# Patient Record
Sex: MALE | Race: WHITE | Employment: PART TIME | URBAN - METROPOLITAN AREA
[De-identification: names, ages, dates, MRNs, and addresses within clinical notes are randomized per-mention and may not be internally consistent; named-entity substitution may affect disease eponyms.]

---

## 2017-10-17 ENCOUNTER — HOSPITAL ENCOUNTER (INPATIENT)
Facility: CLINIC | Age: 57
LOS: 2 days | Discharge: ANOTHER HOSPITAL | End: 2017-10-19
Attending: INTERNAL MEDICINE | Admitting: INTERNAL MEDICINE
Payer: MEDICAID

## 2017-10-17 ENCOUNTER — APPOINTMENT (OUTPATIENT)
Dept: CT IMAGING | Facility: CLINIC | Age: 57
End: 2017-10-17
Attending: INTERNAL MEDICINE
Payer: MEDICAID

## 2017-10-17 ENCOUNTER — APPOINTMENT (OUTPATIENT)
Dept: GENERAL RADIOLOGY | Facility: CLINIC | Age: 57
End: 2017-10-17
Attending: INTERNAL MEDICINE
Payer: MEDICAID

## 2017-10-17 DIAGNOSIS — K92.2 GI BLEED: ICD-10-CM

## 2017-10-17 LAB
ALBUMIN SERPL-MCNC: 2.9 G/DL (ref 3.4–5)
ALP SERPL-CCNC: 66 U/L (ref 40–150)
ALT SERPL W P-5'-P-CCNC: 10 U/L (ref 0–70)
ANION GAP SERPL CALCULATED.3IONS-SCNC: 6 MMOL/L (ref 3–14)
ANISOCYTOSIS BLD QL SMEAR: ABNORMAL
APTT PPP: 27 SEC (ref 22–37)
AST SERPL W P-5'-P-CCNC: 10 U/L (ref 0–45)
BASOPHILS # BLD AUTO: 0 10E9/L (ref 0–0.2)
BASOPHILS NFR BLD AUTO: 0.1 %
BILIRUB SERPL-MCNC: 0.3 MG/DL (ref 0.2–1.3)
BLD PROD TYP BPU: NORMAL
BLD UNIT ID BPU: 0
BLOOD PRODUCT CODE: NORMAL
BPU ID: NORMAL
BUN SERPL-MCNC: 22 MG/DL (ref 7–30)
CALCIUM SERPL-MCNC: 7.8 MG/DL (ref 8.5–10.1)
CHLORIDE SERPL-SCNC: 109 MMOL/L (ref 94–109)
CO2 BLDCOV-SCNC: 20 MMOL/L (ref 21–28)
CO2 SERPL-SCNC: 24 MMOL/L (ref 20–32)
CREAT SERPL-MCNC: 0.91 MG/DL (ref 0.66–1.25)
DACRYOCYTES BLD QL SMEAR: SLIGHT
DIFFERENTIAL METHOD BLD: ABNORMAL
EOSINOPHIL # BLD AUTO: 0 10E9/L (ref 0–0.7)
EOSINOPHIL NFR BLD AUTO: 0.1 %
ERYTHROCYTE [DISTWIDTH] IN BLOOD BY AUTOMATED COUNT: 20.9 % (ref 10–15)
ETHANOL SERPL-MCNC: <0.01 G/DL
GFR SERPL CREATININE-BSD FRML MDRD: 87 ML/MIN/1.7M2
GLUCOSE SERPL-MCNC: 159 MG/DL (ref 70–99)
HCT VFR BLD AUTO: 13.3 % (ref 40–53)
HEMOCCULT STL QL: POSITIVE
HGB BLD-MCNC: 3.5 G/DL (ref 13.3–17.7)
HYPOCHROMIA BLD QL: PRESENT
IMM GRANULOCYTES # BLD: 0.1 10E9/L (ref 0–0.4)
IMM GRANULOCYTES NFR BLD: 0.8 %
INR PPP: 1.26 (ref 0.86–1.14)
LACTATE BLD-SCNC: 3 MMOL/L (ref 0.7–2.1)
LYMPHOCYTES # BLD AUTO: 2.1 10E9/L (ref 0.8–5.3)
LYMPHOCYTES NFR BLD AUTO: 13.6 %
MCH RBC QN AUTO: 17.5 PG (ref 26.5–33)
MCHC RBC AUTO-ENTMCNC: 26.3 G/DL (ref 31.5–36.5)
MCV RBC AUTO: 67 FL (ref 78–100)
MICROCYTES BLD QL SMEAR: PRESENT
MONOCYTES # BLD AUTO: 1.3 10E9/L (ref 0–1.3)
MONOCYTES NFR BLD AUTO: 8.2 %
NEUTROPHILS # BLD AUTO: 12.1 10E9/L (ref 1.6–8.3)
NEUTROPHILS NFR BLD AUTO: 77.2 %
NRBC # BLD AUTO: 0 10*3/UL
NRBC BLD AUTO-RTO: 0 /100
OVALOCYTES BLD QL SMEAR: SLIGHT
PCO2 BLDV: 25 MM HG (ref 40–50)
PH BLDV: 7.52 PH (ref 7.32–7.43)
PLATELET # BLD AUTO: 379 10E9/L (ref 150–450)
PLATELET # BLD EST: NORMAL 10*3/UL
PO2 BLDV: 19 MM HG (ref 25–47)
POTASSIUM SERPL-SCNC: 3.7 MMOL/L (ref 3.4–5.3)
PROT SERPL-MCNC: 5.9 G/DL (ref 6.8–8.8)
RBC # BLD AUTO: 2 10E12/L (ref 4.4–5.9)
SAO2 % BLDV FROM PO2: 39 %
SODIUM SERPL-SCNC: 139 MMOL/L (ref 133–144)
TRANSFUSION STATUS PATIENT QL: NORMAL
WBC # BLD AUTO: 15.7 10E9/L (ref 4–11)

## 2017-10-17 PROCEDURE — 25000125 ZZHC RX 250: Performed by: INTERNAL MEDICINE

## 2017-10-17 PROCEDURE — 87640 STAPH A DNA AMP PROBE: CPT | Performed by: INTERNAL MEDICINE

## 2017-10-17 PROCEDURE — 71010 XR CHEST PORT 1 VW: CPT

## 2017-10-17 PROCEDURE — 85730 THROMBOPLASTIN TIME PARTIAL: CPT | Performed by: INTERNAL MEDICINE

## 2017-10-17 PROCEDURE — 40000986 XR CHEST PORT 1 VW

## 2017-10-17 PROCEDURE — 25000128 H RX IP 250 OP 636: Performed by: INTERNAL MEDICINE

## 2017-10-17 PROCEDURE — 82803 BLOOD GASES ANY COMBINATION: CPT

## 2017-10-17 PROCEDURE — 85610 PROTHROMBIN TIME: CPT | Performed by: INTERNAL MEDICINE

## 2017-10-17 PROCEDURE — 86900 BLOOD TYPING SEROLOGIC ABO: CPT | Performed by: INTERNAL MEDICINE

## 2017-10-17 PROCEDURE — 20000003 ZZH R&B ICU

## 2017-10-17 PROCEDURE — 86901 BLOOD TYPING SEROLOGIC RH(D): CPT | Performed by: INTERNAL MEDICINE

## 2017-10-17 PROCEDURE — P9059 PLASMA, FRZ BETWEEN 8-24HOUR: HCPCS | Performed by: INTERNAL MEDICINE

## 2017-10-17 PROCEDURE — 83605 ASSAY OF LACTIC ACID: CPT

## 2017-10-17 PROCEDURE — 40000344 ZZHCL STATISTIC THAWING COMPONENT: Performed by: INTERNAL MEDICINE

## 2017-10-17 PROCEDURE — 85025 COMPLETE CBC W/AUTO DIFF WBC: CPT | Performed by: INTERNAL MEDICINE

## 2017-10-17 PROCEDURE — 87641 MR-STAPH DNA AMP PROBE: CPT | Performed by: INTERNAL MEDICINE

## 2017-10-17 PROCEDURE — 80053 COMPREHEN METABOLIC PANEL: CPT | Performed by: INTERNAL MEDICINE

## 2017-10-17 PROCEDURE — 36569 INSJ PICC 5 YR+ W/O IMAGING: CPT

## 2017-10-17 PROCEDURE — 82272 OCCULT BLD FECES 1-3 TESTS: CPT | Performed by: INTERNAL MEDICINE

## 2017-10-17 PROCEDURE — 86850 RBC ANTIBODY SCREEN: CPT | Performed by: INTERNAL MEDICINE

## 2017-10-17 PROCEDURE — 25000131 ZZH RX MED GY IP 250 OP 636 PS 637: Performed by: INTERNAL MEDICINE

## 2017-10-17 PROCEDURE — S0164 INJECTION PANTROPRAZOLE: HCPCS | Performed by: INTERNAL MEDICINE

## 2017-10-17 PROCEDURE — 80320 DRUG SCREEN QUANTALCOHOLS: CPT | Performed by: INTERNAL MEDICINE

## 2017-10-17 PROCEDURE — P9016 RBC LEUKOCYTES REDUCED: HCPCS | Performed by: INTERNAL MEDICINE

## 2017-10-17 PROCEDURE — 00000146 ZZHCL STATISTIC GLUCOSE BY METER IP

## 2017-10-17 PROCEDURE — 70450 CT HEAD/BRAIN W/O DYE: CPT

## 2017-10-17 PROCEDURE — 86923 COMPATIBILITY TEST ELECTRIC: CPT | Performed by: INTERNAL MEDICINE

## 2017-10-17 PROCEDURE — 99223 1ST HOSP IP/OBS HIGH 75: CPT | Mod: AI | Performed by: INTERNAL MEDICINE

## 2017-10-17 RX ORDER — LORAZEPAM 2 MG/ML
1-2 INJECTION INTRAMUSCULAR EVERY 30 MIN PRN
Status: DISCONTINUED | OUTPATIENT
Start: 2017-10-17 | End: 2017-10-19 | Stop reason: HOSPADM

## 2017-10-17 RX ORDER — ONDANSETRON 4 MG/1
4 TABLET, ORALLY DISINTEGRATING ORAL EVERY 6 HOURS PRN
Status: DISCONTINUED | OUTPATIENT
Start: 2017-10-17 | End: 2017-10-19 | Stop reason: HOSPADM

## 2017-10-17 RX ORDER — ONDANSETRON 2 MG/ML
4 INJECTION INTRAMUSCULAR; INTRAVENOUS ONCE
Status: DISCONTINUED | OUTPATIENT
Start: 2017-10-17 | End: 2017-10-17

## 2017-10-17 RX ORDER — NALOXONE HYDROCHLORIDE 0.4 MG/ML
.1-.4 INJECTION, SOLUTION INTRAMUSCULAR; INTRAVENOUS; SUBCUTANEOUS
Status: DISCONTINUED | OUTPATIENT
Start: 2017-10-17 | End: 2017-10-19

## 2017-10-17 RX ORDER — ONDANSETRON 2 MG/ML
4 INJECTION INTRAMUSCULAR; INTRAVENOUS EVERY 6 HOURS PRN
Status: DISCONTINUED | OUTPATIENT
Start: 2017-10-17 | End: 2017-10-19 | Stop reason: HOSPADM

## 2017-10-17 RX ORDER — ACETAMINOPHEN 325 MG/1
650 TABLET ORAL EVERY 4 HOURS PRN
Status: DISCONTINUED | OUTPATIENT
Start: 2017-10-17 | End: 2017-10-19 | Stop reason: HOSPADM

## 2017-10-17 RX ORDER — OCTREOTIDE ACETATE 50 UG/ML
50 INJECTION, SOLUTION INTRAVENOUS; SUBCUTANEOUS ONCE
Status: COMPLETED | OUTPATIENT
Start: 2017-10-17 | End: 2017-10-17

## 2017-10-17 RX ORDER — ONDANSETRON 2 MG/ML
4 INJECTION INTRAMUSCULAR; INTRAVENOUS ONCE
Status: COMPLETED | OUTPATIENT
Start: 2017-10-17 | End: 2017-10-17

## 2017-10-17 RX ORDER — LORAZEPAM 1 MG/1
1-2 TABLET ORAL EVERY 30 MIN PRN
Status: DISCONTINUED | OUTPATIENT
Start: 2017-10-17 | End: 2017-10-19 | Stop reason: HOSPADM

## 2017-10-17 RX ORDER — SODIUM CHLORIDE 9 MG/ML
INJECTION, SOLUTION INTRAVENOUS CONTINUOUS
Status: DISCONTINUED | OUTPATIENT
Start: 2017-10-17 | End: 2017-10-19 | Stop reason: HOSPADM

## 2017-10-17 RX ORDER — HYDROMORPHONE HYDROCHLORIDE 1 MG/ML
.3-.5 INJECTION, SOLUTION INTRAMUSCULAR; INTRAVENOUS; SUBCUTANEOUS
Status: DISCONTINUED | OUTPATIENT
Start: 2017-10-17 | End: 2017-10-19 | Stop reason: HOSPADM

## 2017-10-17 RX ORDER — SODIUM CHLORIDE 9 MG/ML
1000 INJECTION, SOLUTION INTRAVENOUS CONTINUOUS
Status: DISCONTINUED | OUTPATIENT
Start: 2017-10-17 | End: 2017-10-17

## 2017-10-17 RX ADMIN — ONDANSETRON 4 MG: 2 INJECTION INTRAMUSCULAR; INTRAVENOUS at 18:01

## 2017-10-17 RX ADMIN — SODIUM CHLORIDE 1000 ML: 9 INJECTION, SOLUTION INTRAVENOUS at 17:00

## 2017-10-17 RX ADMIN — SODIUM CHLORIDE 8 MG/HR: 9 INJECTION, SOLUTION INTRAVENOUS at 20:37

## 2017-10-17 RX ADMIN — SODIUM CHLORIDE 80 MG: 9 INJECTION, SOLUTION INTRAVENOUS at 20:20

## 2017-10-17 RX ADMIN — OCTREOTIDE ACETATE 50 MCG/HR: 200 INJECTION, SOLUTION INTRAVENOUS; SUBCUTANEOUS at 17:51

## 2017-10-17 RX ADMIN — OCTREOTIDE ACETATE 50 MCG: 50 INJECTION, SOLUTION INTRAVENOUS; SUBCUTANEOUS at 17:50

## 2017-10-17 RX ADMIN — FOLIC ACID: 5 INJECTION, SOLUTION INTRAMUSCULAR; INTRAVENOUS; SUBCUTANEOUS at 22:01

## 2017-10-17 ASSESSMENT — ENCOUNTER SYMPTOMS
NAUSEA: 1
DIARRHEA: 1
BLOOD IN STOOL: 1
ABDOMINAL PAIN: 1

## 2017-10-17 ASSESSMENT — ACTIVITIES OF DAILY LIVING (ADL): WHICH_OF_THE_ABOVE_FUNCTIONAL_RISKS_HAD_A_RECENT_ONSET_OR_CHANGE?: FALL HISTORY

## 2017-10-17 NOTE — ED NOTES
Lightheaded and dizzy for the past couple days.  Noticed black tarry stools and feeling short of breath.  States drinking a lot lately.  Fell at home on Sunday night after drinking heavily. Very pale.  Patient alert and oriented x3.  Airway and breathing  intact.

## 2017-10-17 NOTE — ED PROVIDER NOTES
"  History     Chief Complaint:  Dizziness    HPI   Dashawn Ordoñez is a 57 year old male with a history of alcohol abuse who presents with dizziness. The patient reports that two days ago he was drinking some milk when he lost consciousness and fell backwards, hitting his head on the floor. He has noted having some abdominal pain and diarrhea recently with some black tarry bowel movements.The patient notes that he no longer drinks every day and that Sunday before the fall he had not had any alcohol since 1500.    Allergies:  No Known Drug Allergies      Medications:    The patient is not currently taking any prescribed medications.     Past Medical History:    Depressive disorder  Diabetes  Hypertension  Alcohol abuse    Past Surgical History:    Appendectomy  ENT surgery    Family History:    The patient denies any relevant family medical history.     Social History:  The patient was accompanied to the ED by his wife.  Smoking Status: yes  Smokeless Tobacco: No  Alcohol Use: Yes   Marital Status:   [2]    Review of Systems   Gastrointestinal: Positive for abdominal pain, blood in stool, diarrhea and nausea.   Neurological: Positive for syncope.   All other systems reviewed and are negative.    Physical Exam   Vitals;  Patient Vitals for the past 24 hrs:   BP Temp Temp src Pulse Heart Rate Resp SpO2 Height Weight   10/17/17 1604 106/62 98.5  F (36.9  C) Oral 109 109 24 100 % 1.778 m (5' 10\") 74.8 kg (165 lb)      Physical Exam   Constitutional: He is cooperative.   HENT:   Right Ear: Tympanic membrane normal.   Left Ear: Tympanic membrane normal.   Mouth/Throat: Oropharynx is clear and moist and mucous membranes are normal.   Eyes: Conjunctivae are normal.   Neck: Normal range of motion.   Cardiovascular: Regular rhythm and normal heart sounds.    Pulmonary/Chest: Effort normal and breath sounds normal.   Abdominal: Soft. Normal appearance and bowel sounds are normal. There is tenderness in the epigastric area. " There is no rebound and no guarding.   Genitourinary: Rectal exam shows guaiac positive stool.   Genitourinary Comments: Melanotic stool   Musculoskeletal: Normal range of motion.   Lymphadenopathy:     He has no cervical adenopathy.   Neurological: He is alert.   Skin: Skin is warm and dry. There is pallor.   Psychiatric: He has a normal mood and affect.       Emergency Department Course   ECG:  ECG taken at 1723, ECG read at 1747  Normal sinus rhythm. Right bundle branch block that is new. Abnormal ECG  Rate 100 bpm. [place] interval 140. QRS duration 130. QT/QTc 416/536. P-R-T axes 0, 5, 23.     Imaging:  Radiology findings were communicated with the patient who voiced understanding of the findings.  Chest XR, 1 view portable:  IMPRESSION: The lungs appear clear. No pleural effusion.  Reading per radiology.     Laboratory:  Laboratory findings were communicated with the patient who voiced understanding of the findings.  ISTAT gases lactate venous: pH: 7.52, PCO2: 25(L), PO2: 19(L), Bicarbonate: 20(L), O2 sat: 39, Lactic acid: 3.0(H)  CBC: WBC: 15.7(H), RBC: 2.00(L), HGB: 3.5(LL), HCT: 13.3(L), MCV: 67(L), MCH: 17.5(L), MCHC: 26.3(L), RDW: 20.9(H), o/w WNL ()  CMP: Glucose: 159(H), Calcium: 7.8(L), Albumin: 2.9(L), Protein total: 5.9(L), o/w WNL (Creatinine 0.91)   ABO/Rh type and screen: O negative, antibody screen: negative, 4 units ordered  Alcohol level: <0.01  INR: 1.26(H)  PTT: 27  Stool occult blood: Positive    Interventions:  1750 Sandostatin 50 mcg IV  1751 Sandostatin 50 mcg/hr IV  1801 Zofran 4 mg IV   1804 Normal Saline 1000 mL IV   4 units of blood administered    Emergency Department Course:  Nursing notes and vitals reviewed.  I performed an exam of the patient as documented above.   IV was inserted and blood was drawn for laboratory testing, results above.     1726 I spoke with Dr Paulino martha [facility] regarding the patient    I discussed the treatment plan with the patient. They expressed  understanding of this plan and consented to admission. I discussed the patient with Dr Eckert, who will admit the patient to a monitored bed for further evaluation and treatment.     I personally reviewed the laboratory results with the Patient and answered all related questions prior to discharge.    Impression & Plan      Medical Decision Making:  Dashawn Ordoñez is a 57 year old male with a long history of alcohol abuse who presents to the emergency department today complaining of weakness and dark stool. He does have GI bleed with melanotic stool as well as remarkable anemia. He has a low MCV suggesting this is at least chronic to some extent. He has not actually vomited any blood. He does not have known cirrhosis or esophageal varices but given his risk factors, I have started octreotide. Recurrent epigastric distress which could indicate ulcer. He is suprisingly hemodynamically stable given his anemia. Blood has been ordered and is being transfused. We are having difficulty with peripheral access and PICC nurse is coming to establish a triple lumen line. As noted, I discussed the case with gastroenterology who did not feel the patient required immediate endoscopy unless he should have higher volume blood loss or vomiting blood. Given his recent fall and mild coagulopathy head CT is ordered. I discussed the case with Dr. Eckert of the hospitalist service. Patient will be admitted to the ICU for further evaluation and treatment.     Total critical care time was 50 minutes not including procedures    Diagnosis:    ICD-10-CM    1. GI bleed K92.2         Disposition:   Admitted    CMS Diagnoses: None     Scribe Disclosure:  Lucio ARZOLA, am serving as a scribe at 4:50 PM on 10/17/2017 to document services personally performed by Jaylyn Bradford MD, based on my observations and the provider's statements to me.   [facility] EMERGENCY DEPARTMENT       Jaylyn Bradford MD  10/17/17  1909

## 2017-10-18 LAB
ALBUMIN SERPL-MCNC: 2.7 G/DL (ref 3.4–5)
ALP SERPL-CCNC: 58 U/L (ref 40–150)
ALT SERPL W P-5'-P-CCNC: 12 U/L (ref 0–70)
ANION GAP SERPL CALCULATED.3IONS-SCNC: 5 MMOL/L (ref 3–14)
AST SERPL W P-5'-P-CCNC: 8 U/L (ref 0–45)
BILIRUB SERPL-MCNC: 0.5 MG/DL (ref 0.2–1.3)
BLD PROD TYP BPU: NORMAL
BLD UNIT ID BPU: 0
BLOOD PRODUCT CODE: NORMAL
BPU ID: NORMAL
BUN SERPL-MCNC: 14 MG/DL (ref 7–30)
CALCIUM SERPL-MCNC: 7.1 MG/DL (ref 8.5–10.1)
CHLORIDE SERPL-SCNC: 113 MMOL/L (ref 94–109)
CO2 SERPL-SCNC: 24 MMOL/L (ref 20–32)
CREAT SERPL-MCNC: 0.88 MG/DL (ref 0.66–1.25)
ERYTHROCYTE [DISTWIDTH] IN BLOOD BY AUTOMATED COUNT: 21.2 % (ref 10–15)
GFR SERPL CREATININE-BSD FRML MDRD: >90 ML/MIN/1.7M2
GLUCOSE BLDC GLUCOMTR-MCNC: 122 MG/DL (ref 70–99)
GLUCOSE BLDC GLUCOMTR-MCNC: 135 MG/DL (ref 70–99)
GLUCOSE BLDC GLUCOMTR-MCNC: 135 MG/DL (ref 70–99)
GLUCOSE BLDC GLUCOMTR-MCNC: 137 MG/DL (ref 70–99)
GLUCOSE BLDC GLUCOMTR-MCNC: 138 MG/DL (ref 70–99)
GLUCOSE BLDC GLUCOMTR-MCNC: 158 MG/DL (ref 70–99)
GLUCOSE SERPL-MCNC: 137 MG/DL (ref 70–99)
HBA1C MFR BLD: 5.6 % (ref 4.3–6)
HCT VFR BLD AUTO: 19.6 % (ref 40–53)
HGB BLD-MCNC: 5.9 G/DL (ref 13.3–17.7)
HGB BLD-MCNC: 7.4 G/DL (ref 13.3–17.7)
HGB BLD-MCNC: 7.8 G/DL (ref 13.3–17.7)
INTERPRETATION ECG - MUSE: NORMAL
INTERPRETATION ECG - MUSE: NORMAL
MCH RBC QN AUTO: 22.5 PG (ref 26.5–33)
MCHC RBC AUTO-ENTMCNC: 30.1 G/DL (ref 31.5–36.5)
MCV RBC AUTO: 75 FL (ref 78–100)
MRSA DNA SPEC QL NAA+PROBE: NEGATIVE
PLATELET # BLD AUTO: 246 10E9/L (ref 150–450)
POTASSIUM SERPL-SCNC: 4.1 MMOL/L (ref 3.4–5.3)
PROT SERPL-MCNC: 5.4 G/DL (ref 6.8–8.8)
RBC # BLD AUTO: 2.62 10E12/L (ref 4.4–5.9)
SODIUM SERPL-SCNC: 142 MMOL/L (ref 133–144)
SPECIMEN SOURCE: NORMAL
TRANSFUSION STATUS PATIENT QL: NORMAL
WBC # BLD AUTO: 10.3 10E9/L (ref 4–11)

## 2017-10-18 PROCEDURE — 25000132 ZZH RX MED GY IP 250 OP 250 PS 637: Performed by: INTERNAL MEDICINE

## 2017-10-18 PROCEDURE — S0164 INJECTION PANTROPRAZOLE: HCPCS | Performed by: INTERNAL MEDICINE

## 2017-10-18 PROCEDURE — 88305 TISSUE EXAM BY PATHOLOGIST: CPT | Performed by: INTERNAL MEDICINE

## 2017-10-18 PROCEDURE — P9016 RBC LEUKOCYTES REDUCED: HCPCS | Performed by: INTERNAL MEDICINE

## 2017-10-18 PROCEDURE — 0DB68ZX EXCISION OF STOMACH, VIA NATURAL OR ARTIFICIAL OPENING ENDOSCOPIC, DIAGNOSTIC: ICD-10-PCS | Performed by: INTERNAL MEDICINE

## 2017-10-18 PROCEDURE — G0500 MOD SEDAT ENDO SERVICE >5YRS: HCPCS | Performed by: INTERNAL MEDICINE

## 2017-10-18 PROCEDURE — 99233 SBSQ HOSP IP/OBS HIGH 50: CPT | Performed by: INTERNAL MEDICINE

## 2017-10-18 PROCEDURE — 80053 COMPREHEN METABOLIC PANEL: CPT | Performed by: INTERNAL MEDICINE

## 2017-10-18 PROCEDURE — 00000158 ZZHCL STATISTIC H-FISH PROCESS B/S: Performed by: INTERNAL MEDICINE

## 2017-10-18 PROCEDURE — 25000128 H RX IP 250 OP 636: Performed by: INTERNAL MEDICINE

## 2017-10-18 PROCEDURE — 43239 EGD BIOPSY SINGLE/MULTIPLE: CPT | Performed by: INTERNAL MEDICINE

## 2017-10-18 PROCEDURE — 85027 COMPLETE CBC AUTOMATED: CPT | Performed by: INTERNAL MEDICINE

## 2017-10-18 PROCEDURE — 88305 TISSUE EXAM BY PATHOLOGIST: CPT | Mod: 26 | Performed by: INTERNAL MEDICINE

## 2017-10-18 PROCEDURE — 00000146 ZZHCL STATISTIC GLUCOSE BY METER IP

## 2017-10-18 PROCEDURE — 25000125 ZZHC RX 250: Performed by: INTERNAL MEDICINE

## 2017-10-18 PROCEDURE — 20000003 ZZH R&B ICU

## 2017-10-18 PROCEDURE — 85018 HEMOGLOBIN: CPT | Performed by: INTERNAL MEDICINE

## 2017-10-18 PROCEDURE — 88377 M/PHMTRC ALYS ISHQUANT/SEMIQ: CPT | Performed by: PATHOLOGY

## 2017-10-18 PROCEDURE — 00000159 ZZHCL STATISTIC H-SEND OUTS PREP: Performed by: INTERNAL MEDICINE

## 2017-10-18 PROCEDURE — 83036 HEMOGLOBIN GLYCOSYLATED A1C: CPT | Performed by: INTERNAL MEDICINE

## 2017-10-18 RX ORDER — FENTANYL CITRATE 50 UG/ML
50 INJECTION, SOLUTION INTRAMUSCULAR; INTRAVENOUS
Status: COMPLETED | OUTPATIENT
Start: 2017-10-18 | End: 2017-10-18

## 2017-10-18 RX ORDER — NALOXONE HYDROCHLORIDE 0.4 MG/ML
.1-.4 INJECTION, SOLUTION INTRAMUSCULAR; INTRAVENOUS; SUBCUTANEOUS
Status: DISCONTINUED | OUTPATIENT
Start: 2017-10-18 | End: 2017-10-19

## 2017-10-18 RX ORDER — FLUMAZENIL 0.1 MG/ML
0.2 INJECTION, SOLUTION INTRAVENOUS
Status: DISCONTINUED | OUTPATIENT
Start: 2017-10-18 | End: 2017-10-19

## 2017-10-18 RX ORDER — FENTANYL CITRATE 50 UG/ML
25-50 INJECTION, SOLUTION INTRAMUSCULAR; INTRAVENOUS EVERY 5 MIN PRN
Status: DISPENSED | OUTPATIENT
Start: 2017-10-18 | End: 2017-10-19

## 2017-10-18 RX ORDER — LIDOCAINE 40 MG/G
CREAM TOPICAL
Status: DISCONTINUED | OUTPATIENT
Start: 2017-10-18 | End: 2017-10-18 | Stop reason: HOSPADM

## 2017-10-18 RX ADMIN — LIDOCAINE HYDROCHLORIDE 30 ML: 20 SOLUTION ORAL; TOPICAL at 03:09

## 2017-10-18 RX ADMIN — SODIUM CHLORIDE 8 MG/HR: 9 INJECTION, SOLUTION INTRAVENOUS at 14:24

## 2017-10-18 RX ADMIN — FENTANYL CITRATE 100 MCG: 50 INJECTION INTRAMUSCULAR; INTRAVENOUS at 14:16

## 2017-10-18 RX ADMIN — LORAZEPAM 1 MG: 1 TABLET ORAL at 21:34

## 2017-10-18 RX ADMIN — SODIUM CHLORIDE: 9 INJECTION, SOLUTION INTRAVENOUS at 08:27

## 2017-10-18 RX ADMIN — ACETAMINOPHEN 650 MG: 325 TABLET, FILM COATED ORAL at 19:46

## 2017-10-18 RX ADMIN — FOLIC ACID: 5 INJECTION, SOLUTION INTRAMUSCULAR; INTRAVENOUS; SUBCUTANEOUS at 21:46

## 2017-10-18 RX ADMIN — HYDROMORPHONE HYDROCHLORIDE 0.5 MG: 1 INJECTION, SOLUTION INTRAMUSCULAR; INTRAVENOUS; SUBCUTANEOUS at 08:20

## 2017-10-18 RX ADMIN — MIDAZOLAM 2 MG: 1 INJECTION INTRAMUSCULAR; INTRAVENOUS at 14:11

## 2017-10-18 RX ADMIN — MIDAZOLAM 1 MG: 1 INJECTION INTRAMUSCULAR; INTRAVENOUS at 14:18

## 2017-10-18 RX ADMIN — SODIUM CHLORIDE 8 MG/HR: 9 INJECTION, SOLUTION INTRAVENOUS at 03:44

## 2017-10-18 RX ADMIN — SODIUM CHLORIDE: 9 INJECTION, SOLUTION INTRAVENOUS at 19:37

## 2017-10-18 ASSESSMENT — PAIN DESCRIPTION - DESCRIPTORS: DESCRIPTORS: HEADACHE

## 2017-10-18 NOTE — CONSULTS
GASTROENTEROLOGY CONSULTATION      Dashawn Ordoñez  [address] [facility] [address]  57 year old male     Admission Date/Time: 10/17/2017  Primary Care Provider: Mann Grigsby  Referring / Attending Physician:  Dr. Ekcert     We were asked to see the patient in consultation by Dr. Eckert for evaluation of abdominal pain and melena.        HPI:  Dashawn Ordoñez is a 57 year old male with medical history of diabetes mellitus, alcohol dependency, and cigarette smoking who presents to the hospital with generalized weakness and fatigue.    The patient reports he is been feeling ill for the past 2-3 weeks.  He has very little energy.  He has little appetite.  He reports chronic pain in his arms for which he often drinks alcohol in order to fall asleep.  Over the past few days he's noticed some diffuse abdominal pain and indigestion.  This typically occurs at work.  He's been using [place] times per day for relief.  He does also take a sleep aid at night which might contain ibuprofen.  Approximately two days ago he did notice black stool.  This was sticky and difficult to wipe.  He has never experienced this before.  He denies any nausea or vomiting.  He does continue to drink alcohol heavily at least three times per week.  Typically he'll consume a quart of hard alcohol at one time.  To his knowledge she does not have any liver disease.  He does not follow with any outpatient doctors.     In the emergency room he was found to be mildly hypotensive and tachycardic.  His hemoglobin was found to be 3.5.  He has received four of packed red cells since admission.  He was started on empiric octreotide and a Protonix drip.  He is currently being managed in the ICU. Patient did undergo a colonoscopy in 2011.  He cannot recall the results at this time.       PAST MEDICAL HISTORY:  Patient Active Problem List    Diagnosis Date Noted     Acute upper gastrointestinal bleeding 10/17/2017     Priority: Medium      "Alcohol abuse with alcohol-induced anxiety disorder (H) 06/27/2014     Priority: Medium          ROS: A comprehensive ten point review of systems was negative aside from those in mentioned in the HPI.       MEDICATIONS:   Prior to Admission medications    Medication Sig Start Date End Date Taking? Authorizing Provider   sodium-potassium bicarbonate (RADHA-SELTZER GOLD) TBEF solu-tab Take 1 tablet by mouth once   Yes Reported, Patient   IBUPROFEN PO Take 600 mg by mouth every 6 hours as needed for moderate pain   Yes Unknown, Entered By History   UNKNOWN TO PATIENT Take 1 tablet by mouth At Bedtime Sleep aid OTC   Yes Unknown, Entered By History        ALLERGIES: No Known Allergies     SOCIAL HISTORY:  Social History   Substance Use Topics     Smoking status: Current Every Day Smoker     Packs/day: 2.00     Smokeless tobacco: Never Used     Alcohol use Yes      Comment: daily / hard liq;        FAMILY HISTORY: No history of liver disease       PHYSICAL EXAM:     /80  Pulse 109  Temp 98  F (36.7  C) (Oral)  Resp 12  Ht 1.778 m (5' 10\")  Wt 78 kg (171 lb 15.3 oz)  SpO2 100%  BMI 24.67 kg/m2     PHYSICAL EXAM:  GENERAL: No acute distress  SKIN: no suspicious lesions, rashes, jaundice  HEAD: Normocephalic. Atraumatic.  NECK: Neck supple. No adenopathy.   EYES: No scleral icterus  RESPIRATORY: Good transmission. CTA bilaterally.   CARDIOVASCULAR: RRR, normal S1, S2,  No murmur appreciated  GASTROINTESTINAL: +BS, soft, diffusely tender throughout, non distended, no guarding/rebound  JOINT/EXTREMITIES:  no gross deformities noted, normal muscle tone  NEURO: CN 2-12 grossly intact, no focal deficits  PSYCH: Normal affect        ADDITIONAL COMMENTS:   I reviewed the patient's new clinical lab test results.   Recent Labs   Lab Test  10/18/17   0559  10/17/17   1630  05/04/15   2002   WBC  10.3  15.7*  11.5*   HGB  5.9*  3.5*  17.0   MCV  75*  67*  89   PLT  246  379  190   INR   --   1.26*   --      Recent Labs "   Lab Test  10/18/17   0559  10/17/17   1630  05/04/15   2002   POTASSIUM  4.1  3.7  4.0   CHLORIDE  113*  109  106   CO2  24  24  24   BUN  14  22  16   ANIONGAP  5  6  7     Recent Labs   Lab Test  10/18/17   0559  10/17/17   1630  05/04/15   2044  05/04/15   2002   06/21/12   1245   ALBUMIN  2.7*  2.9*   --   4.3   < >   --    BILITOTAL  0.5  0.3   --   0.4   < >   --    ALT  12  10   --   43   < >   --    AST  8  10   --   24   < >   --    PROTEIN   --    --   30*   --    --   10*   LIPASE   --    --    --   193   --    --     < > = values in this interval not displayed.           CONSULTATION ASSESSMENT AND PLAN:    Dashawn Ordoñez is a 57 year old male with medical history of diabetes mellitus, alcohol dependency, and cigarette smoking who presents to the hospital with generalized weakness and fatigue and found to have melena and a hemoglobin of 3.5.    1.  Melena/upper GI bleeding: Melena has been present for the past few days.  There appears to be a chronic component to his blood loss given how low his hemoglobin dropped.  Hemoglobin is 5.9 this morning.  He is at high risk for peptic ulcer disease given smoking and alcohol use in addition to intermittent NSAIDs.  Concern for gastric or duodenal ulcer, alcoholic gastritis or esophagitis, esophageal varices are a consideration.  CT of the abdomen and pelvis from 2015 reveals fatty infiltration of the liver with multiple cysts in the left lobe of the liver. No evidence of portal hypertension.     -- Plan for upper endoscopy this afternoon with Dr. Mclean.  -- Continue PPI infusion   -- Continue octreotide until EGD  -- He has received four units of packed red cells.  Monitor serial hemoglobin, transfusions as needed.    2.  Alcohol dependency: Ongoing heavy alcohol use.  Continue to monitor for withdrawal.  LFTs are normal.  Platelets are stable at 246,000.  INR is slightly elevated at 1.26.  Agree with abdominal ultrasound prior to discharge.  CT from 2015 with  fatty liver.  No evidence of encephalopathy.      I discussed the patient plan with Dr. Mclean. Thank you for asking us to participate in the care of this patient.    Mere Emery PA-C  [facility]

## 2017-10-18 NOTE — PROGRESS NOTES
Pt tolerated routine PICC placement to Right Brachial medial vein.  Good blood flow and flushes without difficulty.  OK to use.    XR CHEST PORT 1 VW  10/17/2017 8:01 PM      HISTORY:  PICC     COMPARISON: None.     FINDINGS:  Right PICC line has been inserted. The tip is at the  junction of the superior vena cava and right atrium. Lungs remain  clear.         IMPRESSION: PICC line in good position.

## 2017-10-18 NOTE — PROGRESS NOTES
[facility]  Hospitalist Progress Note    Name: Dashawn Ordoñez    MRN: [id number]  Provider:  Jono Pruett MD, [facility]    Date of Service: 10/18/2017     Reason for Stay (Diagnosis): GI bleeding         Summary of hospital stay & Assessment/Plan:   Summary of Stay: Dashawn Ordoñez is a 57 year old male who was admitted on 10/17/2017  57 year old male with PMH including DM, alcohol and tobacco dependence who presents with weakness and light headedness.  For the past 2-3 weeks he has felt overall fatigued.  He has had progressive limitations in exertional capacity due to sob and weakness.  Has had some chest and upper abdominal pain that he felt was indigestion.  The pain was not exertional.  It was moderate in intensity and he has been taking a lot of isacc seltzer, which he notes has an nsaid or aspirin in it. The stools are black and tarry.  He continues to drink heavily 2-3 times per week, up to a quart of bacardi at a time.  Has had withdrawal in the past.  Last etoh was 2 days pta.      Problem List:   Acute blood loss anemia likely secondary to upper GI bleeding, hemoglobin on presentation 3.5  EtOH dependency, heavy use especially over the weekend  History of diabetes mellitus, used to be previously on metformin, seems to be hyperglycemic now A1c 5.6    Transfuse to a hemoglobin above 7  Give 2 units of FFP in addition to red blood cells  Continue intravenous Protonix for now keep on octreotide until EGD is done  Upper GI endoscopy this morning, patient currently in the endoscopy suite  Watch blood sugar  Watch for withdrawal      DVT Prophylaxis: Pneumatic Compression Devices  Code Status:  Full Code    Disposition Plan   Expected discharge in 2 days to prior living arrangement once hemoglobin above 7, no evidence of ongoing bleeding and cleared by gastroenterology.     Entered: Jono Pruett 10/18/2017, 9:52 AM                 Interval History:       Feeling better this morning, no dizziness  no chest pain or shortness of breath    Case discussed with patient nurse and ICU MD             Physical Exam:   Physical Exam   Temp: 98.3  F (36.8  C) Temp src: Oral BP: 126/84 Pulse: 109 Heart Rate: 78 Resp: 20 SpO2: 99 % O2 Device: None (Room air) Oxygen Delivery: 3 LPM  Vitals:    10/17/17 1604 10/17/17 2135   Weight: 74.8 kg (165 lb) 78 kg (171 lb 15.3 oz)     I/O last 3 completed shifts:  In: 1134.5 [I.V.:215.33]  Out: 1000 [Urine:1000]        GENERAL:  Comfortable.   PSYCH: pleasant, oriented, No acute distress.  EYES: PERRLA, Normal conjunctiva.  HEART:  Normal S1, S2 with no edema.  LUNGS:  Clear to auscultation, normal Respiratory effort.  ABDOMEN:  Soft, no hepatosplenomegaly, normal bowel sounds.  SKIN:  Dry to touch, No rash.  Neuro: Non focal with normal motor power, sensation, CN's and Reflexes.    Medications     pantoprazole (PROTONIX) infusion ADULT/PEDS GREATER than or EQUAL to 45 kg 8 mg/hr (10/18/17 0801)     octreotide (sandoSTATIN) infusion ADULT 50 mcg/hr (10/18/17 0800)     NaCl 100 mL/hr at 10/18/17 0827       IV Fluid with vitamins   Intravenous Q24H     Data     -Data reviewed today:  I personally reviewed  all new labs and imaging results over the last 24 hours.      Recent Labs  Lab 10/18/17  0559 10/17/17  1630   WBC 10.3 15.7*   HGB 5.9* 3.5*   HCT 19.6* 13.3*   MCV 75* 67*    379       Recent Labs  Lab 10/18/17  0559 10/17/17  1630    139   POTASSIUM 4.1 3.7   CHLORIDE 113* 109   CO2 24 24   ANIONGAP 5 6   * 159*   BUN 14 22   CR 0.88 0.91   GFRESTIMATED >90 87   GFRESTBLACK >90 >90   JULIO CESAR 7.1* 7.8*       Recent Results (from the past 24 hour(s))   Chest  XR, 1 view PORTABLE    Narrative    XR CHEST PORT 1 VW 10/17/2017 5:53 PM     HISTORY: GI bleed      Impression    IMPRESSION: The lungs appear clear. No pleural effusion.    NAHOMY MG MD   Head CT w/o contrast    Narrative    CT HEAD W/O CONTRAST   10/17/2017 7:09 PM     HISTORY: fall, head  injury    TECHNIQUE:  Axial images of the head without IV contrast material.  Radiation dose for this scan was reduced using automated exposure  control, adjustment of the mA and/or kV according to patient size, or  iterative reconstruction technique.    COMPARISON: None.    FINDINGS: The ventricles are normal in size, shape and configuration.  The brain parenchyma and subarachnoid spaces are normal. There is no  evidence of intracranial hemorrhage, mass, acute infarct or anomaly.  The visualized portions of the sinuses and mastoids appear normal. No  intracranial hemorrhage or skull fractures are seen..      Impression    IMPRESSION:  No bleed or fracture is identified.    DENNIS ISLAS MD   Chest  XR, 1 view PORTABLE    Narrative    XR CHEST PORT 1 VW  10/17/2017 8:01 PM     HISTORY:  PICC    COMPARISON: None.    FINDINGS:  Right PICC line has been inserted. The tip is at the  junction of the superior vena cava and right atrium. Lungs remain  clear.      Impression    IMPRESSION: PICC line in good position.    DENNIS ISLAS MD       This document was produced using voice recognition software

## 2017-10-18 NOTE — H&P
[facility]  Hospitalist Admission Note  Name: Dashawn Ordoñez    MRN: [id number]  YOB: 1960    Age: 57 year old  Date of admission: 10/17/2017  Primary care provider: Mann Grigsby    Chief Complaint:  Weakness, possible syncope    Assessment and Plan:   1.   Acute upper GI bleed: presenting symptoms of weakness, light headedness, and sob.  Due to his anemia.  Symptoms have been going on for 2 weeks or so and he has also been taking isacc seltzer multiple times every day for some abdominal and chest discomfort that he felt was indigestion.  He does report there is an nsaid in the formulary of isacc seltzer he is using.  Highly suspicious for PUD.  Alternatively he does drink liquor heavily 3x per week, up to a quart per time.  No hx of cirrhosis or varices.  [facility] notified in ED.  Given likely some chronicity to this and he is doing better after 2 units rbcs he will have EGD tomorrow.  -NPO  -protonix and octreotide gtt  -[facility] consulted, EGD tomorrow  -no nsaids  -zofran prn    2.   Acute blood loss anemia: Hg 3.5.  No recent Hg on file, but previously wnl.  Obvious GI losses, most likely upper with melena and positive occult blood testing.  Mildly hypotensive and tachycardic on arrival, but certainly symptomatic given his progressive weakness, light headedness, and sob.  Suspect there must be some chronic component to this for him to be able to walk in at 3.5.  Has MCV in the 60s so likely significantly iron deficient which some will be replaced with blood transfusions.   -4 units RBCs ordered in ED  -will order unit of FFP per intensivist  -repeat hemoglobin 1-2 hours after received units  -no longer hypotensive or tachycardic after 2 units RBCs and feeling better  -has PICC    3.   Elevated lactate: lactate 3.0.  Secondary to hypoperfusion from severe anemia.  No sign of infection or sepsis.  Give fluids and RBCs.    4.   Alcohol dependence: hx of heavy etoh use.  More recently  drinks 2-3 times per week, up to 1 quart of bacardi at a time.  Hx of withdrawal per spouse, patient does not like to admit.  No seizure hx.  Last etoh 2 days pta.  No sign of withdrawal now.    -ciwa with ativan prn  -chem dep when recovered from acute bleed  -banana bag  -would obtain abdominal US to evaluate for cirrhosis at some point    5.   Hx DM2: previously on metformin then A1c better so his pcp told him to stop per patient. BG in 100s here.  -a1c in am    DVT Prophylaxis: Pneumatic Compression Devices  Code Status: Full Code  FEN: npo, 100ml/hr NS  Discharge Dispo: home  Estimated Disch Date / # of Days until Disch: admit to inpatient/ICU for acute GI bleed.  Anticipate EGD and minimal 2 night stay      History of Present Illness:  Dashawn Ordoñez is a 57 year old male with PMH including DM, alcohol and tobacco dependence who presents with weakness and light headedness.  For the past 2-3 weeks he has felt overall fatigued.  He has had progressive limitations in exertional capacity due to sob and weakness.  Has had some chest and upper abdominal pain that he felt was indigestion.  The pain was not exertional.  It was moderate in intensity and he has been taking a lot of isacc seltzer, which he notes has an nsaid or aspirin in it.  He has been feeling more light headed when up and in fact fell on Sunday from this and hit his head.  He does not think he had LOC.  He noticed some abdominal pain and diarrhea the past day or two.  The stools are black and tarry.  He continues to drink heavily 2-3 times per week, up to a quart of bacardi at a time.  Has had withdrawal in the past.  Last etoh was 2 days pta.      History obtained from patient, medical record, and from Dr. Bradford in the emergency department.  Patient was mildly hypotensive and tachycardic in the ED.  HG 3.5.  4 units RBCs ordered and given fluids.  PICC placed along with PIV.  Vitals improved with 2 units blood so far and he feels better.  [facility]  "consulted.  Placed on octreotide and protonix gtt.  Admit to ICU.  EGD in morning.  Follow Hg.     Past Medical History reviewed:  Past Medical History:   Diagnosis Date     Depressive disorder      Diabetes (H)      Hypertension      Substance abuse      Past Surgical History reviewed:  Past Surgical History:   Procedure Laterality Date     APPENDECTOMY       ENT SURGERY       Social History reviewed:  Social History   Substance Use Topics     Smoking status: Current Every Day Smoker     Packs/day: 2.00     Smokeless tobacco: Never Used     Alcohol use Yes      Comment: daily / hard liq;     Social History     Social History Narrative     Family History reviewed:  No hx of liver or pancreas disease in family    Allergies:  No Known Allergies  Medications:  Prescriptions Prior to Admission   Medication Sig Dispense Refill Last Dose     sodium-potassium bicarbonate (RADHA-SELTZER GOLD) TBEF solu-tab Take 1 tablet by mouth once   new rx     IBUPROFEN PO Take 600 mg by mouth every 6 hours as needed for moderate pain   new rx     UNKNOWN TO PATIENT Take 1 tablet by mouth At Bedtime Sleep aid OTC        Review of Systems:  A Comprehensive greater than 10 system review of systems was carried out.  Pertinent positives and negatives are noted above.  Otherwise negative.     Physical Exam:  Blood pressure 147/74, pulse 109, temperature 97.6  F (36.4  C), resp. rate 28, height 1.778 m (5' 10\"), weight 74.8 kg (165 lb), SpO2 96 %.  Wt Readings from Last 1 Encounters:   10/17/17 74.8 kg (165 lb)     Exam:  Constitutional: Awake, NAD  Eyes: sclera white   HEENT: atraumatic, MMM  Respiratory:   lungs cta bilaterally, no crackles or wheeze  Cardiovascular: RRR.  No murmur   GI: mild lower abdominal tenderness, not distended, bowel sounds present  Skin: pale skin, no rash.  PICC in R arm  Musculoskeletal/extremities: atraumatic, no major deformities. No edema  Neurologic: A&O, speech clear, strength and light touch sensation grossly " normal, no tremor  Psychiatric: calm, cooperative, normal affect    Lab and imaging data personally reviewed:  Labs:    Recent Labs  Lab 10/17/17  1636   PHV 7.52*   PO2V 19*   PCO2V 25*   HCO3V 20*       Recent Labs  Lab 10/17/17  1630   WBC 15.7*   HGB 3.5*   HCT 13.3*   MCV 67*          Recent Labs  Lab 10/17/17  1630      POTASSIUM 3.7   CHLORIDE 109   CO2 24   ANIONGAP 6   *   BUN 22   CR 0.91   GFRESTIMATED 87   GFRESTBLACK >90   JULIO CESAR 7.8*   PROTTOTAL 5.9*   ALBUMIN 2.9*   BILITOTAL 0.3   ALKPHOS 66   AST 10   ALT 10       Recent Labs  Lab 10/17/17  1630   INR 1.26*       Recent Labs  Lab 10/17/17  1636   LACT 3.0*     Occult blood positive    EKG:  Sinus tachycardia, RBBB, TWI V4-V5    Imaging:  Recent Results (from the past 24 hour(s))   Chest  XR, 1 view PORTABLE    Narrative    XR CHEST PORT 1 VW 10/17/2017 5:53 PM     HISTORY: GI bleed      Impression    IMPRESSION: The lungs appear clear. No pleural effusion.    NAHOMY MG MD   Head CT w/o contrast    Narrative    CT HEAD W/O CONTRAST   10/17/2017 7:09 PM     HISTORY: fall, head injury    TECHNIQUE:  Axial images of the head without IV contrast material.  Radiation dose for this scan was reduced using automated exposure  control, adjustment of the mA and/or kV according to patient size, or  iterative reconstruction technique.    COMPARISON: None.    FINDINGS: The ventricles are normal in size, shape and configuration.  The brain parenchyma and subarachnoid spaces are normal. There is no  evidence of intracranial hemorrhage, mass, acute infarct or anomaly.  The visualized portions of the sinuses and mastoids appear normal. No  intracranial hemorrhage or skull fractures are seen..      Impression    IMPRESSION:  No bleed or fracture is identified.    DENNIS ISLAS MD   Chest  XR, 1 view PORTABLE    Narrative    XR CHEST PORT 1 VW  10/17/2017 8:01 PM     HISTORY:  PICC    COMPARISON: None.    FINDINGS:  Right PICC line has been inserted.  The tip is at the  junction of the superior vena cava and right atrium. Lungs remain  clear.      Impression    IMPRESSION: PICC line in good position.    MD Waldemar MANSFIELD MD  Hospitalist  [facility]

## 2017-10-18 NOTE — PROGRESS NOTES
[facility]   Procedure Note           Peripherally Inserted Central Line Catheter (PICC):       Dashawn Ordoñez  MRN# [id number]   October 17, 2017, 7:51 PM Indication: Medication administration           Pause for the cause: Consent for catheter placement procedure signed  Time out completed  Patient ID's verified using two distinct indicators  All necessary equipment is present  Site marked if extremity to be used has been predetermined   Type of line to be used: PICC   Full barrier precautions used: Yes   Skin preparation: Chloraprep   Date of insertion: October 17, 2017, 7:52 PM   Device type: Triple lumen, valved, 5.0   Catheter brand: [employer]   Lot number: [id number]   Insertion location: Right brachial vein (medial)   Method of placement: Venipuncture  MST  Ultrasound   Number of attempts: With ultrasound: 1   Without ultrasound: 0   Difficulty threading: No   Midline IV device: Dressing dry and intact  Transparent semmipermeable dressing applied  Chlorhexidine patch  Catheter securement device   Arm circumference: Adults 15 cm   Midline extremity circumference: 33 cm   Internal length: 40 cm   Midline visible catheter length: 3 cm   Total catheter length: 43 cm   Tip termination: SVC   Method of verification: Chest x-ray   Midline patency post placement: Positive blood return  Flushes without difficulty   Line flush: Line flush documented on the eMAR   Placement verified by: Physician   Catheter placed by: Beny Keane   Discontinuation form initiated: Yes   Patient tolerance: Tolerated well      Summary:  This procedure was performed without difficulty and he tolerated the procedure well with no immediate complications.       Recorded by Beny Keane    Attestation:

## 2017-10-18 NOTE — PLAN OF CARE
Problem: Patient Care Overview  Goal: Plan of Care/Patient Progress Review  ICU End of Shift Summary.  For vital signs and complete assessments, please see documentation flowsheets.      Pertinent assessments: A&Ox4, CIWA 3 due to mild HA and anxiety, VSS, LS dim, up with SBA, tolerating full liquid diet  Major Shift Events: eating full liquids, up to chair for dinner  Plan (Upcoming Events): monitor Hgb and await biopsy results  Discharge/Transfer Needs: plan to d/c home     Bedside Shift Report Completed : y  Bedside Safety Check Completed: y

## 2017-10-18 NOTE — PROGRESS NOTES
EGD result discussed with Dr. Mclean, very concerning finding in the antrum of the stomach just below the GE junction, large area of nonbleeding ulceration with pigmentation and possibly underlying tumor that was biopsied  Continue same treatment  Follow-up hemoglobin  Follow-up pathology result with gastroenterology  Results discussed with the patient and his wife    He reports losing almost 60 pounds of weight in the last year  Also wife reports colonoscopy almost 10 years ago with multiple polyps at that time to large, but benign, he was advised to do a follow-up in short term however he never went back

## 2017-10-18 NOTE — PROCEDURES
Pre-Endoscopy History and Physical     Dashawn Ordoñez MRN# [id number]   YOB: 1960 Age: 57 year old     Date of Procedure: 10/17/2017  Primary care provider: Mann Grigsby  Type of Endoscopy: esophagogastroduodenoscopy (upper GI endoscopy)  Reason for Procedure: melena, anemia  Type of Anesthesia Anticipated: Moderate (conscious) sedation    HPI:    Dashawn is a 57 year old male who will be undergoing the above procedure.      A history and physical has been performed. The patient's medications and allergies have been reviewed. The risks and benefits of the procedure and the sedation options and risks were discussed with the patient.  All questions were answered and informed consent was obtained.      No Known Allergies     Current Facility-Administered Medications   Medication     lidocaine 1 % 1 mL     lidocaine (LMX4) kit     sodium chloride (PF) 0.9% PF flush 3 mL     sodium chloride (PF) 0.9% PF flush 3 mL     May continue current IV fluids if patient has IV fluids infusing.     May take regular AM medications except those listed below     naloxone (NARCAN) injection 0.1-0.4 mg     flumazenil (ROMAZICON) injection 0.2 mg     midazolam (VERSED) injection 1-2 mg    Followed by     midazolam (VERSED) injection 1 mg     fentaNYL (PF) (SUBLIMAZE) injection 50 mcg    Followed by     fentaNYL (PF) (SUBLIMAZE) injection 25-50 mcg     sodium chloride (PF) 0.9% PF flush 3 mL     pantoprazole (PROTONIX) 80 mg in NaCl 0.9 % 100 mL infusion     octreotide (sandoSTATIN) 1,250 mcg in NaCl 0.9 % 250 mL     naloxone (NARCAN) injection 0.1-0.4 mg     0.9% sodium chloride infusion     acetaminophen (TYLENOL) tablet 650 mg    Or     acetaminophen (TYLENOL) solution 650 mg     ondansetron (ZOFRAN-ODT) ODT tab 4 mg    Or     ondansetron (ZOFRAN) injection 4 mg     melatonin tablet 1 mg     HYDROmorphone (PF) (DILAUDID) injection 0.3-0.5 mg     NaCl 0.9 % 1,000 mL with INFUVITE 10 mL, thiamine 100 mg, folic acid 1  "mg infusion     LORazepam (ATIVAN) tablet 1-2 mg    Or     LORazepam (ATIVAN) injection 1-2 mg       Patient Active Problem List   Diagnosis     Alcohol abuse with alcohol-induced anxiety disorder (H)     Acute upper gastrointestinal bleeding        Past Medical History:   Diagnosis Date     Depressive disorder      Diabetes (H)      Hypertension      Substance abuse         Past Surgical History:   Procedure Laterality Date     APPENDECTOMY       ENT SURGERY         Social History   Substance Use Topics     Smoking status: Current Every Day Smoker     Packs/day: 2.00     Smokeless tobacco: Never Used     Alcohol use Yes      Comment: daily / hard liq;       No family history on file.         Medications:     Prescriptions Prior to Admission   Medication Sig Dispense Refill Last Dose     sodium-potassium bicarbonate (RADHA-SELTZER GOLD) TBEF solu-tab Take 1 tablet by mouth once   new rx     IBUPROFEN PO Take 600 mg by mouth every 6 hours as needed for moderate pain   new rx     UNKNOWN TO PATIENT Take 1 tablet by mouth At Bedtime Sleep aid OTC          Scheduled Medications:    sodium chloride (PF)  3 mL Intravenous Q8H     midazolam  1-2 mg Intravenous Once within 24 hrs     fentaNYL  50 mcg Intravenous Once within 24 hrs     IV Fluid with vitamins   Intravenous Q24H       PRN:  lidocaine (buffered or not buffered), lidocaine 4%, sodium chloride (PF), - MEDICATION INSTRUCTIONS -, - MEDICATION INSTRUCTIONS -, naloxone, flumazenil, midazolam **FOLLOWED BY** midazolam, fentaNYL **FOLLOWED BY** fentaNYL, sodium chloride (PF), naloxone, acetaminophen **OR** acetaminophen, ondansetron **OR** ondansetron, melatonin, HYDROmorphone, LORazepam **OR** LORazepam    PHYSICAL EXAM:   /79  Pulse 67  Temp 97.9  F (36.6  C) (Oral)  Resp 16  Ht 1.778 m (5' 10\")  Wt 78 kg (171 lb 15.3 oz)  SpO2 100%  BMI 24.67 kg/m2 Estimated body mass index is 24.67 kg/(m^2) as calculated from the following:    Height as of this " "encounter: 1.778 m (5' 10\").    Weight as of this encounter: 78 kg (171 lb 15.3 oz).   RESP: lungs clear to auscultation - no rales, rhonchi or wheezes  CV: regular rates and rhythm    IMPRESSION   ASA Class 2 - Mild systemic disease      Signed Electronically by: Filippo Mclean MD  October 18, 2017    .            "

## 2017-10-18 NOTE — PLAN OF CARE
Problem: Patient Care Overview  Goal: Plan of Care/Patient Progress Review  Outcome: No Change  .ICU End of Shift Summary.  For vital signs and complete assessments, please see documentation flowsheets.      Pertinent assessments: Patient alert/oriented.  Up with SBA to use the urinal.  VSS on 3 L of O2.  Complained of some heartburn; GI cocktail given x 1 with some improvement.  Patient reports that shortness of breath seems to be improving.  IV Protonix gtt and Octreotide gtt infusing.  IV Infuvite infusing; will need to start NS when that bag is complete.  CIWA 2, 2-no Ativan this shift.    Major Shift Events: Hgb 3.5 on admission; received 2 units PRBC in ED and 2 units PRBC while in ICU.  Patient also received 1 unit of Plasma.  CBC sent this morning; awaiting results of Hgb.    Plan (Upcoming Events): Patient is NPO; GI to see today.  Continue to monitor labs.    Discharge/Transfer Needs: TBD     Bedside Shift Report Completed :   Bedside Safety Check Completed:

## 2017-10-18 NOTE — PLAN OF CARE
Problem: Gastrointestinal Bleeding (Adult)  Goal: Signs and Symptoms of Listed Potential Problems Will be Absent, Minimized or Managed (Gastrointestinal Bleeding)  Signs and symptoms of listed potential problems will be absent, minimized or managed by discharge/transition of care (reference Gastrointestinal Bleeding (Adult) CPG).   Outcome: No Change  ICU End of Shift Summary.  For vital signs and complete assessments, please see documentation flowsheets.      Pertinent assessments: hgb 5.9, 2 units prbcs transfused without problems. Pt c/o headache and abd pain. GI consult and EGD performed. Spec sent to lab. vss protonix and octreitide gtt. picc line  Major Shift Events: blood transfusion and EGD  Plan (Upcoming Events):  Spec in labs pending. MD wants asap results if possiblt  Discharge/Transfer Needs: CC Rn aware of insurance issues.     Bedside Shift Report Completed :   Bedside Safety Check Completed:

## 2017-10-18 NOTE — PHARMACY-ADMISSION MEDICATION HISTORY
Admission medication history interview status for this patient is complete. See [facility] admission navigator for allergy information, prior to admission medications and immunization status.     Medication history interview source(s):Patient  Medication history resources (including written lists, pill bottles, clinic record):None    Changes made to PTA medication list:  Added: unknown sleep aid OTC  Deleted: none  Changed: none    Actions taken by pharmacist (provider contacted, etc):None     Additional medication history information:None    Medication reconciliation/reorder completed by provider prior to medication history? No    For patients on insulin therapy: no (Yes/No)   Lantus/levemir/NPH/Mix 70/30 dose: ___ in AM/PM or twice daily   Sliding scale Novolog Y/N   If Yes, do you have a baseline novolog pre-meal dose: ______units with meals   Patients eat three meals a day: Y/N ---  How many episodes of hypoglycemia (low blood glucose) do you have weekly: ---   How many missed doses do you have a week: ---  How many times do you check your blood glucose per day: ---  Any Barriers to therapy: cost of medications/comfortable with giving injections (if applicable)/ comfortable and confident with current diabetes regimen ---      Prior to Admission medications    Medication Sig Last Dose Taking? Auth Provider   sodium-potassium bicarbonate (RADHA-SELTZER GOLD) TBEF solu-tab Take 1 tablet by mouth once new rx Yes Reported, Patient   IBUPROFEN PO Take 600 mg by mouth every 6 hours as needed for moderate pain new rx Yes Unknown, Entered By History   UNKNOWN TO PATIENT Take 1 tablet by mouth At Bedtime Sleep aid OTC  Yes Unknown, Entered By History

## 2017-10-19 ENCOUNTER — HOSPITAL ENCOUNTER (INPATIENT)
Facility: CLINIC | Age: 57
LOS: 3 days | Discharge: HOME | End: 2017-10-22
Attending: SURGERY | Admitting: SURGERY
Payer: MEDICAID

## 2017-10-19 ENCOUNTER — APPOINTMENT (OUTPATIENT)
Dept: INTERVENTIONAL RADIOLOGY/VASCULAR | Facility: CLINIC | Age: 57
End: 2017-10-19
Attending: PHYSICIAN ASSISTANT
Payer: MEDICAID

## 2017-10-19 VITALS
RESPIRATION RATE: 16 BRPM | OXYGEN SATURATION: 100 % | BODY MASS INDEX: 25.25 KG/M2 | HEART RATE: 85 BPM | TEMPERATURE: 97.9 F | DIASTOLIC BLOOD PRESSURE: 83 MMHG | HEIGHT: 70 IN | WEIGHT: 176.37 LBS | SYSTOLIC BLOOD PRESSURE: 120 MMHG

## 2017-10-19 DIAGNOSIS — K25.4 GASTROINTESTINAL HEMORRHAGE ASSOCIATED WITH GASTRIC ULCER: Primary | ICD-10-CM

## 2017-10-19 LAB
ABO + RH BLD: NORMAL
ABO + RH BLD: NORMAL
ANION GAP SERPL CALCULATED.3IONS-SCNC: 3 MMOL/L (ref 3–14)
ANION GAP SERPL CALCULATED.3IONS-SCNC: 5 MMOL/L (ref 3–14)
BLD GP AB SCN SERPL QL: NORMAL
BLD PROD TYP BPU: NORMAL
BLD UNIT ID BPU: 0
BLOOD BANK CMNT PATIENT-IMP: NORMAL
BLOOD PRODUCT CODE: NORMAL
BPU ID: NORMAL
BUN SERPL-MCNC: 11 MG/DL (ref 7–30)
BUN SERPL-MCNC: 15 MG/DL (ref 7–30)
CALCIUM SERPL-MCNC: 6.5 MG/DL (ref 8.5–10.1)
CALCIUM SERPL-MCNC: 6.6 MG/DL (ref 8.5–10.1)
CHLORIDE SERPL-SCNC: 111 MMOL/L (ref 94–109)
CHLORIDE SERPL-SCNC: 111 MMOL/L (ref 94–109)
CO2 SERPL-SCNC: 23 MMOL/L (ref 20–32)
CO2 SERPL-SCNC: 24 MMOL/L (ref 20–32)
CREAT SERPL-MCNC: 0.73 MG/DL (ref 0.66–1.25)
CREAT SERPL-MCNC: 0.75 MG/DL (ref 0.66–1.25)
ERYTHROCYTE [DISTWIDTH] IN BLOOD BY AUTOMATED COUNT: 21.6 % (ref 10–15)
FIBRINOGEN PPP-MCNC: 251 MG/DL (ref 200–420)
GFR SERPL CREATININE-BSD FRML MDRD: >90 ML/MIN/1.7M2
GFR SERPL CREATININE-BSD FRML MDRD: >90 ML/MIN/1.7M2
GLUCOSE BLDC GLUCOMTR-MCNC: 113 MG/DL (ref 70–99)
GLUCOSE BLDC GLUCOMTR-MCNC: 118 MG/DL (ref 70–99)
GLUCOSE BLDC GLUCOMTR-MCNC: 131 MG/DL (ref 70–99)
GLUCOSE BLDC GLUCOMTR-MCNC: 138 MG/DL (ref 70–99)
GLUCOSE BLDC GLUCOMTR-MCNC: 160 MG/DL (ref 70–99)
GLUCOSE SERPL-MCNC: 129 MG/DL (ref 70–99)
GLUCOSE SERPL-MCNC: 148 MG/DL (ref 70–99)
HCT VFR BLD AUTO: 16.7 % (ref 40–53)
HGB BLD-MCNC: 5.3 G/DL (ref 13.3–17.7)
HGB BLD-MCNC: 7.4 G/DL (ref 13.3–17.7)
HGB BLD-MCNC: 7.4 G/DL (ref 13.3–17.7)
INR PPP: 1.44 (ref 0.86–1.14)
LACTATE BLD-SCNC: 0.3 MMOL/L (ref 0.7–2)
MCH RBC QN AUTO: 24.7 PG (ref 26.5–33)
MCHC RBC AUTO-ENTMCNC: 31.7 G/DL (ref 31.5–36.5)
MCV RBC AUTO: 78 FL (ref 78–100)
NUM BPU REQUESTED: 12
PLATELET # BLD AUTO: 223 10E9/L (ref 150–450)
POTASSIUM SERPL-SCNC: 4 MMOL/L (ref 3.4–5.3)
POTASSIUM SERPL-SCNC: 4 MMOL/L (ref 3.4–5.3)
RBC # BLD AUTO: 2.15 10E12/L (ref 4.4–5.9)
SODIUM SERPL-SCNC: 138 MMOL/L (ref 133–144)
SODIUM SERPL-SCNC: 139 MMOL/L (ref 133–144)
SPECIMEN EXP DATE BLD: NORMAL
TRANSFUSION STATUS PATIENT QL: NORMAL
UPPER GI ENDOSCOPY: NORMAL
UPPER GI ENDOSCOPY: NORMAL
WBC # BLD AUTO: 12.2 10E9/L (ref 4–11)

## 2017-10-19 PROCEDURE — 99152 MOD SED SAME PHYS/QHP 5/>YRS: CPT

## 2017-10-19 PROCEDURE — 75726 ARTERY X-RAYS ABDOMEN: CPT

## 2017-10-19 PROCEDURE — 25000125 ZZHC RX 250: Performed by: INTERNAL MEDICINE

## 2017-10-19 PROCEDURE — 40000344 ZZHCL STATISTIC THAWING COMPONENT: Performed by: INTERNAL MEDICINE

## 2017-10-19 PROCEDURE — 36248 INS CATH ABD/L-EXT ART ADDL: CPT

## 2017-10-19 PROCEDURE — 36247 INS CATH ABD/L-EXT ART 3RD: CPT

## 2017-10-19 PROCEDURE — 3E0G8GC INTRODUCTION OF OTHER THERAPEUTIC SUBSTANCE INTO UPPER GI, VIA NATURAL OR ARTIFICIAL OPENING ENDOSCOPIC: ICD-10-PCS | Performed by: INTERNAL MEDICINE

## 2017-10-19 PROCEDURE — 25000132 ZZH RX MED GY IP 250 OP 250 PS 637: Performed by: INTERNAL MEDICINE

## 2017-10-19 PROCEDURE — 20000002 ZZH R&B BMT INTERMEDIATE

## 2017-10-19 PROCEDURE — 43255 EGD CONTROL BLEEDING ANY: CPT | Performed by: INTERNAL MEDICINE

## 2017-10-19 PROCEDURE — 83605 ASSAY OF LACTIC ACID: CPT | Performed by: INTERNAL MEDICINE

## 2017-10-19 PROCEDURE — 85018 HEMOGLOBIN: CPT | Performed by: INTERNAL MEDICINE

## 2017-10-19 PROCEDURE — 99153 MOD SED SAME PHYS/QHP EA: CPT

## 2017-10-19 PROCEDURE — B41B1ZZ FLUOROSCOPY OF OTHER INTRA-ABDOMINAL ARTERIES USING LOW OSMOLAR CONTRAST: ICD-10-PCS | Performed by: RADIOLOGY

## 2017-10-19 PROCEDURE — 85610 PROTHROMBIN TIME: CPT | Performed by: INTERNAL MEDICINE

## 2017-10-19 PROCEDURE — 75774 ARTERY X-RAY EACH VESSEL: CPT

## 2017-10-19 PROCEDURE — 80048 BASIC METABOLIC PNL TOTAL CA: CPT | Performed by: INTERNAL MEDICINE

## 2017-10-19 PROCEDURE — P9059 PLASMA, FRZ BETWEEN 8-24HOUR: HCPCS | Performed by: INTERNAL MEDICINE

## 2017-10-19 PROCEDURE — C1769 GUIDE WIRE: HCPCS

## 2017-10-19 PROCEDURE — 00000146 ZZHCL STATISTIC GLUCOSE BY METER IP

## 2017-10-19 PROCEDURE — S0164 INJECTION PANTROPRAZOLE: HCPCS | Performed by: INTERNAL MEDICINE

## 2017-10-19 PROCEDURE — B4121ZZ FLUOROSCOPY OF HEPATIC ARTERY USING LOW OSMOLAR CONTRAST: ICD-10-PCS | Performed by: RADIOLOGY

## 2017-10-19 PROCEDURE — 99233 SBSQ HOSP IP/OBS HIGH 50: CPT | Performed by: INTERNAL MEDICINE

## 2017-10-19 PROCEDURE — 27210906 ZZH KIT CR8

## 2017-10-19 PROCEDURE — 27210908 ZZH NEEDLE CR4

## 2017-10-19 PROCEDURE — 27210743 ZZH CATH CR11

## 2017-10-19 PROCEDURE — P9016 RBC LEUKOCYTES REDUCED: HCPCS | Performed by: INTERNAL MEDICINE

## 2017-10-19 PROCEDURE — 85384 FIBRINOGEN ACTIVITY: CPT | Performed by: INTERNAL MEDICINE

## 2017-10-19 PROCEDURE — C1760 CLOSURE DEV, VASC: HCPCS

## 2017-10-19 PROCEDURE — G0500 MOD SEDAT ENDO SERVICE >5YRS: HCPCS | Performed by: INTERNAL MEDICINE

## 2017-10-19 PROCEDURE — 25000128 H RX IP 250 OP 636: Performed by: INTERNAL MEDICINE

## 2017-10-19 PROCEDURE — 36245 INS CATH ABD/L-EXT ART 1ST: CPT

## 2017-10-19 PROCEDURE — 85027 COMPLETE CBC AUTOMATED: CPT | Performed by: INTERNAL MEDICINE

## 2017-10-19 PROCEDURE — B4141ZZ FLUOROSCOPY OF SUPERIOR MESENTERIC ARTERY USING LOW OSMOLAR CONTRAST: ICD-10-PCS | Performed by: RADIOLOGY

## 2017-10-19 PROCEDURE — 3E0G8GC INTRODUCTION OF OTHER THERAPEUTIC SUBSTANCE INTO UPPER GI, VIA NATURAL OR ARTIFICIAL OPENING ENDOSCOPIC: ICD-10-PCS | Performed by: RADIOLOGY

## 2017-10-19 PROCEDURE — 27210742 ZZH CATH CR1

## 2017-10-19 PROCEDURE — B4151ZZ FLUOROSCOPY OF INFERIOR MESENTERIC ARTERY USING LOW OSMOLAR CONTRAST: ICD-10-PCS | Performed by: RADIOLOGY

## 2017-10-19 RX ORDER — FLUMAZENIL 0.1 MG/ML
0.2 INJECTION, SOLUTION INTRAVENOUS
Status: DISCONTINUED | OUTPATIENT
Start: 2017-10-19 | End: 2017-10-19 | Stop reason: HOSPADM

## 2017-10-19 RX ORDER — NICOTINE 21 MG/24HR
1 PATCH, TRANSDERMAL 24 HOURS TRANSDERMAL DAILY
Status: DISCONTINUED | OUTPATIENT
Start: 2017-10-19 | End: 2017-10-19 | Stop reason: HOSPADM

## 2017-10-19 RX ORDER — PROCHLORPERAZINE MALEATE 5 MG
5-10 TABLET ORAL EVERY 6 HOURS PRN
Status: DISCONTINUED | OUTPATIENT
Start: 2017-10-19 | End: 2017-10-20

## 2017-10-19 RX ORDER — LANOLIN ALCOHOL/MO/W.PET/CERES
100 CREAM (GRAM) TOPICAL DAILY
Status: COMPLETED | OUTPATIENT
Start: 2017-10-20 | End: 2017-10-22

## 2017-10-19 RX ORDER — LORAZEPAM 1 MG/1
1-4 TABLET ORAL EVERY 30 MIN PRN
Status: DISCONTINUED | OUTPATIENT
Start: 2017-10-19 | End: 2017-10-22

## 2017-10-19 RX ORDER — LIDOCAINE 40 MG/G
CREAM TOPICAL
Status: DISCONTINUED | OUTPATIENT
Start: 2017-10-19 | End: 2017-10-19 | Stop reason: HOSPADM

## 2017-10-19 RX ORDER — PROCHLORPERAZINE 25 MG
25 SUPPOSITORY, RECTAL RECTAL EVERY 12 HOURS PRN
Status: DISCONTINUED | OUTPATIENT
Start: 2017-10-19 | End: 2017-10-20

## 2017-10-19 RX ORDER — FOLIC ACID 1 MG/1
1 TABLET ORAL DAILY
Status: DISCONTINUED | OUTPATIENT
Start: 2017-10-20 | End: 2017-10-22

## 2017-10-19 RX ORDER — FENTANYL CITRATE 50 UG/ML
50 INJECTION, SOLUTION INTRAMUSCULAR; INTRAVENOUS
Status: DISCONTINUED | OUTPATIENT
Start: 2017-10-19 | End: 2017-10-19 | Stop reason: HOSPADM

## 2017-10-19 RX ORDER — FENTANYL CITRATE 50 UG/ML
25-50 INJECTION, SOLUTION INTRAMUSCULAR; INTRAVENOUS EVERY 5 MIN PRN
Status: DISCONTINUED | OUTPATIENT
Start: 2017-10-19 | End: 2017-10-19 | Stop reason: HOSPADM

## 2017-10-19 RX ORDER — SODIUM CHLORIDE 9 MG/ML
INJECTION, SOLUTION INTRAVENOUS CONTINUOUS
Status: DISCONTINUED | OUTPATIENT
Start: 2017-10-19 | End: 2017-10-21

## 2017-10-19 RX ORDER — NALOXONE HYDROCHLORIDE 0.4 MG/ML
.1-.4 INJECTION, SOLUTION INTRAMUSCULAR; INTRAVENOUS; SUBCUTANEOUS
Status: DISCONTINUED | OUTPATIENT
Start: 2017-10-19 | End: 2017-10-19 | Stop reason: HOSPADM

## 2017-10-19 RX ORDER — ACETAMINOPHEN 325 MG/1
650 TABLET ORAL EVERY 4 HOURS PRN
Status: DISCONTINUED | OUTPATIENT
Start: 2017-10-19 | End: 2017-10-22 | Stop reason: HOSPADM

## 2017-10-19 RX ORDER — FENTANYL CITRATE 50 UG/ML
50-100 INJECTION, SOLUTION INTRAMUSCULAR; INTRAVENOUS ONCE
Status: COMPLETED | OUTPATIENT
Start: 2017-10-19 | End: 2017-10-19

## 2017-10-19 RX ORDER — NALOXONE HYDROCHLORIDE 0.4 MG/ML
.1-.4 INJECTION, SOLUTION INTRAMUSCULAR; INTRAVENOUS; SUBCUTANEOUS
Status: DISCONTINUED | OUTPATIENT
Start: 2017-10-19 | End: 2017-10-22 | Stop reason: HOSPADM

## 2017-10-19 RX ORDER — DEXTROSE MONOHYDRATE 25 G/50ML
25-50 INJECTION, SOLUTION INTRAVENOUS
Status: DISCONTINUED | OUTPATIENT
Start: 2017-10-19 | End: 2017-10-22 | Stop reason: HOSPADM

## 2017-10-19 RX ORDER — NICOTINE POLACRILEX 4 MG
15-30 LOZENGE BUCCAL
Status: DISCONTINUED | OUTPATIENT
Start: 2017-10-19 | End: 2017-10-22 | Stop reason: HOSPADM

## 2017-10-19 RX ORDER — ONDANSETRON 4 MG/1
4 TABLET, ORALLY DISINTEGRATING ORAL EVERY 6 HOURS PRN
Status: DISCONTINUED | OUTPATIENT
Start: 2017-10-19 | End: 2017-10-20

## 2017-10-19 RX ORDER — ONDANSETRON 2 MG/ML
4 INJECTION INTRAMUSCULAR; INTRAVENOUS EVERY 6 HOURS PRN
Status: DISCONTINUED | OUTPATIENT
Start: 2017-10-19 | End: 2017-10-20

## 2017-10-19 RX ADMIN — PROCHLORPERAZINE EDISYLATE 10 MG: 5 INJECTION INTRAMUSCULAR; INTRAVENOUS at 05:38

## 2017-10-19 RX ADMIN — MIDAZOLAM 0.5 MG: 1 INJECTION INTRAMUSCULAR; INTRAVENOUS at 12:42

## 2017-10-19 RX ADMIN — FENTANYL CITRATE 100 MCG: 50 INJECTION INTRAMUSCULAR; INTRAVENOUS at 17:05

## 2017-10-19 RX ADMIN — FENTANYL CITRATE 25 MCG: 50 INJECTION INTRAMUSCULAR; INTRAVENOUS at 12:41

## 2017-10-19 RX ADMIN — ONDANSETRON 4 MG: 2 INJECTION INTRAMUSCULAR; INTRAVENOUS at 04:23

## 2017-10-19 RX ADMIN — FENTANYL CITRATE 25 MCG: 50 INJECTION INTRAMUSCULAR; INTRAVENOUS at 12:36

## 2017-10-19 RX ADMIN — MIDAZOLAM 1 MG: 1 INJECTION INTRAMUSCULAR; INTRAVENOUS at 12:11

## 2017-10-19 RX ADMIN — MIDAZOLAM 1 MG: 1 INJECTION INTRAMUSCULAR; INTRAVENOUS at 17:06

## 2017-10-19 RX ADMIN — SODIUM CHLORIDE 500 ML: 9 INJECTION, SOLUTION INTRAVENOUS at 04:06

## 2017-10-19 RX ADMIN — LORAZEPAM 2 MG: 2 INJECTION INTRAMUSCULAR; INTRAVENOUS at 16:11

## 2017-10-19 RX ADMIN — NICOTINE 1 PATCH: 21 PATCH, EXTENDED RELEASE TRANSDERMAL at 16:34

## 2017-10-19 RX ADMIN — SODIUM CHLORIDE 8 MG/HR: 9 INJECTION, SOLUTION INTRAVENOUS at 00:09

## 2017-10-19 RX ADMIN — SODIUM CHLORIDE 8 MG/HR: 9 INJECTION, SOLUTION INTRAVENOUS at 11:12

## 2017-10-19 NOTE — PROGRESS NOTES
TELE:  Notified of recurrent GI bleeding with hemodynamic instability.  In-house hospitalist evaluating.  Gave NS bolus while awaiting repeat cbc.  hgb now 5.3.  Getting 2 stat prbc.  GI reconsulted by hospitalist.  On ppi.  Pltlts ok.  Last INR ok.  Pt has 18g x2 for access as well as picc -- recommend using large bore peripheral iv's over picc for rapid resuscitation.

## 2017-10-19 NOTE — PLAN OF CARE
Problem: Gastrointestinal Bleeding (Adult)  Goal: Signs and Symptoms of Listed Potential Problems Will be Absent, Minimized or Managed (Gastrointestinal Bleeding)  Signs and symptoms of listed potential problems will be absent, minimized or managed by discharge/transition of care (reference Gastrointestinal Bleeding (Adult) CPG).   Outcome: No Change  ICU End of Shift Summary.  For vital signs and complete assessments, please see documentation flowsheets.      Pertinent assessments: Pt alert and oriented but very anxious and requesting to leave and have cigarette this afternoon.  Given Ativan and Nicotine patch.  VSS.  Pos. Murmur.  Lungs clear. Sats WDL.  Abd soft with active bowel sounds. Incontinent of  dark red bloody stools. Voiding in adequate amnts. HGB STABLE @ 7.6.     Major Shift Events: Pt with large nghia bloody stools x 3 this shift. Has received and additional 2 u prbcs this shift and is next due for FFP.  Went to IR today for vascular study of stomach ulcer. No treatment given.  Access site to R groin asymptomatic. Biopsy has come back positive for CA and mds have discussed with pt and family.  Repeat EGD this afternoon after large bloody stool. Site injected with Epi.  VSS @ this time.  Oncology spoke with wife.  Dr Graves is working on [facility].  Pt remains drowsy post procedure.    Plan (Upcoming Events): Transfer to another facility for surgery.    Discharge/Transfer Needs: TBD      Bedside Shift Report Completed :   Bedside Safety Check Completed:

## 2017-10-19 NOTE — PROGRESS NOTES
"[facility]  Hospitalist Progress Note  Edna Graves MD 10/19/2017    Reason for Stay (Diagnosis): Recurrent upper GI hemorhage         Assessment and Plan:      Summary of Stay: Dashawn Ordoñez is a 57 year old male admitted on 10/17/2017     Problem List:   1. Severe anemia on admission and tarry stools  Walked in with hemoglobin 3.5  Given 4 units and 2 FFP  10/18 EGD showed large antral ulcer without active bleeding  bx done as cancer was suspected    2. Recurrent upper GI hemorhage  4 am with drop in hemoglobin to 5.6 and mild hypotension  And again large maroon stool at 8 am  Given 4 units blood  Also maroon stool at 12 noon  IR could not find the bleeding vessel so no coiling was done    3. Repeat endoscopy at  5 pm for large maroon stool  Large clot was presented over the ulcerated area not there yesterday  Epinephrine injection done  Given an additional 2 units blood and 2 units FFP  Suspect will continue to have recurrent bleeding and will need a gastrectomy  To stop the bleeding  [facility]    4. 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 Long standing alcohol use and abuse  Binge drinking with 3 bottles of rum a week  Has had alcohol withdrawal before            Interval History (Subjective):      \" I have cancer so what is the point\"                  Physical Exam:      Last Vital Signs:  /84  Pulse 82  Temp 97.5  F (36.4  C) (Axillary)  Resp 10  Ht 1.778 m (5' 10\")  Wt 80 kg (176 lb 5.9 oz)  SpO2 100%  BMI 25.31 kg/m2    Constitutional: Awake, alert, cooperative, feels overwhelmed understands the dx and plan   Respiratory: Clear to auscultation bilaterally, no rales or wheezing  Wants a cigarette   Cardiovascular: Regular rate and rhythm, normal S1 and S2, and no murmur noted   Abdomen: Normal bowel " sounds, soft, non-distended, non-tender   Skin: No rashes, no cyanosis, dry to touch   Neuro: Alert and oriented x3, no focal weakness, numbness, memory loss   Extremities: No edema, normal range of motion   Other(s): Recurrent maroon stools large volume at 4 am 8 am 12 noon and 4 pm    No pain   All other systems: Negative          Medications:      All current medications were reviewed with changes reflected in problem list.         Data:      All new lab and imaging data was reviewed.   Labs: Na 138  K 4.0  Bicarb 24  Creat 0.75  Wbc  12.2  Hemoglobin at 4 am 5.3  And now 7.4 ( after 6 units today)  Imaging: EGD  Today showed large clot over the large ulcer seen yesterday  Could not identify a bleeding vessel  S/p epinephrine injections

## 2017-10-19 NOTE — PLAN OF CARE
Problem: Patient Care Overview  Goal: Plan of Care/Patient Progress Review  Outcome: No Change  Patient to IR @ 1110 am.  Dr Graves updated patient's spouse. Hgb up to 7.4 after 2 uprbcs early AM. Started 3rd AM unit prbc just prior to transfer to cath lab. Protonix gtt conts @ 8 mg/ hr.   Patient fatigued, scoring 0 on CIWA scale.  Denies pain but feels cramping like bowels might let loose again. No stooling since Attends change @ 0830 @ which time he had a large maroon stool.  Good uop. VSS presently.

## 2017-10-19 NOTE — PROGRESS NOTES
Pathology reveals a signet ring adenocarcinoma.  IR was unable to find an active bleed.    Will schedule a CT of the chest, abdomen and pelvis for tomorrow.   Oncology consult.  If bleeding returns, difficult to know best way to manage it.  I doubt there is a second site of bleeding.  The ulcerated mass is so big that endoscopic control is unlikely.  Need to consider repeat IR treatment or surgery as well.  Keep on clears in case of rebleeding.

## 2017-10-19 NOTE — PROGRESS NOTES
Xcover:  Notified about bloody stools  Stable hemodynamics  No hematemesis  Stat Hgb.  IVF support.  On PPI drip. Seen with non bleeding gasrtic ulcer by EGD  prbc transfusion if with decreasing trend, unstable hemodynamics.

## 2017-10-19 NOTE — PROGRESS NOTES
Notified about Hgb at 5.3 and hypotension with BP of 80/50  No tachycardia  Awake and alert, no ongoing vomiting or abdominal pain  Seen with bloody stools.  Stat 2 units transfusion  IV bolus  Requested for urgent GI evaluation given worsening anemia, unstable BP levels  Remained on PPI drip.    Ciro      Addendum 4:45 AM    BP improving slightly, not tachycardic, no hypoxia,  No prior hx of CHF, normal creatinine  Continue with PRBC transfusion.   Prepare another 2 units of PRBC and proceed with transfusion if with hypotension.  GI aware about the clinical deterioration.    Addendum 6:30am  Re-visited the patient and BP levels improving but still with bloody stools.  Getting 3rd unit of  Of PRBC

## 2017-10-19 NOTE — IP AVS SNAPSHOT
[address] [address]    Phone:  [phone]                                       After Visit Summary   10/19/2017    Dashawn Ordoñez    MRN: [id number]           After Visit Summary Signature Page     I have received my discharge instructions, and my questions have been answered. I have discussed any challenges I see with this plan with the nurse or doctor.    ..........................................................................................................................................  Patient/Patient Representative Signature      ..........................................................................................................................................  Patient Representative Print Name and Relationship to Patient    ..................................................               ................................................  Date                                            Time    ..........................................................................................................................................  Reviewed by Signature/Title    ...................................................              ..............................................  Date                                                            Time

## 2017-10-19 NOTE — PLAN OF CARE
Problem: Patient Care Overview  Goal: Plan of Care/Patient Progress Review  Outcome: No Change  .ICU End of Shift Summary.  For vital signs and complete assessments, please see documentation flowsheets.      Pertinent assessments: A&O.  Was up with SBA at start of the shift.  1 mg Ativan given x 1 per CIWA.  Slept off and on for most of the night until about 0350.    Major Shift Events: Patient asked to use the urinal; had a hard time standing up.  Sat patient back down in bed; and patient started to have bloody stools.  Hgb checked during this time.  BP dropped to SBP of 80s.  1000 of NS bolus given.  Hgb back at 5.3; 2 units of blood given and currently infusing the 3rd unit.  Patient complained of some shortness of breath along with dry cough and nausea.  Zofran and Compazine given.  Patient had large amounts of bloody stools.  Plan (Upcoming Events): continue to monitor for bleeding, labs, and BP.  GI to see again; Hospitalist spoke with them overnight.    Discharge/Transfer Needs: TBD     Bedside Shift Report Completed :   Bedside Safety Check Completed:

## 2017-10-19 NOTE — OR NURSING
Procedure performed by Dr. Mclean without complications. Sedation and hemodynamic status performed by Carmen Chávez, ICU RN, as well as recovery time.

## 2017-10-19 NOTE — PROGRESS NOTES
"GASTROENTEROLOGY PROGRESS NOTE        SUBJECTIVE:  Fatigued. Pale. No abdominal pain. When to urinate this morning but could not stand up. Ongoing bloody stool since 4 am.      OBJECTIVE:    /72 (BP Location: Left arm)  Pulse 82  Temp 98  F (36.7  C) (Oral)  Resp 14  Ht 1.778 m (5' 10\")  Wt 80 kg (176 lb 5.9 oz)  SpO2 98%  BMI 25.31 kg/m2  Temp (24hrs), Av.9  F (36.6  C), Min:97.1  F (36.2  C), Max:98.5  F (36.9  C)    Patient Vitals for the past 72 hrs:   Weight   10/19/17 0100 80 kg (176 lb 5.9 oz)   10/17/17 2135 78 kg (171 lb 15.3 oz)   10/17/17 1604 74.8 kg (165 lb)       Intake/Output Summary (Last 24 hours) at 10/19/17 0900  Last data filed at 10/19/17 0830   Gross per 24 hour   Intake          2776.83 ml   Output             1270 ml   Net          1506.83 ml        PHYSICAL EXAM     Constitutional: Pale, fatigued  Cardiovascular: RRR, normal S1, S2, no murmur appreciated   Respiratory: fair transmission, CTAB  Abdomen: soft, NTTP     Additional Comments:  ROS, FH, SH: See initial GI consult for details.     I have reviewed the patient's new clinical lab results:     Recent Labs   Lab Test  10/19/17   0840  10/19/17   0352  10/18/17   1800   10/18/17   0559  10/17/17   1630   WBC   --   12.2*   --    --   10.3  15.7*   HGB  7.4*  5.3*  7.8*   < >  5.9*  3.5*   MCV   --   78   --    --   75*  67*   PLT   --   223   --    --   246  379   INR   --    --    --    --    --   1.26*    < > = values in this interval not displayed.     Recent Labs   Lab Test  10/19/17   0352  10/18/17   0559  10/17/17   1630   POTASSIUM  4.0  4.1  3.7   CHLORIDE  111*  113*  109   CO2  24  24  24   BUN  11  14  22   ANIONGAP  3  5  6     Recent Labs   Lab Test  10/18/17   0559  10/17/17   1630  05/04/15   2044  05/04/15   2002   06/21/12   1245   ALBUMIN  2.7*  2.9*   --   4.3   < >   --    BILITOTAL  0.5  0.3   --   0.4   < >   --    ALT  12  10   --   43   < >   --    AST  8  10   --   24   < >   --    PROTEIN   --  "   --   30*   --    --   10*   LIPASE   --    --    --   193   --    --     < > = values in this interval not displayed.     ENDOSCOPY    10/18/2017 EGD  Findings:        A diffuse area of extrinsic compression was found at the        gastroesophageal junction. Z line 42 cm.        One non-bleeding cratered gastric ulcer with pigmented material was        found in the cardia. The lesion was giant, and greater [id number] mm in        largest dimension. Biopsies were taken with a cold forceps for histology.        The examined duodenum was normal.                                                                                     Impression:         - Extrinsic compression at the gastroesophageal junction.                             - Non-bleeding gastric ulcer with pigmented material. Biopsied.                             - Normal examined duodenum.                ASSESSMENT/ PLAN    Dashawn Ordoñez is a 57 year old male with medical history of diabetes mellitus, alcohol dependency, and cigarette smoking who presents to the hospital with generalized weakness and fatigue and found to have melena and a hemoglobin of 3.5. EGD 10/18 revealing a large cratered gastric ulcer greater than 100 mm.      1. Large gastric ulcer / Upper GI bleeding: Melena has been present for the past few days. Ongoing bloody stool this morning since 4 am. His HgB was 5.3 and he was hypotensive. He was stabilized with fluids and 3 more units of PRBCs ( total 7 units since admit).  -- Given large size of the ulcer, will need IR evaluation for possible embolization. Discussed with Dr. Mclean. Dr. James is on call for IR this morning.   -- There is concern this is more than a peptic ulcer given it's size, proximal location, and heaped margins.     -- Continue PPI infusion   -- Monitor serial hemoglobin, transfusions as needed per medicine     2.  Alcohol dependency: Ongoing heavy alcohol use.  Continue to monitor for withdrawal.  LFTs are normal.   Platelets are stable at 246,000.  INR is slightly elevated at 1.26.  Agree with abdominal ultrasound prior to discharge.  CT from 2015 with fatty liver.  No evidence of encephalopathy. CT of the abdomen and pelvis from 2015 reveals fatty infiltration of the liver with multiple cysts in the left lobe of the liver. No evidence of portal hypertension.     Discussed with Dr. Caridad Emery, PA-C  [facility]

## 2017-10-19 NOTE — OR NURSING
Patient's history, allergies and lab results reviewed. Waiting for Dr. Mclean to proceed with Upper Endoscopy for control bleed. Patient is sleepy, but oriented to self and place.

## 2017-10-19 NOTE — PROCEDURES
Pre-Endoscopy History and Physical     Dashawn Ordoñez MRN# [id number]   YOB: 1960 Age: 57 year old     Date of Procedure: 10/17/2017  Primary care provider: Mann Grigsby  Type of Endoscopy: esophagogastroduodenoscopy (upper GI endoscopy)  Reason for Procedure: UGI bleeding  Type of Anesthesia Anticipated: Moderate (conscious) sedation    HPI:    Dashawn is a 57 year old male who will be undergoing the above procedure.      A history and physical has been performed. The patient's medications and allergies have been reviewed. The risks and benefits of the procedure and the sedation options and risks were discussed with the patient.  All questions were answered and informed consent was obtained.      No Known Allergies     Current Facility-Administered Medications   Medication     0.9% sodium chloride BOLUS     nicotine Patch in Place     [START ON 10/20/2017] nicotine patch REMOVAL     nicotine (NICODERM CQ) 21 MG/24HR 24 hr patch 1 patch     nicotine (NICOTROL) Inhaler 1 Cartridge     naloxone (NARCAN) injection 0.1-0.4 mg     flumazenil (ROMAZICON) injection 0.2 mg     fentaNYL (PF) (SUBLIMAZE) injection 50 mcg    Followed by     fentaNYL (PF) (SUBLIMAZE) injection 25-50 mcg     midazolam (VERSED) injection 1-2 mg    Followed by     midazolam (VERSED) injection 1 mg     flumazenil (ROMAZICON) injection 0.2 mg     sodium chloride (PF) 0.9% PF flush 3 mL     pantoprazole (PROTONIX) 80 mg in NaCl 0.9 % 100 mL infusion     0.9% sodium chloride infusion     acetaminophen (TYLENOL) tablet 650 mg    Or     acetaminophen (TYLENOL) solution 650 mg     ondansetron (ZOFRAN-ODT) ODT tab 4 mg    Or     ondansetron (ZOFRAN) injection 4 mg     melatonin tablet 1 mg     HYDROmorphone (PF) (DILAUDID) injection 0.3-0.5 mg     NaCl 0.9 % 1,000 mL with INFUVITE 10 mL, thiamine 100 mg, folic acid 1 mg infusion     LORazepam (ATIVAN) tablet 1-2 mg    Or     LORazepam (ATIVAN) injection 1-2 mg       Patient Active  "Problem List   Diagnosis     Alcohol abuse with alcohol-induced anxiety disorder (H)     Acute upper gastrointestinal bleeding        Past Medical History:   Diagnosis Date     Depressive disorder      Diabetes (H)      Hypertension      Substance abuse         Past Surgical History:   Procedure Laterality Date     APPENDECTOMY       ENT SURGERY         Social History   Substance Use Topics     Smoking status: Current Every Day Smoker     Packs/day: 2.00     Smokeless tobacco: Never Used     Alcohol use Yes      Comment: daily / hard liq;       No family history on file.         Medications:     Prescriptions Prior to Admission   Medication Sig Dispense Refill Last Dose     sodium-potassium bicarbonate (RADHA-SELTZER GOLD) TBEF solu-tab Take 1 tablet by mouth once   new rx     IBUPROFEN PO Take 600 mg by mouth every 6 hours as needed for moderate pain   new rx     UNKNOWN TO PATIENT Take 1 tablet by mouth At Bedtime Sleep aid OTC          Scheduled Medications:    sodium chloride 0.9%  1,000 mL Intravenous Once     nicotine   Transdermal Q8H     [START ON 10/20/2017] nicotine   Transdermal Daily     nicotine  1 patch Transdermal Daily     fentaNYL  50 mcg Intravenous Once within 24 hrs     midazolam  1-2 mg Intravenous Once within 24 hrs     IV Fluid with vitamins   Intravenous Q24H       PRN:  nicotine, naloxone, flumazenil, fentaNYL **FOLLOWED BY** fentaNYL, midazolam **FOLLOWED BY** midazolam, flumazenil, sodium chloride (PF), acetaminophen **OR** acetaminophen, ondansetron **OR** ondansetron, melatonin, HYDROmorphone, LORazepam **OR** LORazepam    PHYSICAL EXAM:   /71  Pulse 82  Temp 97.8  F (36.6  C) (Oral)  Resp 20  Ht 1.778 m (5' 10\")  Wt 80 kg (176 lb 5.9 oz)  SpO2 100%  BMI 25.31 kg/m2 Estimated body mass index is 25.31 kg/(m^2) as calculated from the following:    Height as of this encounter: 1.778 m (5' 10\").    Weight as of this encounter: 80 kg (176 lb 5.9 oz).   RESP: lungs clear to " auscultation - no rales, rhonchi or wheezes  CV: regular rates and rhythm    IMPRESSION   ASA Class 3 - Severe systemic disease, but not incapacitating      Signed Electronically by: Filippo Mclean MD  October 19, 2017    .

## 2017-10-19 NOTE — CONSULTS
Hematology / Oncology Consultation      Dashawn Ordoñez MRN# [id number]   YOB: 1960 Age: 57 year old   Date of Admission: 10/17/2017     Reason for consult: I was asked by Dr. Mclean  to evaluate this patient for newly diagnosed gastric cancer           Assessment and Plan:   #1 Newly diagnosed poor differentiated gastric cancer, staging pending  #2 Significant upper GI bleeding from ulcerated gastric ulcer, secondary to #1    The patient was still sedated after the EGD when I visited.  His family was not available at the time, but I was able to talk to the patient's wife Isa over the phone on two separate occasions.  At this time, the priority is to stop the bleeding.  The patient just underwent an EGD this afternoon during which some bleeding spots were coagulated.  However, given the size of the ulcerated mass in the stomach and the degree of bleeding prior to the EGD, I remain very concerned that he may have continued bleeding throughout the night.  [facility] IR [facility], and I do not believe we have a gastric surgeon here.  Therefore, I strongly advocate for the patient to be transferred to the [facility] where there is 24 hour IR support and a surgeon available for emergent gastrectomy if needed.    The patient has not had any staging studies yet.  I informed the patient's wife that we need to start with a CT of the chest, abdomen, and pelvis to assess the extent of disease.  If distant mets are present, then the disease is incurable, and goal of treatment would be palliation and prolongation of life.  If the tumor is HER2 positive, then Herceptin could be added to chemotherapy. Immunotherapy could be used in the 2nd line setting if the tumor is MSI high or PD-L1 positive.    On the other hand, if the disease is locally advanced, but resectable, then surgery followed by chemoradiation (Tena regimen) would be recommended.  Given the severity of bleeding, it  would not be wise to pursue the perioperative chemotherapy approach per the MAGIC trial.    The patient will most likely be transferred to the [facility].  I told the patient's wife that I would be very happy to see him in my clinic to discuss treatment options further after stabilization of bleeding (surgical or non-surgical).    Cristian Healy M.D.  [facility]               Chief Complaint:   Newly diagnosed gastric cancer         History of Present Illness:   This patient is a 57 year old gentleman with diabetes mellitus and alcohol dependence who has had fatigue and decreased appetite for the past 2-3 weeks.  He developed a diffuse abdominal pain a few days ago, and first noticed melena 2 days prior to admission.  On presentation, his Hgb was 3.5.  He was tachycardic, but his blood pressure was stable.    EGD showed a large gastric ulcer (>10 cm) in the cardia with extrinsic compression of the GE junction.  Biopsy of the ulcer unfortunately showed poor differentiated significant ring carcinoma.  HER2 is pending.  The patient has not yet had any staging studies.    He has continued to have significant GI bleed since admission to the hospital.  He has had three large bloody bowel movements so far today, and has required 10 units of pRBCs during his hospital stay so far.    I visited him right after his EGD this afternoon.  He was still very sedated, so was unable to talk to me.  His family was not available at the time, but I was able to talk to the patient's wife Isa over the phone.              Past Medical History:     Past Medical History:   Diagnosis Date     Depressive disorder      Diabetes (H)      Hypertension      Substance abuse              Past Surgical History:     Past Surgical History:   Procedure Laterality Date     APPENDECTOMY       ENT SURGERY                 Social History:     Social History   Substance Use Topics     Smoking status: Current Every Day Smoker      Packs/day: 2.00     Smokeless tobacco: Never Used     Alcohol use Yes      Comment: daily / hard liq;             Family History:   No family history on file.          Medications:     Prescriptions Prior to Admission   Medication Sig Dispense Refill Last Dose     sodium-potassium bicarbonate (RADHA-SELTZER GOLD) TBEF solu-tab Take 1 tablet by mouth once   new rx     IBUPROFEN PO Take 600 mg by mouth every 6 hours as needed for moderate pain   new rx     UNKNOWN TO PATIENT Take 1 tablet by mouth At Bedtime Sleep aid OTC                Review of Systems:   The 10 point Review of Systems is negative other than noted in the HPI            Physical Exam:   Vitals were reviewed  General:  Sedated after EGD.  Skin:  No skin rash or bruising noted  Lungs:  Clear  Abdomen:  Soft, nondistended  Ext:  Well perfused.  No edema       Data:     Lab Results   Component Value Date    WBC 12.2 (H) 10/19/2017    WBC 10.3 10/18/2017    WBC 15.7 (H) 10/17/2017    HGB 7.4 (L) 10/19/2017    HGB 7.4 (L) 10/19/2017    HGB 5.3 (LL) 10/19/2017    HCT 16.7 (L) 10/19/2017    HCT 19.6 (L) 10/18/2017    HCT 13.3 (L) 10/17/2017     10/19/2017     10/18/2017     10/17/2017     10/19/2017     10/18/2017     10/17/2017    POTASSIUM 4.0 10/19/2017    POTASSIUM 4.1 10/18/2017    POTASSIUM 3.7 10/17/2017    CHLORIDE 111 (H) 10/19/2017    CHLORIDE 113 (H) 10/18/2017    CHLORIDE 109 10/17/2017    CO2 24 10/19/2017    CO2 24 10/18/2017    CO2 24 10/17/2017    BUN 11 10/19/2017    BUN 14 10/18/2017    BUN 22 10/17/2017    CR 0.75 10/19/2017    CR 0.88 10/18/2017    CR 0.91 10/17/2017     (H) 10/19/2017     (H) 10/18/2017     (H) 10/17/2017    TROPONIN <0.04 12/05/2007    AST 8 10/18/2017    AST 10 10/17/2017    AST 24 05/04/2015    ALT 12 10/18/2017    ALT 10 10/17/2017    ALT 43 05/04/2015    ALKPHOS 58 10/18/2017    ALKPHOS 66 10/17/2017    ALKPHOS 144 05/04/2015    BILITOTAL 0.5 10/18/2017     BILITOTAL 0.3 10/17/2017    BILITOTAL 0.4 05/04/2015    INR 1.44 (H) 10/19/2017    INR 1.26 (H) 10/17/2017    INR 0.99 12/05/2007

## 2017-10-20 ENCOUNTER — APPOINTMENT (OUTPATIENT)
Dept: CT IMAGING | Facility: CLINIC | Age: 57
End: 2017-10-20
Attending: SURGERY
Payer: MEDICAID

## 2017-10-20 ENCOUNTER — APPOINTMENT (OUTPATIENT)
Dept: ULTRASOUND IMAGING | Facility: CLINIC | Age: 57
End: 2017-10-20
Attending: SURGERY
Payer: MEDICAID

## 2017-10-20 LAB
ABO + RH BLD: NORMAL
ABO + RH BLD: NORMAL
ALBUMIN SERPL-MCNC: 2 G/DL (ref 3.4–5)
ALP SERPL-CCNC: 43 U/L (ref 40–150)
ALT SERPL W P-5'-P-CCNC: 9 U/L (ref 0–70)
ANION GAP SERPL CALCULATED.3IONS-SCNC: 6 MMOL/L (ref 3–14)
ANION GAP SERPL CALCULATED.3IONS-SCNC: 6 MMOL/L (ref 3–14)
AST SERPL W P-5'-P-CCNC: 7 U/L (ref 0–45)
BILIRUB SERPL-MCNC: 0.5 MG/DL (ref 0.2–1.3)
BLD GP AB SCN SERPL QL: NORMAL
BLD PROD TYP BPU: NORMAL
BLD UNIT ID BPU: 0
BLOOD BANK CMNT PATIENT-IMP: NORMAL
BLOOD PRODUCT CODE: NORMAL
BPU ID: NORMAL
BUN SERPL-MCNC: 12 MG/DL (ref 7–30)
BUN SERPL-MCNC: 13 MG/DL (ref 7–30)
CALCIUM SERPL-MCNC: 7.1 MG/DL (ref 8.5–10.1)
CALCIUM SERPL-MCNC: 7.2 MG/DL (ref 8.5–10.1)
CHLORIDE SERPL-SCNC: 110 MMOL/L (ref 94–109)
CHLORIDE SERPL-SCNC: 111 MMOL/L (ref 94–109)
CO2 SERPL-SCNC: 22 MMOL/L (ref 20–32)
CO2 SERPL-SCNC: 23 MMOL/L (ref 20–32)
CREAT SERPL-MCNC: 0.68 MG/DL (ref 0.66–1.25)
CREAT SERPL-MCNC: 0.68 MG/DL (ref 0.66–1.25)
ERYTHROCYTE [DISTWIDTH] IN BLOOD BY AUTOMATED COUNT: 18.3 % (ref 10–15)
ERYTHROCYTE [DISTWIDTH] IN BLOOD BY AUTOMATED COUNT: 18.3 % (ref 10–15)
GFR SERPL CREATININE-BSD FRML MDRD: >90 ML/MIN/1.7M2
GFR SERPL CREATININE-BSD FRML MDRD: >90 ML/MIN/1.7M2
GLUCOSE BLDC GLUCOMTR-MCNC: 105 MG/DL (ref 70–99)
GLUCOSE BLDC GLUCOMTR-MCNC: 116 MG/DL (ref 70–99)
GLUCOSE BLDC GLUCOMTR-MCNC: 92 MG/DL (ref 70–99)
GLUCOSE BLDC GLUCOMTR-MCNC: 96 MG/DL (ref 70–99)
GLUCOSE SERPL-MCNC: 102 MG/DL (ref 70–99)
GLUCOSE SERPL-MCNC: 108 MG/DL (ref 70–99)
HCT VFR BLD AUTO: 22.7 % (ref 40–53)
HCT VFR BLD AUTO: 23.6 % (ref 40–53)
HGB BLD-MCNC: 6.6 G/DL (ref 13.3–17.7)
HGB BLD-MCNC: 7.5 G/DL (ref 13.3–17.7)
HGB BLD-MCNC: 7.9 G/DL (ref 13.3–17.7)
HGB BLD-MCNC: 8 G/DL (ref 13.3–17.7)
INR PPP: 1.25 (ref 0.86–1.14)
MAGNESIUM SERPL-MCNC: 1.9 MG/DL (ref 1.6–2.3)
MAGNESIUM SERPL-MCNC: 2 MG/DL (ref 1.6–2.3)
MCH RBC QN AUTO: 27.7 PG (ref 26.5–33)
MCH RBC QN AUTO: 29 PG (ref 26.5–33)
MCHC RBC AUTO-ENTMCNC: 33 G/DL (ref 31.5–36.5)
MCHC RBC AUTO-ENTMCNC: 33.9 G/DL (ref 31.5–36.5)
MCV RBC AUTO: 84 FL (ref 78–100)
MCV RBC AUTO: 86 FL (ref 78–100)
NUM BPU REQUESTED: 1
PHOSPHATE SERPL-MCNC: 1.8 MG/DL (ref 2.5–4.5)
PHOSPHATE SERPL-MCNC: 1.8 MG/DL (ref 2.5–4.5)
PHOSPHATE SERPL-MCNC: 2.4 MG/DL (ref 2.5–4.5)
PLATELET # BLD AUTO: 113 10E9/L (ref 150–450)
PLATELET # BLD AUTO: 139 10E9/L (ref 150–450)
POTASSIUM SERPL-SCNC: 3.8 MMOL/L (ref 3.4–5.3)
POTASSIUM SERPL-SCNC: 3.8 MMOL/L (ref 3.4–5.3)
PROT SERPL-MCNC: 4.3 G/DL (ref 6.8–8.8)
RBC # BLD AUTO: 2.71 10E12/L (ref 4.4–5.9)
RBC # BLD AUTO: 2.76 10E12/L (ref 4.4–5.9)
SODIUM SERPL-SCNC: 139 MMOL/L (ref 133–144)
SODIUM SERPL-SCNC: 139 MMOL/L (ref 133–144)
SPECIMEN EXP DATE BLD: NORMAL
TRANSFUSION STATUS PATIENT QL: NORMAL
WBC # BLD AUTO: 10 10E9/L (ref 4–11)
WBC # BLD AUTO: 9.5 10E9/L (ref 4–11)

## 2017-10-20 PROCEDURE — 85610 PROTHROMBIN TIME: CPT | Performed by: SURGERY

## 2017-10-20 PROCEDURE — 00000146 ZZHCL STATISTIC GLUCOSE BY METER IP

## 2017-10-20 PROCEDURE — 85027 COMPLETE CBC AUTOMATED: CPT | Performed by: SURGERY

## 2017-10-20 PROCEDURE — 83735 ASSAY OF MAGNESIUM: CPT | Performed by: SURGERY

## 2017-10-20 PROCEDURE — 86923 COMPATIBILITY TEST ELECTRIC: CPT | Performed by: STUDENT IN AN ORGANIZED HEALTH CARE EDUCATION/TRAINING PROGRAM

## 2017-10-20 PROCEDURE — 25000128 H RX IP 250 OP 636: Performed by: SURGERY

## 2017-10-20 PROCEDURE — 40000556 ZZH STATISTIC PERIPHERAL IV START W US GUIDANCE

## 2017-10-20 PROCEDURE — 74177 CT ABD & PELVIS W/CONTRAST: CPT

## 2017-10-20 PROCEDURE — 12000001 ZZH R&B MED SURG/OB UMMC

## 2017-10-20 PROCEDURE — 71260 CT THORAX DX C+: CPT

## 2017-10-20 PROCEDURE — 25000128 H RX IP 250 OP 636: Performed by: STUDENT IN AN ORGANIZED HEALTH CARE EDUCATION/TRAINING PROGRAM

## 2017-10-20 PROCEDURE — 36415 COLL VENOUS BLD VENIPUNCTURE: CPT | Performed by: SURGERY

## 2017-10-20 PROCEDURE — 86850 RBC ANTIBODY SCREEN: CPT | Performed by: STUDENT IN AN ORGANIZED HEALTH CARE EDUCATION/TRAINING PROGRAM

## 2017-10-20 PROCEDURE — P9016 RBC LEUKOCYTES REDUCED: HCPCS | Performed by: STUDENT IN AN ORGANIZED HEALTH CARE EDUCATION/TRAINING PROGRAM

## 2017-10-20 PROCEDURE — 80048 BASIC METABOLIC PNL TOTAL CA: CPT | Performed by: SURGERY

## 2017-10-20 PROCEDURE — 84100 ASSAY OF PHOSPHORUS: CPT | Performed by: SURGERY

## 2017-10-20 PROCEDURE — 85018 HEMOGLOBIN: CPT | Performed by: SURGERY

## 2017-10-20 PROCEDURE — 25000125 ZZHC RX 250: Performed by: STUDENT IN AN ORGANIZED HEALTH CARE EDUCATION/TRAINING PROGRAM

## 2017-10-20 PROCEDURE — 86900 BLOOD TYPING SEROLOGIC ABO: CPT | Performed by: STUDENT IN AN ORGANIZED HEALTH CARE EDUCATION/TRAINING PROGRAM

## 2017-10-20 PROCEDURE — 80053 COMPREHEN METABOLIC PANEL: CPT | Performed by: SURGERY

## 2017-10-20 PROCEDURE — 25000132 ZZH RX MED GY IP 250 OP 250 PS 637: Performed by: STUDENT IN AN ORGANIZED HEALTH CARE EDUCATION/TRAINING PROGRAM

## 2017-10-20 PROCEDURE — 86901 BLOOD TYPING SEROLOGIC RH(D): CPT | Performed by: STUDENT IN AN ORGANIZED HEALTH CARE EDUCATION/TRAINING PROGRAM

## 2017-10-20 PROCEDURE — S0164 INJECTION PANTROPRAZOLE: HCPCS | Performed by: STUDENT IN AN ORGANIZED HEALTH CARE EDUCATION/TRAINING PROGRAM

## 2017-10-20 PROCEDURE — 25000125 ZZHC RX 250: Performed by: SURGERY

## 2017-10-20 PROCEDURE — 76705 ECHO EXAM OF ABDOMEN: CPT

## 2017-10-20 RX ORDER — IOPAMIDOL 755 MG/ML
105 INJECTION, SOLUTION INTRAVASCULAR ONCE
Status: COMPLETED | OUTPATIENT
Start: 2017-10-20 | End: 2017-10-20

## 2017-10-20 RX ORDER — POTASSIUM CHLORIDE 7.45 MG/ML
10 INJECTION INTRAVENOUS
Status: DISCONTINUED | OUTPATIENT
Start: 2017-10-20 | End: 2017-10-20

## 2017-10-20 RX ORDER — POTASSIUM CL/LIDO/0.9 % NACL 10MEQ/0.1L
10 INTRAVENOUS SOLUTION, PIGGYBACK (ML) INTRAVENOUS
Status: DISCONTINUED | OUTPATIENT
Start: 2017-10-20 | End: 2017-10-20

## 2017-10-20 RX ORDER — HYDROMORPHONE HYDROCHLORIDE 1 MG/ML
.3-.5 INJECTION, SOLUTION INTRAMUSCULAR; INTRAVENOUS; SUBCUTANEOUS EVERY 4 HOURS PRN
Status: DISCONTINUED | OUTPATIENT
Start: 2017-10-20 | End: 2017-10-22 | Stop reason: HOSPADM

## 2017-10-20 RX ORDER — MAGNESIUM SULFATE HEPTAHYDRATE 40 MG/ML
4 INJECTION, SOLUTION INTRAVENOUS EVERY 4 HOURS PRN
Status: DISCONTINUED | OUTPATIENT
Start: 2017-10-20 | End: 2017-10-20

## 2017-10-20 RX ORDER — LORAZEPAM 0.5 MG/1
0.5 TABLET ORAL EVERY 8 HOURS PRN
Status: DISCONTINUED | OUTPATIENT
Start: 2017-10-20 | End: 2017-10-22 | Stop reason: HOSPADM

## 2017-10-20 RX ORDER — ONDANSETRON 4 MG/1
4-8 TABLET, ORALLY DISINTEGRATING ORAL EVERY 6 HOURS PRN
Status: DISCONTINUED | OUTPATIENT
Start: 2017-10-20 | End: 2017-10-22 | Stop reason: HOSPADM

## 2017-10-20 RX ORDER — POTASSIUM CHLORIDE 750 MG/1
20-40 TABLET, EXTENDED RELEASE ORAL
Status: DISCONTINUED | OUTPATIENT
Start: 2017-10-20 | End: 2017-10-20

## 2017-10-20 RX ORDER — ONDANSETRON 2 MG/ML
4-8 INJECTION INTRAMUSCULAR; INTRAVENOUS EVERY 6 HOURS PRN
Status: DISCONTINUED | OUTPATIENT
Start: 2017-10-20 | End: 2017-10-22 | Stop reason: HOSPADM

## 2017-10-20 RX ORDER — POTASSIUM CHLORIDE 29.8 MG/ML
20 INJECTION INTRAVENOUS
Status: DISCONTINUED | OUTPATIENT
Start: 2017-10-20 | End: 2017-10-20

## 2017-10-20 RX ORDER — POTASSIUM CHLORIDE 1.5 G/1.58G
20-40 POWDER, FOR SOLUTION ORAL
Status: DISCONTINUED | OUTPATIENT
Start: 2017-10-20 | End: 2017-10-20

## 2017-10-20 RX ADMIN — IOPAMIDOL 105 ML: 755 INJECTION, SOLUTION INTRAVENOUS at 03:02

## 2017-10-20 RX ADMIN — POTASSIUM PHOSPHATE, MONOBASIC AND POTASSIUM PHOSPHATE, DIBASIC 20 MMOL: 224; 236 INJECTION, SOLUTION INTRAVENOUS at 05:41

## 2017-10-20 RX ADMIN — PANTOPRAZOLE SODIUM 8 MG/HR: 40 INJECTION, POWDER, FOR SOLUTION INTRAVENOUS at 00:41

## 2017-10-20 RX ADMIN — Medication 100 MG: at 08:58

## 2017-10-20 RX ADMIN — PANTOPRAZOLE SODIUM 8 MG/HR: 40 INJECTION, POWDER, FOR SOLUTION INTRAVENOUS at 10:23

## 2017-10-20 RX ADMIN — SODIUM CHLORIDE, PRESERVATIVE FREE 77 ML: 5 INJECTION INTRAVENOUS at 03:03

## 2017-10-20 RX ADMIN — LORAZEPAM 0.5 MG: 0.5 TABLET ORAL at 21:37

## 2017-10-20 RX ADMIN — HYDROMORPHONE HYDROCHLORIDE 0.5 MG: 1 INJECTION, SOLUTION INTRAMUSCULAR; INTRAVENOUS; SUBCUTANEOUS at 15:56

## 2017-10-20 RX ADMIN — SODIUM CHLORIDE: 9 INJECTION, SOLUTION INTRAVENOUS at 00:41

## 2017-10-20 RX ADMIN — FOLIC ACID 1 MG: 1 TABLET ORAL at 08:58

## 2017-10-20 RX ADMIN — LORAZEPAM 0.5 MG: 0.5 TABLET ORAL at 05:52

## 2017-10-20 ASSESSMENT — PAIN DESCRIPTION - DESCRIPTORS
DESCRIPTORS: ACHING
DESCRIPTORS: SHARP

## 2017-10-20 NOTE — PROGRESS NOTES
Assumed patient care at 1900.  1 unit of PRBC's finished and 1 unit of FFP completed before transfer to [facility]. Report called to Odalys on unit 5C. Updated nurse with completion of FFP. 2/2 units PRBC's released however did not receive product before transferred to other facility nurse updated with information. LS fine crackles heard after unit of FFP. Voided 400 before transfer. No bloody stools for this nurse, smear only. Patient stood up with no c/o dizziness to ambulate while voiding and walked a few steps to EMS gurney. Patient alert and oriented, cracking jokes with staff. Denies pain. Family by bedside. Son given new unit number to call for updates tonight.

## 2017-10-20 NOTE — H&P
SURGERY H&P  October 20, 2017      HISTORY PRESENTING ILLNESS: Dashawn Ordoñez is a 57 year old male PMH HTN, DM, alcohol abuse, who presented to [facility] on 10/17 with 1 day of dizziness, weakness, states he couldn't stand, and a syncopal episode in his home.  He had drank 1/2 qt of alcohol earlier in the day, went to sleep, and when he got up and went to the kitchen to drink a glass of milk he subsequently fainted.  He endorses generalized fatigue and weakness for the past several weeks to months which he has attributed to stress at work.  He denies black, maroon, or bright red stools in the past, though he states he doesn't frequently pay attention to them.  He has also had a 100lb weight loss over the last year, after modifying diet.  In OSH ED, was found to have a Hbg of 3.5, noted tarry stools that day, was admitted, and underwent EGD and IR procedures which identified a gastric ulcerated mass and on biopsy showed gastric adenocarcinoma.  No active bleeding was identified.  During admission he had multiple maroon bowel movements, received a total of 9 units of pRBCs and 4 units of FFP.  Repeat EGD on 10/19 showed an adherent clot not present previously.  He had several maroon BMs on Thursday, largest in the am, most recent around 4pm which was small, hbg was 7.4.  He received a last unit of pRBCs and he was transferred to [facility] for further workup.  He currently denies dizziness, lightheadedness, pain, n/v, or any more bloody stools.  He is anxious about his new cancer diagnosis.  Patient does have a significant history of heavy alcohol use, states he usually tries to make a quart of alcohol last a week but frequently drinks more than that.  He works as a , and states that he is self-paying for this hospitalization as his insurance is not currently set up.  PSH remarkable for appendectomy 10 years ago.    REVIEW OF SYSTEMS: As noted above. No headache, dizziness. No fever, chills. No chest  pain or shortness of breath. No abdominal pain, nausea, vomiting. No diarrhea or constipation. No urinary difficulties. No muscle or body aches.    PAST MEDICAL HISTORY:   Past Medical History:   Diagnosis Date     Depressive disorder      Diabetes (H)      Hypertension      Substance abuse        SURGICAL HISTORY:   Past Surgical History:   Procedure Laterality Date     APPENDECTOMY       ENT SURGERY         SOCIAL HISTORY: Tobacco - Current everyday smoker, 2 PPD. Etoh - yes, daily, hard liquor. Drugs - no.  Works as .     FAMILY HISTORY: No bleeding/clotting disorders nor problems with anesthesia.     ALLERGIES:    No Known Allergies    MEDICATIONS:  Ibuprofen 600mg prn pain  Unknown sleep aid    PHYSICAL EXAMINATION:  Temp:  [97.1  F (36.2  C)-98.4  F (36.9  C)] 98.2  F (36.8  C)  Pulse:  [85] 85  Heart Rate:  [] 84  Resp:  [8-31] 16  BP: ()/(47-86) 119/71  SpO2:  [86 %-100 %] 97 %  General: Alert, appears stated age, in no acute distress.  HEENT: Normocephalic, atraumatic. Patent nares.   Neck: No cervical lymphadenopathy.  Chest wall: Symmetric thorax. No masses or tenderness to palpation.  Respiratory: Non-labored breathing. Lung sounds clear to auscultation bilaterally.  Cardiovascular: Regular rate and rhythm.  Gastrointestinal: Abdomen soft, non-distended, non-tender to palpation. No organomegaly or masses appreciated.  Genitourinary: No CVA tenderness.   Extremities: Moving all four extremities. No limb deformities. No pedal edema.   Skin: No rashes or lesions appreciated.  Pale.    LABS: Reviewed.   Arterial Blood Gases   No lab results found in last 7 days.  Complete Blood Count     Recent Labs  Lab 10/20/17  0102 10/19/17  1620 10/19/17  0840 10/19/17  0352  10/18/17  0559 10/17/17  1630   WBC 10.0  --   --  12.2*  --  10.3 15.7*   HGB 8.0* 7.4* 7.4* 5.3*  < > 5.9* 3.5*   *  --   --  223  --  246 379   < > = values in this interval not displayed.  Basic Metabolic  Panel    Recent Labs  Lab 10/19/17  1855 10/19/17  0352 10/18/17  0559 10/17/17  1630    138 142 139   POTASSIUM 4.0 4.0 4.1 3.7   CHLORIDE 111* 111* 113* 109   CO2 23 24 24 24   BUN 15 11 14 22   CR 0.73 0.75 0.88 0.91   * 148* 137* 159*     Liver Function Tests    Recent Labs  Lab 10/20/17  0102 10/19/17  1057 10/18/17  0559 10/17/17  1630   AST  --   --  8 10   ALT  --   --  12 10   ALKPHOS  --   --  58 66   BILITOTAL  --   --  0.5 0.3   ALBUMIN  --   --  2.7* 2.9*   INR 1.25* 1.44*  --  1.26*     Pancreatic Enzymes  No lab results found in last 7 days.  Coagulation Profile    Recent Labs  Lab 10/20/17  0102 10/19/17  1057 10/17/17  1630   INR 1.25* 1.44* 1.26*   PTT  --   --  27         IMAGING:  Results for orders placed or performed during the hospital encounter of 10/17/17   Head CT w/o contrast    Narrative    CT HEAD W/O CONTRAST   10/17/2017 7:09 PM     HISTORY: fall, head injury    TECHNIQUE:  Axial images of the head without IV contrast material.  Radiation dose for this scan was reduced using automated exposure  control, adjustment of the mA and/or kV according to patient size, or  iterative reconstruction technique.    COMPARISON: None.    FINDINGS: The ventricles are normal in size, shape and configuration.  The brain parenchyma and subarachnoid spaces are normal. There is no  evidence of intracranial hemorrhage, mass, acute infarct or anomaly.  The visualized portions of the sinuses and mastoids appear normal. No  intracranial hemorrhage or skull fractures are seen..      Impression    IMPRESSION:  No bleed or fracture is identified.    DENNIS ISLAS MD   Chest  XR, 1 view PORTABLE    Narrative    XR CHEST PORT 1 VW 10/17/2017 5:53 PM     HISTORY: GI bleed      Impression    IMPRESSION: The lungs appear clear. No pleural effusion.    NAHOMY MG MD   Chest  XR, 1 view PORTABLE    Narrative    XR CHEST PORT 1 VW  10/17/2017 8:01 PM     HISTORY:  PICC    COMPARISON: None.    FINDINGS:   Right PICC line has been inserted. The tip is at the  junction of the superior vena cava and right atrium. Lungs remain  clear.      Impression    IMPRESSION: PICC line in good position.    DENNIS ISLAS MD   IR Visceral Angiogram    Narrative    PROCEDURE:   1. Ultrasound guided right femoral arteriotomy.  2. Celiac axis, superior mesenteric and inferior mesenteric  angiography.  3. Superselective catheterization and angiography of the left  gastrohepatic trunk and 3 branches of the left gastric artery.  4. Superselective catheterization and angiography of the  gastroduodenal artery and pancreatic artery.  5. Placement of a 6 Croatian Angio-Seal closure device.     DATE OF PROCEDURE: 10/19/2017    MEDICATIONS: 6 mL 1% lidocaine SQ, Versed 1.5 mg IV, fentanyl 50 mcg  IV    CONTRAST: 100 mL Visipaque IA    FLUOROSCOPY TIME: 14.1 minutes, (1375 mGy).    COMPLICATIONS: None    INTERVENTIONAL RADIOLOGY VISCERAL ANGIOGRAM 10/19/2017 1:29 PM    CLINICAL HISTORY/INDICATION: 57-year-old male with upper GI bleed and  large ulcer seen on endoscopy. Patient also had an episode of bright  red blood prior to the procedure.    PROCEDURE AND FINDINGS:  Following a discussion of the risks, benefits, indications, and  alternatives to treatment, appropriate informed consent was obtained  from the patient. The patient was brought to the interventional  radiology suite and placed supine on the table. Bilateral groins were  prepped and draped in a sterile fashion. A timeout was performed per  universal protocol policy to confirm the correct patient, site and  procedure to be performed.    A preliminary ultrasound of the right groin was performed and  demonstrates a patent right common femoral artery. A permanent  ultrasound image was recorded. Using a combination of fluoroscopy and  ultrasound, an access site was determined. The overlying skin was  anesthetized with 1% Lidocaine. Using ultrasound guidance, access into  the right common  femoral artery was obtained with visualization of  needle entry into the vessel. A 0.018 wire was advanced through the  needle and exchange was made for a 5 [place] micropuncture sheath. A  0.035 wire was advanced through the micropuncture sheath and exchange  was made for a 5 [place] vascular sheath.    A Bentson and 5 [place] SOS catheter were inserted into the abdominal  aorta and used to selectively catheterize the celiac axis. Digital  subtraction angiography was performed, images show the celiac axis is  patent. No evidence of active extravasation, pseudoaneurysm or AV  malformation. A renegade high flow microcatheter and wire were used to  selectively catheterize a branch vessel off of the celiac axis.  Digital subtraction angiography was performed, images show access into  the pancreatic artery. No evidence of active extravasation. The  microcatheter and wire were pulled back and used to selectively  catheterize the left gastrohepatic artery. Digital subtraction  angiography was performed, images show a common trunk between the left  gastric and the left hepatic artery. Off of this common trunk there  are multiple branches of the left hepatic artery. The microcatheter  and wire were then used to selectively catheterize a branch of the  left hepatic artery off of the common trunk, digital subtraction  angiography was performed, images show no evidence of active  extravasation, pseudoaneurysm or AV malformation. The microcatheter  and wire were pulled back and used to selectively catheterize a second  branch vessel of the left gastric artery. Images show no active  extravasation, pseudoaneurysm or AV fistula. The microcatheter and  wire were pulled back and used to selectively catheterize a third  branch of the left gastric artery. Digital subtraction angiography was  performed, images show no active extravasation, pseudoaneurysm or AV  malformation.    The microcatheter and wire were pulled back and used to  selectively  catheterize the gastroduodenal artery. Digital subtraction angiography  was performed, images show no active extravasation, pseudoaneurysm or  AV malformation. Microcatheter and wire were removed.    The 5 Hungarian SOS catheter were used to selectively catheterize the  superior mesenteric artery. Images show the superior mesenteric artery  is patent without evidence of active extravasation, pseudoaneurysm or  AV malformation. Incidentally noted there is a replaced right hepatic  artery which is a normal anatomic variant. The base catheter was then  used to selectively catheterize the inferior mesenteric artery. Images  show the inferior mesenteric artery is extremely small in size. No  evidence of active extravasation noted.    At this point, the catheter was removed over the wire. A preclosure  digital subtraction angiographic run was performed to evaluate the  access site and adequate hemostasis was then obtained using  a 6  Hungarian Angio-Seal closure device.     Throughout the procedure, the patient was monitored by a radiology  nurse for cardiac rhythm and oxygen saturation which remained stable.  Total sedation time was 60 minutes. The patient tolerated the  procedure well and left interventional radiology in stable condition.      Impression    IMPRESSION:  1. Visceral angiogram showed no active extravasation.  2. The inferior mesenteric artery is small and clamped down,  suggesting bleeding may be colonic in nature. Patient also did have a  bowel movement that was bright red blood prior to the procedure.    Findings were discussed with Dr. Mclean at the time of the procedure.          CO-MORBIDITIES:   No diagnosis found.    ASSESSMENT: Dashawn Ordoñez is a 57 year old male presenting from OSH with GI bleed 2/2 ulcerated gastric carcinoma.    PLAN:    - Admit to surgical oncology  - 2 large bore PIVs, in addition to his PICC  - IVF  - NPO  - STAT labs  - am labs  - Q6 hbg  - Transfuse for Hbg<7.0  -  Protonix gtt  - Michel, strict Is&Os  - MSSA protocol  - SSI  - Prn electrolyte replacement  - Staging CT C/A/P in the morning    Patient seen, findings and plan discussed with chief resident, Dr. Iraheta who discussed with staff, Dr. Last.        Rebecca Ortega  General Surgery PGY1  [id number]

## 2017-10-20 NOTE — PROGRESS NOTES
GI Fellow Brief Note    Chart reviewed and imaging discussed with radiology as well as the surgical service.      Briefly, patient is a 57 year old male with newly diagnosed gastric adenocarcinoma complicated by upper GI bleeding.  Patient required transfusion of multiple blood products ~ underwent upper endoscopy x 2, injected without additional endoscopic intervention performed. Underwent  IR angiography which showed no active extravasation.  Now transferred to [facility] for further cares.     Admitted to the surgical service.  No further evidence of GI blood loss while inpatient.  Staging CT done.  GI called for consideration of EUS guided bx of LN.        Imaging reviewed with Dr. Clayton as well as radiology.  Concern for stage 4 disease given new ill defined 10 mm lesion in hepatic segment 8, additional indeterminate lesion in segment 8 too small to characterize.      Recommend:  MRI to further characterize liver lesions  Percutaneous biopsy of liver lesion as this is felt to be highest yield   EUS could certainly be considered pending workup above (please notify if this is needed)  PPI BID    Case discussed with Dr. Clayton  GI will sign off.  Please page with ? Or change in clinical status.      Unique Lehman

## 2017-10-20 NOTE — PROGRESS NOTES
"Surgical Oncology  10/20/2017    Subjective:    Admitted overnight for bleeding gastric ulcer that due to underlying gastric adenocarcinoma. Transferred from [facility].    No hematemesis or BMs since admission.    Denies any abdominal pain.    Pain at home was mostly described as chest pain/heartburn. He had been treating with \"lots of ibuprofen\" and isacc-seltzer. Has never seen any medical provider for ulcers or GI bleeding episodes.     Objective:  /74 (BP Location: Left arm)  Pulse 81  Temp 97.3  F (36.3  C) (Axillary)  Resp 16  Wt 75.8 kg (167 lb 1.6 oz)  SpO2 98%  BMI 23.98 kg/m2  NAD, comfortable in bed  RRR  NLB on RA  Soft, nt, nd.  WWP    Labs:   Hgb 7.5 (8)  WBC 9.5  Plt 113    INR 1.25    139 111 12 / 105  3.8 22 0.68 \  Mg 1.9  P 1.8    CT CAP 10/20/2017   Official read is still pending. Large ulcerated lesion in the stomach near the GE junction. Liver lesions noted and seen on previous CT scans. Possible new hypodensity R lateral liver. There are enlarged herb-aortic lymph nodes.    Assessment:   57 year old man with newly diagnosed gastric adenocarcinoma complicated by upper GI bleeding s/p multiple blood product resuscitation at OSH. Staging CT CAP highly suspicious for stage IV disease, metastatic disease with distant LN enlargement and close proximity to spleen, pancreas, and diaphragm. Currently hemodynamically stable.    Plan:    GI consult for EUS-guided biopsy of enlarged herb-aortic LNs vs liver biopsy for completion staging, discussed w/fellow.    Q8H hemoglobin checks. Transfuse for Hgb < 7.    Close monitoring of vital signs in case of re-bleeding.    Continue protonix gtt.    MSSA protocol, h/o EtOH use.     Consider prophylactic IR angiogram and embolization of L gastric artery, alternatively can be used for life-threatening rebleed. Will discuss case with them.    Discussed the extensive operation details with the patient and his wife that would be largely palliative, and the " patient would not want an extensive surgery.    Pt adamantly wants to go outside and smoke prior to any procedures. Please have patient fill out appropriate paperwork stating this would be against medical advice.     In future, may involve oncology and palliative care teams.     Lines: PICC, PIVs, Michel    Dispo: transfer to .    Seen with Dr. Last.    Herson Bazan MD  General Surgery PGY-3  Pager [phone]

## 2017-10-20 NOTE — PLAN OF CARE
Problem: Patient Care Overview  Goal: Plan of Care/Patient Progress Review  /75 (BP Location: Left arm)  Temp 97.5  F (36.4  C) (Axillary)  Resp 16  Wt 78.4 kg (172 lb 13.5 oz)  SpO2 97%  BMI 24.8 kg/m2     VSS on RA. A/Ox4. MSSA protocol; pt scored 7. PRN ativan 0.5mg q8h (not linked to MSSA) given x1 per pt request. No c/o pain. Up with SBA. hgb 7.5 MD notified; parameters to transfuse will be <7 or if having bloody stools. No BM since pt admitted last night. TL PIVV infusing NS @ 100mL/hr and Protonix gtt infusing @ 10mL/hr. Phos 1.8, replacement infusing; redraw ordered for 1200 today and again tomorrow AM. PT had nicotine on @ [place] but fell off and pt requesting another patch, will pass off to day RN. Cont with Blood sugars q4h, on SSI, no coverage needed overnight. Michel in place per orders for strict I&O, has adequate urine output.  NPO since arrival to . CT done for staging last night. Will cont to monitor, follow POC, and update team with any changes.

## 2017-10-21 ENCOUNTER — APPOINTMENT (OUTPATIENT)
Dept: INTERVENTIONAL RADIOLOGY/VASCULAR | Facility: CLINIC | Age: 57
End: 2017-10-21
Attending: RADIOLOGY
Payer: MEDICAID

## 2017-10-21 LAB
GLUCOSE BLDC GLUCOMTR-MCNC: 109 MG/DL (ref 70–99)
GLUCOSE BLDC GLUCOMTR-MCNC: 110 MG/DL (ref 70–99)
GLUCOSE BLDC GLUCOMTR-MCNC: 145 MG/DL (ref 70–99)
GLUCOSE BLDC GLUCOMTR-MCNC: 85 MG/DL (ref 70–99)
GLUCOSE BLDC GLUCOMTR-MCNC: 89 MG/DL (ref 70–99)
GLUCOSE BLDC GLUCOMTR-MCNC: 93 MG/DL (ref 70–99)
HGB BLD-MCNC: 8.3 G/DL (ref 13.3–17.7)
HGB BLD-MCNC: 8.4 G/DL (ref 13.3–17.7)
HGB BLD-MCNC: 8.6 G/DL (ref 13.3–17.7)

## 2017-10-21 PROCEDURE — 0FB03ZX EXCISION OF LIVER, PERCUTANEOUS APPROACH, DIAGNOSTIC: ICD-10-PCS | Performed by: RADIOLOGY

## 2017-10-21 PROCEDURE — 25000125 ZZHC RX 250: Performed by: STUDENT IN AN ORGANIZED HEALTH CARE EDUCATION/TRAINING PROGRAM

## 2017-10-21 PROCEDURE — 25000128 H RX IP 250 OP 636: Performed by: STUDENT IN AN ORGANIZED HEALTH CARE EDUCATION/TRAINING PROGRAM

## 2017-10-21 PROCEDURE — 36415 COLL VENOUS BLD VENIPUNCTURE: CPT | Performed by: SURGERY

## 2017-10-21 PROCEDURE — 99153 MOD SED SAME PHYS/QHP EA: CPT

## 2017-10-21 PROCEDURE — 36592 COLLECT BLOOD FROM PICC: CPT | Performed by: SURGERY

## 2017-10-21 PROCEDURE — 88342 IMHCHEM/IMCYTCHM 1ST ANTB: CPT | Performed by: STUDENT IN AN ORGANIZED HEALTH CARE EDUCATION/TRAINING PROGRAM

## 2017-10-21 PROCEDURE — S0164 INJECTION PANTROPRAZOLE: HCPCS | Performed by: STUDENT IN AN ORGANIZED HEALTH CARE EDUCATION/TRAINING PROGRAM

## 2017-10-21 PROCEDURE — 25000132 ZZH RX MED GY IP 250 OP 250 PS 637: Performed by: STUDENT IN AN ORGANIZED HEALTH CARE EDUCATION/TRAINING PROGRAM

## 2017-10-21 PROCEDURE — 25800025 ZZH RX 258: Performed by: STUDENT IN AN ORGANIZED HEALTH CARE EDUCATION/TRAINING PROGRAM

## 2017-10-21 PROCEDURE — 00000146 ZZHCL STATISTIC GLUCOSE BY METER IP

## 2017-10-21 PROCEDURE — 12000001 ZZH R&B MED SURG/OB UMMC

## 2017-10-21 PROCEDURE — 25000128 H RX IP 250 OP 636: Performed by: RADIOLOGY

## 2017-10-21 PROCEDURE — 25000128 H RX IP 250 OP 636: Performed by: SURGERY

## 2017-10-21 PROCEDURE — 76942 ECHO GUIDE FOR BIOPSY: CPT

## 2017-10-21 PROCEDURE — 85018 HEMOGLOBIN: CPT | Performed by: SURGERY

## 2017-10-21 PROCEDURE — 99152 MOD SED SAME PHYS/QHP 5/>YRS: CPT

## 2017-10-21 PROCEDURE — 88307 TISSUE EXAM BY PATHOLOGIST: CPT | Performed by: STUDENT IN AN ORGANIZED HEALTH CARE EDUCATION/TRAINING PROGRAM

## 2017-10-21 PROCEDURE — 25000125 ZZHC RX 250: Performed by: RADIOLOGY

## 2017-10-21 PROCEDURE — 40000428 ZZHCL STATISTIC R-RUSH PROCESSING: Performed by: STUDENT IN AN ORGANIZED HEALTH CARE EDUCATION/TRAINING PROGRAM

## 2017-10-21 PROCEDURE — 88341 IMHCHEM/IMCYTCHM EA ADD ANTB: CPT | Performed by: STUDENT IN AN ORGANIZED HEALTH CARE EDUCATION/TRAINING PROGRAM

## 2017-10-21 RX ORDER — DEXTROSE, SODIUM CHLORIDE, AND POTASSIUM CHLORIDE 5; .2; .15 G/100ML; G/100ML; G/100ML
INJECTION INTRAVENOUS CONTINUOUS
Status: DISCONTINUED | OUTPATIENT
Start: 2017-10-21 | End: 2017-10-22 | Stop reason: HOSPADM

## 2017-10-21 RX ORDER — NALOXONE HYDROCHLORIDE 0.4 MG/ML
.1-.4 INJECTION, SOLUTION INTRAMUSCULAR; INTRAVENOUS; SUBCUTANEOUS
Status: DISCONTINUED | OUTPATIENT
Start: 2017-10-21 | End: 2017-10-21 | Stop reason: HOSPADM

## 2017-10-21 RX ORDER — FLUMAZENIL 0.1 MG/ML
0.2 INJECTION, SOLUTION INTRAVENOUS
Status: DISCONTINUED | OUTPATIENT
Start: 2017-10-21 | End: 2017-10-21 | Stop reason: HOSPADM

## 2017-10-21 RX ORDER — FENTANYL CITRATE 50 UG/ML
25-50 INJECTION, SOLUTION INTRAMUSCULAR; INTRAVENOUS EVERY 5 MIN PRN
Status: DISCONTINUED | OUTPATIENT
Start: 2017-10-21 | End: 2017-10-21 | Stop reason: HOSPADM

## 2017-10-21 RX ADMIN — FENTANYL CITRATE 25 MCG: 50 INJECTION, SOLUTION INTRAMUSCULAR; INTRAVENOUS at 11:33

## 2017-10-21 RX ADMIN — MIDAZOLAM 0.5 MG: 1 INJECTION INTRAMUSCULAR; INTRAVENOUS at 11:40

## 2017-10-21 RX ADMIN — LIDOCAINE HYDROCHLORIDE 6 ML: 10 INJECTION, SOLUTION EPIDURAL; INFILTRATION; INTRACAUDAL; PERINEURAL at 11:58

## 2017-10-21 RX ADMIN — FOLIC ACID 1 MG: 1 TABLET ORAL at 08:07

## 2017-10-21 RX ADMIN — HYDROMORPHONE HYDROCHLORIDE 0.5 MG: 1 INJECTION, SOLUTION INTRAMUSCULAR; INTRAVENOUS; SUBCUTANEOUS at 08:22

## 2017-10-21 RX ADMIN — POTASSIUM CHLORIDE, DEXTROSE MONOHYDRATE AND SODIUM CHLORIDE: 150; 5; 200 INJECTION, SOLUTION INTRAVENOUS at 09:30

## 2017-10-21 RX ADMIN — MIDAZOLAM 1 MG: 1 INJECTION INTRAMUSCULAR; INTRAVENOUS at 11:14

## 2017-10-21 RX ADMIN — PANTOPRAZOLE SODIUM 8 MG/HR: 40 INJECTION, POWDER, FOR SOLUTION INTRAVENOUS at 01:53

## 2017-10-21 RX ADMIN — HYDROMORPHONE HYDROCHLORIDE 0.5 MG: 1 INJECTION, SOLUTION INTRAMUSCULAR; INTRAVENOUS; SUBCUTANEOUS at 00:08

## 2017-10-21 RX ADMIN — MIDAZOLAM 0.5 MG: 1 INJECTION INTRAMUSCULAR; INTRAVENOUS at 11:33

## 2017-10-21 RX ADMIN — POTASSIUM CHLORIDE, DEXTROSE MONOHYDRATE AND SODIUM CHLORIDE: 150; 5; 200 INJECTION, SOLUTION INTRAVENOUS at 19:48

## 2017-10-21 RX ADMIN — PANTOPRAZOLE SODIUM 8 MG/HR: 40 INJECTION, POWDER, FOR SOLUTION INTRAVENOUS at 20:57

## 2017-10-21 RX ADMIN — FENTANYL CITRATE 25 MCG: 50 INJECTION, SOLUTION INTRAMUSCULAR; INTRAVENOUS at 11:40

## 2017-10-21 RX ADMIN — FENTANYL CITRATE 50 MCG: 50 INJECTION, SOLUTION INTRAMUSCULAR; INTRAVENOUS at 11:14

## 2017-10-21 RX ADMIN — HYDROMORPHONE HYDROCHLORIDE 0.5 MG: 1 INJECTION, SOLUTION INTRAMUSCULAR; INTRAVENOUS; SUBCUTANEOUS at 12:37

## 2017-10-21 RX ADMIN — Medication 100 MG: at 08:07

## 2017-10-21 ASSESSMENT — PAIN DESCRIPTION - DESCRIPTORS
DESCRIPTORS: ACHING

## 2017-10-21 NOTE — PROGRESS NOTES
Transfer from [facility] on 10/20/17 after GI bleed work up and treatment.  One small BM in a.m with black tarry appearance.  No other bleeding noted but had drop in Hgb from 7.9 to 6.6, VSS and patient asymptomatic with blood count.  MD notified of Hgb and type and screen ran and one unit ordered.  Abd US to evaluate hepatic lesion for possible biopsy.  NPO all day and per MD to continue NPO all night d/t drop in Hgb.  Patient transferred to [address] [place] [address] where he will receive blood transfusion.  Dilaudid given once for abd pain and HA with relief.

## 2017-10-21 NOTE — PROGRESS NOTES
After discussion with MD team patient was anxious and reported he would like to go outside and smoke.  Even though nursing and MD advised against smoking he still stated he would be going outside for cigarettes and acknowledged the risk of exacerbating bleed and overall status.  He left floor twice during shift.

## 2017-10-21 NOTE — PROGRESS NOTES
Patient seen with Dr Bazan.    No hematemesis, coffee ground emesis, or bloody BM for nearly 24 hours. Hb down to 6.6 yesterday, responded with 1u pRBC. No dizziness or shortness of breath.  No signs of EtOH withdrawal.  Abdomen soft, non-tender. Urine o/p adequate.    PLAN:  1. Liver biopsy to rule out metastatic disease - team & I spoke with IR yesterday and today regarding the importance of determining metastatic disease for decision making overall and in the setting of a rebleed.  2. Continue NPO due to GI bleed  3. Continue IV PPI    Dora Last MD MSc FRC    Division of Surgical Oncology  [facility]

## 2017-10-21 NOTE — CONSULTS
INTERVENTIONAL RADIOLOGY CONSULT    Patient is on IR schedule a liver biopsy. Biopsy was deemed urgent by Dr. Dora Last, who states that gastric lesion resection will not be offered if biopsy indicates metastasis to liver. If biopsy of liver lesion is non-diagnostic, she will purse endoscopic biopsy of perigastric lymph nodes by GI service. Pathology staff will be present for htis biopsy. Labs WNL for procedure. Orders for NPO, scrubs and antibiotics have been entered. Consent will be done prior to procedure.    Regarding the previously bleeding gastric ulcer, the patient continues to have stable vital signs, stable HGB, not further e/o active GI bleeding. Angiogram will only be considered on an emergent basis in the event of significant, life threatening GI bleeding that is not amenable to endoscopic intervention.    Please contact the IR charge RN at [phone] for estimated time of procedure.     Discussed with Dr. Bustillo.    Denisse Lay MD  Interventional Radiology Attending  [phone]

## 2017-10-21 NOTE — PLAN OF CARE
Problem: Patient Care Overview  Goal: Plan of Care/Patient Progress Review  Outcome: No Change  A&Ox4. Pt anxious about plan of care and needing to smoke, PRN ativan given, pt more relaxed. On MSSA protocol, scoring 5, no intervention needed. AVSS. Pt's Hgb 6.6, 1 unit blood given, no transfusion rxn. Pt denies dizziness, has slight headache. Ambulates independently. Pt has not had BM this shift, not passing flatus. Has viramontes catheter with adequate output. Is on 10 ml/hr protonix drip and NS 100ml/hour. Is NPO and has catheter for possible future procedure. Family requesting the spouse gets an update tomorrow on pt's POC. Continue to monitor s/s of bleeding and alcohol withdrawal.

## 2017-10-21 NOTE — PROGRESS NOTES
Interventional Radiology Pre-Procedure Sedation Assessment   Time of Assessment: 10:39 AM    Expected Level: Moderate Sedation    Indication: Sedation is required for the following type of Procedure: Biopsy    Sedation and procedural consent: Risks, benefits and alternatives were discussed with Patient    PO Intake: Appropriately NPO for procedure    ASA Class: Class 2 - MILD SYSTEMIC DISEASE, NO ACUTE PROBLEMS, NO FUNCTIONAL LIMITATIONS.    Mallampati: Grade 1:  Soft palate, uvula, tonsillar pillars, and posterior pharyngeal wall visible    Lungs: Lungs Clear with good breath sounds bilaterally    Heart: Normal heart sounds and rate    History and physical reviewed and no updates needed. I have reviewed the lab findings, diagnostic data, medications, and the plan for sedation. I have determined this patient to be an appropriate candidate for the planned sedation and procedure and have reassessed the patient IMMEDIATELY PRIOR to sedation and procedure.    Denisse Lay MD

## 2017-10-21 NOTE — PLAN OF CARE
Problem: Patient Care Overview  Goal: Plan of Care/Patient Progress Review  Outcome: Therapy, progress towards functional goals is fair  HD3 admitted for a GI bleed with hx of ETOH use. A&Ox4, VSS on room air, pain is controlled with IV dilaudid. MSSA score has been <8. No S/S of ETOH withdrawal. PICC is infusing IVM and protonix continuous. Michel removed today, adequate UO. Voiding in the urinal. LS clear, BS active, passing gas. Liver biopsy done today, site is CDI. BG checks Q4h, no insulin needed. Hbg 8.3. Up independent. Plan is to continue to monitor for Bleeding.

## 2017-10-21 NOTE — PROGRESS NOTES
Surgical Oncology  10/21/2017    Subjective:    Had one maroon BM yesterday morning, no BMs or hematemesis since.    Hgb 6.6 --> 8.4 s/p 1 unit PRBC yesterday. Hgb 8.3 this AM.    Does not have much pain, dilaudid gives relief.     Objective:  /70 (BP Location: Left arm)  Pulse 76  Temp 97.5  F (36.4  C) (Oral)  Resp 16  Wt 75.8 kg (167 lb 1.6 oz)  SpO2 96%  BMI 23.98 kg/m2  NAD, comfortable in bed  RRR  NLB on RA  Soft, nt, nd.  WWP    Labs:   Hgb 8.4 --> 8.3    Imaging:  Abd US 10/20/17  1. Multiple hepatic lesion including multiple cystic lesions in the  left lobe corresponding to to lesion seen on abdominal CT.   2. Small, hypoechoic solid lesion corresponding to index lesion seen  on CT in segment 8.   3. Mild hepatomegaly.    Assessment:   57 year old man with newly diagnosed gastric adenocarcinoma complicated by upper GI bleeding s/p multiple blood product resuscitation at OSH. Staging CT CAP highly suspicious for stage IV disease, metastatic disease with new liver lesions, distant LN enlargement, and close proximity to spleen, pancreas, and diaphragm. Currently hemodynamically stable.    Plan:    IR consult for liver biopsy. Would prefer to be done today to help determine ultimate treatment and contingency plan prior to discharge.    Q8H hemoglobin checks. Transfuse for Hgb < 7.    Close monitoring of vital signs in case of re-bleeding.    Continue protonix gtt.    MSSA protocol, h/o EtOH use.     Patient has so far not wanted to undergo surgery, which would be largely palliative.    In future, may involve oncology and palliative care teams.     Lines: PICC, PIVs, Remove Michel    Dispo: 7C.    Seen with Dr. Last.    Herson Bazan MD  General Surgery PGY-3  Pager [phone]

## 2017-10-21 NOTE — BRIEF OP NOTE
Interventional Radiology Brief Post Procedure Note    Procedure: Liver biopsy    Proceduralist: Denisse Lay MD    Assistant: None    Time Out: Prior to the start of the procedure and with procedural staff participation, I verbally confirmed the patient s identity using two indicators, relevant allergies, that the procedure was appropriate and matched the consent or emergent situation, and that the correct equipment/implants were available. Immediately prior to starting the procedure I conducted the Time Out with the procedural staff and re-confirmed the patient s name, procedure, and site/side. (The Joint Commission universal protocol was followed.)  Yes        Sedation: IR Nurse Monitored Care   Post Procedure Summary:  Prior to the start of the procedure and with procedural staff participation, I verbally confirmed the patient s identity using two indicators, relevant allergies, that the procedure was appropriate and matched the consent or emergent situation, and that the correct equipment/implants were available. Immediately prior to starting the procedure I conducted the Time Out with the procedural staff and re-confirmed the patient s name, procedure, and site/side. (The Joint Commission universal protocol was followed.)  Yes       Sedatives: Fentanyl and Midazolam (Versed) fentanyl 100 mcg, versed 2 mg    Vital signs, airway and pulse oximetry were monitored and remained stable throughout the procedure and sedation was maintained until the procedure was complete.  The patient was monitored by staff until sedation discharge criteria were met.    Patient tolerance: Patient tolerated the procedure well with no immediate complications.    Time of sedation in minutes: 35 minutes minutes from beginning to end of physician one to one monitoring.          Findings: 8 x 18 G core right lobe lesion. Path indicated specimens adequate    Estimated Blood Loss: Minimal    Fluoroscopy Time:  n/a    SPECIMENS: Core  needle biopsy specimens sent for pathological analysis    Complications: 1. None     Condition: Stable    Plan:   Bedrest x 1 hour  Wound cares as ordered  Path results pending    Comments: See dictated procedure note for full details.    Denisse Lay MD

## 2017-10-21 NOTE — PROGRESS NOTES
Interventional Radiology Intra-procedural Nursing Note    Patient Name: Dashawn Ordoñez  Medical Record Number: [id number]  Today's Date: October 21, 2017    Start Time: Sedation start 1115, time out 1125  End of procedure time: 1150  Procedure: US guided liver lesion biopsy  Report given to: Claudia RIDER 7C  Time pt departs: 1204   Provider:  MD Matty    Other Notes: Pt in IR 7, ID and procedure verified and consent signed. Pt remained on transport cart, positioned supine, prepped and monitored per protocol.   Puncture site right abdomen,clean, dry, covered with sterile dressing. Specimens collected and verified by pathology staff.  Pt tolerated procedure well, vitals stable, moderate sedation given with versed 2 mg, fentanyl 100 mcg, sedation time 35 min.        SHERLY PUENTE

## 2017-10-21 NOTE — PLAN OF CARE
Problem: Patient Care Overview  Goal: Plan of Care/Patient Progress Review  Outcome: No Change  D/I: Pt is alert and oriented x4, vitally stable on room air. Hemoglobin recheck post transfusion is 8.4. Protonix drip infusing @10 ml/hr &  ml/hr.    Dilaudid given for mild headache with relief, denies dizziness. MSSA score 3. Post transfusion hemoglobin was 8.4. No BM so far this shift. Michel is patent with adequate urine output.   P: Continue to monitor gastrointestinal status for s/s of bleeding, pain, nausea and response to interventions. Monitor for alcohol withdrawal symptoms.

## 2017-10-22 ENCOUNTER — HOSPITAL ENCOUNTER (OUTPATIENT)
Facility: CLINIC | Age: 57
Setting detail: OBSERVATION
Discharge: HOME | End: 2017-10-23
Attending: RADIOLOGY | Admitting: SURGERY
Payer: MEDICAID

## 2017-10-22 VITALS
DIASTOLIC BLOOD PRESSURE: 62 MMHG | SYSTOLIC BLOOD PRESSURE: 106 MMHG | WEIGHT: 167.1 LBS | BODY MASS INDEX: 23.98 KG/M2 | HEART RATE: 85 BPM | OXYGEN SATURATION: 100 % | RESPIRATION RATE: 18 BRPM | TEMPERATURE: 98 F

## 2017-10-22 DIAGNOSIS — K92.2 ACUTE UPPER GASTROINTESTINAL BLEEDING: Primary | ICD-10-CM

## 2017-10-22 LAB
ANION GAP SERPL CALCULATED.3IONS-SCNC: 8 MMOL/L (ref 3–14)
BUN SERPL-MCNC: 6 MG/DL (ref 7–30)
CALCIUM SERPL-MCNC: 8.6 MG/DL (ref 8.5–10.1)
CHLORIDE SERPL-SCNC: 106 MMOL/L (ref 94–109)
CO2 SERPL-SCNC: 23 MMOL/L (ref 20–32)
CREAT SERPL-MCNC: 0.72 MG/DL (ref 0.66–1.25)
GFR SERPL CREATININE-BSD FRML MDRD: >90 ML/MIN/1.7M2
GLUCOSE BLDC GLUCOMTR-MCNC: 114 MG/DL (ref 70–99)
GLUCOSE BLDC GLUCOMTR-MCNC: 136 MG/DL (ref 70–99)
GLUCOSE BLDC GLUCOMTR-MCNC: 74 MG/DL (ref 70–99)
GLUCOSE SERPL-MCNC: 137 MG/DL (ref 70–99)
HGB BLD-MCNC: 9 G/DL (ref 13.3–17.7)
HGB BLD-MCNC: 9.5 G/DL (ref 13.3–17.7)
INR PPP: 1.12 (ref 0.86–1.14)
MAGNESIUM SERPL-MCNC: 2.1 MG/DL (ref 1.6–2.3)
PHOSPHATE SERPL-MCNC: 2.9 MG/DL (ref 2.5–4.5)
POTASSIUM SERPL-SCNC: 3.6 MMOL/L (ref 3.4–5.3)
SODIUM SERPL-SCNC: 137 MMOL/L (ref 133–144)

## 2017-10-22 PROCEDURE — 25000128 H RX IP 250 OP 636: Performed by: STUDENT IN AN ORGANIZED HEALTH CARE EDUCATION/TRAINING PROGRAM

## 2017-10-22 PROCEDURE — 25000125 ZZHC RX 250: Performed by: STUDENT IN AN ORGANIZED HEALTH CARE EDUCATION/TRAINING PROGRAM

## 2017-10-22 PROCEDURE — 84100 ASSAY OF PHOSPHORUS: CPT | Performed by: SURGERY

## 2017-10-22 PROCEDURE — S0164 INJECTION PANTROPRAZOLE: HCPCS | Performed by: STUDENT IN AN ORGANIZED HEALTH CARE EDUCATION/TRAINING PROGRAM

## 2017-10-22 PROCEDURE — 00000146 ZZHCL STATISTIC GLUCOSE BY METER IP

## 2017-10-22 PROCEDURE — 83735 ASSAY OF MAGNESIUM: CPT | Performed by: SURGERY

## 2017-10-22 PROCEDURE — 36592 COLLECT BLOOD FROM PICC: CPT | Performed by: SURGERY

## 2017-10-22 PROCEDURE — 85610 PROTHROMBIN TIME: CPT | Performed by: SURGERY

## 2017-10-22 PROCEDURE — 25800025 ZZH RX 258: Performed by: STUDENT IN AN ORGANIZED HEALTH CARE EDUCATION/TRAINING PROGRAM

## 2017-10-22 PROCEDURE — G0378 HOSPITAL OBSERVATION PER HR: HCPCS

## 2017-10-22 PROCEDURE — 80048 BASIC METABOLIC PNL TOTAL CA: CPT | Performed by: SURGERY

## 2017-10-22 PROCEDURE — 96374 THER/PROPH/DIAG INJ IV PUSH: CPT

## 2017-10-22 PROCEDURE — 25000132 ZZH RX MED GY IP 250 OP 250 PS 637: Performed by: STUDENT IN AN ORGANIZED HEALTH CARE EDUCATION/TRAINING PROGRAM

## 2017-10-22 PROCEDURE — 85018 HEMOGLOBIN: CPT | Performed by: SURGERY

## 2017-10-22 RX ORDER — ACETAMINOPHEN 325 MG/1
650 TABLET ORAL EVERY 6 HOURS PRN
Qty: 100 TABLET
Start: 2017-10-22 | End: 2018-01-07

## 2017-10-22 RX ORDER — ONDANSETRON 2 MG/ML
4 INJECTION INTRAMUSCULAR; INTRAVENOUS EVERY 6 HOURS PRN
Status: DISCONTINUED | OUTPATIENT
Start: 2017-10-22 | End: 2017-10-23 | Stop reason: HOSPADM

## 2017-10-22 RX ORDER — LORAZEPAM 0.5 MG/1
0.5 TABLET ORAL EVERY 8 HOURS PRN
Status: DISCONTINUED | OUTPATIENT
Start: 2017-10-22 | End: 2017-10-23 | Stop reason: HOSPADM

## 2017-10-22 RX ORDER — NALOXONE HYDROCHLORIDE 0.4 MG/ML
.1-.4 INJECTION, SOLUTION INTRAMUSCULAR; INTRAVENOUS; SUBCUTANEOUS
Status: DISCONTINUED | OUTPATIENT
Start: 2017-10-22 | End: 2017-10-23 | Stop reason: HOSPADM

## 2017-10-22 RX ORDER — POTASSIUM CHLORIDE 7.45 MG/ML
10 INJECTION INTRAVENOUS
Status: COMPLETED | OUTPATIENT
Start: 2017-10-22 | End: 2017-10-22

## 2017-10-22 RX ORDER — ONDANSETRON 4 MG/1
4 TABLET, ORALLY DISINTEGRATING ORAL EVERY 6 HOURS PRN
Status: DISCONTINUED | OUTPATIENT
Start: 2017-10-22 | End: 2017-10-23 | Stop reason: HOSPADM

## 2017-10-22 RX ORDER — PROCHLORPERAZINE MALEATE 5 MG
5-10 TABLET ORAL EVERY 6 HOURS PRN
Status: DISCONTINUED | OUTPATIENT
Start: 2017-10-22 | End: 2017-10-23 | Stop reason: HOSPADM

## 2017-10-22 RX ORDER — LANOLIN ALCOHOL/MO/W.PET/CERES
100 CREAM (GRAM) TOPICAL DAILY
Status: DISCONTINUED | OUTPATIENT
Start: 2017-10-23 | End: 2017-10-22

## 2017-10-22 RX ORDER — DEXTROSE, SODIUM CHLORIDE, AND POTASSIUM CHLORIDE 5; .2; .15 G/100ML; G/100ML; G/100ML
INJECTION INTRAVENOUS CONTINUOUS
Status: DISCONTINUED | OUTPATIENT
Start: 2017-10-22 | End: 2017-10-23

## 2017-10-22 RX ORDER — FOLIC ACID 1 MG/1
1 TABLET ORAL DAILY
Status: DISCONTINUED | OUTPATIENT
Start: 2017-10-23 | End: 2017-10-22

## 2017-10-22 RX ORDER — ACETAMINOPHEN 325 MG/1
650 TABLET ORAL EVERY 4 HOURS PRN
Status: DISCONTINUED | OUTPATIENT
Start: 2017-10-22 | End: 2017-10-23 | Stop reason: HOSPADM

## 2017-10-22 RX ORDER — PROCHLORPERAZINE 25 MG
25 SUPPOSITORY, RECTAL RECTAL EVERY 12 HOURS PRN
Status: DISCONTINUED | OUTPATIENT
Start: 2017-10-22 | End: 2017-10-23 | Stop reason: HOSPADM

## 2017-10-22 RX ORDER — LORAZEPAM 1 MG/1
1-4 TABLET ORAL EVERY 30 MIN PRN
Status: DISCONTINUED | OUTPATIENT
Start: 2017-10-22 | End: 2017-10-22

## 2017-10-22 RX ADMIN — Medication 100 MG: at 07:57

## 2017-10-22 RX ADMIN — PANTOPRAZOLE SODIUM 40 MG: 40 INJECTION, POWDER, FOR SOLUTION INTRAVENOUS at 09:43

## 2017-10-22 RX ADMIN — POTASSIUM CHLORIDE 10 MEQ: 10 INJECTION, SOLUTION INTRAVENOUS at 10:50

## 2017-10-22 RX ADMIN — POTASSIUM CHLORIDE 10 MEQ: 10 INJECTION, SOLUTION INTRAVENOUS at 11:51

## 2017-10-22 RX ADMIN — POTASSIUM CHLORIDE 10 MEQ: 10 INJECTION, SOLUTION INTRAVENOUS at 09:50

## 2017-10-22 RX ADMIN — PANTOPRAZOLE SODIUM 8 MG/HR: 40 INJECTION, POWDER, FOR SOLUTION INTRAVENOUS at 08:03

## 2017-10-22 RX ADMIN — POTASSIUM CHLORIDE, DEXTROSE MONOHYDRATE AND SODIUM CHLORIDE: 150; 5; 200 INJECTION, SOLUTION INTRAVENOUS at 05:44

## 2017-10-22 RX ADMIN — POTASSIUM CHLORIDE 10 MEQ: 10 INJECTION, SOLUTION INTRAVENOUS at 13:41

## 2017-10-22 RX ADMIN — PANTOPRAZOLE SODIUM 40 MG: 40 INJECTION, POWDER, FOR SOLUTION INTRAVENOUS at 21:45

## 2017-10-22 RX ADMIN — FOLIC ACID 1 MG: 1 TABLET ORAL at 07:57

## 2017-10-22 NOTE — IP AVS SNAPSHOT
MRN:[id number]                      After Visit Summary   10/22/2017    Dashawn Ordoñez    MRN: [id number]           Thank you!     Thank you for choosing [facility] for your care. Our goal is always to provide you with excellent care. Hearing back from our patients is one way we can continue to improve our services. Please take a few minutes to complete the written survey that you may receive in the mail after you visit with us. Thank you!        Patient Information     Date Of Birth          1960        Designated Caregiver       Most Recent Value    Caregiver    Will someone help with your care after discharge? yes    Name of designated caregiver Odalys Ordoñez    Phone number of caregiver [phone]    Caregiver address [address] [address]      About your hospital stay     You were admitted on:  October 22, 2017 You last received care in the:  [address] [facility]    You were discharged on:  October 23, 2017        Reason for your hospital stay       Upper GI bleed.                  Who to Call     For medical emergencies, please call 911.  For non-urgent questions about your medical care, please call your primary care provider or clinic, [phone]          Attending Provider     Provider Specialty    Denisse Lay MD Radiology    Shalom Wood MD Surgery Surgical Oncology       Primary Care Provider Office Phone # Fax #    [facility] [phone]      After Care Instructions     Activity       Your activity upon discharge: activity as tolerated            Diet       Follow this diet upon discharge: Regular diet                  Follow-up Appointments     Adult [facility] Follow-up and recommended labs and tests       Follow up with primary care provider, Mann Grigsby, within 1-2 week, for hospital follow- up and regarding new diagnosis. No follow up labs or test are needed.   3. Follow up with Dr. Robbie Healy of Oncology, at  "[facility]/associated clinic, within 1-2 weeks  for hospital follow- up and regarding new diagnosis. No follow up labs or test are needed.  4. Follow up with Radiation Oncology as an outpatient as instructed.    Appointments on [place] and/or [facility] (with [facility] or [facility] provider or service). Call [phone] if you haven't heard regarding these appointments within 7 days of discharge.                  Pending Results     No orders found for last 3 day(s).            Statement of Approval     Ordered          10/23/17 1125  I have reviewed and agree with all the recommendations and orders detailed in this document.  EFFECTIVE NOW     Approved and electronically signed by:  Herson Bazan MD             Admission Information     Date & Time Provider Department Dept. Phone    10/22/2017 Shalom Wood MD [address] [facility] [place] [phone]      Your Vitals Were     Blood Pressure Pulse Temperature Respirations Pulse Oximetry       124/74 (BP Location: Left arm) 76 98  F (36.7  C) (Oral) 16 98%       MyChart Information     [facility] lets you send messages to your doctor, view your test results, renew your prescriptions, schedule appointments and more. To sign up, go to [email or web address] . Click on \"Log in\" on the left side of the screen, which will take you to the Welcome page. Then click on \"Sign up Now\" on the right side of the page.     You will be asked to enter the access code listed below, as well as some personal information. Please follow the directions to create your username and password.     Your access code is: [email or web address]  Expires: 2018 11:04 AM     Your access code will  in 90 days. If you need help or a new code, please call your [facility] clinic or [phone].        Care EveryWhere ID     This is your Care EveryWhere ID. This could be used by other organizations to access your [facility] medical records  [id number]        Equal Access to Services     VANESSA JOSHI " AH: Hadii nito randkinseylisa Soshena, waaxda luqadaha, qaybta kaalmada sally, emma rossyin hayaamarcos hernándezfermin eller laSulaimanleeann alfred. So [facility] [phone].    ATENCIÓN: Si habla blake, tiene a sanz disposición servicios gratuitos de asistencia lingüística. Brisaame al [phone].    We comply with applicable federal civil rights laws and [place] laws. We do not discriminate on the basis of race, color, national origin, age, disability, sex, sexual orientation, or gender identity.               Review of your medicines      CONTINUE these medicines which may have CHANGED, or have new prescriptions. If we are uncertain of the size of tablets/capsules you have at home, strength may be listed as something that might have changed.        Dose / Directions    omeprazole 40 MG capsule   Commonly known as:  priLOSEC   This may have changed:    - medication strength  - how much to take  - when to take this   Used for:  Acute upper gastrointestinal bleeding        Dose:  40 mg   Take 1 capsule (40 mg) by mouth 2 times daily (before meals)   Quantity:  30 capsule   Refills:  1         CONTINUE these medicines which have NOT CHANGED        Dose / Directions    acetaminophen 325 MG tablet   Commonly known as:  TYLENOL        Dose:  650 mg   Take 2 tablets (650 mg) by mouth every 6 hours as needed for mild pain   Quantity:  100 tablet   Refills:  0            Where to get your medicines      [facility] [address]  [place], [place] - [address] AT [place] [address]  [address] [place] [address]     Phone:  [phone]     omeprazole 40 MG capsule                Protect others around you: Learn how to safely use, store and throw away your medicines at www.disposemymeds.org.             Medication List: This is a list of all your medications and when to take them. Check marks below indicate your daily home schedule. Keep this list as a reference.      Medications           Morning Afternoon  Evening Bedtime As Needed    acetaminophen 325 MG tablet   Commonly known as:  TYLENOL   Take 2 tablets (650 mg) by mouth every 6 hours as needed for mild pain                                omeprazole 40 MG capsule   Commonly known as:  priLOSEC   Take 1 capsule (40 mg) by mouth 2 times daily (before meals)   Last time this was given:  40 mg on 10/23/2017  7:57 AM

## 2017-10-22 NOTE — PLAN OF CARE
Problem: Patient Care Overview  Goal: Plan of Care/Patient Progress Review  Outcome: Improving  VSS. MSSA WNL. Protonix qtt continues. MIV@ 100 ml/hr. Patient denies pain. No stool noted. Voiding adequate amounts. BG WNL. Bx site c/d/i. Ambulating halls independently. Continue c POC.

## 2017-10-22 NOTE — PLAN OF CARE
Problem: Patient Care Overview  Goal: Plan of Care/Patient Progress Review  Outcome: Adequate for Discharge Date Met:  10/22/17  HD4 admitted with a GI bleed. A&Ox4, VSS on room air, denies pain. Up independent. Tolerating a CLD. Ambulating in halls, K+ replaced. PICC is SL. LS clear, BS active, passing gas, No BM, HBG 9.0 Discharging to home.

## 2017-10-22 NOTE — PROGRESS NOTES
[id number]: VSS, MSSA 2 this shift. A&Ox4. , 136 no SS needed. MIV 100cc/hr, Protonix gtt 8mg/hr. Up ad yessi, outside to smoke several times. R) PICC infusing. Excellent UO. Liver biopsy dressing CDI. No BM reported this shift. Continue to monitor for bleeding, follow POC.

## 2017-10-22 NOTE — IP AVS SNAPSHOT
[address] [address]    Phone:  [phone]                                       After Visit Summary   10/22/2017    Dashawn Ordoñez    MRN: [id number]           After Visit Summary Signature Page     I have received my discharge instructions, and my questions have been answered. I have discussed any challenges I see with this plan with the nurse or doctor.    ..........................................................................................................................................  Patient/Patient Representative Signature      ..........................................................................................................................................  Patient Representative Print Name and Relationship to Patient    ..................................................               ................................................  Date                                            Time    ..........................................................................................................................................  Reviewed by Signature/Title    ...................................................              ..............................................  Date                                                            Time

## 2017-10-22 NOTE — DISCHARGE SUMMARY
"[facility]  Discharge Summary  Surgical Oncology     Dashawn Ordoñez MRN# [id number]   YOB: 1960 Age: 57 year old     Date of Admission:  10/19/2017  Date of Discharge::  10/22/2017  Admitting Physician:  Dora Last MD  Discharge Physician:  Same  Primary Care Physician:        [facility]          Admission Diagnoses:   Gastric adenocarcinoma (H) [C16.9]  GI bleeding associated with gastric ulcer          Discharge Diagnosis:   Metastatic gastric adenocarcinoma         Procedures:   None          Brief History of Illness:   Taken from Admission H&P:  \"Dashawn Ordoñez is a 57 year old male PMH HTN, DM, alcohol abuse, who presented to [facility] on 10/17 with 1 day of dizziness, weakness, states he couldn't stand, and a syncopal episode in his home.  He had drank 1/2 qt of alcohol earlier in the day, went to sleep, and when he got up and went to the kitchen to drink a glass of milk he subsequently fainted.  He endorses generalized fatigue and weakness for the past several weeks to months which he has attributed to stress at work.  He denies black, maroon, or bright red stools in the past, though he states he doesn't frequently pay attention to them.  He has also had a 100lb weight loss over the last year, after modifying diet.  In OSH ED, was found to have a Hbg of 3.5, noted tarry stools that day, was admitted, and underwent EGD and IR procedures which identified a gastric ulcerated mass and on biopsy showed gastric adenocarcinoma.  No active bleeding was identified.  During admission he had multiple maroon bowel movements, received a total of 9 units of pRBCs and 4 units of FFP.  Repeat EGD on 10/19 showed an adherent clot not present previously.  He had several maroon BMs on Thursday, largest in the am, most recent around 4pm which was small, hbg was 7.4.  He received a last unit of pRBCs and he was transferred to [facility] for further workup.  He currently denies " "dizziness, lightheadedness, pain, n/v, or any more bloody stools.  He is anxious about his new cancer diagnosis.  Patient does have a significant history of heavy alcohol use, states he usually tries to make a quart of alcohol last a week but frequently drinks more than that.  He works as a , and states that he is self-paying for this hospitalization as his insurance is not currently set up.  PSH remarkable for appendectomy 10 years ago.\"           Hospital Course:   Mr. Ordoñez was admitted to the general med/surg floor as a transfer from [facility]. He had one maroon colored bowel movement but had no other clinical signs of ongoing bleeding. He did drift down to a hemoglobin of 6.6 and received a transfusion of 1 unit PRBCs and responded appropriately. Since then, his Hgb has been stable. He did have a IR liver biopsy performed which confirmed our suspicion of metastatic disease. Thus, he has Stage IV gastric cancer, making him NOT an operative candidate. Radiation oncology will meet with him prior to discharge to discuss treatment options. If possible, would like the patient to meet with medical oncology as a inpatient as well considering the metastatic nature of disease. The patient prefers to follow up with Dr. Healy of medical oncology at [facility] as an outpatient, as it is closer to his home.     On the day of discharge, he was advanced to a clear liquid diet and transitioned to twice daily PPI orally and off continuous infusion. He has requested to be discharged for the afternoon to go to his grandson's birthday party and resume his hospital care this evening as we slowly advance his diet in case of a rebleeding event. Should he rebleed, the plan is to perform a repeat endoscopy first and if the bleeding is not controlled, possibly perform an angioembolization with IR. All discharge orders, prescriptions, and follow up instructions are below.         Final Pathology Result:   SPECIMEN(S): "   Gastric ulcer biopsy     FINAL DIAGNOSIS:   Stomach, ulcer, biopsy:   - Adenocarcinoma, poorly differentiated (diffuse type/signet ring cell   adenocarcinoma).   - HER2 FISH ordered.  See addendum and separate report for results.     SPECIMEN(S):   Liver needle biopsy     FINAL DIAGNOSIS:   LIVER, ULTRASOUND-GUIDED NEEDLE CORE BIOPSY:   - Metastatic adenocarcinoma          Discharge Instructions and Follow-Up:     Discharge Procedure Orders  Reason for your hospital stay   Order Comments: Upper GI bleed associated with a stomach ulcer.  Newly diagnosed stage IV stomach cancer that has spread to the liver.     Adult [facility] Follow-up and recommended labs and tests   Order Comments: 1. Return to the hospital for observation tonight as we advance your diet to ensure that the bleeding does not come back.  2. Follow up with primary care provider, [facility], within 1-2 weeks, for hospital follow- up and regarding new diagnosis. No follow up labs or test are needed.   3. Follow up with Dr. Robbie Healy of Oncology, at [facility] clinic, within 1-2 weeks  for hospital follow- up and regarding new diagnosis. No follow up labs or test are needed.  4. Follow up with Radiation Oncology as an outpatient as instructed.    Appointments on [place] and/or [facility] (with [facility] or [facility] provider or service). Call [phone] if you haven't heard regarding these appointments within 7 days of discharge.     Activity   Order Comments: Your activity upon discharge: activity as tolerated   Order Specific Question Answer Comments   Is discharge order? Yes      IV access   Order Comments: You are going home with the following vascular access device: PICC.     Discharge Instructions   Order Comments: Only drink clear liquids until instructed otherwise by surgical oncology team.  Return tonight for ongoing observation at [facility].  Return to ED if you have any bloody bowel movements, episodes of bloody  vomiting, or any dizziness, feeling lightheaded, chest pain, palpitations, or shortness of breath.     Full Code     Diet   Order Comments: Follow this diet upon discharge:      Clear Liquid Diet   Order Specific Question Answer Comments   Is discharge order? Yes               Home Health Care:   Not needed           Discharge Disposition:   Discharged to home      Condition at discharge: Stable         Consultations:   VASCULAR ACCESS CARE ADULT IP CONSULT  INTERVENTIONAL RADIOLOGY ADULT/PEDS IP CONSULT  RADIATION ONCOLOGY IP CONSULT  ONCOLOGY IP CONSULT         Imaging Studies:     Results for orders placed or performed during the hospital encounter of 10/19/17   CT Chest/Abdomen/Pelvis w Contrast    Narrative    EXAMINATION: CT CHEST/ABDOMEN/PELVIS W CONTRAST, 10/20/2017 3:13 AM    TECHNIQUE:  Helical CT images from the thoracic inlet through the  symphysis pubis were obtained  with contrast.    COMPARISON: CT abdomen pelvis 5/4/2015 and 8/16/2005    HISTORY: Staging CT, new Cancer.  Need by morning as patient will need  surgery. Patient presented with upper GI bleed and large gastric ulcer  with biopsy demonstrating poorly differentiated adenocarcinoma.     FINDINGS:    Chest:  The visualized thyroid gland is unremarkable. Normal branching  of the aortic arch. The heart size is normal. Normal caliber of the  aorta and pulmonary artery. Right-sided PICC line terminates in the  right atrium. There is haziness in the aortopulmonary window without  enlarged lymph nodes identified. No lymphadenopathy in the chest or  axilla.      There is a 5 mm nodule in the left lower lobe (series 2, image 105)  and 4 mm nodule in the lingula (series 2 image 91), unchanged since  8/16/2005, and is statistically benign.  No suspicious pulmonary  nodules or  focal opacities. Trace bilateral pleural effusion with  overlying bibasilar atelectasis.     Abdomen and pelvis: There is a large hepatic segment 7 lesion  measuring  approximately 8 x 5 cm with nodular peripheral enhancement,  grossly stable compared to 5/4/2015, although increased since 2005,  where it measured 4.5 x 4.9 cm. Findings are most compatible with a  cavernous hemangioma.     There is a 10 mm lesion at the interface of hepatic segment 7 and 8  (series 4, image 115) which has ill-defined borders and is new since  2015 CT.     Fluid attenuating cysts in the hepatic segment III and in segment IVb,  unchanged since prior study. Additional subcentimeter hypoattenuating  focus in hepatic segment 8 (series 4 image 104) new since prior study  and is too small to characterize.    There is a large broad-based ulceration in the gastric cardia with  surrounding enhancing gastric wall thickening, corresponds to  biopsy-proven adenocarcinoma. Associated haziness surrounding the  gastric cardia extending towards without definite involvement of the  pancreas and left adrenal gland, related to recent biopsy versus tumor  infiltration. Additional areas of submucosal thickening and  heterogeneous enhancement along the gastric body and gastric antrum  are indeterminate.     Several retroperitoneal lymph nodes adjacent to the gastric cardiac  measuring up to 13 mm in short axis (series 4, image 125). Multiple  enlarged gastrohepatic lymph nodes, slightly increased from previous  measuring up to 11 mm (series 4 image 121, 122). Two prominent  portocaval lymph nodes (series 4, image 131 and 130) are unchanged  from previous. No significant lymphadenopathy in the remainder of the  abdomen or pelvis.    Vicarious excretion of contrast in the gallbladder. Punctate  calcification in the pancreatic tail, likely related to chronic  pancreatitis. Otherwise the pancreas is unremarkable without  pancreatic ductal dilatation. The adrenal glands, spleen, and kidneys  are within normal limits. No bowel obstruction. Diffuse sigmoid  diverticulosis. There is a focal area of segmental thickening in  the  sigmoid colon without surrounding fat stranding or pericolonic lymph  nodes. The bladder is decompressed by a Michel catheter. The prostate  is within normal limits, with the exception of coarse calcifications.  No free fluid in the abdomen or pelvis. Major abdominal and pelvic  vessels are patent.    Bones and soft tissues: Old right lateral 11th rib fracture no  suspicious bony lesions.      Impression    IMPRESSION:   1. Ulcerative mass in the gastric cardia consistent with the patient's  known primary tumor. Associated haziness surrounding the gastric  cardia extending, related to recent biopsy versus tumor infiltration.   2. Additional areas of submucosal thickening along the gastric body  and antrum are indeterminate for gastritis versus additional foci of  tumor involvement.  3. Regional gastrohepatic and retroperitoneal lymphadenopathy,  concerning for locoregional metastases.  3. New ill-defined 10 mm lesion in hepatic segment 8, suspicious for  metastases. Additional indeterminate subcentimeter hypoattenuating  focus in hepatic segment 8, is too small to characterize.   4. Sigmoid colonic diverticulosis with focal segmental thickening,  likely exaggerated by incomplete distention and possibly sequela of  chronic diverticulitis. Consider direct visualization if no recent  colonoscopy was preformed.     I have personally reviewed the examination and initial interpretation  and I agree with the findings.    JADE SEVILLA MD   US Abdomen Limited    Narrative    EXAMINATION: US ABDOMEN LIMITED  10/20/2017 5:10 PM      CLINICAL HISTORY: Please evaluate WITH DOPPLER a new hepatic segment 8  lesion noted on CT CAP for future possible biopsy.    COMPARISON: CT dated 10/20/2017        FINDINGS:  Multiple lesions are seen in the liver including solid lesion  measuring 3.5 x 7 x 3.9 cm in the superior right lobe of liver. Cystic  lesions are seen in the left lobe of the liver measuring 3 and 2.5 x  2.7 cm and  2.4 x 2.1 x 2 cm cystic lesion is seen in segment 4  measuring 1.3 x 1.5 x 1.3 cm. There is a small lesion in the right  lobe of the liver measuring 1 x 0.9 x 1.1 cm, likely corresponding to  the segment 8 lesion seen on the CT. The lesion is hypoechoic with no  distal acoustic enhancement. No internal vascularity is seen. The  liver measures 17.8 cm in craniocaudal dimension.     There is no intrahepatic or extrahepatic biliary ductal dilatation.  The common bile duct measures 3.5 mm. The gallbladder shows sludge,  without gallstones, wall thickening (2.6 mm), or pericholecystic  fluid.    The visualized upper abdominal aorta and inferior vena cava are  normal.      The right kidney is  normal in position and echogenicity. The right  kidney measures 13.1       Impression    Impression:   1. Multiple hepatic lesion including multiple cystic lesions in the  left lobe corresponding to to lesion seen on abdominal CT.   2. Small, hypoechoic solid lesion corresponding to index lesion seen  on CT in segment 8.   3. Mild hepatomegaly.    I have personally reviewed the examination and initial interpretation  and I agree with the findings.    GODWIN REDMOND MD   IR Liver Biopsy Percutaneous    Narrative    PROCEDURES 10/21/2017:  1. Ultrasound guided percutaneous liver lesion biopsy.     Clinical History: Gastric cancer, new right lobe liver lesion  concerning for metastatic disease. Biopsy requested.    Comparisons: CT abdomen pelvis 10/20/2017, 5/4/2015    Staff Radiologist: Denisse Lay M.D., interventional radiology  staff. I, Denisse Lay, performed the entire procedure without  assistance.    Medications:   Versed 2 mg IV  Fentanyl 100 mcg IV    Nursing: The patient was placed on continuous monitoring. Intravenous  sedation was administered. Sedation time was 35 minutes. Attending  face-to-face time was 35 minutes. Vital signs and sedation monitored  by nursing staff under Interventional Radiologists  supervision. The  patient remained stable throughout the procedure.     PROCEDURE: The patient understood the limitations, alternatives, and  risks of the procedure and requested the procedure be performed. Both  written and oral consent were obtained.    Targeted pre-procedural scan performed localized new segment 7/8  lesion seen on recent CT, measuring approximately 8 mm. The position  is adequate for biopsy.    The right upper quadrant was prepped and draped in the usual sterile  fashion. 1% lidocaine was used for local anesthesia.     Under ultrasound guidance, a 17 gauge coaxial introducer needle was  advanced into the lesion. Permanent copy of the image documenting the  needle position was entered into the patients record.     Total of 8, 18 gauge core biopsies were obtained with a Temno ACT  biopsy gun and submitted to Pathology in formalin. Pathologist  indicated specimens were adequate.    Good hemostasis was achieved with Gelfoam plugs that were deployed as  the needle was removed. Sterile dressing applied.    Targeted post-procedural scan demonstrated no immediate complication.       Impression    IMPRESSION:   1. Total of 8 18 gauge core biopsies obtained of right lobe liver  lesion under ultrasound guidance.    NETTE OLIVEIRA MD              Medications Prior to Admission:     No prescriptions prior to admission.              Discharge Medications:     Current Discharge Medication List      START taking these medications    Details   acetaminophen (TYLENOL) 325 MG tablet Take 2 tablets (650 mg) by mouth every 6 hours as needed for mild pain  Qty: 100 tablet    Associated Diagnoses: Gastrointestinal hemorrhage associated with gastric ulcer      omeprazole (PRILOSEC) 20 MG CR capsule Take 1 capsule (20 mg) by mouth 2 times daily  Qty: 90 capsule, Refills: 3    Associated Diagnoses: Gastrointestinal hemorrhage associated with gastric ulcer             Pt was seen and discussed with Dr. Last on  10/22/2017.    Herson Bazan MD  General Surgery PGY-3  Pager [phone]

## 2017-10-22 NOTE — CONSULTS
"RADIATION ONCOLOGY CONSULT NOTE  Date of Visit: Oct 19, 2017    Dashawn Ordoñez  MRN: [id number]  : 1960    Dashawn Ordoñez is being seen today for initial consultation at the request of Dr. Dora Last for consideration of radiation therapy for newly diagnosed Stage IV gastric adenocarcinoma. All pertinent labs, imaging, and pathology findings have been reviewed.     HISTORY OF PRESENT ILLNESS:  Mr. Ordoñez is a 57 year old gentleman with history of alcohol and tobacco use and now a new diagnosis of metastatic gastric adenocarcinoma with biopsy proven liver metastases. History was obtained from patient and his wife Isa. They noted previously he only had vague symptoms of fatigue, abdominal pain that was similar to \"heartburn\", and a 60 pound weight loss over the course of one year that was partially intentional because he was trying to get off metformin for diabetes. He was using a box of Autumn-Goodhue a day, had been using 2-3 tablets of Advil PM every night for approximately 4 years as a sleep aid, and 2-3 Tylenol per day for pain in his upper arms and shoulders. He denied ever having nausea, vomiting, coffee ground emesis, dark tarry stools or pencil thin stools. His bowel movements were very regular - about 2-3 per day until the week prior to admission. Then Ranulfo 10/15 he became lightheaded in his kitchen, lost consciousness and fell while drinking a glass of milk. He experienced dark, tarry, foul smelling stools for the first time that day as well. When his symptoms did not resolve he went to the emergency room at [facility] 10/17 with weakness, lightheadedness, SOB and dark stools and was found to be severely anemic with a hemoglobin of 3.5.     GI performed an EGD with biopsies 10/18/17 which showed a giant, nonbleeding cratered ulcer greater than 10 cm in the gastric cardia with a diffuse area of extrinsic compression at the gastroesophageal junction. Final pathology ([id number]) returned poorly " differentiated diffuse type/signet ring adenocarcinoma. He began having maroon stools and his hemoglobin began to drop again despite transfusion. IR evaluated the patient and performed an angiogram but was unable to locate an active bleeding vessel to embolize. A repeat EGD was performed, a large clot was seen over the previously visualized large ulcer and epinephrine was given. It was recommended he be transferred to [facility] for 24 hour IR support and surgical oncology available for possible emergent gastrectomy. Oncology evaluated the patient 10/19/17; they discussed chemotherapy or immunotherapy as possible systemic treatments based on genetic marker results, which are still pending. In total he received a total of 10 units pRBCs at [facility].     Mr. Ordoñez then transferred to [facility] 10/20/17 for higher level of care. A CT of the chest, abdomen and pelvis that day showed several retroperitoneal lymph nodes adjacent to the gastric cardia measuring up to 13 mm as well as multiple enlarged gastrohepatic lymph nodes and two prominent portocaval lymph nodes. Of note there was a new ill-defined 10 mm lesion in hepatic segment 8 suspicious for metastases. Dr. Last of surgical oncology reviewed the CT findings and options for managing acute GI bleed with the patient and his wife. He was amenable to a possible IR embolization but not a emergent total gastrectomy. IR performed a biopsy of the liver lesion 10/21/17 and pathology ([id number]) unfortunately showed metastatic adenocarcinoma.     The patient and his wife feel he is doing much better since first arriving to the hospital; he denies any dizziness, nausea, vomiting, further dark red stools or tarry stools. They feel his symptoms came on extremely suddenly and that he is looking and feeling very well overall.    CHEMOTHERAPY HISTORY: none    PAST RADIATION THERAPY HISTORY: none    PACEMAKER: no    PAST MEDICAL HISTORY:  Hypertension  Diabetes - off metformin for at least 1  "year, last A1C normal  Alcohol abuse with history of withdrawal  Nicotine dependence    PAST SURGICAL HISTORY:  Appendectomy ~10 years ago  Tonsillectomy at age 7    ALLERGIES:  NKDA    MEDICATIONS:  No meds at home     FAMILY HISTORY:  Breast cancer in mother, metastasized to brain, passed away at age 84-85  Kidney cancer in maternal aunt  Half sibling and paternal aunt with breast cancer  No other history of cancer in the family    SOCIAL HISTORY:  Smoked 2-3 packs per day, started at age 17. Drinks 1 quart bacardi approximately 3 times per week, has quit for a year previously in the distant past (per wife \"it's been years\"), lives in [place], [place] with wife Isa. Has one son and one daughter in good health, 3 grandkids (ages 2 months, 5, 14) and works as a  currently.     REVIEW OF SYSTEMS: A 10 point review of systems was obtained. Pertinent findings are noted in the HPI and are otherwise unremarkable.     PHYSICAL EXAM:  VITALS: /62 (BP Location: Left arm)  Pulse 85  Temp 98  F (36.7  C) (Oral)  Resp 18  Wt 75.8 kg (167 lb 1.6 oz)  SpO2 100%  BMI 23.98 kg/m2  GEN: Lying in bed. appears well, alert, oriented, and in NAD  HEENT: EOMI  NECK: Supple, full ROM  CV: warm and well-perfused  RESP: breathing comfortably on room air  ABDOMEN: Scattered brown macules across entirety of abdomen on inspection. Soft, nontender, nondistended and hepatomegaly without nodularity on palpation.  SKIN: Normal color and turgor  NEURO: No focal deficits, CN 3-12 grossly intact  PSYCH: Appropriate mood and affect    ECOG PERFORMANCE STATUS: 1    LABS & IMAGING:  Lab Results   Component Value Date    WBC 9.5 10/20/2017    HGB 9.5 (L) 10/22/2017    HCT 22.7 (L) 10/20/2017    MCV 84 10/20/2017     (L) 10/20/2017     Lab Results   Component Value Date     10/22/2017    POTASSIUM 3.6 10/22/2017    CHLORIDE 106 10/22/2017    CO2 23 10/22/2017     (H) 10/22/2017     Lab Results   Component Value Date "    AST 7 10/20/2017    ALT 9 10/20/2017    ALKPHOS 43 10/20/2017    BILITOTAL 0.5 10/20/2017     IMPRESSION:  Mr. Ordoñez is a 57 year old gentleman with newly diagnosed gastric adenocarcinoma with evidence of regional jose metastases and distant metastases to the liver. He was admitted for ongoing bleeding.      RECOMMENDATION:  We had an in-depth discussion regarding the role of radiation in Mr. Ordoñez's cancer treatment with him and his wife. Radiation is a localized treatment and with both jose and distant metastases, we feel systemic therapy would be more appropriate as an initial treatment. At this time we would not recommend palliative radiotherapy given that his hemoglobin has stabilized and he is no longer actively bleeding. Additionally, with larger acute bleeds, IR embolization would be first line as radiation would not act as quickly to stop his bleeding. However if he continues to exhibit signs and symptoms of chronic blood loss and there is no specific vessel to embolize, radiotherapy would then be indicated.     We explained our rationale, as well as the logistics, risks, benefits, and alternatives to palliative radiation. We discussed side effects, including but not limited to nausea, radiation dermatitis, fatigue, and more long term, small bowel obstruction. We encouraged Mr. Ordoñez to ask questions, which we answered to the best of our ability. Since he and his wife live approximately 5 minutes away from [facility], if he were to follow with a medical oncologist and / or undergo palliative radiation in the future he would prefer to have it done there for convenience's sake. However, we also discussed that if he were to come to [facility] for immunotherapy or clinical trials, we would be happy to coordinate radiation therapy here for him.    The patient was seen and discussed with staff, Dr. Seo. Thank you for involving us in the care of this patient.  Please feel free to contact us with questions  or concerns at any time.    Lainey Lipscomb MD   PGY-2 Radiation Oncology Resident, [facility]  Phone: [phone]    I saw and examined the patient with the resident.  I have reviewed and agree with the resident's note and plan of care.      Claudia Seo MD

## 2017-10-22 NOTE — PROGRESS NOTES
Surgical Oncology  10/22/2017    Subjective:    No BMs or emesis.     Pain only at IR biopsy site.     Asking to go to grandson's 5th birthday party for a couple hours.     No dizziness or lightheadedness.    Objective:  /62 (BP Location: Left arm)  Pulse 85  Temp 98  F (36.7  C) (Oral)  Resp 18  Wt 75.8 kg (167 lb 1.6 oz)  SpO2 100%  BMI 23.98 kg/m2  NAD, comfortable in bed  RRR  NLB on RA  Soft, nt, nd. Bx site intact.   WWP    Labs:   Hgb stable 9.5    Pathology:   LIVER, ULTRASOUND-GUIDED NEEDLE CORE BIOPSY:   - Metastatic adenocarcinoma     Assessment:   57 year old man with newly diagnosed gastric adenocarcinoma complicated by upper GI bleeding s/p multiple blood product resuscitation at OSH. IR liver biopsy yesterday (results above) confirm cancer is stage IV. Currently hemodynamically stable.    Plan:    Clear liquid diet. May advance tomorrow.     CBC tomorrow. Transfuse for Hgb < 7.    Close monitoring of vital signs in case of re-bleeding. Would perform endoscopy if rebleeds first and consider embolization if not controlled.     Protonix BID.    Stop MSSA protocol.      Given metastatic nature of his gastric cancer, would not offer an operation.    Consult Oncology and Radiation Oncology.     Lines: PICC (cap when leaves), remove PIVs    Dispo: 7C. Ok for day pass for a couple hours, per Dr. Last.    Seen with Dr. Last.    Herson Bazan MD  General Surgery PGY-3  Pager [phone]

## 2017-10-22 NOTE — PROGRESS NOTES
Patient seen with Dr Bazan.    Patient doing well without hematemesis, rectal bleeding, dizziness, or shortness of breath. Ambulating without difficulties.  Abdomen soft, non-tender. Hb 9.5 today.    Liver biopsy yesterday confirmed stage IV gastric cancer.    Reviewed biopsy results with patient and his wife.  We discussed that stage IV gastric cancer is treated with palliative intent, typically with chemotherapy.  Dashawn Ordoñez would like to follow up with Dr Healy of medical oncology as an outpatient. I have recommended a radiation oncology consultation for possible radiation to the gastric cancer to reduce his risk of future bleeds. There is no role for surgery.    All questions were answered.    PLAN:  1. D/C PPI gtt, start protonix BID  2. Clears  3. Radiation oncology consultation  4. If no signs of rebleed, plan for regular diet and d/c home tomorrow.    Dora Last MD MSc [facility]    Division of Surgical Oncology  [facility]

## 2017-10-23 ENCOUNTER — CARE COORDINATION (OUTPATIENT)
Dept: CARE COORDINATION | Facility: CLINIC | Age: 57
End: 2017-10-23

## 2017-10-23 VITALS
TEMPERATURE: 98 F | HEART RATE: 76 BPM | RESPIRATION RATE: 16 BRPM | OXYGEN SATURATION: 98 % | SYSTOLIC BLOOD PRESSURE: 124 MMHG | DIASTOLIC BLOOD PRESSURE: 74 MMHG

## 2017-10-23 LAB
ANION GAP SERPL CALCULATED.3IONS-SCNC: 7 MMOL/L (ref 3–14)
BUN SERPL-MCNC: 7 MG/DL (ref 7–30)
CALCIUM SERPL-MCNC: 8.6 MG/DL (ref 8.5–10.1)
CHLORIDE SERPL-SCNC: 107 MMOL/L (ref 94–109)
CO2 SERPL-SCNC: 24 MMOL/L (ref 20–32)
COPATH REPORT: NORMAL
CREAT SERPL-MCNC: 0.85 MG/DL (ref 0.66–1.25)
ERYTHROCYTE [DISTWIDTH] IN BLOOD BY AUTOMATED COUNT: 18.6 % (ref 10–15)
GFR SERPL CREATININE-BSD FRML MDRD: >90 ML/MIN/1.7M2
GLUCOSE SERPL-MCNC: 110 MG/DL (ref 70–99)
HCT VFR BLD AUTO: 29.2 % (ref 40–53)
HGB BLD-MCNC: 9.1 G/DL (ref 13.3–17.7)
MCH RBC QN AUTO: 26.9 PG (ref 26.5–33)
MCHC RBC AUTO-ENTMCNC: 31.2 G/DL (ref 31.5–36.5)
MCV RBC AUTO: 86 FL (ref 78–100)
PLATELET # BLD AUTO: 228 10E9/L (ref 150–450)
POTASSIUM SERPL-SCNC: 4 MMOL/L (ref 3.4–5.3)
RBC # BLD AUTO: 3.38 10E12/L (ref 4.4–5.9)
SODIUM SERPL-SCNC: 138 MMOL/L (ref 133–144)
WBC # BLD AUTO: 6.4 10E9/L (ref 4–11)

## 2017-10-23 PROCEDURE — 36592 COLLECT BLOOD FROM PICC: CPT | Performed by: SURGERY

## 2017-10-23 PROCEDURE — 25000132 ZZH RX MED GY IP 250 OP 250 PS 637: Performed by: SURGERY

## 2017-10-23 PROCEDURE — 85027 COMPLETE CBC AUTOMATED: CPT | Performed by: SURGERY

## 2017-10-23 PROCEDURE — G0378 HOSPITAL OBSERVATION PER HR: HCPCS

## 2017-10-23 PROCEDURE — 80048 BASIC METABOLIC PNL TOTAL CA: CPT | Performed by: SURGERY

## 2017-10-23 PROCEDURE — 99255 IP/OBS CONSLTJ NEW/EST HI 80: CPT | Mod: GC | Performed by: INTERNAL MEDICINE

## 2017-10-23 RX ORDER — OMEPRAZOLE 40 MG/1
40 CAPSULE, DELAYED RELEASE ORAL
Qty: 30 CAPSULE | Refills: 1 | Status: ON HOLD | OUTPATIENT
Start: 2017-10-23 | End: 2017-11-12

## 2017-10-23 RX ADMIN — OMEPRAZOLE 40 MG: 20 CAPSULE, DELAYED RELEASE ORAL at 07:57

## 2017-10-23 NOTE — PROGRESS NOTES
This patient is a Oncology patient and they will handle the post discharge follow up        Follow up with Dr. Robbie Healy of Oncology, at [facility] clinic, within 1-2 weeks  for hospital follow- up and regarding new diagnosis. No follow up labs or test are needed.  Follow up with Radiation Oncology as an outpatient as instructed.      Surgical Oncology      Dashawn Ordoñez MRN# [id number]   YOB: 1960 Age: 57 year old      Date of Admission:                                                10/19/2017  Date of Discharge::                                                10/22/2017  Admitting Physician:                                              Dora Last MD

## 2017-10-23 NOTE — PROGRESS NOTES
Denies pain or nausea, no stool per patient. Diet advanced to regular this am, if tolerates regular diet and stable Hgb, possible discharge later today.

## 2017-10-23 NOTE — DISCHARGE SUMMARY
"[facility]  Discharge Summary  Surgical Oncology     Dashawn Ordoñez MRN# [id number]   YOB: 1960 Age: 57 year old     Date of Admission:  10/22/2017  Date of Discharge::  10/23/2017  Admitting Physician:  Shalom Wood MD  Discharge Physician:    Primary Care Physician:         [facility]          Admission Diagnoses:   Metastatic gastric adenocarcinoma  GI bleed          Discharge Diagnosis:   Same         Procedures:   None          Brief History of Illness:   Taken from Admission H&P:  \"Dashawn Ordoñez is a 57 year old male PMH HTN, DM, alcohol abuse, who presented to [facility] on 10/17 with 1 day of dizziness, weakness, states he couldn't stand, and a syncopal episode in his home.  He had drank 1/2 qt of alcohol earlier in the day, went to sleep, and when he got up and went to the kitchen to drink a glass of milk he subsequently fainted.  He endorses generalized fatigue and weakness for the past several weeks to months which he has attributed to stress at work.  He denies black, maroon, or bright red stools in the past, though he states he doesn't frequently pay attention to them.  He has also had a 100lb weight loss over the last year, after modifying diet.  In OSH ED, was found to have a Hbg of 3.5, noted tarry stools that day, was admitted, and underwent EGD and IR procedures which identified a gastric ulcerated mass and on biopsy showed gastric adenocarcinoma.  No active bleeding was identified.  During admission he had multiple maroon bowel movements, received a total of 9 units of pRBCs and 4 units of FFP.  Repeat EGD on 10/19 showed an adherent clot not present previously.  He had several maroon BMs on Thursday, largest in the am, most recent around 4pm which was small, hbg was 7.4.  He received a last unit of pRBCs and he was transferred to [facility] for further workup.  He currently denies dizziness, lightheadedness, pain, n/v, or any more bloody stools.  He " "is anxious about his new cancer diagnosis.  Patient does have a significant history of heavy alcohol use, states he usually tries to make a quart of alcohol last a week but frequently drinks more than that.  He works as a , and states that he is self-paying for this hospitalization as his insurance is not currently set up.  PSH remarkable for appendectomy 10 years ago.\"           Hospital Course:   Mr. Ordoñez was readmitted last night for continued observation after he went to his grandson's birthday party. He was advanced to a regular diet, which he tolerated well. He had no further clinical signs of bleeding. He was seen by medical oncology. Please see their consult note for further details. He wishes to follow up with Dr. Healy of oncology through [facility] since it's closer to his home. He will discharge with PO omeprazole BID. Other orders and instructions as below.            Final Pathology Result:   SPECIMEN(S):   Gastric ulcer biopsy     FINAL DIAGNOSIS:   Stomach, ulcer, biopsy:   - Adenocarcinoma, poorly differentiated (diffuse type/signet ring cell   adenocarcinoma).   - HER2 FISH ordered.  See addendum and separate report for results.      SPECIMEN(S):   Liver needle biopsy     FINAL DIAGNOSIS:   LIVER, ULTRASOUND-GUIDED NEEDLE CORE BIOPSY:   - Metastatic adenocarcinoma          Discharge Instructions and Follow-Up:     Discharge Procedure Orders  Reason for your hospital stay   Order Comments: Upper GI bleed.     [facility]ult [facility] Follow-up and recommended labs and tests   Order Comments: Follow up with primary care provider, [facility], within 1-2 week, for hospital follow- up and regarding new diagnosis. No follow up labs or test are needed.   3. Follow up with Dr. Robbie Healy of Oncology, at [facility]/associated clinic, within 1-2 weeks  for hospital follow- up and regarding new diagnosis. No follow up labs or test are needed.  4. Follow up with Radiation Oncology as an " outpatient as instructed.    Appointments on [place] and/or [facility] (with [facility] or [facility] provider or service). Call [phone] if you haven't heard regarding these appointments within 7 days of discharge.     Diet   Order Comments: Follow this diet upon discharge: Regular diet   Order Specific Question Answer Comments   Is discharge order? Yes               Home Health Care:   Not needed           Discharge Disposition:   Discharged to home      Condition at discharge: Stable         Consultations:   HEMATOLOGY & ONCOLOGY IP CONSULT         Imaging Studies:     Results for orders placed or performed during the hospital encounter of 10/19/17   CT Chest/Abdomen/Pelvis w Contrast    Narrative    EXAMINATION: CT CHEST/ABDOMEN/PELVIS W CONTRAST, 10/20/2017 3:13 AM    TECHNIQUE:  Helical CT images from the thoracic inlet through the  symphysis pubis were obtained  with contrast.    COMPARISON: CT abdomen pelvis 5/4/2015 and 8/16/2005    HISTORY: Staging CT, new Cancer.  Need by morning as patient will need  surgery. Patient presented with upper GI bleed and large gastric ulcer  with biopsy demonstrating poorly differentiated adenocarcinoma.     FINDINGS:    Chest:  The visualized thyroid gland is unremarkable. Normal branching  of the aortic arch. The heart size is normal. Normal caliber of the  aorta and pulmonary artery. Right-sided PICC line terminates in the  right atrium. There is haziness in the aortopulmonary window without  enlarged lymph nodes identified. No lymphadenopathy in the chest or  axilla.      There is a 5 mm nodule in the left lower lobe (series 2, image 105)  and 4 mm nodule in the lingula (series 2 image 91), unchanged since  8/16/2005, and is statistically benign.  No suspicious pulmonary  nodules or  focal opacities. Trace bilateral pleural effusion with  overlying bibasilar atelectasis.     Abdomen and pelvis: There is a large hepatic segment 7 lesion  measuring approximately 8 x 5 cm  with nodular peripheral enhancement,  grossly stable compared to 5/4/2015, although increased since 2005,  where it measured 4.5 x 4.9 cm. Findings are most compatible with a  cavernous hemangioma.     There is a 10 mm lesion at the interface of hepatic segment 7 and 8  (series 4, image 115) which has ill-defined borders and is new since  2015 CT.     Fluid attenuating cysts in the hepatic segment III and in segment IVb,  unchanged since prior study. Additional subcentimeter hypoattenuating  focus in hepatic segment 8 (series 4 image 104) new since prior study  and is too small to characterize.    There is a large broad-based ulceration in the gastric cardia with  surrounding enhancing gastric wall thickening, corresponds to  biopsy-proven adenocarcinoma. Associated haziness surrounding the  gastric cardia extending towards without definite involvement of the  pancreas and left adrenal gland, related to recent biopsy versus tumor  infiltration. Additional areas of submucosal thickening and  heterogeneous enhancement along the gastric body and gastric antrum  are indeterminate.     Several retroperitoneal lymph nodes adjacent to the gastric cardiac  measuring up to 13 mm in short axis (series 4, image 125). Multiple  enlarged gastrohepatic lymph nodes, slightly increased from previous  measuring up to 11 mm (series 4 image 121, 122). Two prominent  portocaval lymph nodes (series 4, image 131 and 130) are unchanged  from previous. No significant lymphadenopathy in the remainder of the  abdomen or pelvis.    Vicarious excretion of contrast in the gallbladder. Punctate  calcification in the pancreatic tail, likely related to chronic  pancreatitis. Otherwise the pancreas is unremarkable without  pancreatic ductal dilatation. The adrenal glands, spleen, and kidneys  are within normal limits. No bowel obstruction. Diffuse sigmoid  diverticulosis. There is a focal area of segmental thickening in the  sigmoid colon without  surrounding fat stranding or pericolonic lymph  nodes. The bladder is decompressed by a Michel catheter. The prostate  is within normal limits, with the exception of coarse calcifications.  No free fluid in the abdomen or pelvis. Major abdominal and pelvic  vessels are patent.    Bones and soft tissues: Old right lateral 11th rib fracture no  suspicious bony lesions.      Impression    IMPRESSION:   1. Ulcerative mass in the gastric cardia consistent with the patient's  known primary tumor. Associated haziness surrounding the gastric  cardia extending, related to recent biopsy versus tumor infiltration.   2. Additional areas of submucosal thickening along the gastric body  and antrum are indeterminate for gastritis versus additional foci of  tumor involvement.  3. Regional gastrohepatic and retroperitoneal lymphadenopathy,  concerning for locoregional metastases.  3. New ill-defined 10 mm lesion in hepatic segment 8, suspicious for  metastases. Additional indeterminate subcentimeter hypoattenuating  focus in hepatic segment 8, is too small to characterize.   4. Sigmoid colonic diverticulosis with focal segmental thickening,  likely exaggerated by incomplete distention and possibly sequela of  chronic diverticulitis. Consider direct visualization if no recent  colonoscopy was preformed.     I have personally reviewed the examination and initial interpretation  and I agree with the findings.    JADE SEVILLA MD   US Abdomen Limited    Narrative    EXAMINATION: US ABDOMEN LIMITED  10/20/2017 5:10 PM      CLINICAL HISTORY: Please evaluate WITH DOPPLER a new hepatic segment 8  lesion noted on CT CAP for future possible biopsy.    COMPARISON: CT dated 10/20/2017        FINDINGS:  Multiple lesions are seen in the liver including solid lesion  measuring 3.5 x 7 x 3.9 cm in the superior right lobe of liver. Cystic  lesions are seen in the left lobe of the liver measuring 3 and 2.5 x  2.7 cm and 2.4 x 2.1 x 2 cm cystic  lesion is seen in segment 4  measuring 1.3 x 1.5 x 1.3 cm. There is a small lesion in the right  lobe of the liver measuring 1 x 0.9 x 1.1 cm, likely corresponding to  the segment 8 lesion seen on the CT. The lesion is hypoechoic with no  distal acoustic enhancement. No internal vascularity is seen. The  liver measures 17.8 cm in craniocaudal dimension.     There is no intrahepatic or extrahepatic biliary ductal dilatation.  The common bile duct measures 3.5 mm. The gallbladder shows sludge,  without gallstones, wall thickening (2.6 mm), or pericholecystic  fluid.    The visualized upper abdominal aorta and inferior vena cava are  normal.      The right kidney is  normal in position and echogenicity. The right  kidney measures 13.1       Impression    Impression:   1. Multiple hepatic lesion including multiple cystic lesions in the  left lobe corresponding to to lesion seen on abdominal CT.   2. Small, hypoechoic solid lesion corresponding to index lesion seen  on CT in segment 8.   3. Mild hepatomegaly.    I have personally reviewed the examination and initial interpretation  and I agree with the findings.    GODWIN REDMOND MD   IR Liver Biopsy Percutaneous    Narrative    PROCEDURES 10/21/2017:  1. Ultrasound guided percutaneous liver lesion biopsy.     Clinical History: Gastric cancer, new right lobe liver lesion  concerning for metastatic disease. Biopsy requested.    Comparisons: CT abdomen pelvis 10/20/2017, 5/4/2015    Staff Radiologist: Denisse Lay M.D., interventional radiology  staff. I, Denisse Lay, performed the entire procedure without  assistance.    Medications:   Versed 2 mg IV  Fentanyl 100 mcg IV    Nursing: The patient was placed on continuous monitoring. Intravenous  sedation was administered. Sedation time was 35 minutes. Attending  face-to-face time was 35 minutes. Vital signs and sedation monitored  by nursing staff under Interventional Radiologists supervision. The  patient  remained stable throughout the procedure.     PROCEDURE: The patient understood the limitations, alternatives, and  risks of the procedure and requested the procedure be performed. Both  written and oral consent were obtained.    Targeted pre-procedural scan performed localized new segment 7/8  lesion seen on recent CT, measuring approximately 8 mm. The position  is adequate for biopsy.    The right upper quadrant was prepped and draped in the usual sterile  fashion. 1% lidocaine was used for local anesthesia.     Under ultrasound guidance, a 17 gauge coaxial introducer needle was  advanced into the lesion. Permanent copy of the image documenting the  needle position was entered into the patients record.     Total of 8, 18 gauge core biopsies were obtained with a Temno ACT  biopsy gun and submitted to Pathology in formalin. Pathologist  indicated specimens were adequate.    Good hemostasis was achieved with Gelfoam plugs that were deployed as  the needle was removed. Sterile dressing applied.    Targeted post-procedural scan demonstrated no immediate complication.       Impression    IMPRESSION:   1. Total of 8 18 gauge core biopsies obtained of right lobe liver  lesion under ultrasound guidance.    NETTE OLIVEIRA MD              Medications Prior to Admission:     Prescriptions Prior to Admission   Medication Sig Dispense Refill Last Dose     acetaminophen (TYLENOL) 325 MG tablet Take 2 tablets (650 mg) by mouth every 6 hours as needed for mild pain 100 tablet       [DISCONTINUED] omeprazole (PRILOSEC) 20 MG CR capsule Take 1 capsule (20 mg) by mouth 2 times daily 90 capsule 3               Discharge Medications:     Current Discharge Medication List      CONTINUE these medications which have CHANGED    Details   omeprazole (PRILOSEC) 40 MG capsule Take 1 capsule (40 mg) by mouth 2 times daily (before meals)  Qty: 30 capsule, Refills: 1    Associated Diagnoses: Acute upper gastrointestinal bleeding          CONTINUE these medications which have NOT CHANGED    Details   acetaminophen (TYLENOL) 325 MG tablet Take 2 tablets (650 mg) by mouth every 6 hours as needed for mild pain  Qty: 100 tablet    Associated Diagnoses: Gastrointestinal hemorrhage associated with gastric ulcer           Herson Bazan MD  General Surgery PGY-3  Pager [phone]

## 2017-10-23 NOTE — PROGRESS NOTES
This patient is a Oncology patient and they will handle the post discharge follow up              3. Follow up with Dr. Robbie Healy of Oncology, at [facility]/associated clinic, within 1-2 weeks  for hospital follow- up and regarding new diagnosis. No follow up labs or test are needed.  4. Follow up with Radiation Oncology as an outpatient as instructed.

## 2017-10-23 NOTE — PLAN OF CARE
Problem: Patient Care Overview  Goal: Plan of Care/Patient Progress Review  Outcome: Adequate for Discharge Date Met:  10/23/17  Tolerated regular diet for lunch, denies pain or nausea, no stool, voiding spont. Up ad yessi in halls, Hgb 9.1.PICC line taken out, discharge instructions reviewed with patient and discharged home.

## 2017-10-23 NOTE — PLAN OF CARE
Problem: Patient Care Overview  Goal: Plan of Care/Patient Progress Review  Outcome: No Change  VSS. Denies pain. Denies nausea. Denies any blood in stool. MIVF via PICC. Up ad yessi. Continue POC.       Observation goals PRIOR TO DISCHARGE     Comments: -diagnostic tests and consults completed and resulted   -vital signs normal or at patient baseline. Goal met  -tolerating oral intake to maintain hydration . Tolerating Clear liquids  -adequate pain control on oral analgesics : Goal met. Denies pain  -returns to baseline functional status Goal met. VSS  -safe disposition plan has been identified . Goal Not met

## 2017-10-23 NOTE — CONSULTS
[facility] Hematology-Oncology New Consult          Dashawn Ordoñez MRN# [id number]   Age: 57 year old YOB: 1960   Date of Admission: 10/22/2017     Reason for consult: Dashawn Ordoñez is a 57 year old M with a history of DMII, EtOH abuse and tobacco abuse admitted to [facility] on 10/18 for evaluation of GI bleeding, found to have large cratered gastric ulcer > 10 cm with final pathology returning with poorly differentiated gastric signet ring adenocarcinoma. Oncology is consulted given this new diagnosis, patient is not a surgical candidate.          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 EGD with large antral ulcer (>10 cm) and biopsy demonstrating gastric adenocarcinoma with signet ring cells. CT C/A/P demonstrated retroperitoneal lymphadenopathy as well as liver lesions with biopsy proven metastases. Her2 amplification testing is pending at this time. Patient has been evaluated by Dr. Cristian Healy of [facility] at [place] and would like to proceed with care at their facility. Additionally has been seen by radiation oncology and surgical oncology. Recommending coil embolization if rebleeds prior to p[alliative radiotherapy. Not a surgical candidate.       Recommendations:  - Outpatient follow up with Dr. Healy of [facility] 1-2 weeks post hospitalization, patient has contact info and will call to schedule.   - Follow up Her2 testing  - Thank you for the consult, please do not hesitate to contact Oncology with any questions.     Pt seen and discussed with Dr. Martell who agrees with the plan. Agree with below documentation by Cabrera Bartlett, Medical Student.     Please don't hesitate to call us  with any questions    Cabrera Dhruv CONCHITA, served as scribe for this encounter. I have personally seen and evaluated the patient today. I have reviewed the available data and medical records. I agree with the findings and plan of care as documented in the note by Cabrera Bartlett, CONCHITA    Hem/Onc service was asked to see this gentleman with a new diagnosis of stage 4 gastric cancer.    /74 (BP Location: Left arm)  Pulse 76  Temp 98  F (36.7  C) (Oral)  Resp 16  SpO2 98%  Wt Readings from Last 3 Encounters:   10/20/17 75.8 kg (167 lb 1.6 oz)   10/19/17 80 kg (176 lb 5.9 oz)   09/13/16 74.8 kg (165 lb)     Constitutional: No acute distress.  HEENT: NCAT. anicteric sclera. Oral mucosa pink and moist with no lesions or thrush.  Neck: Neck supple. No jugular venous distention.  Respiratory: Non-labored breathing, good air exchange, lungs clear to auscultation bilaterally.  Cardiovascular: Regular rate and rhythm. No murmur or rub.   GI: Normoactive bowel sounds. Abdomen soft, non-distended, and non-tender.  Extremities: grossly normal, non-tender, no edema.    A/P:  We had a detailed discussion with the patient about the diagnosis and possible treatment plans. Unfortunately the treat for stage 4 gastric cancer is palliative. We discussed that his cancer is treatable but unlikely curative. Systemic chemotherapy would be the treatment of choice. If his tumor is positive for HER-2, trastuzumab can be added. We offered the option for him to be treated at [facility], but he prefers to follow up with his oncologist (Dr. Cristian Healy) at [facility], which is only 5 min away from his house.    Staffed with Dr. Jasbir Healy MD, PhD  Hematology/Oncology Fellow       HPI:   Dashawn Ordoñez is a 57 year old M with a history of DMII, EtOH abuse, and tobacco abuse who presented on 10/18 to [facility] after syncope and several days of fatigue and was subsequently found to have Hgb 3.5 s/p multiple transfusions with pRBC and FFP.  He was admitted for further evaluation. An EGD was performed revealing a >10 cm cratered ulcer in the gastric antrum with biopsy returning signet ring poorly differentiated adenocarcinoma.     Subsequent CT imaging demonstrated hypodensities in the liver concerning for metastasis. Biopsy with IR demonstrated metastatic adenocarcinoma. The patient was transferred to [facility] for further evaluation on 10/20.     Mr. Ordoñez reports a 1 year history of weight loss, about 60 lbs. He reportedly had been dieting following his diagnosis of diabetes and believed the weight loss to be intentional. Additionally he has been feeling fatigued for several weeks which he attributed to his work. On 10/15 he was opening his refrigrator when he lifted his head backwards and lost consciousness. He felt too weak to get off the floor at that time and waited to regain his strength before standing up. The next few days he stayed home from work and did not have energy to go about his daily activities. On 10/18 he presented to [facility] for evaluation.     His Hgb was found to be 3.5 without active signs of bleeding. He has been transfused a total of 9 u pRBC and 4 u FFP since admission. Hgb has since remained stable. He was evaluated by IR and Radiation oncology. IR did not recommend coil embolization at this time given bleeding had resolved. Radiation oncology noted no indication for radiotherapy in the absence of active bleeding, would elect to proceed with embolization before attempting radiotherapy.                Review of system: Complete ROS otherwise negative         Past Medical History:     Past Medical History:   Diagnosis Date     Depressive disorder      Diabetes (H)      Hypertension      Substance abuse              Past Surgical History:     Past Surgical History:   Procedure Laterality Date     APPENDECTOMY       ENT SURGERY       ESOPHAGOSCOPY, GASTROSCOPY, DUODENOSCOPY (EGD), COMBINED N/A 10/18/2017    Procedure: COMBINED  ESOPHAGOSCOPY, GASTROSCOPY, DUODENOSCOPY (EGD), BIOPSY SINGLE OR MULTIPLE;  ESOPHAGOSCOPY, GASTROSCOPY, DUODENOSCOPY (EGD) with biopsies using cold forceps;  Surgeon: Filippo Mclean MD;  Location:  GI             Social History:     Social History     Social History     Marital status:      Spouse name: N/A     Number of children: N/A     Years of education: N/A     Occupational History     Not on file.     Social History Main Topics     Smoking status: Current Every Day Smoker     Packs/day: 2.00     Smokeless tobacco: Never Used     Alcohol use Yes      Comment: daily / hard liq;     Drug use: No     Sexual activity: Yes     Other Topics Concern     Not on file     Social History Narrative             Family History:   No family history on file.             Allergies:   . No Known Allergies          Medications:     Current Facility-Administered Medications   Medication     omeprazole (priLOSEC) CR capsule 40 mg     naloxone (NARCAN) injection 0.1-0.4 mg     acetaminophen (TYLENOL) tablet 650 mg     ondansetron (ZOFRAN-ODT) ODT tab 4 mg    Or     ondansetron (ZOFRAN) injection 4 mg     prochlorperazine (COMPAZINE) injection 5-10 mg    Or     prochlorperazine (COMPAZINE) tablet 5-10 mg    Or     prochlorperazine (COMPAZINE) Suppository 25 mg     LORazepam (ATIVAN) tablet 0.5 mg           Vital signs:  /74 (BP Location: Left arm)  Pulse 76  Temp 98  F (36.7  C) (Oral)  Resp 16  SpO2 98%    Physical exam:  Gen: alert, pleasant and conversational, NAD  HEENT: NC/AT, EOMI w/ PERRL, anicteric sclera.  OP clear. MMM. Neck supple,  Lymph: no palpable cervical, supraclavicular, axillary or inguinal LAD.  CV: normal S1,S2 with RRR no m/r/g  Resp: lungs CTA bilaterally with adequate air movement to bases. No wheezes or crackles  Abd: soft NTND no organomegaly or masses. BS normoactive.   Ext: WWP no edema or cyanosis  Skin: no concerning lesions or rashes  Neuro: A&Ox4, no lateralizing sx. Grossly  nonfocal.           Data:   The labs and imaging were reviewed.      Recent Labs   Lab Test  10/23/17   0810  10/22/17   0716  10/20/17   1559  10/20/17   0320   NA  138  137   --   139   POTASSIUM  4.0  3.6   --   3.8   CHLORIDE  107  106   --   111*   CO2  24  23   --   22   ANIONGAP  7  8   --   6   BUN  7  6*   --   12   CR  0.85  0.72   --   0.68   GLC  110*  137*   --   102*   JULIO CESAR  8.6  8.6   --   7.1*   MAG   --   2.1   --   1.9   PHOS   --   2.9  2.4*  1.8*     Recent Labs   Lab Test  10/23/17   0810  10/22/17   0716  10/21/17   2356   10/20/17   0320  10/20/17   0102   10/17/17   1630  05/04/15   2002  06/27/14   0040   WBC  6.4   --    --    --   9.5  10.0   < >  15.7*  11.5*  10.6   HGB  9.1*  9.5*  9.0*   < >  7.5*  8.0*   < >  3.5*  17.0  16.2   PLT  228   --    --    --   113*  139*   < >  379  190  211   MCV  86   --    --    --   84  86   < >  67*  89  93   NEUTROPHIL   --    --    --    --    --    --    --   77.2  64.3  60.6    < > = values in this interval not displayed.     Recent Labs   Lab Test  10/20/17   0102  10/18/17   0559  10/17/17   1630   BILITOTAL  0.5  0.5  0.3   ALKPHOS  43  58  66   ALT  9  12  10   AST  7  8  10   ALBUMIN  2.0*  2.7*  2.9*     No results found for: TSH]      Results for orders placed or performed during the hospital encounter of 10/19/17   CT Chest/Abdomen/Pelvis w Contrast    Narrative    EXAMINATION: CT CHEST/ABDOMEN/PELVIS W CONTRAST, 10/20/2017 3:13 AM    TECHNIQUE:  Helical CT images from the thoracic inlet through the  symphysis pubis were obtained  with contrast.    COMPARISON: CT abdomen pelvis 5/4/2015 and 8/16/2005    HISTORY: Staging CT, new Cancer.  Need by morning as patient will need  surgery. Patient presented with upper GI bleed and large gastric ulcer  with biopsy demonstrating poorly differentiated adenocarcinoma.     FINDINGS:    Chest:  The visualized thyroid gland is unremarkable. Normal branching  of the aortic arch. The heart size is normal.  Normal caliber of the  aorta and pulmonary artery. Right-sided PICC line terminates in the  right atrium. There is haziness in the aortopulmonary window without  enlarged lymph nodes identified. No lymphadenopathy in the chest or  axilla.      There is a 5 mm nodule in the left lower lobe (series 2, image 105)  and 4 mm nodule in the lingula (series 2 image 91), unchanged since  8/16/2005, and is statistically benign.  No suspicious pulmonary  nodules or  focal opacities. Trace bilateral pleural effusion with  overlying bibasilar atelectasis.     Abdomen and pelvis: There is a large hepatic segment 7 lesion  measuring approximately 8 x 5 cm with nodular peripheral enhancement,  grossly stable compared to 5/4/2015, although increased since 2005,  where it measured 4.5 x 4.9 cm. Findings are most compatible with a  cavernous hemangioma.     There is a 10 mm lesion at the interface of hepatic segment 7 and 8  (series 4, image 115) which has ill-defined borders and is new since  2015 CT.     Fluid attenuating cysts in the hepatic segment III and in segment IVb,  unchanged since prior study. Additional subcentimeter hypoattenuating  focus in hepatic segment 8 (series 4 image 104) new since prior study  and is too small to characterize.    There is a large broad-based ulceration in the gastric cardia with  surrounding enhancing gastric wall thickening, corresponds to  biopsy-proven adenocarcinoma. Associated haziness surrounding the  gastric cardia extending towards without definite involvement of the  pancreas and left adrenal gland, related to recent biopsy versus tumor  infiltration. Additional areas of submucosal thickening and  heterogeneous enhancement along the gastric body and gastric antrum  are indeterminate.     Several retroperitoneal lymph nodes adjacent to the gastric cardiac  measuring up to 13 mm in short axis (series 4, image 125). Multiple  enlarged gastrohepatic lymph nodes, slightly increased from  previous  measuring up to 11 mm (series 4 image 121, 122). Two prominent  portocaval lymph nodes (series 4, image 131 and 130) are unchanged  from previous. No significant lymphadenopathy in the remainder of the  abdomen or pelvis.    Vicarious excretion of contrast in the gallbladder. Punctate  calcification in the pancreatic tail, likely related to chronic  pancreatitis. Otherwise the pancreas is unremarkable without  pancreatic ductal dilatation. The adrenal glands, spleen, and kidneys  are within normal limits. No bowel obstruction. Diffuse sigmoid  diverticulosis. There is a focal area of segmental thickening in the  sigmoid colon without surrounding fat stranding or pericolonic lymph  nodes. The bladder is decompressed by a Michel catheter. The prostate  is within normal limits, with the exception of coarse calcifications.  No free fluid in the abdomen or pelvis. Major abdominal and pelvic  vessels are patent.    Bones and soft tissues: Old right lateral 11th rib fracture no  suspicious bony lesions.      Impression    IMPRESSION:   1. Ulcerative mass in the gastric cardia consistent with the patient's  known primary tumor. Associated haziness surrounding the gastric  cardia extending, related to recent biopsy versus tumor infiltration.   2. Additional areas of submucosal thickening along the gastric body  and antrum are indeterminate for gastritis versus additional foci of  tumor involvement.  3. Regional gastrohepatic and retroperitoneal lymphadenopathy,  concerning for locoregional metastases.  3. New ill-defined 10 mm lesion in hepatic segment 8, suspicious for  metastases. Additional indeterminate subcentimeter hypoattenuating  focus in hepatic segment 8, is too small to characterize.   4. Sigmoid colonic diverticulosis with focal segmental thickening,  likely exaggerated by incomplete distention and possibly sequela of  chronic diverticulitis. Consider direct visualization if no recent  colonoscopy was  preformed.     I have personally reviewed the examination and initial interpretation  and I agree with the findings.    JADE SEVILLA MD   US Abdomen Limited    Narrative    EXAMINATION: US ABDOMEN LIMITED  10/20/2017 5:10 PM      CLINICAL HISTORY: Please evaluate WITH DOPPLER a new hepatic segment 8  lesion noted on CT CAP for future possible biopsy.    COMPARISON: CT dated 10/20/2017        FINDINGS:  Multiple lesions are seen in the liver including solid lesion  measuring 3.5 x 7 x 3.9 cm in the superior right lobe of liver. Cystic  lesions are seen in the left lobe of the liver measuring 3 and 2.5 x  2.7 cm and 2.4 x 2.1 x 2 cm cystic lesion is seen in segment 4  measuring 1.3 x 1.5 x 1.3 cm. There is a small lesion in the right  lobe of the liver measuring 1 x 0.9 x 1.1 cm, likely corresponding to  the segment 8 lesion seen on the CT. The lesion is hypoechoic with no  distal acoustic enhancement. No internal vascularity is seen. The  liver measures 17.8 cm in craniocaudal dimension.     There is no intrahepatic or extrahepatic biliary ductal dilatation.  The common bile duct measures 3.5 mm. The gallbladder shows sludge,  without gallstones, wall thickening (2.6 mm), or pericholecystic  fluid.    The visualized upper abdominal aorta and inferior vena cava are  normal.      The right kidney is  normal in position and echogenicity. The right  kidney measures 13.1       Impression    Impression:   1. Multiple hepatic lesion including multiple cystic lesions in the  left lobe corresponding to to lesion seen on abdominal CT.   2. Small, hypoechoic solid lesion corresponding to index lesion seen  on CT in segment 8.   3. Mild hepatomegaly.    I have personally reviewed the examination and initial interpretation  and I agree with the findings.    GODWIN REDMOND MD   IR Liver Biopsy Percutaneous    Narrative    PROCEDURES 10/21/2017:  1. Ultrasound guided percutaneous liver lesion biopsy.     Clinical History:  Gastric cancer, new right lobe liver lesion  concerning for metastatic disease. Biopsy requested.    Comparisons: CT abdomen pelvis 10/20/2017, 5/4/2015    Staff Radiologist: Denisse Oliveira M.D., interventional radiology  staff. I, Denisse Oliveira, performed the entire procedure without  assistance.    Medications:   Versed 2 mg IV  Fentanyl 100 mcg IV    Nursing: The patient was placed on continuous monitoring. Intravenous  sedation was administered. Sedation time was 35 minutes. Attending  face-to-face time was 35 minutes. Vital signs and sedation monitored  by nursing staff under Interventional Radiologists supervision. The  patient remained stable throughout the procedure.     PROCEDURE: The patient understood the limitations, alternatives, and  risks of the procedure and requested the procedure be performed. Both  written and oral consent were obtained.    Targeted pre-procedural scan performed localized new segment 7/8  lesion seen on recent CT, measuring approximately 8 mm. The position  is adequate for biopsy.    The right upper quadrant was prepped and draped in the usual sterile  fashion. 1% lidocaine was used for local anesthesia.     Under ultrasound guidance, a 17 gauge coaxial introducer needle was  advanced into the lesion. Permanent copy of the image documenting the  needle position was entered into the patients record.     Total of 8, 18 gauge core biopsies were obtained with a Temno ACT  biopsy gun and submitted to Pathology in formalin. Pathologist  indicated specimens were adequate.    Good hemostasis was achieved with Gelfoam plugs that were deployed as  the needle was removed. Sterile dressing applied.    Targeted post-procedural scan demonstrated no immediate complication.       Impression    IMPRESSION:   1. Total of 8 18 gauge core biopsies obtained of right lobe liver  lesion under ultrasound guidance.    DENISSE OLIVEIRA MD

## 2017-10-23 NOTE — PLAN OF CARE
Observation goals PRIOR TO DISCHARGE     Comments: -diagnostic tests and consults completed and resulted   -vital signs normal or at patient baseline. Goal met  -tolerating oral intake to maintain hydration . Tolerating Clear liquids  -adequate pain control on oral analgesics : Goal met. Denies pain  -returns to baseline functional status Goal met. VSS  -safe disposition plan has been identified . Goal Not met.

## 2017-10-23 NOTE — PROGRESS NOTES
Surgical Oncology Progress Note    Interval History:  No acute events overnight. Tolerating clear liquid diet. Denies pain. Hgb stable, morning labs pending.     Physical Exam:   Temp:  [98  F (36.7  C)-98.9  F (37.2  C)] 98  F (36.7  C)  Pulse:  [76-89] 76  Resp:  [16] 16  BP: (117-124)/(72-74) 124/74  SpO2:  [97 %-100 %] 98 %  General: Alert, oriented, appears comfortable, NAD.  Respiratory: breathing non labored  Abdomen: Abdomen is soft, non-tender, non-distended.    Data:   All laboratory and imaging data in the past 24 hours reviewed  I/O last 3 completed shifts:  In: 465.83 [I.V.:465.83]  Out: 200 [Urine:200]  Recent Labs   Lab Test  10/22/17   0716  10/21/17   2356  10/21/17   1636   10/20/17   0320  10/20/17   0102   10/19/17   1057   10/19/17   0352   WBC   --    --    --    --   9.5  10.0   --    --    --   12.2*   HGB  9.5*  9.0*  8.6*   < >  7.5*  8.0*   < >   --    < >  5.3*   PLT   --    --    --    --   113*  139*   --    --    --   223   INR  1.12   --    --    --    --   1.25*   --   1.44*   --    --     < > = values in this interval not displayed.      Recent Labs   Lab Test  10/22/17   0716  10/20/17   0320  10/20/17   0102   NA  137  139  139   POTASSIUM  3.6  3.8  3.8   CHLORIDE  106  111*  110*   CO2  23  22  23   BUN  6*  12  13   CR  0.72  0.68  0.68   ANIONGAP  8  6  6   JULIO CESAR  8.6  7.1*  7.2*   GLC  137*  102*  108*     Recent Labs   Lab Test  10/20/17   0102   PROTTOTAL  4.3*   ALBUMIN  2.0*   BILITOTAL  0.5   ALKPHOS  43   AST  7   ALT  9     Assessment and Plan:     Dashawn Ordoñez is a 57 year old man with newly diagnosed gastric adenocarcinoma complicated by upper GI bleeding s/p multiple blood product resuscitation at OSH. IR liver biopsy yesterday confirm cancer is stage IV. Currently hemodynamically stable.    - Advance to regular diet  - Morning labs pending  - Omeprazole  - Med/onc consult.   - Home later today if tolerating diet    Erlinda Dorsey PA-C   Surgical Oncology

## 2017-10-23 NOTE — H&P
SURGERY H&P  October 22, 2017      HISTORY PRESENTING ILLNESS: Dashawn Ordoñez is a 57 year old male PMH HTN, DM, alcohol abuse who was admitted until this am with GI bleed and new diagnosis of stage IV gastric adenocarcinoma.  He received 1 unit of pRBCs during his admission at [facility] for Hbg of 6.6.  His hbg has since been stable.  He was discharged for the afternoon today to attend a family member's birthday party.  He has no current complaints.  He had a BM this am which was nonbloody.  He denies dizziness, CP, SOB, or abd pain.    REVIEW OF SYSTEMS: As noted above. No headache, dizziness. No fever, chills. No chest pain or shortness of breath. No abdominal pain, nausea, vomiting. No diarrhea or constipation. No urinary difficulties. No muscle or body aches.    PAST MEDICAL HISTORY:   Past Medical History:   Diagnosis Date     Depressive disorder      Diabetes (H)      Hypertension      Substance abuse        SURGICAL HISTORY:   Past Surgical History:   Procedure Laterality Date     APPENDECTOMY       ENT SURGERY       ESOPHAGOSCOPY, GASTROSCOPY, DUODENOSCOPY (EGD), COMBINED N/A 10/18/2017    Procedure: COMBINED ESOPHAGOSCOPY, GASTROSCOPY, DUODENOSCOPY (EGD), BIOPSY SINGLE OR MULTIPLE;  ESOPHAGOSCOPY, GASTROSCOPY, DUODENOSCOPY (EGD) with biopsies using cold forceps;  Surgeon: Filippo Mclean MD;  Location:  GI       SOCIAL HISTORY: Tobacco - yes. Etoh - yes. Drugs - no.      FAMILY HISTORY: No bleeding/clotting disorders nor problems with anesthesia.     ALLERGIES:    No Known Allergies    MEDICATIONS:    Current Facility-Administered Medications on File Prior to Encounter:  [COMPLETED] potassium chloride 10 mEq in 100 mL sterile water intermittent infusion (premix)   [DISCONTINUED] sodium chloride (PF) 0.9% PF flush 3 mL   [DISCONTINUED] pantoprazole (PROTONIX) 40 mg IV push injection   [DISCONTINUED] pantoprazole (PROTONIX) 40 mg IV push injection   [COMPLETED] sodium chloride (PF) 0.9% PF flush 20 mL    [DISCONTINUED] dextrose 5% and 0.2% NaCl + KCl 20 mEq/L infusion   [DISCONTINUED] LORazepam (ATIVAN) tablet 0.5 mg   [DISCONTINUED] HYDROmorphone (PF) (DILAUDID) injection 0.3-0.5 mg   [DISCONTINUED] ondansetron (ZOFRAN) injection 4-8 mg   [DISCONTINUED] ondansetron (ZOFRAN-ODT) ODT tab 4-8 mg   [COMPLETED] thiamine tablet 100 mg   [DISCONTINUED] naloxone (NARCAN) injection 0.1-0.4 mg   [DISCONTINUED] acetaminophen (TYLENOL) tablet 650 mg   [DISCONTINUED] pantoprazole (PROTONIX) 80 mg in NaCl 0.9 % 100 mL infusion   [DISCONTINUED] glucose 40 % gel 15-30 g   [DISCONTINUED] dextrose 50 % injection 25-50 mL   [DISCONTINUED] glucagon injection 1 mg   [DISCONTINUED] insulin aspart (NovoLOG) inj (RAPID ACTING)   [DISCONTINUED] LORazepam (ATIVAN) tablet 1-4 mg   [DISCONTINUED] folic acid (FOLVITE) tablet 1 mg     Current Outpatient Prescriptions on File Prior to Encounter:  acetaminophen (TYLENOL) 325 MG tablet Take 2 tablets (650 mg) by mouth every 6 hours as needed for mild pain   omeprazole (PRILOSEC) 20 MG CR capsule Take 1 capsule (20 mg) by mouth 2 times daily       PHYSICAL EXAMINATION:  Temp:  [97.5  F (36.4  C)-98  F (36.7  C)] 98  F (36.7  C)  Pulse:  [72-85] 85  Heart Rate:  [85] 85  Resp:  [16-18] 18  BP: (106-142)/(62-77) 106/62  SpO2:  [96 %-100 %] 100 %  General: Alert, well-appearing  in no acute distress.  HEENT: Normocephalic, atraumatic. Patent nares.   Neck: No cervical lymphadenopathy.   Chest wall: Symmetric thorax. No masses or tenderness to palpation.   Respiratory: Non-labored breathing. Lung sounds clear to auscultation bilaterally.   Cardiovascular: Regular rate and rhythm.   Gastrointestinal: Abdomen soft, non-distended, non-tender to palpation. No organomegaly or masses appreciated.   Genitourinary: No CVA tenderness.   Extremities: Moving all four extremities. No limb deformities. No pedal edema.   Skin: As noted above. No rashes or lesions appreciated.    LABS: Reviewed.   Arterial Blood  Gases   No lab results found in last 7 days.  Complete Blood Count     Recent Labs  Lab 10/22/17  0716 10/21/17  2356 10/21/17  1636 10/21/17  0750  10/20/17  0320 10/20/17  0102  10/19/17  0352  10/18/17  0559   WBC  --   --   --   --   --  9.5 10.0  --  12.2*  --  10.3   HGB 9.5* 9.0* 8.6* 8.3*  < > 7.5* 8.0*  < > 5.3*  < > 5.9*   PLT  --   --   --   --   --  113* 139*  --  223  --  246   < > = values in this interval not displayed.  Basic Metabolic Panel    Recent Labs  Lab 10/22/17  0716 10/20/17  0320 10/20/17  0102 10/19/17  1855    139 139 139   POTASSIUM 3.6 3.8 3.8 4.0   CHLORIDE 106 111* 110* 111*   CO2 23 22 23 23   BUN 6* 12 13 15   CR 0.72 0.68 0.68 0.73   * 102* 108* 129*     Liver Function Tests    Recent Labs  Lab 10/22/17  0716 10/20/17  0102 10/19/17  1057 10/18/17  0559 10/17/17  1630   AST  --  7  --  8 10   ALT  --  9  --  12 10   ALKPHOS  --  43  --  58 66   BILITOTAL  --  0.5  --  0.5 0.3   ALBUMIN  --  2.0*  --  2.7* 2.9*   INR 1.12 1.25* 1.44*  --  1.26*     Pancreatic Enzymes  No lab results found in last 7 days.  Coagulation Profile    Recent Labs  Lab 10/22/17  0716 10/20/17  0102 10/19/17  1057 10/17/17  1630   INR 1.12 1.25* 1.44* 1.26*   PTT  --   --   --  27         IMAGING:  Results for orders placed or performed during the hospital encounter of 10/19/17   CT Chest/Abdomen/Pelvis w Contrast    Narrative    EXAMINATION: CT CHEST/ABDOMEN/PELVIS W CONTRAST, 10/20/2017 3:13 AM    TECHNIQUE:  Helical CT images from the thoracic inlet through the  symphysis pubis were obtained  with contrast.    COMPARISON: CT abdomen pelvis 5/4/2015 and 8/16/2005    HISTORY: Staging CT, new Cancer.  Need by morning as patient will need  surgery. Patient presented with upper GI bleed and large gastric ulcer  with biopsy demonstrating poorly differentiated adenocarcinoma.     FINDINGS:    Chest:  The visualized thyroid gland is unremarkable. Normal branching  of the aortic arch. The heart size  is normal. Normal caliber of the  aorta and pulmonary artery. Right-sided PICC line terminates in the  right atrium. There is haziness in the aortopulmonary window without  enlarged lymph nodes identified. No lymphadenopathy in the chest or  axilla.      There is a 5 mm nodule in the left lower lobe (series 2, image 105)  and 4 mm nodule in the lingula (series 2 image 91), unchanged since  8/16/2005, and is statistically benign.  No suspicious pulmonary  nodules or  focal opacities. Trace bilateral pleural effusion with  overlying bibasilar atelectasis.     Abdomen and pelvis: There is a large hepatic segment 7 lesion  measuring approximately 8 x 5 cm with nodular peripheral enhancement,  grossly stable compared to 5/4/2015, although increased since 2005,  where it measured 4.5 x 4.9 cm. Findings are most compatible with a  cavernous hemangioma.     There is a 10 mm lesion at the interface of hepatic segment 7 and 8  (series 4, image 115) which has ill-defined borders and is new since  2015 CT.     Fluid attenuating cysts in the hepatic segment III and in segment IVb,  unchanged since prior study. Additional subcentimeter hypoattenuating  focus in hepatic segment 8 (series 4 image 104) new since prior study  and is too small to characterize.    There is a large broad-based ulceration in the gastric cardia with  surrounding enhancing gastric wall thickening, corresponds to  biopsy-proven adenocarcinoma. Associated haziness surrounding the  gastric cardia extending towards without definite involvement of the  pancreas and left adrenal gland, related to recent biopsy versus tumor  infiltration. Additional areas of submucosal thickening and  heterogeneous enhancement along the gastric body and gastric antrum  are indeterminate.     Several retroperitoneal lymph nodes adjacent to the gastric cardiac  measuring up to 13 mm in short axis (series 4, image 125). Multiple  enlarged gastrohepatic lymph nodes, slightly  increased from previous  measuring up to 11 mm (series 4 image 121, 122). Two prominent  portocaval lymph nodes (series 4, image 131 and 130) are unchanged  from previous. No significant lymphadenopathy in the remainder of the  abdomen or pelvis.    Vicarious excretion of contrast in the gallbladder. Punctate  calcification in the pancreatic tail, likely related to chronic  pancreatitis. Otherwise the pancreas is unremarkable without  pancreatic ductal dilatation. The adrenal glands, spleen, and kidneys  are within normal limits. No bowel obstruction. Diffuse sigmoid  diverticulosis. There is a focal area of segmental thickening in the  sigmoid colon without surrounding fat stranding or pericolonic lymph  nodes. The bladder is decompressed by a Michel catheter. The prostate  is within normal limits, with the exception of coarse calcifications.  No free fluid in the abdomen or pelvis. Major abdominal and pelvic  vessels are patent.    Bones and soft tissues: Old right lateral 11th rib fracture no  suspicious bony lesions.      Impression    IMPRESSION:   1. Ulcerative mass in the gastric cardia consistent with the patient's  known primary tumor. Associated haziness surrounding the gastric  cardia extending, related to recent biopsy versus tumor infiltration.   2. Additional areas of submucosal thickening along the gastric body  and antrum are indeterminate for gastritis versus additional foci of  tumor involvement.  3. Regional gastrohepatic and retroperitoneal lymphadenopathy,  concerning for locoregional metastases.  3. New ill-defined 10 mm lesion in hepatic segment 8, suspicious for  metastases. Additional indeterminate subcentimeter hypoattenuating  focus in hepatic segment 8, is too small to characterize.   4. Sigmoid colonic diverticulosis with focal segmental thickening,  likely exaggerated by incomplete distention and possibly sequela of  chronic diverticulitis. Consider direct visualization if no  recent  colonoscopy was preformed.     I have personally reviewed the examination and initial interpretation  and I agree with the findings.    JADE SEVILLA MD   US Abdomen Limited    Narrative    EXAMINATION: US ABDOMEN LIMITED  10/20/2017 5:10 PM      CLINICAL HISTORY: Please evaluate WITH DOPPLER a new hepatic segment 8  lesion noted on CT CAP for future possible biopsy.    COMPARISON: CT dated 10/20/2017        FINDINGS:  Multiple lesions are seen in the liver including solid lesion  measuring 3.5 x 7 x 3.9 cm in the superior right lobe of liver. Cystic  lesions are seen in the left lobe of the liver measuring 3 and 2.5 x  2.7 cm and 2.4 x 2.1 x 2 cm cystic lesion is seen in segment 4  measuring 1.3 x 1.5 x 1.3 cm. There is a small lesion in the right  lobe of the liver measuring 1 x 0.9 x 1.1 cm, likely corresponding to  the segment 8 lesion seen on the CT. The lesion is hypoechoic with no  distal acoustic enhancement. No internal vascularity is seen. The  liver measures 17.8 cm in craniocaudal dimension.     There is no intrahepatic or extrahepatic biliary ductal dilatation.  The common bile duct measures 3.5 mm. The gallbladder shows sludge,  without gallstones, wall thickening (2.6 mm), or pericholecystic  fluid.    The visualized upper abdominal aorta and inferior vena cava are  normal.      The right kidney is  normal in position and echogenicity. The right  kidney measures 13.1       Impression    Impression:   1. Multiple hepatic lesion including multiple cystic lesions in the  left lobe corresponding to to lesion seen on abdominal CT.   2. Small, hypoechoic solid lesion corresponding to index lesion seen  on CT in segment 8.   3. Mild hepatomegaly.    I have personally reviewed the examination and initial interpretation  and I agree with the findings.    GODWIN REDMOND MD   IR Liver Biopsy Percutaneous    Narrative    PROCEDURES 10/21/2017:  1. Ultrasound guided percutaneous liver lesion  biopsy.     Clinical History: Gastric cancer, new right lobe liver lesion  concerning for metastatic disease. Biopsy requested.    Comparisons: CT abdomen pelvis 10/20/2017, 5/4/2015    Staff Radiologist: Denisse Oliveira M.D., interventional radiology  staff. I, Denisse Oliveira, performed the entire procedure without  assistance.    Medications:   Versed 2 mg IV  Fentanyl 100 mcg IV    Nursing: The patient was placed on continuous monitoring. Intravenous  sedation was administered. Sedation time was 35 minutes. Attending  face-to-face time was 35 minutes. Vital signs and sedation monitored  by nursing staff under Interventional Radiologists supervision. The  patient remained stable throughout the procedure.     PROCEDURE: The patient understood the limitations, alternatives, and  risks of the procedure and requested the procedure be performed. Both  written and oral consent were obtained.    Targeted pre-procedural scan performed localized new segment 7/8  lesion seen on recent CT, measuring approximately 8 mm. The position  is adequate for biopsy.    The right upper quadrant was prepped and draped in the usual sterile  fashion. 1% lidocaine was used for local anesthesia.     Under ultrasound guidance, a 17 gauge coaxial introducer needle was  advanced into the lesion. Permanent copy of the image documenting the  needle position was entered into the patients record.     Total of 8, 18 gauge core biopsies were obtained with a Temno ACT  biopsy gun and submitted to Pathology in formalin. Pathologist  indicated specimens were adequate.    Good hemostasis was achieved with Gelfoam plugs that were deployed as  the needle was removed. Sterile dressing applied.    Targeted post-procedural scan demonstrated no immediate complication.       Impression    IMPRESSION:   1. Total of 8 18 gauge core biopsies obtained of right lobe liver  lesion under ultrasound guidance.    DENISSE OLIVEIRA MD             ASSESSMENT: Dashawn MANE  Smita is a 57 year old male with newly diagnosed metastatic gastric adenocarcinoma, who presented with GI bleed.    PLAN:    - Admit to obs, surgical oncology  - BID PPI  - IVF  - CLD  - MSSA    Patient seen, findings and plan discussed with chief resident, Dr. Suggs.        Rebecca Ortega  General Surgery PGY1  [id number]

## 2017-10-24 PROBLEM — C16.9 GASTRIC CANCER (H): Status: ACTIVE | Noted: 2017-10-24

## 2017-10-24 LAB — COPATH REPORT: NORMAL

## 2017-10-26 NOTE — DISCHARGE SUMMARY
Date of Admission   10/17/2017  Date of Discharge and Transfer to the [facility]  10/20/2017    Primary MD Darnell [facility]    Discharge and Transfer diagnosis  1. Recurrent bleeding from ulcerated newly biopsied gastric adenocarcinoma  2.Unsuccessful attempt by IR to identify the bleeding vessel  3. Recurrent blood per rectum with transfusion of 6 units of blood and FFP  4. Repeat endoscopy identified large clot on the ulcerated gastric adenocarcinoma   5.Severe anemia on admission with hemoglobin 3.5 and tarry stools  6. S/p 4 units blood and FFP for admission anemia  7. Long standing alcohol use and abuse  8. cigarette smoker    Hospital Course  Dashawn Ordoñez is a 57 year old male with alcohol use and abuse, cigarette smoker and who presented following a syncopal episode, lightheadedness when standing, and recurrent tarry stools. In the ER, /62  , lactic acid 3.0, hemoglobin 3.5 and alcohol level of zero.He also had epigastric discomfort and rectal exam with guaiac positive stool. MCV was 60 c/w iron deficiency anemia and since he walked into the ER, the severe anemia was likely from acute on chronic blood loss. He was given four units of blood along with FFP and was placed on octreotide and protonix drip. His hemoglobin improved to 6.0. The following am he underwent upper endoscopy which showed a large non bleeding ulcer in the gastric cardia with extrinisic compression of the GE junction. A biopsy was done and sent for a rush analysis as a malignancy was suspected. Then at  4 am, he began having large amounts of bloody stools with mild transient hypotension (hemoglobin returned at 5.3) and he was given 2 units blood stat. The bloody stools continued about every 2 hours and he recieved another 2 units. GI requested that IR attempt to stop the bleeding by a catheter approach but the bleeding vessel could not be identified. Pathology returned with signet ring adenocarcinoma and the  "patient and family was informed. He continued to have bloody stools and a repeat upper endoscopy was done which now showed a large adherent clot on the ulcer. The clot was injected with 4 cc 1:10,000 epinephrine and no bleeding vessel could be identified. Since the recurrent bleeding could not be controlled by either an IR or endoscopic approach, arrangements were made for transfer to the [facility] for surgical intervention if he continue to hemorrhage.    Physical Exam:  subjective:   \" I have cancer so what is the point\"           Physical Exam:      Last Vital Signs:  /84  Pulse 82  Temp 97.5  F (36.4  C) (Axillary)  Resp 10  Ht 1.778 m (5' 10\")  Wt 80 kg (176 lb 5.9 oz)  SpO2 100%  BMI 25.31 kg/m2     Constitutional: Awake, alert, cooperative, feels overwhelmed understands the dx and plan   Respiratory: Clear to auscultation bilaterally, no rales or wheezing  Wants a cigarette   Cardiovascular: Regular rate and rhythm, normal S1 and S2, and no murmur noted   Abdomen: Normal bowel sounds, soft, non-distended, non-tender   Skin: No rashes, no cyanosis, dry to touch   Neuro: Alert and oriented x3, no focal weakness, numbness, memory loss   Extremities: No edema, normal range of motion   Other(s): Recurrent maroon stools large volume at 4 am 8 am 12 noon and 4 pm     No pain   All other systems: Negative       LABS  At the time of transfer  Na 139  K 3.8  Creat 0.68  Bicarb 22 LFT normal lactic acid 0.03  Hemoglobin 7.4    Greater than one hour with arrangements for transfer and discharge    "

## 2017-11-03 ENCOUNTER — HOSPITAL ENCOUNTER (OUTPATIENT)
Facility: CLINIC | Age: 57
Discharge: HOME | End: 2017-11-03
Attending: RADIOLOGY | Admitting: RADIOLOGY
Payer: MEDICAID

## 2017-11-03 ENCOUNTER — APPOINTMENT (OUTPATIENT)
Dept: INTERVENTIONAL RADIOLOGY/VASCULAR | Facility: CLINIC | Age: 57
End: 2017-11-03
Attending: INTERNAL MEDICINE
Payer: MEDICAID

## 2017-11-03 VITALS
DIASTOLIC BLOOD PRESSURE: 96 MMHG | HEART RATE: 85 BPM | RESPIRATION RATE: 18 BRPM | TEMPERATURE: 99.4 F | OXYGEN SATURATION: 97 % | SYSTOLIC BLOOD PRESSURE: 130 MMHG

## 2017-11-03 DIAGNOSIS — C16.9 MALIGNANT NEOPLASM OF STOMACH, UNSPECIFIED LOCATION (H): ICD-10-CM

## 2017-11-03 LAB
ERYTHROCYTE [DISTWIDTH] IN BLOOD BY AUTOMATED COUNT: 21.5 % (ref 10–15)
HCT VFR BLD AUTO: 29.4 % (ref 40–53)
HGB BLD-MCNC: 9.4 G/DL (ref 13.3–17.7)
INR PPP: 1.05 (ref 0.86–1.14)
MCH RBC QN AUTO: 26 PG (ref 26.5–33)
MCHC RBC AUTO-ENTMCNC: 32 G/DL (ref 31.5–36.5)
MCV RBC AUTO: 81 FL (ref 78–100)
PLATELET # BLD AUTO: 521 10E9/L (ref 150–450)
RBC # BLD AUTO: 3.62 10E12/L (ref 4.4–5.9)
WBC # BLD AUTO: 8.5 10E9/L (ref 4–11)

## 2017-11-03 PROCEDURE — 25000128 H RX IP 250 OP 636: Performed by: RADIOLOGY

## 2017-11-03 PROCEDURE — 27210820 ZZH WOUND GLUE CR3

## 2017-11-03 PROCEDURE — 27210742 ZZH CATH CR1

## 2017-11-03 PROCEDURE — 85027 COMPLETE CBC AUTOMATED: CPT | Performed by: OBSTETRICS & GYNECOLOGY

## 2017-11-03 PROCEDURE — C1769 GUIDE WIRE: HCPCS

## 2017-11-03 PROCEDURE — 36561 INSERT TUNNELED CV CATH: CPT

## 2017-11-03 PROCEDURE — 85610 PROTHROMBIN TIME: CPT | Performed by: OBSTETRICS & GYNECOLOGY

## 2017-11-03 PROCEDURE — C1788 PORT, INDWELLING, IMP: HCPCS

## 2017-11-03 PROCEDURE — 27210904 ZZH KIT CR6

## 2017-11-03 PROCEDURE — 99152 MOD SED SAME PHYS/QHP 5/>YRS: CPT

## 2017-11-03 PROCEDURE — 76937 US GUIDE VASCULAR ACCESS: CPT

## 2017-11-03 PROCEDURE — 99153 MOD SED SAME PHYS/QHP EA: CPT

## 2017-11-03 PROCEDURE — 27210908 ZZH NEEDLE CR4

## 2017-11-03 PROCEDURE — 25000128 H RX IP 250 OP 636

## 2017-11-03 PROCEDURE — 77001 FLUOROGUIDE FOR VEIN DEVICE: CPT

## 2017-11-03 RX ORDER — HEPARIN SODIUM (PORCINE) LOCK FLUSH IV SOLN 100 UNIT/ML 100 UNIT/ML
10 SOLUTION INTRAVENOUS
Status: CANCELLED | OUTPATIENT
Start: 2017-11-03

## 2017-11-03 RX ORDER — HEPARIN SODIUM 1000 [USP'U]/ML
INJECTION, SOLUTION INTRAVENOUS; SUBCUTANEOUS
Status: DISCONTINUED
Start: 2017-11-03 | End: 2017-11-03 | Stop reason: HOSPADM

## 2017-11-03 RX ORDER — CEFAZOLIN SODIUM 2 G/100ML
INJECTION, SOLUTION INTRAVENOUS
Status: DISCONTINUED
Start: 2017-11-03 | End: 2017-11-03 | Stop reason: HOSPADM

## 2017-11-03 RX ORDER — FLUMAZENIL 0.1 MG/ML
0.2 INJECTION, SOLUTION INTRAVENOUS
Status: DISCONTINUED | OUTPATIENT
Start: 2017-11-03 | End: 2017-11-03 | Stop reason: HOSPADM

## 2017-11-03 RX ORDER — FENTANYL CITRATE 50 UG/ML
INJECTION, SOLUTION INTRAMUSCULAR; INTRAVENOUS
Status: COMPLETED
Start: 2017-11-03 | End: 2017-11-03

## 2017-11-03 RX ORDER — HEPARIN SODIUM,PORCINE 10 UNIT/ML
10 VIAL (ML) INTRAVENOUS EVERY 24 HOURS
Status: CANCELLED | OUTPATIENT
Start: 2017-11-03

## 2017-11-03 RX ORDER — CEFAZOLIN SODIUM 2 G/100ML
2 INJECTION, SOLUTION INTRAVENOUS
Status: COMPLETED | OUTPATIENT
Start: 2017-11-03 | End: 2017-11-03

## 2017-11-03 RX ORDER — FENTANYL CITRATE 50 UG/ML
25-50 INJECTION, SOLUTION INTRAMUSCULAR; INTRAVENOUS EVERY 5 MIN PRN
Status: DISCONTINUED | OUTPATIENT
Start: 2017-11-03 | End: 2017-11-03 | Stop reason: HOSPADM

## 2017-11-03 RX ORDER — OXYCODONE AND ACETAMINOPHEN 5; 325 MG/1; MG/1
2 TABLET ORAL
Status: CANCELLED | OUTPATIENT
Start: 2017-11-03

## 2017-11-03 RX ORDER — NALOXONE HYDROCHLORIDE 0.4 MG/ML
.1-.4 INJECTION, SOLUTION INTRAMUSCULAR; INTRAVENOUS; SUBCUTANEOUS
Status: DISCONTINUED | OUTPATIENT
Start: 2017-11-03 | End: 2017-11-03 | Stop reason: HOSPADM

## 2017-11-03 RX ORDER — LIDOCAINE HYDROCHLORIDE 10 MG/ML
1-30 INJECTION, SOLUTION EPIDURAL; INFILTRATION; INTRACAUDAL; PERINEURAL
Status: DISCONTINUED | OUTPATIENT
Start: 2017-11-03 | End: 2017-11-03 | Stop reason: HOSPADM

## 2017-11-03 RX ORDER — LIDOCAINE 40 MG/G
CREAM TOPICAL
Status: DISCONTINUED | OUTPATIENT
Start: 2017-11-03 | End: 2017-11-03 | Stop reason: HOSPADM

## 2017-11-03 RX ADMIN — MIDAZOLAM 2 MG: 1 INJECTION INTRAMUSCULAR; INTRAVENOUS at 09:46

## 2017-11-03 RX ADMIN — MIDAZOLAM 3 MG: 1 INJECTION INTRAMUSCULAR; INTRAVENOUS at 09:38

## 2017-11-03 RX ADMIN — CEFAZOLIN SODIUM 2 G: 2 INJECTION, SOLUTION INTRAVENOUS at 09:28

## 2017-11-03 RX ADMIN — MIDAZOLAM 1 MG: 1 INJECTION INTRAMUSCULAR; INTRAVENOUS at 09:40

## 2017-11-03 RX ADMIN — FENTANYL CITRATE 100 MCG: 50 INJECTION INTRAMUSCULAR; INTRAVENOUS at 09:39

## 2017-11-03 NOTE — IP AVS SNAPSHOT
[facility]    [address]    [facility] [address]    Phone:  [phone]                                       After Visit Summary   11/3/2017    Dashawn Ordoñez    MRN: [id number]           After Visit Summary Signature Page     I have received my discharge instructions, and my questions have been answered. I have discussed any challenges I see with this plan with the nurse or doctor.    ..........................................................................................................................................  Patient/Patient Representative Signature      ..........................................................................................................................................  Patient Representative Print Name and Relationship to Patient    ..................................................               ................................................  Date                                            Time    ..........................................................................................................................................  Reviewed by Signature/Title    ...................................................              ..............................................  Date                                                            Time

## 2017-11-03 NOTE — IP AVS SNAPSHOT
MRN:[id number]                      After Visit Summary   11/3/2017    Dashawn Ordoñez    MRN: [id number]           Visit Information        Department      11/3/2017  8:21 AM [facility] Cath Lab          Review of your medicines      UNREVIEWED medicines. Ask your doctor about these medicines        Dose / Directions    acetaminophen 325 MG tablet   Commonly known as:  TYLENOL   Used for:  Gastrointestinal hemorrhage associated with gastric ulcer        Dose:  650 mg   Take 2 tablets (650 mg) by mouth every 6 hours as needed for mild pain   Quantity:  100 tablet   Refills:  0       omeprazole 40 MG capsule   Commonly known as:  priLOSEC   Used for:  Acute upper gastrointestinal bleeding        Dose:  40 mg   Take 1 capsule (40 mg) by mouth 2 times daily (before meals)   Quantity:  30 capsule   Refills:  1                Protect others around you: Learn how to safely use, store and throw away your medicines at www.disposemymeds.org.         Follow-ups after your visit         Care Instructions        Further instructions from your care team         Going Home after Your Port Is Inserted  _______________________________________    Patient Name: Dashawn Ordoñez  Today's Date: November 3, 2017    The doctor who inserted your port was:  Dr. Chacon at [facility]     Have your port site and/or neck wound checked on:  First chemo day         Your port may be accessed right away. A nurse needs to flush your port every 30 days or after each use.     When you get home:    You should have an adult with you for the first 6 hours.    No driving or drinking alcohol until tomorrow. You may still have side effects from the medicine you received today (you may feel drowsy, unsteady or forgetful).     You may go back to your regular diet today.     If you take aspirin or Plavix, you may begin taking it again tomorrow. You may restart all other medicines today. Use pain medicine as directed.    Avoid heavy  "lifting or the overuse of your shoulder for three days.    Caring for the port site and/or neck wound:    Keep the port site clean and dry. Cover the site with plastic before taking a shower. Do this until the site has healed.     Keep the bandage on your port site for three days. Change it if it gets wet or dirty. After three days, you may use Band-Aids until the wound has healed.    For a neck wound, leave the tape on for three days. You may cover it with a Band-Aid for comfort.     If you have oozing or bleeding from the port site or from the cut in your neck:     Put direct pressure on the wound for 5 to 10 minutes with a gauze pad.  If you still have bleeding after 10 minutes, call your doctor.    If you are bleeding a lot and can't control it with direct pressure, call 911.    Call your doctor at  if you have:    Bleeding from a wound after 10 minutes of direct pressure.    Swelling in your neck or over your port site.    Signs of infection: a fever over 100 F (37.8 C) under the tongue; the site is red, tender or draining.       Additional Information About Your Visit        [employer] acquired by [employer] Information     [employer] lets you send messages to your doctor, view your test results, renew your prescriptions, schedule appointments and more. To sign up, go to [email or web address] . Click on \"Log in\" on the left side of the screen, which will take you to the Welcome page. Then click on \"Sign up Now\" on the right side of the page.     You will be asked to enter the access code listed below, as well as some personal information. Please follow the directions to create your username and password.     Your access code is: [email or web address]  Expires: 2018 11:04 AM     Your access code will  in 90 days. If you need help or a new code, please call your [facility] clinic or [phone].        Care EveryWhere ID     This is your Care EveryWhere ID. This could be used by other organizations to access your [facility] medical " records  [id number]        Your Vitals Were     Blood Pressure Pulse Temperature Respirations Pulse Oximetry       123/91 85 99.4  F (37.4  C) (Temporal) 18 97%        Primary Care Provider Office Phone # Fax #    [facility] [phone]      Equal Access to Services     MARIJAMARKUS BANUELOSKENDRA : Hadii aad ku hadasho Soomaali, waaxda luqadaha, qaybta kaalmada adeegyada, waxjanice rossyin hayaamarcos hernándezfermin eller laSulaimanleeann simon. So [facility] [phone].    ATENCIÓN: Si habla español, tiene a sanz disposición servicios gratuitos de asistencia lingüística. Llame al [phone].    We comply with applicable federal civil rights laws and [place] laws. We do not discriminate on the basis of race, color, national origin, age, disability, sex, sexual orientation, or gender identity.            Thank you!     Thank you for choosing [facility] for your care. Our goal is always to provide you with excellent care. Hearing back from our patients is one way we can continue to improve our services. Please take a few minutes to complete the written survey that you may receive in the mail after you visit. If you would like to speak to someone directly about your visit please contact Patient Relations at [phone]. Thank you!               Medication List: This is a list of all your medications and when to take them. Check marks below indicate your daily home schedule. Keep this list as a reference.      Medications           Morning Afternoon Evening Bedtime As Needed    acetaminophen 325 MG tablet   Commonly known as:  TYLENOL   Take 2 tablets (650 mg) by mouth every 6 hours as needed for mild pain                                omeprazole 40 MG capsule   Commonly known as:  priLOSEC   Take 1 capsule (40 mg) by mouth 2 times daily (before meals)

## 2017-11-03 NOTE — DISCHARGE INSTRUCTIONS
Going Home after Your Port Is Inserted  _______________________________________    Patient Name: Dashawn Ordoñez  Today's Date: November 3, 2017    The doctor who inserted your port was:  Dr. Chacon at [facility]     Have your port site and/or neck wound checked on:  First chemo day         Your port may be accessed right away. A nurse needs to flush your port every 30 days or after each use.     When you get home:    You should have an adult with you for the first 6 hours.    No driving or drinking alcohol until tomorrow. You may still have side effects from the medicine you received today (you may feel drowsy, unsteady or forgetful).     You may go back to your regular diet today.     If you take aspirin or Plavix, you may begin taking it again tomorrow. You may restart all other medicines today. Use pain medicine as directed.    Avoid heavy lifting or the overuse of your shoulder for three days.    Caring for the port site and/or neck wound:    Keep the port site clean and dry. Cover the site with plastic before taking a shower. Do this until the site has healed.     Keep the bandage on your port site for three days. Change it if it gets wet or dirty. After three days, you may use Band-Aids until the wound has healed.    For a neck wound, leave the tape on for three days. You may cover it with a Band-Aid for comfort.     If you have oozing or bleeding from the port site or from the cut in your neck:     Put direct pressure on the wound for 5 to 10 minutes with a gauze pad.  If you still have bleeding after 10 minutes, call your doctor.    If you are bleeding a lot and can't control it with direct pressure, call 911.    Call your doctor at  if you have:    Bleeding from a wound after 10 minutes of direct pressure.    Swelling in your neck or over your port site.    Signs of infection: a fever over 100 F (37.8 C) under the tongue; the site is red, tender or draining.

## 2017-11-03 NOTE — PROCEDURES
RADIOLOGY PROCEDURE NOTE  Patient name: Dashawn Ordoñez  MRN: [id number]  : 1960    Pre-procedure diagnosis: stomach cancer  Post-procedure diagnosis: Same    Procedure Date/Time: November 3, 2017  10:05 AM  Procedure: port placment  Estimated blood loss: None  Specimen(s) collected with description: none  The patient tolerated the procedure well with no immediate complications.  Significant findings:none    See imaging dictation for procedural details.    Provider name: Isaias Chacon  Assistant(s):None

## 2017-11-03 NOTE — PROGRESS NOTES
Pt alert and oriented, vitals stable, tolerated procedure. Dc instructions reviewed with pt and wife.  All questions answered

## 2017-11-09 ENCOUNTER — HOSPITAL ENCOUNTER (INPATIENT)
Facility: CLINIC | Age: 57
LOS: 3 days | Discharge: HOME | End: 2017-11-12
Attending: INTERNAL MEDICINE | Admitting: INTERNAL MEDICINE
Payer: MEDICAID

## 2017-11-09 ENCOUNTER — HOSPITAL ENCOUNTER (EMERGENCY)
Facility: CLINIC | Age: 57
Discharge: ANOTHER HOSPITAL | End: 2017-11-09
Attending: EMERGENCY MEDICINE | Admitting: EMERGENCY MEDICINE
Payer: MEDICAID

## 2017-11-09 ENCOUNTER — APPOINTMENT (OUTPATIENT)
Dept: CT IMAGING | Facility: CLINIC | Age: 57
End: 2017-11-09
Attending: EMERGENCY MEDICINE
Payer: MEDICAID

## 2017-11-09 VITALS
HEART RATE: 92 BPM | DIASTOLIC BLOOD PRESSURE: 71 MMHG | RESPIRATION RATE: 11 BRPM | TEMPERATURE: 97.9 F | OXYGEN SATURATION: 98 % | BODY MASS INDEX: 23.68 KG/M2 | SYSTOLIC BLOOD PRESSURE: 119 MMHG | WEIGHT: 165 LBS

## 2017-11-09 DIAGNOSIS — G47.9 SLEEP DISORDER: Primary | ICD-10-CM

## 2017-11-09 DIAGNOSIS — F17.200 TOBACCO USE DISORDER: ICD-10-CM

## 2017-11-09 DIAGNOSIS — K92.1 GASTROINTESTINAL HEMORRHAGE WITH MELENA: ICD-10-CM

## 2017-11-09 DIAGNOSIS — D62 ANEMIA DUE TO BLOOD LOSS, ACUTE: ICD-10-CM

## 2017-11-09 DIAGNOSIS — K92.2 ACUTE UPPER GASTROINTESTINAL BLEEDING: ICD-10-CM

## 2017-11-09 LAB
ABO + RH BLD: NORMAL
ABO + RH BLD: NORMAL
ALBUMIN SERPL-MCNC: 2.8 G/DL (ref 3.4–5)
ALBUMIN SERPL-MCNC: 2.9 G/DL (ref 3.4–5)
ALBUMIN UR-MCNC: NEGATIVE MG/DL
ALP SERPL-CCNC: 55 U/L (ref 40–150)
ALP SERPL-CCNC: 62 U/L (ref 40–150)
ALT SERPL W P-5'-P-CCNC: 10 U/L (ref 0–70)
ALT SERPL W P-5'-P-CCNC: 10 U/L (ref 0–70)
ANION GAP SERPL CALCULATED.3IONS-SCNC: 6 MMOL/L (ref 3–14)
ANION GAP SERPL CALCULATED.3IONS-SCNC: 9 MMOL/L (ref 3–14)
APPEARANCE UR: CLEAR
AST SERPL W P-5'-P-CCNC: 6 U/L (ref 0–45)
AST SERPL W P-5'-P-CCNC: 9 U/L (ref 0–45)
BASOPHILS # BLD AUTO: 0 10E9/L (ref 0–0.2)
BASOPHILS NFR BLD AUTO: 0.1 %
BILIRUB SERPL-MCNC: 0.2 MG/DL (ref 0.2–1.3)
BILIRUB SERPL-MCNC: 0.7 MG/DL (ref 0.2–1.3)
BILIRUB UR QL STRIP: NEGATIVE
BLD GP AB SCN SERPL QL: NORMAL
BLD PROD TYP BPU: NORMAL
BLD UNIT ID BPU: 0
BLD UNIT ID BPU: 0
BLOOD BANK CMNT PATIENT-IMP: NORMAL
BLOOD PRODUCT CODE: NORMAL
BLOOD PRODUCT CODE: NORMAL
BPU ID: NORMAL
BPU ID: NORMAL
BUN SERPL-MCNC: 31 MG/DL (ref 7–30)
BUN SERPL-MCNC: 47 MG/DL (ref 7–30)
CALCIUM SERPL-MCNC: 8.2 MG/DL (ref 8.5–10.1)
CALCIUM SERPL-MCNC: 8.4 MG/DL (ref 8.5–10.1)
CHLORIDE SERPL-SCNC: 110 MMOL/L (ref 94–109)
CHLORIDE SERPL-SCNC: 113 MMOL/L (ref 94–109)
CO2 SERPL-SCNC: 21 MMOL/L (ref 20–32)
CO2 SERPL-SCNC: 22 MMOL/L (ref 20–32)
COLOR UR AUTO: ABNORMAL
CREAT SERPL-MCNC: 0.69 MG/DL (ref 0.66–1.25)
CREAT SERPL-MCNC: 0.7 MG/DL (ref 0.66–1.25)
DIFFERENTIAL METHOD BLD: ABNORMAL
EOSINOPHIL # BLD AUTO: 0 10E9/L (ref 0–0.7)
EOSINOPHIL NFR BLD AUTO: 0.1 %
ERYTHROCYTE [DISTWIDTH] IN BLOOD BY AUTOMATED COUNT: 20.9 % (ref 10–15)
ERYTHROCYTE [DISTWIDTH] IN BLOOD BY AUTOMATED COUNT: 23.5 % (ref 10–15)
ETHANOL SERPL-MCNC: <0.01 G/DL
GFR SERPL CREATININE-BSD FRML MDRD: >90 ML/MIN/1.7M2
GFR SERPL CREATININE-BSD FRML MDRD: >90 ML/MIN/1.7M2
GLUCOSE SERPL-MCNC: 169 MG/DL (ref 70–99)
GLUCOSE SERPL-MCNC: 245 MG/DL (ref 70–99)
GLUCOSE UR STRIP-MCNC: >499 MG/DL
HCT VFR BLD AUTO: 18.5 % (ref 40–53)
HCT VFR BLD AUTO: 24.2 % (ref 40–53)
HGB BLD-MCNC: 5.6 G/DL (ref 13.3–17.7)
HGB BLD-MCNC: 7.7 G/DL (ref 13.3–17.7)
HGB UR QL STRIP: NEGATIVE
IMM GRANULOCYTES # BLD: 0 10E9/L (ref 0–0.4)
IMM GRANULOCYTES NFR BLD: 0.3 %
INR PPP: 1.12 (ref 0.86–1.14)
INR PPP: 1.2 (ref 0.86–1.14)
KETONES UR STRIP-MCNC: NEGATIVE MG/DL
LEUKOCYTE ESTERASE UR QL STRIP: NEGATIVE
LIPASE SERPL-CCNC: 155 U/L (ref 73–393)
LYMPHOCYTES # BLD AUTO: 1 10E9/L (ref 0.8–5.3)
LYMPHOCYTES NFR BLD AUTO: 11.7 %
MCH RBC QN AUTO: 24.6 PG (ref 26.5–33)
MCH RBC QN AUTO: 26.6 PG (ref 26.5–33)
MCHC RBC AUTO-ENTMCNC: 30.3 G/DL (ref 31.5–36.5)
MCHC RBC AUTO-ENTMCNC: 31.8 G/DL (ref 31.5–36.5)
MCV RBC AUTO: 81 FL (ref 78–100)
MCV RBC AUTO: 84 FL (ref 78–100)
MONOCYTES # BLD AUTO: 0.2 10E9/L (ref 0–1.3)
MONOCYTES NFR BLD AUTO: 2.5 %
NEUTROPHILS # BLD AUTO: 7.4 10E9/L (ref 1.6–8.3)
NEUTROPHILS NFR BLD AUTO: 85.3 %
NITRATE UR QL: NEGATIVE
NRBC # BLD AUTO: 0 10*3/UL
NRBC BLD AUTO-RTO: 0 /100
NUM BPU REQUESTED: 2
PH UR STRIP: 5 PH (ref 5–7)
PLATELET # BLD AUTO: 226 10E9/L (ref 150–450)
PLATELET # BLD AUTO: 300 10E9/L (ref 150–450)
POTASSIUM SERPL-SCNC: 4.3 MMOL/L (ref 3.4–5.3)
POTASSIUM SERPL-SCNC: 4.6 MMOL/L (ref 3.4–5.3)
PROT SERPL-MCNC: 5.5 G/DL (ref 6.8–8.8)
PROT SERPL-MCNC: 5.5 G/DL (ref 6.8–8.8)
RBC # BLD AUTO: 2.28 10E12/L (ref 4.4–5.9)
RBC # BLD AUTO: 2.89 10E12/L (ref 4.4–5.9)
SODIUM SERPL-SCNC: 140 MMOL/L (ref 133–144)
SODIUM SERPL-SCNC: 141 MMOL/L (ref 133–144)
SOURCE: ABNORMAL
SP GR UR STRIP: 1.03 (ref 1–1.03)
SPECIMEN EXP DATE BLD: NORMAL
TRANSFUSION STATUS PATIENT QL: NORMAL
UROBILINOGEN UR STRIP-MCNC: 0 MG/DL (ref 0–2)
WBC # BLD AUTO: 7.3 10E9/L (ref 4–11)
WBC # BLD AUTO: 8.7 10E9/L (ref 4–11)

## 2017-11-09 PROCEDURE — 96375 TX/PRO/DX INJ NEW DRUG ADDON: CPT

## 2017-11-09 PROCEDURE — 96374 THER/PROPH/DIAG INJ IV PUSH: CPT | Mod: 59

## 2017-11-09 PROCEDURE — 25000128 H RX IP 250 OP 636: Performed by: EMERGENCY MEDICINE

## 2017-11-09 PROCEDURE — 74177 CT ABD & PELVIS W/CONTRAST: CPT

## 2017-11-09 PROCEDURE — 80053 COMPREHEN METABOLIC PANEL: CPT | Performed by: STUDENT IN AN ORGANIZED HEALTH CARE EDUCATION/TRAINING PROGRAM

## 2017-11-09 PROCEDURE — 99221 1ST HOSP IP/OBS SF/LOW 40: CPT | Mod: GC | Performed by: INTERNAL MEDICINE

## 2017-11-09 PROCEDURE — 86900 BLOOD TYPING SEROLOGIC ABO: CPT | Performed by: EMERGENCY MEDICINE

## 2017-11-09 PROCEDURE — S0028 INJECTION, FAMOTIDINE, 20 MG: HCPCS | Performed by: STUDENT IN AN ORGANIZED HEALTH CARE EDUCATION/TRAINING PROGRAM

## 2017-11-09 PROCEDURE — 87641 MR-STAPH DNA AMP PROBE: CPT | Performed by: STUDENT IN AN ORGANIZED HEALTH CARE EDUCATION/TRAINING PROGRAM

## 2017-11-09 PROCEDURE — 86850 RBC ANTIBODY SCREEN: CPT | Performed by: STUDENT IN AN ORGANIZED HEALTH CARE EDUCATION/TRAINING PROGRAM

## 2017-11-09 PROCEDURE — 86923 COMPATIBILITY TEST ELECTRIC: CPT | Performed by: EMERGENCY MEDICINE

## 2017-11-09 PROCEDURE — 85610 PROTHROMBIN TIME: CPT | Performed by: STUDENT IN AN ORGANIZED HEALTH CARE EDUCATION/TRAINING PROGRAM

## 2017-11-09 PROCEDURE — 86923 COMPATIBILITY TEST ELECTRIC: CPT | Performed by: STUDENT IN AN ORGANIZED HEALTH CARE EDUCATION/TRAINING PROGRAM

## 2017-11-09 PROCEDURE — 87640 STAPH A DNA AMP PROBE: CPT | Performed by: STUDENT IN AN ORGANIZED HEALTH CARE EDUCATION/TRAINING PROGRAM

## 2017-11-09 PROCEDURE — 40000556 ZZH STATISTIC PERIPHERAL IV START W US GUIDANCE

## 2017-11-09 PROCEDURE — 85025 COMPLETE CBC W/AUTO DIFF WBC: CPT | Performed by: EMERGENCY MEDICINE

## 2017-11-09 PROCEDURE — 80320 DRUG SCREEN QUANTALCOHOLS: CPT | Performed by: EMERGENCY MEDICINE

## 2017-11-09 PROCEDURE — 81003 URINALYSIS AUTO W/O SCOPE: CPT | Performed by: EMERGENCY MEDICINE

## 2017-11-09 PROCEDURE — 80053 COMPREHEN METABOLIC PANEL: CPT | Performed by: EMERGENCY MEDICINE

## 2017-11-09 PROCEDURE — 86901 BLOOD TYPING SEROLOGIC RH(D): CPT | Performed by: STUDENT IN AN ORGANIZED HEALTH CARE EDUCATION/TRAINING PROGRAM

## 2017-11-09 PROCEDURE — 25000125 ZZHC RX 250: Performed by: STUDENT IN AN ORGANIZED HEALTH CARE EDUCATION/TRAINING PROGRAM

## 2017-11-09 PROCEDURE — 20000004 ZZH R&B ICU UMMC

## 2017-11-09 PROCEDURE — 96376 TX/PRO/DX INJ SAME DRUG ADON: CPT

## 2017-11-09 PROCEDURE — 93005 ELECTROCARDIOGRAM TRACING: CPT

## 2017-11-09 PROCEDURE — 86901 BLOOD TYPING SEROLOGIC RH(D): CPT | Performed by: EMERGENCY MEDICINE

## 2017-11-09 PROCEDURE — 99285 EMERGENCY DEPT VISIT HI MDM: CPT | Mod: 25

## 2017-11-09 PROCEDURE — 36430 TRANSFUSION BLD/BLD COMPNT: CPT

## 2017-11-09 PROCEDURE — 85027 COMPLETE CBC AUTOMATED: CPT | Performed by: STUDENT IN AN ORGANIZED HEALTH CARE EDUCATION/TRAINING PROGRAM

## 2017-11-09 PROCEDURE — P9016 RBC LEUKOCYTES REDUCED: HCPCS | Performed by: EMERGENCY MEDICINE

## 2017-11-09 PROCEDURE — 96361 HYDRATE IV INFUSION ADD-ON: CPT

## 2017-11-09 PROCEDURE — 86900 BLOOD TYPING SEROLOGIC ABO: CPT | Performed by: STUDENT IN AN ORGANIZED HEALTH CARE EDUCATION/TRAINING PROGRAM

## 2017-11-09 PROCEDURE — 83690 ASSAY OF LIPASE: CPT | Performed by: EMERGENCY MEDICINE

## 2017-11-09 PROCEDURE — 85610 PROTHROMBIN TIME: CPT | Performed by: EMERGENCY MEDICINE

## 2017-11-09 PROCEDURE — 86850 RBC ANTIBODY SCREEN: CPT | Performed by: EMERGENCY MEDICINE

## 2017-11-09 PROCEDURE — 25000128 H RX IP 250 OP 636: Performed by: STUDENT IN AN ORGANIZED HEALTH CARE EDUCATION/TRAINING PROGRAM

## 2017-11-09 RX ORDER — FENTANYL CITRATE 50 UG/ML
10-20 INJECTION, SOLUTION INTRAMUSCULAR; INTRAVENOUS
Status: DISCONTINUED | OUTPATIENT
Start: 2017-11-09 | End: 2017-11-10

## 2017-11-09 RX ORDER — ONDANSETRON 2 MG/ML
4 INJECTION INTRAMUSCULAR; INTRAVENOUS EVERY 6 HOURS PRN
Status: DISCONTINUED | OUTPATIENT
Start: 2017-11-09 | End: 2017-11-10

## 2017-11-09 RX ORDER — NALOXONE HYDROCHLORIDE 0.4 MG/ML
.1-.4 INJECTION, SOLUTION INTRAMUSCULAR; INTRAVENOUS; SUBCUTANEOUS
Status: DISCONTINUED | OUTPATIENT
Start: 2017-11-09 | End: 2017-11-12 | Stop reason: HOSPADM

## 2017-11-09 RX ORDER — IOPAMIDOL 755 MG/ML
500 INJECTION, SOLUTION INTRAVASCULAR ONCE
Status: COMPLETED | OUTPATIENT
Start: 2017-11-09 | End: 2017-11-09

## 2017-11-09 RX ORDER — NICOTINE 21 MG/24HR
1 PATCH, TRANSDERMAL 24 HOURS TRANSDERMAL DAILY
Status: DISCONTINUED | OUTPATIENT
Start: 2017-11-09 | End: 2017-11-12 | Stop reason: HOSPADM

## 2017-11-09 RX ORDER — SODIUM CHLORIDE 9 MG/ML
1000 INJECTION, SOLUTION INTRAVENOUS CONTINUOUS
Status: DISCONTINUED | OUTPATIENT
Start: 2017-11-09 | End: 2017-11-09 | Stop reason: HOSPADM

## 2017-11-09 RX ORDER — NICOTINE POLACRILEX 4 MG
15-30 LOZENGE BUCCAL
Status: DISCONTINUED | OUTPATIENT
Start: 2017-11-09 | End: 2017-11-12 | Stop reason: HOSPADM

## 2017-11-09 RX ORDER — ONDANSETRON 4 MG/1
4 TABLET, ORALLY DISINTEGRATING ORAL EVERY 6 HOURS PRN
Status: DISCONTINUED | OUTPATIENT
Start: 2017-11-09 | End: 2017-11-10

## 2017-11-09 RX ORDER — ONDANSETRON 2 MG/ML
4 INJECTION INTRAMUSCULAR; INTRAVENOUS EVERY 30 MIN PRN
Status: DISCONTINUED | OUTPATIENT
Start: 2017-11-09 | End: 2017-11-09 | Stop reason: HOSPADM

## 2017-11-09 RX ORDER — NALOXONE HYDROCHLORIDE 0.4 MG/ML
.1-.4 INJECTION, SOLUTION INTRAMUSCULAR; INTRAVENOUS; SUBCUTANEOUS
Status: CANCELLED | OUTPATIENT
Start: 2017-11-09

## 2017-11-09 RX ORDER — ONDANSETRON 4 MG/1
4 TABLET, ORALLY DISINTEGRATING ORAL EVERY 6 HOURS PRN
Status: CANCELLED | OUTPATIENT
Start: 2017-11-09

## 2017-11-09 RX ORDER — SODIUM CHLORIDE 9 MG/ML
INJECTION, SOLUTION INTRAVENOUS CONTINUOUS
Status: DISCONTINUED | OUTPATIENT
Start: 2017-11-09 | End: 2017-11-11

## 2017-11-09 RX ORDER — HYDROMORPHONE HYDROCHLORIDE 1 MG/ML
0.5 INJECTION, SOLUTION INTRAMUSCULAR; INTRAVENOUS; SUBCUTANEOUS
Status: COMPLETED | OUTPATIENT
Start: 2017-11-09 | End: 2017-11-09

## 2017-11-09 RX ORDER — DEXTROSE MONOHYDRATE 25 G/50ML
25-50 INJECTION, SOLUTION INTRAVENOUS
Status: DISCONTINUED | OUTPATIENT
Start: 2017-11-09 | End: 2017-11-12 | Stop reason: HOSPADM

## 2017-11-09 RX ORDER — ONDANSETRON 2 MG/ML
4 INJECTION INTRAMUSCULAR; INTRAVENOUS EVERY 6 HOURS PRN
Status: CANCELLED | OUTPATIENT
Start: 2017-11-09

## 2017-11-09 RX ADMIN — HYDROMORPHONE HYDROCHLORIDE 0.5 MG: 1 INJECTION, SOLUTION INTRAMUSCULAR; INTRAVENOUS; SUBCUTANEOUS at 16:22

## 2017-11-09 RX ADMIN — HYDROMORPHONE HYDROCHLORIDE 0.5 MG: 1 INJECTION, SOLUTION INTRAMUSCULAR; INTRAVENOUS; SUBCUTANEOUS at 15:07

## 2017-11-09 RX ADMIN — HYDROMORPHONE HYDROCHLORIDE 0.5 MG: 1 INJECTION, SOLUTION INTRAMUSCULAR; INTRAVENOUS; SUBCUTANEOUS at 17:29

## 2017-11-09 RX ADMIN — SODIUM CHLORIDE: 9 INJECTION, SOLUTION INTRAVENOUS at 23:46

## 2017-11-09 RX ADMIN — FAMOTIDINE 20 MG: 10 INJECTION, SOLUTION INTRAVENOUS at 23:46

## 2017-11-09 RX ADMIN — SODIUM CHLORIDE 1000 ML: 9 INJECTION, SOLUTION INTRAVENOUS at 15:07

## 2017-11-09 RX ADMIN — HYDROMORPHONE HYDROCHLORIDE 1 MG: 1 INJECTION, SOLUTION INTRAMUSCULAR; INTRAVENOUS; SUBCUTANEOUS at 20:07

## 2017-11-09 RX ADMIN — IOPAMIDOL 83 ML: 755 INJECTION, SOLUTION INTRAVENOUS at 15:42

## 2017-11-09 RX ADMIN — HYDROMORPHONE HYDROCHLORIDE 0.5 MG: 1 INJECTION, SOLUTION INTRAMUSCULAR; INTRAVENOUS; SUBCUTANEOUS at 18:36

## 2017-11-09 RX ADMIN — ONDANSETRON 4 MG: 2 INJECTION INTRAMUSCULAR; INTRAVENOUS at 15:07

## 2017-11-09 RX ADMIN — SODIUM CHLORIDE 61 ML: 9 INJECTION, SOLUTION INTRAVENOUS at 15:44

## 2017-11-09 ASSESSMENT — ENCOUNTER SYMPTOMS
ABDOMINAL PAIN: 1
NAUSEA: 1
DYSURIA: 0
WEAKNESS: 1
FEVER: 0
BACK PAIN: 1
BLOOD IN STOOL: 1
VOMITING: 0
HEMATURIA: 0

## 2017-11-09 ASSESSMENT — ACTIVITIES OF DAILY LIVING (ADL)
AMBULATION: 0-->INDEPENDENT
RETIRED_EATING: 0-->INDEPENDENT
COGNITION: 0 - NO COGNITION ISSUES REPORTED
RETIRED_COMMUNICATION: 0-->UNDERSTANDS/COMMUNICATES WITHOUT DIFFICULTY
NUMBER_OF_TIMES_PATIENT_HAS_FALLEN_WITHIN_LAST_SIX_MONTHS: 1
TOILETING: 0-->INDEPENDENT
FALL_HISTORY_WITHIN_LAST_SIX_MONTHS: YES
BATHING: 0-->INDEPENDENT
DRESS: 0-->INDEPENDENT
WHICH_OF_THE_ABOVE_FUNCTIONAL_RISKS_HAD_A_RECENT_ONSET_OR_CHANGE?: FALL HISTORY
TRANSFERRING: 0-->INDEPENDENT
SWALLOWING: 0-->SWALLOWS FOODS/LIQUIDS WITHOUT DIFFICULTY

## 2017-11-09 ASSESSMENT — PAIN DESCRIPTION - DESCRIPTORS: DESCRIPTORS: ACHING;CRAMPING

## 2017-11-09 NOTE — H&P
MICU Admission  History &Physical  Dashawn Ordoñez MRN: [id number]  Age: 57 year old, : 1960  Date of Admission:(Not on file)  Primary care provider: [facility]        Chief Complaint:  Severe anemia in setting of newly diagnosed stage IV gastric adenocarcinoma, poorly differentiated     Assessment and Plan  Dashawn Ordoñez is a 56 yo male PMHx significant for newly diagnosed stage IV gastric adenocarcinoma, poorly differentiated, well controlled T2DM, HTN, polysubstance abuse, and anxiety presenting as a transfer from [facility] for ongoing persistent severe anemia due to GI bleed.    Neuro  No active issues.  #Hx of alcohol abuse  No hx of DT.  Most recent drink on Monday,  around noon per patient.  No signs of withdrawal on review of records at [facility] or on presentation.   - [facility] protocol ordered    Cardiovascular  12 lead EKG on 10/17/17 demonstrated sinus tachycardia, incomplete RBBB, and ST depression in V3-V5.    #Hx of hypertension  - holding home lisinopril given anemia    Respiratory   No active issues    Renal  Baseline Cr - 0.6-0.8  #Elevated BUN  Attributable to acute active GIB in setting of normal creatinine function.  - trending BMP QD    Gastrointestinal  #GIB 2/2 stage IV (T3, N3, M1) gastric adenocarcinoma, poorly differentiated  Recently diagnosed.  Ongoing bleed from ulcerated lesion in stomach.  No colonoscopy hx in our records.  Evaluated and managed by GI and IR.  CT of abdomen and pelvis demonstrates multiple lesions in liver with hemangioma in R lobe.  Additionally new 1 cm lucency in middle right lobe concerning for metastases.  Staged via liver biopsy;  already started chemotherapy receiving FOLFOX, actively discussing radiation options.  - consulted GI and oncology; appreciate recommendations  - IV PPI BID; no current need for IV PPI or octreotide gtt  - trending Hbg Q6H, daily INR  - NPO, VS, continuous pulse ox and telemetry, I&Os    Nutrition - NPO    Infectious  Disease  No active issues.    Heme/Onc  #Severe anemia  #GIB 2/2 stage IV (T3, N3, M1) gastric adenocarcinoma, poorly differentiated  - Oncology consulted; appreciated recommendations  - transfuse for Hbg < 7 and platelets < 50  - type and screen and trending Hbg Q6H  - consider involvement of radiation oncology pending patient's wishes and expected hospital course    Endocrine  #T2DM, well controlled  Most recent Hemoglobin 5.6 on 10/18/17.  On home metformin.   Glucoses fairly well controlled at [facility] with one uncontrolled reading.  - SSI with hypoglycemic protocol  - holding home metformin    Mental Health  #Anxiety  - holding home ativan PRN    PPX: IV famotidine BID  CODE: FULL  Family:  Lives with wife; patient requested no updates tonight as she is sleeping and would not like her disturbed.  Access: Portacath   Pain: IV fentanyl 10-20 mg Q2H PRN    Patient discussed with staff attending, Dr. Barajas, who agrees with the plan as stated above.  Please feel free to page with questions.    Joaquin Gonzalez MD  IM/PEDS, PGY3  pager [phone]          History of Present Illness  Dashawn Ordoñez is a 58 yo male PMHx significant for newly diagnosed stage IV gastric adenocarcinoma, poorly differentiated, well controlled T2DM, HTN, polysubstance abuse, and anxiety presenting as a transfer from [facility] for ongoing persistent severe anemia due to GI bleed.    History is obtained from patient and chart review    Prior presentation to [facility] 10/17, he had endorsed dizziness, weakness, and a syncopal episode at home; of note he reportedly had progressive worsening in his energy level over a couple months prior to presentation.  Also reported 100 lb weight loss in prior year however partially attributable due to altering diet per patient.   Additionally drinks about 1/2 quart of alcohol a day.  At OSH, he was found to have a hemoglobin of 3.5 with tarry stools. EGD and IR procedures resulted in identification of gastric  ulcerated mass without active bleeding that on biopsy confirmed to be gastric adenocarcinoma, poorly differenitiated.   Additional imaging has established a stage 4 (T3, N3, M1).  Given that he has had ongoing maroon stools, he received 9 units RBCs and 4 units FFP.   Second EGD 10/19 with adherent clot that not seen prior and transferred to [facility] for continued workup.  On presentation, the patient endorses nausea, headache, and parethesias; denies vision changes, sore throat, chest pain, SOB, and abdominal pain.  His last melenic stool around noon around 11/9.  No history of hematemesis.  Last alcoholic beverage on Monday; history of daily drinking 2 shots to 1/2 quart of liquor.  Continues to smoke daily.  No additional questions or concerns.    Of note, upper endoscopy in 10/17 demonstrated normal esophagus with non-bleeding gastric ulcer with adherent clot which was injected with epinephrine.  Additionally, had a gastric ulcer biopsy at this time demonstrating poorly differentiated adenocarcinoma; in situ hybridization negative for HER2 amplification.  He follows with Dr. Healy, oncology, at [facility].          Past Medical History  Past Medical History:   Diagnosis Date     Cancer (H)      Depressive disorder      Diabetes (H)      Hypertension      Stomach cancer (H)      Substance abuse              Past Surgical History  Past Surgical History:   Procedure Laterality Date     APPENDECTOMY       ENT SURGERY       ESOPHAGOSCOPY, GASTROSCOPY, DUODENOSCOPY (EGD), COMBINED N/A 10/18/2017    Procedure: COMBINED ESOPHAGOSCOPY, GASTROSCOPY, DUODENOSCOPY (EGD), BIOPSY SINGLE OR MULTIPLE;  ESOPHAGOSCOPY, GASTROSCOPY, DUODENOSCOPY (EGD) with biopsies using cold forceps;  Surgeon: Filippo Mclean MD;  Location:  GI              Social History  Social History   Substance Use Topics     Smoking status: Current Every Day Smoker     Packs/day: 2.00     Smokeless tobacco: Never Used     Alcohol use Yes       Comment: occasionally              Family History  No family history on file.  Family history reviewed          Allergies  No Known Allergies          Medications  Current Facility-Administered Medications   Medication     naloxone (NARCAN) injection 0.1-0.4 mg     ondansetron (ZOFRAN-ODT) ODT tab 4 mg    Or     ondansetron (ZOFRAN) injection 4 mg     famotidine (PEPCID) injection 20 mg     0.9% sodium chloride infusion     glucose 40 % gel 15-30 g    Or     dextrose 50 % injection 25-50 mL    Or     glucagon injection 1 mg     [START ON 11/10/2017] insulin aspart (NovoLOG) inj (RAPID ACTING)     naloxone (NARCAN) injection 0.1-0.4 mg     fentaNYL (PF) (SUBLIMAZE) injection 10-20 mcg     [START ON 11/10/2017] nicotine Patch in Place     [START ON 11/10/2017] nicotine patch REMOVAL     nicotine (NICODERM CQ) 21 MG/24HR 24 hr patch 1 patch             Vitals  Temp:  [97.9  F (36.6  C)-98.9  F (37.2  C)] 97.9  F (36.6  C)  Pulse:  [] 92  Heart Rate:  [] 82  Resp:  [11-20] 11  BP: (106-137)/(67-95) 119/71  SpO2:  [90 %-100 %] 98 %        Physical Exam  Gen: Resting comfortably in bed supine, making needs known.  Jovial with a great sense of humor.    HEENT:AT/ NC, PERRL b/l,  sclera anicteric.  Normal oropharynx with no sores or lesions.    PULM/THORAX: CTAB.  Normal rate and effort.    CV:RRR, S1 and S2 appreciated, no extra heart sounds, murmurs or rub auscultated.  Portacath in right upper chest, c/d/i.    ABD: Soft, nontender, nondistended. Normoactive bowel sounds x 4, no HSM appreciated.  Rectal: No nghia blood appreciated.  No sacral ulcers appreciated.  EXT: No pedal edema, clubbing or cyanosis. No asymmetrical edema or tenderness   SKIN: Capillary refill normal, distal extremities warm to the touch.      LINES: Portacath in left upper chest.  One large bore PIV; currently obtaining a second one.        ROUTINE ICU LABS (Last four results)    CMP    Recent Labs  Lab 11/09/17  2240 11/09/17  1448   NA  141 140   POTASSIUM 4.3 4.6   CHLORIDE 113* 110*   CO2 22 21   ANIONGAP 6 9   * 245*   BUN 31* 47*   CR 0.69 0.70   GFRESTIMATED >90 >90   GFRESTBLACK >90 >90   JULIO CESAR 8.2* 8.4*   PROTTOTAL 5.5* 5.5*   ALBUMIN 2.9* 2.8*   BILITOTAL 0.7 0.2   ALKPHOS 55 62   AST 9 6   ALT 10 10       CBC    Recent Labs  Lab 11/09/17 2240 11/09/17  1448 11/03/17  0849   WBC 7.3 8.7 8.5   RBC 2.89* 2.28* 3.62*   HGB 7.7* 5.6* 9.4*   HCT 24.2* 18.5* 29.4*   MCV 84 81 81   MCH 26.6 24.6* 26.0*   MCHC 31.8 30.3* 32.0   RDW 20.9* 23.5* 21.5*    300 521*       INR    Recent Labs  Lab 11/09/17 2240 11/09/17  1448 11/03/17  0849   INR 1.12 1.20* 1.05       Arterial Blood GasNo lab results found in last 7 days.     Microbiology  MRSA, nares - negative           Imaging    Recent CXR on 10/17 demonstrates normal lung fields with out any infiltrates or effusions.  PICC in good position.

## 2017-11-09 NOTE — ED NOTES
"Pt arrives via EMS with dizziness and weakness starting last night. Pt had chemo ending at 0830 yesterday and it was his very first dose. Pt c/o nausea but no vomiting. Pt reports darker stools starting last night and \"black tarry\" stools throughout this morning. ABCs intact, A/O x4.   "

## 2017-11-09 NOTE — ED NOTES
"Accessed pts port on R upper chest. Blood return noted and flushed with NS. 3 prongs palpated during insertion, wife states she \"thinks it is a power port\", but pt was unsure.   "

## 2017-11-09 NOTE — ED PROVIDER NOTES
"  History     Chief Complaint:  Generalized Weakness and Melena      HPI   Dashawn Ordoñez is a 57 year old male with a history of diabetes mellitus type II and substance abuse who presents with generalized weakness and melena. The patient was discharged from the hospital two weeks ago after being admitted for symptoms of a GI bleed. At that time he was diagnosed with stage IV gastric cancer. He started chemo on Monday which ended yesterday. He states that last night he had some darker colored stool and began feeling dizzy. These symptoms have persisted and worsened throughout the day which led him to present to the ED. Upon his arrival he also complains of nausea, mild abdominal pain, worsening back pain, and generalized weakness. He also admits to having \"2 shots\" of alcohol to help him go to bed last night but denies any other use of alcohol since discharge from the hospital. Patient denies bright red blood or blood clots in his stool, vomiting, fever, or urinary symptoms. Patient denies all other complaints.     Allergies:  No known drug allergies      Medications:    Prilosec    Past Medical History:    Gastric Cancer  Depression  Diabetes Mellitus, Type II  Hypertension  Substance Abuse  GI Bleed  Tobacco Use Disorder  Anxiety    Past Surgical History:    Appendectomy  ENT Surgery  EGD, Combined    Family History:    History reviewed. No pertinent family history.      Social History:  Presents via EMS   Tobacco use: current 2.00 ppd smoker  Alcohol use: Yes, occasionally   PCP: Mann Grigsby    Marital Status:        Review of Systems   Constitutional: Negative for fever.   Gastrointestinal: Positive for abdominal pain, blood in stool and nausea. Negative for vomiting.   Genitourinary: Negative for dysuria, hematuria and urgency.   Musculoskeletal: Positive for back pain.   Neurological: Positive for weakness.   All other systems reviewed and are negative.    Physical Exam     Patient Vitals for " the past 24 hrs:   BP Temp Temp src Pulse Heart Rate Resp SpO2 Weight   11/09/17 1800 106/67 - - - 94 19 - -   11/09/17 1745 117/78 - - - 104 - - -   11/09/17 1730 135/89 - - - 105 - 100 % -   11/09/17 1715 134/78 - - - 128 - 90 % -   11/09/17 1703 - 98.8  F (37.1  C) Oral - - - - -   11/09/17 1700 119/76 - - - 108 - 98 % -   11/09/17 1645 118/81 - - - 103 - 100 % -   11/09/17 1630 125/81 98.7  F (37.1  C) Oral 92 112 18 100 % -   11/09/17 1615 118/78 - - - 101 11 100 % -   11/09/17 1600 131/75 - - - 100 - 99 % -   11/09/17 1530 132/77 - - - - - 100 % -   11/09/17 1522 - - - - - - 100 % -   11/09/17 1521 - - - - - - 100 % -   11/09/17 1520 - - - - - - 100 % -   11/09/17 1515 114/77 - - - - - 100 % -   11/09/17 1512 112/68 - - - - - 100 % -   11/09/17 1510 - - - - - - 100 % -   11/09/17 1505 - - - - - - 100 % -   11/09/17 1500 - - - - - - 100 % -   11/09/17 1455 - - - - - - 100 % -   11/09/17 1450 - - - - - - 100 % -   11/09/17 1445 - - - - - - 100 % -   11/09/17 1440 - - - - - - 100 % -   11/09/17 1435 - - - - - - 100 % -   11/09/17 1430 117/71 - - - - - 100 % -   11/09/17 1425 - - - - - - 100 % -   11/09/17 1420 - - - - - - 97 % -   11/09/17 1415 120/83 - - - - - 99 % -   11/09/17 1410 - - - - - - 100 % -   11/09/17 1407 120/82 98.7  F (37.1  C) Oral 112 112 16 100 % 74.8 kg (165 lb)   11/09/17 1405 - - - - - - 100 % -      Physical Exam  Constitutional:  Cachectic and chronically ill appearing.  HENT:  Bilateral external ears normal, Mucous membranes dry, Nose normal. Neck- Normal range of motion, Supple  Respiratory:  Normal breath sounds, No respiratory distress, No wheezing,  Cardiovascular:  Mildly tachycardic heart, Normal rhythm, No murmurs,    GI:  Bowel sounds normal, Soft, Nondistended, No tenderness, no palpable masses, no CVA tenderness, no rebound or guarding  Musculoskeletal:  Intact distal pulses, No edema, grossly unremarkable range of motion   Integument:  Warm, Dry   Neurologic:  Alert, attentive  and appropriately oriented  Psychiatric:  Mood and affect normal.     Emergency Department Course   ECG (14:07:34):  Rate 115 bpm. NE interval 124. QRS duration 1016. QT/QTc 352/486. P-R-T axes 26 45 50. Sinus tachycardia. Incomplete right bundle branch block. Borderline ECG. Interpreted at 1412 by Ligia Batista MD.     Imaging:  Radiographic findings were communicated with the patient and family who voiced understanding of the findings.    CT Abdomen Pelvis w/ Contrast:   IMPRESSION:  1. Multiple lesions in the liver which all appear stable since the  most recent prior exam, including a large hemangioma in the right  lobe. Other lesions appear to be cysts and are unchanged since  multiple prior exams. A 1 cm ill-defined lucency in the mid right lobe  is unchanged since 10/20/2017 but was new at that time and is  nonspecific. A metastatic lesion is not excluded.  2. Large gastric ulceration is again noted. Mild upper abdominal  adenopathy as described above.  3. Extensive sigmoid diverticulosis with chronic benign colonic wall  thickening.     Results per radiology.     Laboratory:  CBC: HGB 5.6 (LL) o/w WNL (WBC 8.7, )    CMP: Chloride 110 (H), Glucose 245 (H), BUN 47 (H), Calcium 8.4 (L), Albumin 2.8 (L), Protein total 5.5 (L) o/w WNL (Creatinine 0.70)   INR: 1.20 (H)   Lipase: 155   EtOH: <0.01   ABO/Rh Type and Screen: O negative, Negative antibody screen.     UA with micro: Glucose > 499 o/w negative     Interventions:  1507: NS 1L IV Bolus   1507: Zofran 4 mg IV  1622: Dilaudid 0.5 mg IV    The patient's symptoms were partially improved with parenteral narcotics.    Emergency Department Course:  Past medical records, nursing notes, and vitals reviewed.  1422: I performed an exam of the patient and obtained history, as documented above.  IV inserted and blood drawn.   Above interventions provided.   The patient was sent for a abdomen/pelvis CT while in the emergency department, findings above.   1546:  I spoke with Dr. Rao, from GI, regarding this patient.   1610: I spoke to Dr. Davis regarding this patient.  1624: I spoke with Dr. RUSSELL, a hospitalist from the [facility], who recommended I speak to oncology for admission. I spoke with oncology who preferred pt be admitted to the ICU.  1627: I spoke to Dr. aBrtholomew, intensivist at the [facility], who accepts the patient for transfer.   I personally reviewed the laboratory results with the Patient and spouse and answered all related questions prior to transfer.    1635: I rechecked the patient who denies any new symptoms. I discussed plan of transfer with the patient who agrees.   Findings and plan explained to the Patient and spouse.  1700: Patient will be transferred to the [facility] via EMS. Discussed the case with Dr. Bartholomew, who will admit the patient to a monitored bed for further monitoring, evaluation, and treatment.      Impression & Plan    Medical Decision Making:  Dashawn Ordoñez is a 57 year old year old male who presents to the ER with blood in the stool.  VS on presentation reveal tachycardia but no HoTN.  His likely source of bleeding is his known gastric malignancy.  During his previous admission, there was no treatable vascular source of bleeding found by IR and it appeared to stop on its own.  GI had anticipated that should bleeding recur, it would be unlikely to be treatable endoscopically and he would likely need reassessment by IR or possible surgical resection.    His workup in the ER was notable for anemia with a four point decreased his hemoglobin since his last hospitalization.  His remain mildly tachycardic.  Pressure has been stable.  No episodes of melena and no hematemesis.  Emergent blood transfusion was ordered. Given the degree of bleeding, and risk factors identified above, the patient will be transferred to the [facility] for further management.  The case was discussed with the accepting hospital team, who are in agreement with the plan of care  and have accepted the patient to their service.  Patient was updated regarding the proposed plan of care and was agreeable.    Critical Care time:  was 30 minutes for this patient excluding procedures.    Diagnosis:    ICD-10-CM    1. Gastrointestinal hemorrhage with melena K92.1    2. Anemia due to blood loss, acute D62        Disposition:  Transferred to the [facility].    11/9/2017   [facility] EMERGENCY DEPARTMENT  I, Akiko Mariscal, am serving as a scribe at 2:22 PM on 11/9/2017 to document services personally performed by Ligia Batista MD based on my observations and the provider's statements to me.       Ligia Batista MD  11/09/17 1828

## 2017-11-09 NOTE — ED NOTES
Pt complains of return pain of 7.5-8/10 severity, will administer another dose of Dilaudid and reassess. PT declines other comfort measures.

## 2017-11-09 NOTE — IP AVS SNAPSHOT
MRN:[id number]                      After Visit Summary   11/9/2017    Dashawn Ordoñez    MRN: [id number]           Thank you!     Thank you for choosing [facility] for your care. Our goal is always to provide you with excellent care. Hearing back from our patients is one way we can continue to improve our services. Please take a few minutes to complete the written survey that you may receive in the mail after you visit with us. Thank you!        Patient Information     Date Of Birth          1960        Designated Caregiver       Most Recent Value    Caregiver    Will someone help with your care after discharge? yes    Name of designated caregiver wife Isa    Phone number of caregiver [phone]    Caregiver address home      About your hospital stay     You were admitted on:  November 9, 2017 You last received care in the:  [address] [facility]    You were discharged on:  November 12, 2017        Reason for your hospital stay       You are here for recurrent bleeding for the stomach.                  Who to Call     For medical emergencies, please call 911.  For non-urgent questions about your medical care, please call your primary care provider or clinic, [phone]          Attending Provider     Provider Specialty    Chris Loyd MD Internal Medicine    [facility]       Primary Care Provider Office Phone # Fax #    Mann Seo [facility] [phone]      After Care Instructions     Activity       Your activity upon discharge: activity as tolerated            Diet       Follow this diet upon discharge: Regular                  Follow-up Appointments     Follow Up and recommended labs and tests       Follow up with Dr. Healy ([place] oncology), within 1-2 weeks. And call [place] [facility] to make a schedule for blood labs on next Wed and Thu.                  Pending Results     No orders found from 11/7/2017 to 11/10/2017.           "  Statement of Approval     Ordered          17 1146  I have reviewed and agree with all the recommendations and orders detailed in this document.  EFFECTIVE NOW     Approved and electronically signed by:  Se Red Euceda MD             Admission Information     Date & Time Provider Department Dept. Phone    2017 Saima Barajas MD [address] [facility] [phone]      Your Vitals Were     Blood Pressure Pulse Temperature Respirations Height Weight    118/69 88 97.9  F (36.6  C) (Oral) 16 1.778 m (5' 10\") 70.7 kg (155 lb 13.8 oz)    Pulse Oximetry BMI (Body Mass Index)                95% 22.36 kg/m2          MyChart Information     [employer] lets you send messages to your doctor, view your test results, renew your prescriptions, schedule appointments and more. To sign up, go to [email or web address] . Click on \"Log in\" on the left side of the screen, which will take you to the Welcome page. Then click on \"Sign up Now\" on the right side of the page.     You will be asked to enter the access code listed below, as well as some personal information. Please follow the directions to create your username and password.     Your access code is: [email or web address]  Expires: 2018 10:04 AM     Your access code will  in 90 days. If you need help or a new code, please call your [facility] clinic or [phone].        Care EveryWhere ID     This is your Care EveryWhere ID. This could be used by other organizations to access your [facility] medical records  [id number]        Equal Access to Services     [facility]: Hadii nito hagan Soshena, waaxda luqadaha, qaybta kaalmaemma wood. So [facility] [phone].    ATENCIÓN: Si habla español, tiene a sanz disposición servicios gratuitos de asistencia lingüística. Llame al [phone].    We comply with applicable federal civil rights laws and [place] laws. We do not discriminate on the basis of race, color, " national origin, age, disability, sex, sexual orientation, or gender identity.               Review of your medicines      START taking        Dose / Directions    mirtazapine 7.5 MG Tabs tablet   Commonly known as:  REMERON   Used for:  Sleep disorder        Dose:  7.5 mg   Take 1 tablet (7.5 mg) by mouth nightly as needed   Quantity:  30 tablet   Refills:  0       nicotine 21 MG/24HR 24 hr patch   Commonly known as:  NICODERM CQ   Used for:  Tobacco use disorder        Dose:  1 patch   Place 1 patch onto the skin daily   Quantity:  30 patch   Refills:  1         CONTINUE these medicines which have NOT CHANGED        Dose / Directions    acetaminophen 325 MG tablet   Commonly known as:  TYLENOL   Used for:  Gastrointestinal hemorrhage associated with gastric ulcer        Dose:  650 mg   Take 2 tablets (650 mg) by mouth every 6 hours as needed for mild pain   Quantity:  100 tablet   Refills:  0       omeprazole 40 MG capsule   Commonly known as:  priLOSEC   Used for:  Acute upper gastrointestinal bleeding        Dose:  40 mg   Take 1 capsule (40 mg) by mouth 2 times daily (before meals)   Quantity:  30 capsule   Refills:  3            Where to get your medicines      These medications were sent to [id number] - [place], [place] - [address] AT [place] [address]  [address], [place] [address]     Phone:  [phone]     mirtazapine 7.5 MG Tabs tablet    nicotine 21 MG/24HR 24 hr patch    omeprazole 40 MG capsule                Protect others around you: Learn how to safely use, store and throw away your medicines at www.disposemymeds.org.             Medication List: This is a list of all your medications and when to take them. Check marks below indicate your daily home schedule. Keep this list as a reference.      Medications           Morning Afternoon Evening Bedtime As Needed    acetaminophen 325 MG tablet   Commonly known as:  TYLENOL   Take 2 tablets (650 mg) by mouth every 6 hours  as needed for mild pain                                mirtazapine 7.5 MG Tabs tablet   Commonly known as:  REMERON   Take 1 tablet (7.5 mg) by mouth nightly as needed   Last time this was given:  7.5 mg on 11/11/2017 10:42 PM                                nicotine 21 MG/24HR 24 hr patch   Commonly known as:  NICODERM CQ   Place 1 patch onto the skin daily   Last time this was given:  1 patch on 11/11/2017 10:03 AM                                omeprazole 40 MG capsule   Commonly known as:  priLOSEC   Take 1 capsule (40 mg) by mouth 2 times daily (before meals)

## 2017-11-09 NOTE — IP AVS SNAPSHOT
[address] [address]    Phone:  [phone]                                       After Visit Summary   11/9/2017    Dashawn Ordoñez    MRN: [id number]           After Visit Summary Signature Page     I have received my discharge instructions, and my questions have been answered. I have discussed any challenges I see with this plan with the nurse or doctor.    ..........................................................................................................................................  Patient/Patient Representative Signature      ..........................................................................................................................................  Patient Representative Print Name and Relationship to Patient    ..................................................               ................................................  Date                                            Time    ..........................................................................................................................................  Reviewed by Signature/Title    ...................................................              ..............................................  Date                                                            Time

## 2017-11-09 NOTE — ED NOTES
Bed: ED28  Expected date: 11/9/17  Expected time: 1:49 PM  Means of arrival: Ambulance  Comments:  Becky Ledesma

## 2017-11-10 LAB
ANION GAP SERPL CALCULATED.3IONS-SCNC: 4 MMOL/L (ref 3–14)
BLD PROD TYP BPU: NORMAL
BLD UNIT ID BPU: 0
BLOOD PRODUCT CODE: NORMAL
BPU ID: NORMAL
BUN SERPL-MCNC: 25 MG/DL (ref 7–30)
CALCIUM SERPL-MCNC: 8 MG/DL (ref 8.5–10.1)
CHLORIDE SERPL-SCNC: 114 MMOL/L (ref 94–109)
CO2 SERPL-SCNC: 25 MMOL/L (ref 20–32)
CREAT SERPL-MCNC: 0.66 MG/DL (ref 0.66–1.25)
ERYTHROCYTE [DISTWIDTH] IN BLOOD BY AUTOMATED COUNT: 20.7 % (ref 10–15)
FIBRINOGEN PPP-MCNC: 246 MG/DL (ref 200–420)
GFR SERPL CREATININE-BSD FRML MDRD: >90 ML/MIN/1.7M2
GLUCOSE BLDC GLUCOMTR-MCNC: 103 MG/DL (ref 70–99)
GLUCOSE BLDC GLUCOMTR-MCNC: 114 MG/DL (ref 70–99)
GLUCOSE BLDC GLUCOMTR-MCNC: 118 MG/DL (ref 70–99)
GLUCOSE BLDC GLUCOMTR-MCNC: 129 MG/DL (ref 70–99)
GLUCOSE BLDC GLUCOMTR-MCNC: 158 MG/DL (ref 70–99)
GLUCOSE SERPL-MCNC: 132 MG/DL (ref 70–99)
HCT VFR BLD AUTO: 21.8 % (ref 40–53)
HGB BLD-MCNC: 7 G/DL (ref 13.3–17.7)
HGB BLD-MCNC: 7.7 G/DL (ref 13.3–17.7)
INR PPP: 1.13 (ref 0.86–1.14)
INTERPRETATION ECG - MUSE: NORMAL
MCH RBC QN AUTO: 26.5 PG (ref 26.5–33)
MCHC RBC AUTO-ENTMCNC: 32.1 G/DL (ref 31.5–36.5)
MCV RBC AUTO: 83 FL (ref 78–100)
MRSA DNA SPEC QL NAA+PROBE: NEGATIVE
PLATELET # BLD AUTO: 177 10E9/L (ref 150–450)
POTASSIUM SERPL-SCNC: 4.7 MMOL/L (ref 3.4–5.3)
RBC # BLD AUTO: 2.64 10E12/L (ref 4.4–5.9)
SODIUM SERPL-SCNC: 143 MMOL/L (ref 133–144)
SPECIMEN SOURCE: NORMAL
TRANSFUSION STATUS PATIENT QL: NORMAL
TRANSFUSION STATUS PATIENT QL: NORMAL
WBC # BLD AUTO: 6.5 10E9/L (ref 4–11)

## 2017-11-10 PROCEDURE — 25000132 ZZH RX MED GY IP 250 OP 250 PS 637: Performed by: INTERNAL MEDICINE

## 2017-11-10 PROCEDURE — 25000132 ZZH RX MED GY IP 250 OP 250 PS 637: Performed by: STUDENT IN AN ORGANIZED HEALTH CARE EDUCATION/TRAINING PROGRAM

## 2017-11-10 PROCEDURE — 25000128 H RX IP 250 OP 636

## 2017-11-10 PROCEDURE — 99233 SBSQ HOSP IP/OBS HIGH 50: CPT | Performed by: INTERNAL MEDICINE

## 2017-11-10 PROCEDURE — 00000146 ZZHCL STATISTIC GLUCOSE BY METER IP

## 2017-11-10 PROCEDURE — 12000001 ZZH R&B MED SURG/OB UMMC

## 2017-11-10 PROCEDURE — 25000125 ZZHC RX 250: Performed by: INTERNAL MEDICINE

## 2017-11-10 PROCEDURE — 85027 COMPLETE CBC AUTOMATED: CPT | Performed by: STUDENT IN AN ORGANIZED HEALTH CARE EDUCATION/TRAINING PROGRAM

## 2017-11-10 PROCEDURE — 25000128 H RX IP 250 OP 636: Performed by: STUDENT IN AN ORGANIZED HEALTH CARE EDUCATION/TRAINING PROGRAM

## 2017-11-10 PROCEDURE — 36592 COLLECT BLOOD FROM PICC: CPT | Performed by: INTERNAL MEDICINE

## 2017-11-10 PROCEDURE — 25000132 ZZH RX MED GY IP 250 OP 250 PS 637: Performed by: CLINICAL NURSE SPECIALIST

## 2017-11-10 PROCEDURE — 25000131 ZZH RX MED GY IP 250 OP 636 PS 637: Performed by: STUDENT IN AN ORGANIZED HEALTH CARE EDUCATION/TRAINING PROGRAM

## 2017-11-10 PROCEDURE — 25000128 H RX IP 250 OP 636: Performed by: INTERNAL MEDICINE

## 2017-11-10 PROCEDURE — 85018 HEMOGLOBIN: CPT | Performed by: INTERNAL MEDICINE

## 2017-11-10 PROCEDURE — 99223 1ST HOSP IP/OBS HIGH 75: CPT | Performed by: CLINICAL NURSE SPECIALIST

## 2017-11-10 PROCEDURE — P9016 RBC LEUKOCYTES REDUCED: HCPCS | Performed by: STUDENT IN AN ORGANIZED HEALTH CARE EDUCATION/TRAINING PROGRAM

## 2017-11-10 RX ORDER — PROCHLORPERAZINE MALEATE 5 MG
10 TABLET ORAL EVERY 6 HOURS PRN
Status: DISCONTINUED | OUTPATIENT
Start: 2017-11-10 | End: 2017-11-12 | Stop reason: HOSPADM

## 2017-11-10 RX ORDER — ONDANSETRON 2 MG/ML
4-8 INJECTION INTRAMUSCULAR; INTRAVENOUS EVERY 6 HOURS PRN
Status: DISCONTINUED | OUTPATIENT
Start: 2017-11-10 | End: 2017-11-12 | Stop reason: HOSPADM

## 2017-11-10 RX ORDER — ONDANSETRON 4 MG/1
4-8 TABLET, ORALLY DISINTEGRATING ORAL EVERY 6 HOURS PRN
Status: DISCONTINUED | OUTPATIENT
Start: 2017-11-10 | End: 2017-11-12 | Stop reason: HOSPADM

## 2017-11-10 RX ORDER — FENTANYL CITRATE 50 UG/ML
25-50 INJECTION, SOLUTION INTRAMUSCULAR; INTRAVENOUS
Status: DISCONTINUED | OUTPATIENT
Start: 2017-11-10 | End: 2017-11-10

## 2017-11-10 RX ORDER — FENTANYL CITRATE 50 UG/ML
INJECTION, SOLUTION INTRAMUSCULAR; INTRAVENOUS
Status: COMPLETED
Start: 2017-11-10 | End: 2017-11-10

## 2017-11-10 RX ORDER — OXYCODONE HYDROCHLORIDE 5 MG/1
5 TABLET ORAL EVERY 4 HOURS PRN
Status: DISCONTINUED | OUTPATIENT
Start: 2017-11-10 | End: 2017-11-12 | Stop reason: HOSPADM

## 2017-11-10 RX ORDER — MIRTAZAPINE 7.5 MG/1
7.5 TABLET, FILM COATED ORAL AT BEDTIME
Status: DISCONTINUED | OUTPATIENT
Start: 2017-11-10 | End: 2017-11-12 | Stop reason: HOSPADM

## 2017-11-10 RX ORDER — ACETAMINOPHEN 325 MG/1
325-650 TABLET ORAL EVERY 4 HOURS PRN
Status: DISCONTINUED | OUTPATIENT
Start: 2017-11-10 | End: 2017-11-12 | Stop reason: HOSPADM

## 2017-11-10 RX ADMIN — MIRTAZAPINE 7.5 MG: 7.5 TABLET, FILM COATED ORAL at 22:15

## 2017-11-10 RX ADMIN — FENTANYL CITRATE 50 MCG: 50 INJECTION, SOLUTION INTRAMUSCULAR; INTRAVENOUS at 05:51

## 2017-11-10 RX ADMIN — NICOTINE 1 PATCH: 21 PATCH, EXTENDED RELEASE TRANSDERMAL at 00:56

## 2017-11-10 RX ADMIN — NICOTINE 1 PATCH: 21 PATCH, EXTENDED RELEASE TRANSDERMAL at 08:52

## 2017-11-10 RX ADMIN — OXYCODONE HYDROCHLORIDE 5 MG: 5 TABLET ORAL at 22:04

## 2017-11-10 RX ADMIN — PANTOPRAZOLE SODIUM 40 MG: 40 INJECTION, POWDER, FOR SOLUTION INTRAVENOUS at 19:44

## 2017-11-10 RX ADMIN — FENTANYL CITRATE 50 MCG: 50 INJECTION INTRAMUSCULAR; INTRAVENOUS at 05:51

## 2017-11-10 RX ADMIN — PANTOPRAZOLE SODIUM 40 MG: 40 INJECTION, POWDER, FOR SOLUTION INTRAVENOUS at 12:41

## 2017-11-10 RX ADMIN — SODIUM CHLORIDE: 9 INJECTION, SOLUTION INTRAVENOUS at 16:22

## 2017-11-10 RX ADMIN — SODIUM CHLORIDE: 9 INJECTION, SOLUTION INTRAVENOUS at 07:58

## 2017-11-10 RX ADMIN — INSULIN ASPART 1 UNITS: 100 INJECTION, SOLUTION INTRAVENOUS; SUBCUTANEOUS at 01:02

## 2017-11-10 RX ADMIN — FENTANYL CITRATE 20 MCG: 50 INJECTION INTRAMUSCULAR; INTRAVENOUS at 04:25

## 2017-11-10 ASSESSMENT — PAIN DESCRIPTION - DESCRIPTORS: DESCRIPTORS: ACHING

## 2017-11-10 NOTE — PLAN OF CARE
Problem: Patient Care Overview  Goal: Plan of Care/Patient Progress Review  Outcome: No Change  Transferred to  from  at 1430.  A&O x4,oriented to room/call light.  VSS. Ambulated to bed without c/o dizziness or weakness.   CIWA score 0.  NPO x ice chips.  Denies pain and nausea.  No BM since yesterday.  Port infusing MIVF @100cc/hr.  PIV x2 SL.  Next Hgb check due at 1500.

## 2017-11-10 NOTE — PLAN OF CARE
Problem: Patient Care Overview  Goal: Plan of Care/Patient Progress Review  Outcome: Improving  D: GIB  A/I: Pt A&Ox4, no complaints of pain. VSS. No nausea reported. No evidence of bleeding. Pt transferred to Hem/Onc service and orders for 7C. Report given to 7C RN. Pt to be transferred up to floor.  P: Continue to monitor pts hbg and bleeding. Alert team to any change.

## 2017-11-10 NOTE — PLAN OF CARE
"Problem: Gastrointestinal Bleeding (Adult)  Goal: Signs and Symptoms of Listed Potential Problems Will be Absent, Minimized or Managed (Gastrointestinal Bleeding)  Signs and symptoms of listed potential problems will be absent, minimized or managed by discharge/transition of care (reference Gastrointestinal Bleeding (Adult) CPG).   Outcome: No Change  DI: Pt with recent diagnosis of metastatic gastric cancer was admitted to MICU at 2215 from OSH s/p melena at home 11/8 and morning of the 11/9 with corresponding Hgb drop to 5.6 at OSH.  Pt received 2 u PRBC at OSH and recheck here was 7.7.  Admission profile completed and pt received NS at 125/hr overnight.  Glucoses monitored overnight, as pt has history of DM2, (not taking meds for several months after weight loss and MD stopped metformin). MSSA protocal ordered, so started CIWAA monitoring and pt shows no s/s of withdrawal at this time, stated he had \"2 shots\" on Monday.  AM hgb was 7.0 and pt tp receive one more unit of PRBCs.  Pt afebrile with normal BP, and HR less than 100 tonight, no signs of bleeding, nausea, or stools.  Pt does c/o lower back pain which is chronic.  Pt takes tylenol at home and received dilaudid and fentanyl PTA to MICU.  Fentanyl given x 2 for pain, with some relief after 50 mcg dose given.  A: Pt continues to be quite stable with no s/s of bleeding overnight (hgb drop likely dilutional in part); back pain partially relieved with fentanyl.  P: Continue to monitor and update MD with changes and concerns, await GI consult and potential changes to POC.  Pt potentially able to transfer out of ICU if continues to show no s/s of bleeding or instability.      "

## 2017-11-10 NOTE — PROGRESS NOTES
"SPIRITUAL HEALTH SERVICES  SPIRITUAL ASSESSMENT Progress Note  [facility] 4C     PRIMARY FOCUS: Support for coping    ILLNESS CIRCUMSTANCES:   Visited with pt per pt request. Reviewed documentation. Reflective conversation shared with pt which integrated elements of illness and family narratives.     Context of Serious Illness/Symptom(s) - \"I have a diagnosis that's very serious. I know I probably don't have a long time to live but I'm OK. I'm concentrating on living whatever life I have left as fully as possible.\" Pt said he is presently receiving treatment through our oncology service, on an outpatient basis.    Resources for Support - family, friends.     DISTRESS:     Emotional/Spiritual/Existential Distress - pt very open about his own sense of mortality - said his goal is to live the rest of life concentrating on relationships and quality of life.     Hoahaoism Distress - Not Discussed     Social/Cultural/Economic Distress - Not Discussed     SPIRITUAL/Sabianism COPING:     Mormonism/Kelsea - pt raised [place], not presently active in a Mosque, said prayer is important to him.     Spiritual Practice(s) - prayer, conversation.    Emotional/Relational/Existential Connections - pt enjoys having a broad range of connection with other people - family and friends.     GOALS OF CARE:    Goals of Care - motivated for treatment to extend his life if quality of life can be maintained.    Meaning/Sense-Making - pt gains strength and meaning through relationships with others. Good sense of humor - enjoyed sharing a particularly clever joke with me.    PLAN: pt expects to transfer to floor today, and to discharge from hospital over the weekend.  support always available on-call.    Chris Phillips M.Div (Bill)., [facility]  Staff   Pager [phone]      "

## 2017-11-10 NOTE — CONSULTS
"[facility], [place]    Palliative Care Consultation Note    Patient: Dashawn Ordoñez  Date of Admission:  11/9/2017    Requesting provider/team: Theodora Casas MD  Reason for consult: Symptom management    Recommendations:  -Encourage use of nonpharmacologic methods including: Sleep only as much as you need to feel rested and then get out of bed, Keep a regular sleep schedule, Avoid forcing sleep, Exercise regularly for at least 20 minutes, preferably 4 to 5 hours before bedtime, Avoid caffeinated beverages after lunch, Avoid alcohol near bedtime: no \"night cap\", Avoid smoking, especially in the evening, Do not go to bed hungry, Adjust bedroom environment, Avoid prolonged use of light-emitting screens before bedtime, Deal with your worries before bedtime  -Mirtazapine 7.5 mg at bedtime  -Palliative care clinic follow up with [facility]    These recommendations have been discussed with primary team    Thank you for the opportunity to participate in the care of this patient and family. Our team will follow outpatient as patient discharging over the weekend. Please feel free to contact the on-call Palliative provider with any urgent needs.     TYLOR Bruce CNS  Palliative Care Consult Team  Pager: [phone]    [facility] Inpatient Team Consult pager [phone] (M-F 8-4:30)  After-hours Answering Service [phone]   Palliative Clinic: [phone]       Assessment:  Dashawn Ordoñez is a 57 year old male who presented with a GI bleed on 11/9 which is most likely from his gastric cancer. He as anemic and hypotensive in the ED and sent to [facility] for further evaluation.     Symptoms:    Back pain: reports to have some back pain which is chronic but is ok for right now   Insomnia: history of difficulty sleeping, reports to fall asleep at night, will wake up all of a sudden and then ruminate about his worries (mainly finances).     Social:          Living situation: with wife       Support " "system: good support of wife, two children, 3 grandchildren       Actual/Potential Caregiver:        Functional status: independent       Financial concerns: yes, this is a large part of his worries       Substance use disorder (past / present): Present uses alcohol and tobacco, reports that he is no longer going to drink anymore and he has not had a problem quitting in the past, he has been through treatment in the past twice (once when he was in his 20's and most recently 10 years ago), he reports that he has continued to drink out of \"rebellion\" and currently does not have any concerns about quitting drinking. He reports that he does not plan to stop using tobacco. Past use of substances include marijuana over 30 years ago and about 3-4 times months ago, history of also cocaine use over 30 years ago.       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    Spiritual/Anabaptist:    Spiritual background: Muslim  Spiritual needs:  involved  Sense/Meaning Making: he reports that since he knows time is shorter now that he is making plans and reaching out to people that he wants to rebuild relationships with.    Advance Care Planning:           Decision making capacity: Yes       Disease understanding: he has a good understanding that his cancer is not curable       Goals of Care: currently restorative goals       Preferred way of decision making: with wife       Health care directive: No, discussed HCD today, encouraged him to complete it       Code Status:  Full Code, reports he might change his mind about that when he fills out his health care directive, instructed him if he wants to change his resuscitation status to discuss also with his doctor as a POLST form would be indicated in that case.       POLST There is no POLST (Physician orders for " life-sustaining treatment) form on file for this patient      History of Present Illness   Sources of History:patient and electronic health record    Dashawn Ordoñez is a 57 year old male who presents with a GI bleed on 11/9 which is most likely from his newly diagnosed metastatic gastric cancer to which he is currently getting treatment for at [facility]. He as anemic and hypotensive in the ED and sent to [facility] for further evaluation. He also has a history of alcohol use, diabetes, and tobacco dependence. Since being admitted he has been stabilized and had no bleeding over last night. GI was consulted and recommended iron supplementation and ongoing monitoring of hemoglobin.     Palliative care team consulted 11/10 for insomnia    ROS:  Palliative Symptom Review (0=no symptom/no concern, 1=mild, 2=moderate, 3=severe):  Pain: 1  Fatigue: 0  Nausea: 0  Constipation: 0  Diarrhea: 0  Depressive Symptoms: 0  Anxiety: 0  Drowsiness: 0  Poor Appetite: 0  Shortness of Breath: 0  Insomnia: 3  Delirium: 0       Past Medical History:   Past Medical History:   Diagnosis Date     Cancer (H)      Depressive disorder      Diabetes (H)      Hypertension      Stomach cancer (H)      Substance abuse           Past Surgical History:   Past Surgical History:   Procedure Laterality Date     APPENDECTOMY       ENT SURGERY       ESOPHAGOSCOPY, GASTROSCOPY, DUODENOSCOPY (EGD), COMBINED N/A 10/18/2017    Procedure: COMBINED ESOPHAGOSCOPY, GASTROSCOPY, DUODENOSCOPY (EGD), BIOPSY SINGLE OR MULTIPLE;  ESOPHAGOSCOPY, GASTROSCOPY, DUODENOSCOPY (EGD) with biopsies using cold forceps;  Surgeon: Filippo Mclean MD;  Location:  GI             Family History:   No family history on file.         Allergies:   No Known Allergies         Medications:   I have reviewed this patient's medication profile and medications given in the past 24 hours.    Fentanyl PRN         Physical Exam:   Vital Signs: Temp: 97.6  F (36.4  C) Temp src: Oral BP: 126/81    Heart Rate: 87 Resp: 15 SpO2: 100 % O2 Device: None (Room air)    Weight: 155 lbs 13.84 oz    Physical Exam:  Constitutional: Awake, alert, cooperative, no apparent distress  Lungs: No increased work of breathing  Neurologic: Awake, alert, oriented to name, place and time.    Neuropsychiatric: Normal affect, mood, orientation, memory and insight.       Data reviewed:     ROUTINE ICU LABS (Last four results)  CMP  Recent Labs  Lab 11/10/17  0420 11/09/17  2240 11/09/17  1448    141 140   POTASSIUM 4.7 4.3 4.6   CHLORIDE 114* 113* 110*   CO2 25 22 21   ANIONGAP 4 6 9   * 169* 245*   BUN 25 31* 47*   CR 0.66 0.69 0.70   GFRESTIMATED >90 >90 >90   GFRESTBLACK >90 >90 >90   JULIO CESAR 8.0* 8.2* 8.4*   PROTTOTAL  --  5.5* 5.5*   ALBUMIN  --  2.9* 2.8*   BILITOTAL  --  0.7 0.2   ALKPHOS  --  55 62   AST  --  9 6   ALT  --  10 10     CBC  Recent Labs  Lab 11/10/17  0415 11/09/17  2240 11/09/17  1448   WBC 6.5 7.3 8.7   RBC 2.64* 2.89* 2.28*   HGB 7.0* 7.7* 5.6*   HCT 21.8* 24.2* 18.5*   MCV 83 84 81   MCH 26.5 26.6 24.6*   MCHC 32.1 31.8 30.3*   RDW 20.7* 20.9* 23.5*    226 300     INR  Recent Labs  Lab 11/10/17  0420 11/09/17  2240 11/09/17  1448   INR 1.13 1.12 1.20*     Arterial Blood GasNo lab results found in last 7 days.      TYLOR Bruce CNS  Palliative Care Consult Team  Pager: [phone]    [facility] Inpatient Team Consult pager [phone] (M-F 8-4:30)  After-hours Answering Service [phone]  Palliative Clinic: [phone]     Total time spent was 80 minutes,  >50% of time was spent counseling and/or coordination of care regarding symptom management and disease understanding.

## 2017-11-10 NOTE — PROGRESS NOTES
[facility], [place]    Hematology and Hematology / Oncology Progress Note     Assessment & Plan   Dashawn Ordoñez is a 57 year old male with relatively newly diagnosed metastatic gastric cancer, signet ring cell type, with liver metadmitted with recurrent UGI bleed from large gastric tumor. C1 chemo (not sure what kind, but definitely involved 5-FU) 11/6 ~ 11/8.    UGI bleed: Hemoglobin 5.6 on admission. Last 9.4 here on 11/3, but surely has had interval checks at [facility]. Melena x 1 day. Tumor too large for endoscopic intervention. Would need IR for coil embolization if were to rebleed significantly again. Really needs to stop ALL etoh. Confirmed no longer taking NSAIDs or aspirin. Other possibility is tumor is to some degree responding to chemo, and hence bleeding started. Would also have gotten dexamethasone with chemo on Mon.  - pantoprazole 40 mg IV BID x 24 h  - change to omeprazole 20 mg bid tomorrow if no further bleeding  - repeat hgb this afternoon and in AM  - check fibrinogen - might be low due to consumption  - T&S q72 h  - Discuss with IR and radiation oncology again if rebleeds    Blood loss, microcytic anemia: Microcytic. Undoubtedly was iron deficient before. No ferritin or iron studies in our system. Got about 10-12 units of PRBC in the last month so not likely to be iron deficient.  - recommend checking ferritin as OP once things settle down    Hyperglycemia, DM2  - SSI    ETOH: Says last known drink was two swallows of rum on Monday. Discussed need to stop due to direct effect on bleeding risk. Does not think he'll have issues stopping.   - Monitor for withdrawal    Insomnia: Long term problem.  - Palliative Care for input into long term management    Metastatic gastric cancer: I called Dr. Healy's office ([facility] [place]) and spoke with nurse. Confirmed got C1 FOLFOX 11/6 ~ 11/8.   - has provider appt 11/20  - asked for lab appt next week    Tobacco  dependence  - Nicotine patch    FEN: - NPO except meds, ice chips until Sat, then reassess           - NS @ 100 ml/hr           - Lyte replacement protocol    PPX:  DVT: Mechanical  GI: On PPI as above    Dispo: If no rebleeding, anticipate Rosalva Casas M.D.    Interval History   No overnight bleeding. No abd pain. No N/V.     Physical Exam   Temp: 97.6  F (36.4  C) Temp src: Oral BP: 126/81   Heart Rate: 87 Resp: 15 SpO2: 100 % O2 Device: None (Room air)    Vitals:    11/09/17 2215 11/10/17 0000   Weight: 70.7 kg (155 lb 13.8 oz) 70.7 kg (155 lb 13.8 oz)     Vital Signs with Ranges  Temp:  [97.6  F (36.4  C)-98.9  F (37.2  C)] 97.6  F (36.4  C)  Pulse:  [] 92  Heart Rate:  [] 87  Resp:  [10-23] 15  BP: (105-143)/(61-95) 126/81  SpO2:  [90 %-100 %] 100 %  I/O last 3 completed shifts:  In: 750 [I.V.:750]  Out: 475 [Urine:475]  GEN: NAD  HEENT: no icterus/injection/pallor  LUNGS: clear  CV: regular, no m/r/g  ABD: soft, NT/ND, quiet bowel sounds, no masses  EXT: warm, no edema  NEURO: alert    Medications     NaCl 125 mL/hr at 11/10/17 0758       pantoprazole  40 mg Intravenous BID     insulin aspart  1-6 Units Subcutaneous Q4H     nicotine   Transdermal Q8H     nicotine   Transdermal Daily     nicotine  1 patch Transdermal Daily     Data   Results for orders placed or performed during the hospital encounter of 11/09/17 (from the past 24 hour(s))   ABO/Rh type and screen   Result Value Ref Range    Units Ordered 1     ABO O     RH(D) Neg     Antibody Screen Neg     Test Valid Only At          [facility]    Specimen Expires 11/12/2017     Crossmatch Red Blood Cells    CBC with platelets   Result Value Ref Range    WBC 7.3 4.0 - 11.0 10e9/L    RBC Count 2.89 (L) 4.4 - 5.9 10e12/L    Hemoglobin 7.7 (L) 13.3 - 17.7 g/dL    Hematocrit 24.2 (L) 40.0 - 53.0 %    MCV 84 78 - 100 fl    MCH 26.6 26.5 - 33.0 pg    MCHC 31.8 31.5 - 36.5 g/dL    RDW 20.9 (H) 10.0  - 15.0 %    Platelet Count 226 150 - 450 10e9/L   Comprehensive metabolic panel   Result Value Ref Range    Sodium 141 133 - 144 mmol/L    Potassium 4.3 3.4 - 5.3 mmol/L    Chloride 113 (H) 94 - 109 mmol/L    Carbon Dioxide 22 20 - 32 mmol/L    Anion Gap 6 3 - 14 mmol/L    Glucose 169 (H) 70 - 99 mg/dL    Urea Nitrogen 31 (H) 7 - 30 mg/dL    Creatinine 0.69 0.66 - 1.25 mg/dL    GFR Estimate >90 >60 mL/min/1.7m2    GFR Estimate If Black >90 >60 mL/min/1.7m2    Calcium 8.2 (L) 8.5 - 10.1 mg/dL    Bilirubin Total 0.7 0.2 - 1.3 mg/dL    Albumin 2.9 (L) 3.4 - 5.0 g/dL    Protein Total 5.5 (L) 6.8 - 8.8 g/dL    Alkaline Phosphatase 55 40 - 150 U/L    ALT 10 0 - 70 U/L    AST 9 0 - 45 U/L   INR   Result Value Ref Range    INR 1.12 0.86 - 1.14   Methicillin Resistant Staph Aureus PCR   Result Value Ref Range    Specimen Description Nares     S Aur Meth Resis PCR Negative NEG^Negative   Blood component   Result Value Ref Range    Unit Number [id number]     Blood Component Type Red Blood Cells Leukocyte Reduced     Division Number 00     Status of Unit Released to care unit 11/10/2017 0611     Blood Product Code [id number]     Unit Status ISS    Glucose by meter   Result Value Ref Range    Glucose 158 (H) 70 - 99 mg/dL   CBC with platelets   Result Value Ref Range    WBC 6.5 4.0 - 11.0 10e9/L    RBC Count 2.64 (L) 4.4 - 5.9 10e12/L    Hemoglobin 7.0 (L) 13.3 - 17.7 g/dL    Hematocrit 21.8 (L) 40.0 - 53.0 %    MCV 83 78 - 100 fl    MCH 26.5 26.5 - 33.0 pg    MCHC 32.1 31.5 - 36.5 g/dL    RDW 20.7 (H) 10.0 - 15.0 %    Platelet Count 177 150 - 450 10e9/L   Glucose by meter   Result Value Ref Range    Glucose 103 (H) 70 - 99 mg/dL   INR (AM Draw)   Result Value Ref Range    INR 1.13 0.86 - 1.14   Basic metabolic panel (AM Draw)   Result Value Ref Range    Sodium 143 133 - 144 mmol/L    Potassium 4.7 3.4 - 5.3 mmol/L    Chloride 114 (H) 94 - 109 mmol/L    Carbon Dioxide 25 20 - 32 mmol/L    Anion Gap 4 3 - 14 mmol/L    Glucose  132 (H) 70 - 99 mg/dL    Urea Nitrogen 25 7 - 30 mg/dL    Creatinine 0.66 0.66 - 1.25 mg/dL    GFR Estimate >90 >60 mL/min/1.7m2    GFR Estimate If Black >90 >60 mL/min/1.7m2    Calcium 8.0 (L) 8.5 - 10.1 mg/dL   Fibrinogen activity   Result Value Ref Range    Fibrinogen 246 200 - 420 mg/dL   Glucose by meter   Result Value Ref Range    Glucose 129 (H) 70 - 99 mg/dL   Glucose by meter   Result Value Ref Range    Glucose 118 (H) 70 - 99 mg/dL

## 2017-11-10 NOTE — ED NOTES
Attempted to call report to [address] [place] [address], but was told the room was still occupied by a pt. TAINA Leavitt told me she will call back when there is a nurse capable of taking report.

## 2017-11-10 NOTE — CONSULTS
"   [facility] Hematology-Oncology New Consult          Dashawn Ordoñez MRN# [id number]   Age: 57 year old YOB: 1960   Date of Admission: 11/9/2017  DOS: 11/10/2017    Reason for consult:     HPI / COURSE  Dashawn Ordoñez is a 57 year old male with past medical history recently diagnosed poorly differentiated, signet-ring cell gastric adenocarcinoma who began FOLFOX 11/6, well controlled T2DM, HTN, polysubstance abuse and anxiety who presented from OSH with recurrent upper GIB.    Patient was diagnosed with gastric adenocarcinoma 10/2017, which was discovered after presented with acute weakness, syncope, and dark stools. He was found to have Hgb 3.5 and received several transfusions PRBC. EGD revealed gastric adenocarcinoma and CT Chest/Abdomen/Pelvis revealed liver mass, biopsy +metastasis. Gastric mass was Her2 negative. Because of metastasis he was not surgical or radiation candidate. Recommended palliation with systemic therapy.    On 11/6/17 he began outpatient FOLFOX (C1 11/6/2017~11/8/2017) with Dr. Healy at [facility]. Patient stated that they gave him some steroids prior to treatment which caused him difficulty sleeping. That night he took \"2 shots\" of alcohol to help with sleep. He reports doing well until the evening of 11/8/17 when he began noticing dark stools again. He \"waited it out\" at home, but continued to have dark stools through the morning of 11/9/17. He also began progressively weak and dizzy, having to crawl to the bathroom. Endorses associated nausea, abdominal pain, back pain. Denies fever, chills, sweats, hematemesis, hematochezia or SOB.    Patient called EMS and was transported to [facility], where he was found to be tachycardic. Hgb 9.4 on 11/3/17 --> 5.6 on presentation to ED 11/9/17. He received 2U PRBC and was transferred to [facility].    Hem/Onc was consulted for further management.    Pt does not report recent infections, fevers, chills, other B-symptoms, " "vision problems, hearing loss, dysphagia or dysarthria, chest pain, palpitations, SOB, trauma, seizures, focal weakness    ROS: A complete ROS was otherwise negative    PERTINENT PAST MEDICAL HISTORY  Past Medical History:   Diagnosis Date     Cancer (H)      Depressive disorder      Diabetes (H)      Hypertension      Stomach cancer (H)      Substance abuse         Past Surgical History:   Procedure Laterality Date     APPENDECTOMY       ENT SURGERY       ESOPHAGOSCOPY, GASTROSCOPY, DUODENOSCOPY (EGD), COMBINED N/A 10/18/2017    Procedure: COMBINED ESOPHAGOSCOPY, GASTROSCOPY, DUODENOSCOPY (EGD), BIOPSY SINGLE OR MULTIPLE;  ESOPHAGOSCOPY, GASTROSCOPY, DUODENOSCOPY (EGD) with biopsies using cold forceps;  Surgeon: Filippo Mclean MD;  Location:  GI       SOCIAL / FAMILY HISTORY  Social History     Social History     Marital status:      Spouse name: N/A     Number of children: N/A     Years of education: N/A     Social History Main Topics     Smoking status: Current Every Day Smoker     Packs/day: 2.00     Smokeless tobacco: Never Used     Alcohol use Yes      Comment: occasionally      Drug use: No     Sexual activity: Yes     Other Topics Concern     Not on file     Social History Narrative       No family history on file.    MEDICATIONS  No current outpatient prescriptions on file.     Prescriptions Prior to Admission   Medication Sig Dispense Refill Last Dose     omeprazole (PRILOSEC) 40 MG capsule Take 1 capsule (40 mg) by mouth 2 times daily (before meals) 30 capsule 1 11/2/2017 at Unknown time     acetaminophen (TYLENOL) 325 MG tablet Take 2 tablets (650 mg) by mouth every 6 hours as needed for mild pain 100 tablet  Past Month at Unknown time     No current outpatient prescriptions on file.         ALLERGIES  No Known Allergies    PHYSICAL EXAMINATION:  /79  Temp 98.3  F (36.8  C) (Oral)  Resp 23  Ht 1.778 m (5' 10\")  Wt 70.7 kg (155 lb 13.8 oz)  SpO2 97%  BMI 22.36 " kg/m2  Constitutional: Pleasant middle aged gentleman lying in bed. Awake, alert, cooperative.  HEENT: NCAT. Anicteric sclera. Oral mucosa pink and moist.  Respiratory: Non-labored breathing, good air exchange, lungs clear to auscultation bilaterally.  Cardiovascular: Regular rate and rhythm. No murmur or rub.   GI: Abdomen soft, non-distended, and non-tender.  Extremities: Grossly normal, non-tender, no edema..  Neuro: AAOX3, no focal deficits noted    DATA:  Recent Labs   Lab Test  11/10/17   0420  11/09/17   2240   10/22/17   0716  10/20/17   1559  10/20/17   0320   NA  143  141   < >  137   --   139   POTASSIUM  4.7  4.3   < >  3.6   --   3.8   CHLORIDE  114*  113*   < >  106   --   111*   CO2  25  22   < >  23   --   22   ANIONGAP  4  6   < >  8   --   6   BUN  25  31*   < >  6*   --   12   CR  0.66  0.69   < >  0.72   --   0.68   GLC  132*  169*   < >  137*   --   102*   JULIO CESAR  8.0*  8.2*   < >  8.6   --   7.1*   MAG   --    --    --   2.1   --   1.9   PHOS   --    --    --   2.9  2.4*  1.8*    < > = values in this interval not displayed.     Recent Labs   Lab Test  11/10/17   0415  11/09/17 2240 11/09/17 1448   10/17/17   1630  05/04/15   2002   WBC  6.5  7.3  8.7   < >  15.7*  11.5*   HGB  7.0*  7.7*  5.6*   < >  3.5*  17.0   PLT  177  226  300   < >  379  190   MCV  83  84  81   < >  67*  89   NEUTROPHIL   --    --   85.3   --   77.2  64.3    < > = values in this interval not displayed.     Recent Labs   Lab Test  11/09/17 2240 11/09/17 1448  10/20/17   0102   BILITOTAL  0.7  0.2  0.5   ALKPHOS  55  62  43   ALT  10  10  9   AST  9  6  7   ALBUMIN  2.9*  2.8*  2.0*     No results found for: TSH]    Results for orders placed or performed during the hospital encounter of 11/09/17   CT Abdomen Pelvis w Contrast    Narrative    CT ABDOMEN AND PELVIS WITH CONTRAST 11/9/2017 3:56 PM    HISTORY: History of stomach cancer. Recurrent black stools and new  left flank pain.    TECHNIQUE: Helical axial scans  from dome of liver through pubic  symphysis with 83 mL Isovue-370 IV contrast. Radiation dose for this  scan was reduced using automated exposure control, adjustment of the  mA and/or kV according to patient size, or iterative reconstruction  technique.    COMPARISON: 10/20/2017.    FINDINGS: Again seen is a large benign cavernous hemangioma in the  posterior right lobe of the liver which is not significantly changed  since the prior exam and was reported stable since 5/4/2015. There are  a few benign stable cysts in the left lobe of the liver, one  anteriorly in the lateral segment and another posteriorly in the  medial segment of the left lobe. There is a more ill-defined lucency  in the mid lateral right lobe of the liver measuring 1 cm which is  unchanged since the most recent prior exam but is new since prior exam  of 5/4/2015. This remains nonspecific. The spleen, pancreas, bilateral  adrenal glands and kidneys bilaterally show no acute abnormality and  are unchanged. Few small calcifications in the tail of the pancreas  may be related to chronic pancreatitis.    Again seen is a large ulceration at the junction of the cardia and  lesser curvature of the stomach, probably not significantly changed.  Ill-defined induration of adjacent fat inferior to the area of the  ulcer is not significantly changed and may represent tumor  infiltration. There are a few slightly enlarged retroperitoneal lymph  nodes adjacent to this region (image 21 of series 2) not significantly  changed and presumably representing metastatic disease. Mild  gastrohepatic ligament and portacaval adenopathy is also seen and  appears stable. There is no new adenopathy. Mild vascular  calcifications are seen. The remainder of the bowel and mesentery in  the upper abdomen are unremarkable.    Scans through the pelvis show no acute abnormality. There is extensive  diverticulosis in the distal descending and sigmoid portions of the  colon. Colonic  "wall thickening in the sigmoid region is likely related  to diverticulosis alone and has been present since 5/4/2015. No  evidence for acute diverticulitis. No free fluid. Moderately enlarged  prostate gland. Previously seen Michel catheter in the urinary bladder  has been removed. The appendix is not definitely identified but there  is no CT evidence for appendicitis.      Impression    IMPRESSION:  1. Multiple lesions in the liver which all appear stable since the  most recent prior exam, including a large hemangioma in the right  lobe. Other lesions appear to be cysts and are unchanged since  multiple prior exams. A 1 cm ill-defined lucency in the mid right lobe  is unchanged since 10/20/2017 but was new at that time and is  nonspecific. A metastatic lesion is not excluded.  2. Large gastric ulceration is again noted. Mild upper abdominal  adenopathy as described above.  3. Extensive sigmoid diverticulosis with chronic benign colonic wall  thickening.    SERA MORRELL MD       IMPRESSION:  # Diffuse-type, Her2 negative,Signet Ring Cell Gastric Adenocarcinoma, Stage 4  # Upper GI Bleed  Patient presented with recurrent GIB related to recent diagnosis gastric adenocarcinoma. Since diagnosis he has continued to smoke, but has mostly abstained from alcohol with the exception of \"2 shots\" earlier this week.  Patient has started palliative FOLFOX managed by Dr. Healy at [facility] and seems to be tolerating the treatment well. The hope is that the chemotherapy will reduce the size of his tumor and allow the gastric mucosa to heal, preventing future bleeds. This will take some time to work, however. Discussed the importance of diet modification and need to abstain from alcohol or other substances that increase the acidity of the stomach. Also recommended continuation of PPI, as well as the possible addition of carafate. We would recommend that IR embolization be considered as an absolute last resorts, as it could " increase the risk of gastric necrosis and perforation.    RECS:  - PPI drip  - Once stable: IV PPI BID   - Upon Discharge: Continue PTA regimen PPI  - Consider carafate  - Consider IR and rad onc consult if further bleeding  - Diet modification  - Smoking and EtOH cessation    Rasheed Tang, MS4, served as scribe for this encounter. I have personally seen and evaluated the patient today. I have reviewed the available data and medical records. I agree with the findings and plan of care as documented in the note by Rasheed Tang, MS4      Discussed w/ Dr. Chiki Healy MD, PhD  Hem/Onc fellow

## 2017-11-10 NOTE — CONSULTS
GASTROENTEROLOGY CONSULTATION      Date of Admission:  11/9/2017  Date of Service:   11/10/2017          ASSESSMENT AND RECOMMENDATIONS:   Assessment:  Dashawn Ordoñez is a 57 year old male with poorly differentiated gastric cancer, HTN, DM, and alcohol and tobacco dependence who is admitted with melena and acute anemia.  Expect melena is related to known gastric cancer.  Based on results of prior endoscopy and imaging, it is unlikely that there is a intervenable lesion, considering large, ulcerated mass.  Mr. Ordoñez will likely continue to have GI bleeding, associated with his cancer and treatment.  Discussed propensity for recurrent bleeding with Mr. Ordoñez.  For ongoing or recurrent melena, plan for supportive care with hemoglobin checks and transfusion, as needed.     Recommendations  - Repeat endoscopy likely to be of little benefit considering large, ulcerated, gastric mass  - Consider CBC every 1-2 weeks, outpatient transfusion orders if symptomatic or Hgb <8.  - Consider ferritin + iron profile checked every 1-2 months, consider IV iron replacement if ferritin <20 and Hgb <10 (particularly if microcytic).  - Consider daily PO iron supplementation (Fe gluconate 324mg PO BID).  - Will need regular follow-up with PCP/oncology for ongoing monitoring.     Inpatient GI consults service will sign off. If primary team has addition questions, please page consult fellow listed in Amion.  No outpatient GI clinic follow-up indicated. Follow-up with primary care provider at timing determined by discharge physician.  If outpatient GI follow-up is felt indicated by primary care, they may coordinate this by placing new GI referral and contacting General GI clinic - ([phone])    Thank you for involving us in this patient's care. Please do not hesitate to contact the GI service with any questions or concerns.     Pt care plan discussed with Dr. Yeung, GI staff physician.    Daniella Oh MD  GI  Fellow  [phone]    ATTENDING ATTESTATION:    REFERRING PROVIDER: Karl  DATE SEEN: 11/10/17    Patient was discussed, seen, and examined by me, Filippo Yeung. The plan of care and pertinent data/imaging were also reviewed with the GI Consult team and GI Fellow as well. Agree with the joint assessment and plan as delineated above.    Please contact me with any further questions.    Filippo Yeung MD    [facility] - Department of Medicine  Division of Gastroenterology  [phone]    -------------------------------------------------------------------------------------------------------------------          Reason for Consult:   We were asked by Dr. Gonzalez to evaluate this patient with gastric cancer    History is obtained from the patient and the medical record.          History of Present Illness:     Dashawn Ordoñez is a 57 year old male with poorly differentiated gastric cancer, HTN, DM, and alcohol and tobacco dependence who is admitted with melena and acute anemia.  He reports feeling at baseline while getting chemotherapy 11/6-11/8 and then developing dizziness and black, tarry stools the night of 11/8.  Says he felt lightheaded and weak, but denies CP, SOB, syncope, or palpitations.  Reports similar episode prior to diagnosis of gastric cancer.  Had been having brown, formed stool prior to 11/8. Had some associated abdominal pain that night, but this has since resolved.  Says he has only intermittent abdominal pain associated with cancer.  Denies n/v.  Denies recent fevers, chills, BRBPR.  Symptoms resolved following transfusion, and Mr. Ordoñez says he feels well now.  Denies pain, n/v, and lightheadedness.  He says he knows his family and his wife are very anxious about his new cancer diagnosis.  However, he says he views it as a blessing, because he has notice and is able to address issues and make peace with people before he dies.           Review of Systems:   A 10 point  review of systems was performed and is negative except as noted in the HPI           Past Medical History:   Reviewed and edited as appropriate  Past Medical History:   Diagnosis Date     Cancer (H)      Depressive disorder      Diabetes (H)      Hypertension      Stomach cancer (H)      Substance abuse             Past Surgical History:   Reviewed and edited as appropriate   Past Surgical History:   Procedure Laterality Date     APPENDECTOMY       ENT SURGERY       ESOPHAGOSCOPY, GASTROSCOPY, DUODENOSCOPY (EGD), COMBINED N/A 10/18/2017    Procedure: COMBINED ESOPHAGOSCOPY, GASTROSCOPY, DUODENOSCOPY (EGD), BIOPSY SINGLE OR MULTIPLE;  ESOPHAGOSCOPY, GASTROSCOPY, DUODENOSCOPY (EGD) with biopsies using cold forceps;  Surgeon: Filippo Mclean MD;  Location:  GI            Previous Endoscopy:   No results found for this or any previous visit.         Social History:   Reviewed and edited as appropriate  Social History     Social History     Marital status:      Spouse name: N/A     Number of children: N/A     Years of education: N/A     Occupational History     Not on file.     Social History Main Topics     Smoking status: Current Every Day Smoker     Packs/day: 2.00     Smokeless tobacco: Never Used     Alcohol use Yes      Comment: occasionally      Drug use: No     Sexual activity: Yes     Other Topics Concern     Not on file     Social History Narrative            Family History:   Reviewed and edited as appropriate  No family history on file.        Allergies:   Reviewed and edited as appropriate   No Known Allergies         Medications:     Prescriptions Prior to Admission   Medication Sig Dispense Refill Last Dose     omeprazole (PRILOSEC) 40 MG capsule Take 1 capsule (40 mg) by mouth 2 times daily (before meals) 30 capsule 1 11/2/2017 at Unknown time     acetaminophen (TYLENOL) 325 MG tablet Take 2 tablets (650 mg) by mouth every 6 hours as needed for mild pain 100 tablet  Past Month at Unknown  "time             Physical Exam:   /81 (BP Location: Right arm)  Pulse 89  Temp 98  F (36.7  C) (Oral)  Resp 16  Ht 1.778 m (5' 10\")  Wt 70.7 kg (155 lb 13.8 oz)  SpO2 98%  BMI 22.36 kg/m2  Wt:   Wt Readings from Last 2 Encounters:   11/10/17 70.7 kg (155 lb 13.8 oz)   11/09/17 74.8 kg (165 lb)        Constitutional: Cooperative, not dyspneic/diaphoretic, no acute distress  Eyes: Sclera anicteric  Ears/nose/mouth/throat: Mucus membranes moist, hearing intact  Neck: Supple  CV: RRR, no edema  Respiratory: Unlabored breathing  Abd: Soft, nontender, nondistended, no peritoneal signs  Skin: Warm, perfused, no jaundice  Neuro: AAO  Psych: Normal affect  MSK: No gross deformities           Data:   All available labs, imaging, and procedure notes were independently reviewed and interpreted, pertinent values are as follow:    BMP  Recent Labs  Lab 11/10/17  0420 11/09/17  2240 11/09/17  1448    141 140   POTASSIUM 4.7 4.3 4.6   CHLORIDE 114* 113* 110*   JULIO CESAR 8.0* 8.2* 8.4*   CO2 25 22 21   BUN 25 31* 47*   CR 0.66 0.69 0.70   * 169* 245*     CBC  Recent Labs  Lab 11/10/17  0415 11/09/17 2240 11/09/17  1448   WBC 6.5 7.3 8.7   RBC 2.64* 2.89* 2.28*   HGB 7.0* 7.7* 5.6*   HCT 21.8* 24.2* 18.5*   MCV 83 84 81   MCH 26.5 26.6 24.6*   MCHC 32.1 31.8 30.3*   RDW 20.7* 20.9* 23.5*    226 300     INR  Recent Labs  Lab 11/10/17  0420 11/09/17  2240 11/09/17  1448   INR 1.13 1.12 1.20*     LFTs  Recent Labs  Lab 11/09/17 2240 11/09/17  1448   ALKPHOS 55 62   AST 9 6   ALT 10 10   BILITOTAL 0.7 0.2   PROTTOTAL 5.5* 5.5*   ALBUMIN 2.9* 2.8*      PANC  Recent Labs  Lab 11/09/17  1448   LIPASE 155         "

## 2017-11-10 NOTE — PROGRESS NOTES
"CLINICAL NUTRITION SERVICES - ASSESSMENT NOTE     Nutrition Prescription    RECOMMENDATIONS FOR MDs/PROVIDERS TO ORDER:  -If diet cannot advance in the next 2-3 days, would recommend initiating nutrition support (enteral nutrition)  -If enteral nutrition is indicated, please send appropriate consult    Malnutrition Status:    Severe malnutrition in the context of acute illness    Recommendations already ordered by Registered Dietitian (RD):  None at this time    Future/Additional Recommendations:  -If once diet advances, PO intake is poor, offer scheduled ONS and start kcal ct    -If EN is indicated, would recommend: TwoCal HN @ 40 mL/hr (960 mL/day) to provide 1920 kcals (27 kcal/kg/day), 81 g PRO (1.1 g/kg/day), 672 mL H2O, 210 g CHO and 5 g Fiber daily. + 2 packets Prosource daily (80 kcal, 22 g PRO) + 15 mL liquid MVI for micronutrient needs + 30 mL water flush q4h for tube patency     REASON FOR ASSESSMENT  Dashawn Ordoñez is a/an 57 year old male assessed by the dietitian for Admission Nutrition Risk Screen for unintentional loss of 10# or more in the past two months and Provider Order \"pt interested in nutrition in light of cancer diagnosis and prior ETOH use. Might be limited to full liqudi diet for a week.\"    NUTRITION HISTORY  Pt reports no decrease in appetite PTA or now. He states he's lost # over the past few years, initially intentionally r/t T2DM dx but most recently unintentionally r/t new cancer dx. Pt feels like food and beverages get stuck in his throat sometimes which he knows is r/t gastric cancer dx, he drinks milk and tries to take smaller bites r/t this.    He had questions about what foods he can and can't eat with a bleed gastric ulcer. I answered these questions and also informed him that he can order ONS whenever he would like.    CURRENT NUTRITION ORDERS  Diet: NPO  Intake/Tolerance: N/A    LABS  Labs reviewed    MEDICATIONS  Medications reviewed    ANTHROPOMETRICS  Height: 177.8 cm " "(5' 10\")  Most Recent Weight: 70.7 kg (155 lb 13.8 oz)    IBW: 75.5 kg  BMI: 22; Normal BMI  Weight History:   Wt Readings from Last 10 Encounters:   11/10/17 70.7 kg (155 lb 13.8 oz)   11/09/17 74.8 kg (165 lb)   10/20/17 75.8 kg (167 lb 1.6 oz)   10/19/17 80 kg (176 lb 5.9 oz)   09/13/16 74.8 kg (165 lb)   06/27/14 94 kg (207 lb 3.7 oz)   27 kg, 28% weight loss over the past year per pt  Dosing Weight: 71 kg (actual based on lowest admit wt of 70.7 kg on 11/10, IBW = 75.5 kg)    ASSESSED NUTRITION NEEDS  Estimated Energy Needs: 4135-5956 kcals/day (25 - 30 kcals/kg)  Justification: Maintenance  Estimated Protein Needs:  grams protein/day (1.2 - 1.5 grams of pro/kg)  Justification: Increased needs  Estimated Fluid Needs: (1 mL/kcal)   Justification: Per provider pending fluid status    PHYSICAL FINDINGS  See malnutrition section below.  Poor skin turgor   Skin redness    MALNUTRITION  % Intake: No decreased intake noted  % Weight Loss: > 20% in 1 year (severe)  Subcutaneous Fat Loss: Facial region, Upper arm, Lower arm and Thoracic/intercostal:  Mild-Moderate  Muscle Loss: Temporal, Facial & jaw region, Thoracic region (clavicle, acromium bone, deltoid, trapezius, pectoral), Upper arm (bicep, tricep), Lower arm  (forearm), Upper leg (quadricep, hamstring), Patellar region and Posterior calf:  Moderate  Fluid Accumulation/Edema: None noted  Malnutrition Diagnosis: Severe malnutrition in the context of acute illness    NUTRITION DIAGNOSIS  Inadequate protein-energy intake related to bleeding gastric ulcer as evidenced by NPO day 1 with diet unlikely to advance today    INTERVENTIONS  Implementation  Nutrition education for nutrition relationship to health/disease   Collaboration with other providers - MICU rounds  Enteral Nutrition - Recs     Goals  Diet adv v nutrition support within 2-3 days.  Patient to consume % of nutritionally adequate meal trays TID, or the equivalent with supplements/snacks.   "   Monitoring/Evaluation  Progress toward goals will be monitored and evaluated per protocol.    Madie Shea RDN, LD  Pgr: [phone]

## 2017-11-11 LAB
ALBUMIN SERPL-MCNC: 2.8 G/DL (ref 3.4–5)
ALP SERPL-CCNC: 45 U/L (ref 40–150)
ALT SERPL W P-5'-P-CCNC: 10 U/L (ref 0–70)
ANION GAP SERPL CALCULATED.3IONS-SCNC: 7 MMOL/L (ref 3–14)
AST SERPL W P-5'-P-CCNC: 11 U/L (ref 0–45)
BASOPHILS # BLD AUTO: 0 10E9/L (ref 0–0.2)
BASOPHILS NFR BLD AUTO: 0.7 %
BILIRUB SERPL-MCNC: 0.7 MG/DL (ref 0.2–1.3)
BUN SERPL-MCNC: 13 MG/DL (ref 7–30)
CALCIUM SERPL-MCNC: 8.1 MG/DL (ref 8.5–10.1)
CHLORIDE SERPL-SCNC: 108 MMOL/L (ref 94–109)
CO2 SERPL-SCNC: 24 MMOL/L (ref 20–32)
CREAT SERPL-MCNC: 0.65 MG/DL (ref 0.66–1.25)
DIFFERENTIAL METHOD BLD: ABNORMAL
EOSINOPHIL # BLD AUTO: 0.2 10E9/L (ref 0–0.7)
EOSINOPHIL NFR BLD AUTO: 3.7 %
ERYTHROCYTE [DISTWIDTH] IN BLOOD BY AUTOMATED COUNT: 19.2 % (ref 10–15)
FIBRINOGEN PPP-MCNC: 244 MG/DL (ref 200–420)
GFR SERPL CREATININE-BSD FRML MDRD: >90 ML/MIN/1.7M2
GLUCOSE BLDC GLUCOMTR-MCNC: 109 MG/DL (ref 70–99)
GLUCOSE BLDC GLUCOMTR-MCNC: 115 MG/DL (ref 70–99)
GLUCOSE BLDC GLUCOMTR-MCNC: 129 MG/DL (ref 70–99)
GLUCOSE BLDC GLUCOMTR-MCNC: 145 MG/DL (ref 70–99)
GLUCOSE BLDC GLUCOMTR-MCNC: 150 MG/DL (ref 70–99)
GLUCOSE BLDC GLUCOMTR-MCNC: 99 MG/DL (ref 70–99)
GLUCOSE SERPL-MCNC: 87 MG/DL (ref 70–99)
HCT VFR BLD AUTO: 22.9 % (ref 40–53)
HGB BLD-MCNC: 7.3 G/DL (ref 13.3–17.7)
IMM GRANULOCYTES # BLD: 0 10E9/L (ref 0–0.4)
IMM GRANULOCYTES NFR BLD: 0 %
INR PPP: 1.19 (ref 0.86–1.14)
LYMPHOCYTES # BLD AUTO: 1.3 10E9/L (ref 0.8–5.3)
LYMPHOCYTES NFR BLD AUTO: 29.5 %
MCH RBC QN AUTO: 26.4 PG (ref 26.5–33)
MCHC RBC AUTO-ENTMCNC: 31.9 G/DL (ref 31.5–36.5)
MCV RBC AUTO: 83 FL (ref 78–100)
MONOCYTES # BLD AUTO: 0.1 10E9/L (ref 0–1.3)
MONOCYTES NFR BLD AUTO: 3.1 %
NEUTROPHILS # BLD AUTO: 2.9 10E9/L (ref 1.6–8.3)
NEUTROPHILS NFR BLD AUTO: 63 %
NRBC # BLD AUTO: 0 10*3/UL
NRBC BLD AUTO-RTO: 0 /100
PLATELET # BLD AUTO: 161 10E9/L (ref 150–450)
POTASSIUM SERPL-SCNC: 3.8 MMOL/L (ref 3.4–5.3)
PROT SERPL-MCNC: 5 G/DL (ref 6.8–8.8)
RBC # BLD AUTO: 2.77 10E12/L (ref 4.4–5.9)
SODIUM SERPL-SCNC: 139 MMOL/L (ref 133–144)
WBC # BLD AUTO: 4.5 10E9/L (ref 4–11)

## 2017-11-11 PROCEDURE — 25000125 ZZHC RX 250: Performed by: INTERNAL MEDICINE

## 2017-11-11 PROCEDURE — 85025 COMPLETE CBC W/AUTO DIFF WBC: CPT | Performed by: INTERNAL MEDICINE

## 2017-11-11 PROCEDURE — 00000146 ZZHCL STATISTIC GLUCOSE BY METER IP

## 2017-11-11 PROCEDURE — 86900 BLOOD TYPING SEROLOGIC ABO: CPT | Performed by: INTERNAL MEDICINE

## 2017-11-11 PROCEDURE — 36592 COLLECT BLOOD FROM PICC: CPT | Performed by: INTERNAL MEDICINE

## 2017-11-11 PROCEDURE — 12000001 ZZH R&B MED SURG/OB UMMC

## 2017-11-11 PROCEDURE — 25000128 H RX IP 250 OP 636: Performed by: INTERNAL MEDICINE

## 2017-11-11 PROCEDURE — 80053 COMPREHEN METABOLIC PANEL: CPT | Performed by: INTERNAL MEDICINE

## 2017-11-11 PROCEDURE — 25000132 ZZH RX MED GY IP 250 OP 250 PS 637: Performed by: INTERNAL MEDICINE

## 2017-11-11 PROCEDURE — 25000132 ZZH RX MED GY IP 250 OP 250 PS 637: Performed by: CLINICAL NURSE SPECIALIST

## 2017-11-11 PROCEDURE — 99232 SBSQ HOSP IP/OBS MODERATE 35: CPT | Mod: GC | Performed by: INTERNAL MEDICINE

## 2017-11-11 PROCEDURE — 86850 RBC ANTIBODY SCREEN: CPT | Performed by: INTERNAL MEDICINE

## 2017-11-11 PROCEDURE — 85384 FIBRINOGEN ACTIVITY: CPT | Performed by: INTERNAL MEDICINE

## 2017-11-11 PROCEDURE — 85610 PROTHROMBIN TIME: CPT | Performed by: INTERNAL MEDICINE

## 2017-11-11 PROCEDURE — 25000132 ZZH RX MED GY IP 250 OP 250 PS 637: Performed by: STUDENT IN AN ORGANIZED HEALTH CARE EDUCATION/TRAINING PROGRAM

## 2017-11-11 PROCEDURE — 86901 BLOOD TYPING SEROLOGIC RH(D): CPT | Performed by: INTERNAL MEDICINE

## 2017-11-11 RX ORDER — PANTOPRAZOLE SODIUM 40 MG/1
40 TABLET, DELAYED RELEASE ORAL 2 TIMES DAILY
Status: DISCONTINUED | OUTPATIENT
Start: 2017-11-11 | End: 2017-11-12 | Stop reason: HOSPADM

## 2017-11-11 RX ADMIN — PANTOPRAZOLE SODIUM 40 MG: 40 TABLET, DELAYED RELEASE ORAL at 22:42

## 2017-11-11 RX ADMIN — NICOTINE 1 PATCH: 21 PATCH, EXTENDED RELEASE TRANSDERMAL at 10:03

## 2017-11-11 RX ADMIN — MIRTAZAPINE 7.5 MG: 7.5 TABLET, FILM COATED ORAL at 22:42

## 2017-11-11 RX ADMIN — INSULIN ASPART 1 UNITS: 100 INJECTION, SOLUTION INTRAVENOUS; SUBCUTANEOUS at 20:43

## 2017-11-11 RX ADMIN — OXYCODONE HYDROCHLORIDE 5 MG: 5 TABLET ORAL at 20:22

## 2017-11-11 RX ADMIN — SODIUM CHLORIDE: 9 INJECTION, SOLUTION INTRAVENOUS at 02:42

## 2017-11-11 RX ADMIN — PANTOPRAZOLE SODIUM 40 MG: 40 INJECTION, POWDER, FOR SOLUTION INTRAVENOUS at 10:04

## 2017-11-11 NOTE — PLAN OF CARE
Problem: Patient Care Overview  Goal: Individualization & Mutuality  Outcome: No Change     Patient denies pain, no nausea. Patient is voiding and ambulating without difficulty, port infusing, blood sugar 114. Pt has  no signs of bleeding, on previous hospitalization pt had Hgb 3.5, hemoglobin more stable now 7.7. Patient is independent with cares, call light in reach.

## 2017-11-11 NOTE — PLAN OF CARE
Problem: Patient Care Overview  Goal: Plan of Care/Patient Progress Review  AVSS, denies pain. AM labs WNL, Hgb 7.3. Up in halls, independently, visiting with wife. Began diet, tolerated clears lunch. Now advanced to full liquid as discussed by team with patient in am. Note Hgb in am.

## 2017-11-11 NOTE — PLAN OF CARE
Problem: Patient Care Overview  Goal: Plan of Care/Patient Progress Review  Outcome: Improving     VSS. Denies pain or nausea. No signs of bleeding. NPO except ice chips. Blood sugars Q4 hours; 109 and 99. Slept well overnight. Continue with plan of care.

## 2017-11-11 NOTE — PROGRESS NOTES
[facility], [place]  Hematology and Hematology / Oncology Progress Note     Assessment & Plan   Dashawn Ordoñez is a 57 year old male with relatively newly diagnosed metastatic gastric cancer, signet ring cell type, with liver metadmitted with recurrent UGI bleed from large gastric tumor. C1 chemo (not sure what kind, but definitely involved 5-FU) 11/6 ~ 11/8.    # UGI bleed   10/18 EGD noted >10cm gastric ulcer which was not endoscopically amenable - potential source of rebleeding; possible bleeding from gastric tumor after having C1 FOLOX. Hemoglobin 5.6 on admission, Hb has been stable (7.3 11/11) after last transfusion 11/10 AM, No acute bleeding episodes noted. On IV PPI (pantoprazole 40 mg IV BID). Given a large gastric ulcer, not fixable by endoscopy; if significant bleeding recurs, will consider IR consult for coil embolization. Emphasized that he needs to stay away from ETOH use or NSAIDs. No other coagulopathy (normal fibrinogen).  - Start CLD today and advance diet accordingly  - plan to change to omeprazole 20 mg bid tomorrow if no further bleeding  - if no bleeding, tolerating PO and stable Hb, will D/C home tomorrow    # Blood loss, microcytic anemia: Microcytic. Undoubtedly was iron deficient before. No ferritin or iron studies in our system. Got about 10-12 units of PRBC in the last month so not likely to be iron deficient.  - recommend checking ferritin as OP once things settle down    # Metastatic gastric cancer:   C1 FOLFOX 11/6 ~ 11/8 from Dr. Healy's office ([facility] [place]), next follow-up on 11/20      Chronic problems  # Hyperglycemia, DM2; SSI  # ETOH abuse: Says last known drink was two swallows of rum on Monday. Discussed need to stop due to direct effect on bleeding risk. Does not think he'll have issues stopping. Monitor for withdrawal  # Insomnia: Long term problem. Palliative Care for input into long term management  #Tobacco dependence  - Nicotine  patch    FEN: - CLD and advance diet as tolerates           - Lyte replacement protocol    PPX:  DVT: Mechanical  GI: On PPI as above    Dispo: If no rebleeding, anticipate Sun.    The patient was seen and care plans discussed with Dr. Casas.    Se brandt Euceda MD  [facility]  Hematology oncology and transplantation fellow  Pager [phone]    Interval History   Doing fine. No overnight bleeding (had one BM - lighter than before w/o fresh blood or melena). No abd pain. No N/V. No F/V. Denies any dizzy with positions or SOB.    Physical Exam   Temp: 97.6  F (36.4  C) Temp src: Oral BP: 128/77 Pulse: 89 Heart Rate: 74 Resp: 16 SpO2: 98 % O2 Device: None (Room air)    Vitals:    11/09/17 2215 11/10/17 0000   Weight: 70.7 kg (155 lb 13.8 oz) 70.7 kg (155 lb 13.8 oz)     Vital Signs with Ranges  Temp:  [97.4  F (36.3  C)-98  F (36.7  C)] 97.6  F (36.4  C)  Pulse:  [89] 89  Heart Rate:  [] 74  Resp:  [14-18] 16  BP: (124-148)/(75-82) 128/77  SpO2:  [97 %-100 %] 98 %  I/O last 3 completed shifts:  In: 2932.92 [I.V.:2595.42]  Out: 2200 [Urine:1850; Emesis/NG output:350]    GEN: NAD comfortably lying in a bed  HEENT: no icterus/injection/pallor  LUNGS: CTA BL  CV: RRR, no m/r/g  ABD: soft, NT/ND, quiet bowel sounds  EXT: warm, no edema  NEURO: AAOx3    Medications        pantoprazole  40 mg Intravenous BID     mirtazapine  7.5 mg Oral At Bedtime     insulin aspart  1-6 Units Subcutaneous Q4H     nicotine   Transdermal Q8H     nicotine   Transdermal Daily     nicotine  1 patch Transdermal Daily     Data   Results for orders placed or performed during the hospital encounter of 11/09/17 (from the past 24 hour(s))   Glucose by meter   Result Value Ref Range    Glucose 115 (H) 70 - 99 mg/dL   Hemoglobin   Result Value Ref Range    Hemoglobin 7.7 (L) 13.3 - 17.7 g/dL   Glucose by meter   Result Value Ref Range    Glucose 114 (H) 70 - 99 mg/dL   Glucose by meter   Result Value Ref Range    Glucose 109 (H) 70 -  99 mg/dL   Glucose by meter   Result Value Ref Range    Glucose 99 70 - 99 mg/dL   Comprehensive metabolic panel   Result Value Ref Range    Sodium 139 133 - 144 mmol/L    Potassium 3.8 3.4 - 5.3 mmol/L    Chloride 108 94 - 109 mmol/L    Carbon Dioxide 24 20 - 32 mmol/L    Anion Gap 7 3 - 14 mmol/L    Glucose 87 70 - 99 mg/dL    Urea Nitrogen 13 7 - 30 mg/dL    Creatinine 0.65 (L) 0.66 - 1.25 mg/dL    GFR Estimate >90 >60 mL/min/1.7m2    GFR Estimate If Black >90 >60 mL/min/1.7m2    Calcium 8.1 (L) 8.5 - 10.1 mg/dL    Bilirubin Total 0.7 0.2 - 1.3 mg/dL    Albumin 2.8 (L) 3.4 - 5.0 g/dL    Protein Total 5.0 (L) 6.8 - 8.8 g/dL    Alkaline Phosphatase 45 40 - 150 U/L    ALT 10 0 - 70 U/L    AST 11 0 - 45 U/L   Fibrinogen activity (AM Draw)   Result Value Ref Range    Fibrinogen 244 200 - 420 mg/dL   INR   Result Value Ref Range    INR 1.19 (H) 0.86 - 1.14   CBC with platelets differential   Result Value Ref Range    WBC 4.5 4.0 - 11.0 10e9/L    RBC Count 2.77 (L) 4.4 - 5.9 10e12/L    Hemoglobin 7.3 (L) 13.3 - 17.7 g/dL    Hematocrit 22.9 (L) 40.0 - 53.0 %    MCV 83 78 - 100 fl    MCH 26.4 (L) 26.5 - 33.0 pg    MCHC 31.9 31.5 - 36.5 g/dL    RDW 19.2 (H) 10.0 - 15.0 %    Platelet Count 161 150 - 450 10e9/L    Diff Method Automated Method     % Neutrophils 63.0 %    % Lymphocytes 29.5 %    % Monocytes 3.1 %    % Eosinophils 3.7 %    % Basophils 0.7 %    % Immature Granulocytes 0.0 %    Nucleated RBCs 0 0 /100    Absolute Neutrophil 2.9 1.6 - 8.3 10e9/L    Absolute Lymphocytes 1.3 0.8 - 5.3 10e9/L    Absolute Monocytes 0.1 0.0 - 1.3 10e9/L    Absolute Eosinophils 0.2 0.0 - 0.7 10e9/L    Absolute Basophils 0.0 0.0 - 0.2 10e9/L    Abs Immature Granulocytes 0.0 0 - 0.4 10e9/L    Absolute Nucleated RBC 0.0    Glucose by meter   Result Value Ref Range    Glucose 129 (H) 70 - 99 mg/dL

## 2017-11-12 VITALS
HEIGHT: 70 IN | TEMPERATURE: 97.1 F | BODY MASS INDEX: 22.31 KG/M2 | DIASTOLIC BLOOD PRESSURE: 80 MMHG | WEIGHT: 155.87 LBS | HEART RATE: 88 BPM | RESPIRATION RATE: 14 BRPM | SYSTOLIC BLOOD PRESSURE: 130 MMHG | OXYGEN SATURATION: 99 %

## 2017-11-12 LAB
ABO + RH BLD: NORMAL
ABO + RH BLD: NORMAL
ALBUMIN SERPL-MCNC: 3 G/DL (ref 3.4–5)
ALP SERPL-CCNC: 52 U/L (ref 40–150)
ALT SERPL W P-5'-P-CCNC: 13 U/L (ref 0–70)
ANION GAP SERPL CALCULATED.3IONS-SCNC: 7 MMOL/L (ref 3–14)
AST SERPL W P-5'-P-CCNC: 8 U/L (ref 0–45)
BASOPHILS # BLD AUTO: 0.1 10E9/L (ref 0–0.2)
BASOPHILS NFR BLD AUTO: 0.9 %
BILIRUB SERPL-MCNC: 0.6 MG/DL (ref 0.2–1.3)
BLD GP AB SCN SERPL QL: NORMAL
BLD PROD TYP BPU: NORMAL
BLD PROD TYP BPU: NORMAL
BLD UNIT ID BPU: 0
BLOOD BANK CMNT PATIENT-IMP: NORMAL
BLOOD PRODUCT CODE: NORMAL
BPU ID: NORMAL
BUN SERPL-MCNC: 12 MG/DL (ref 7–30)
CALCIUM SERPL-MCNC: 8.6 MG/DL (ref 8.5–10.1)
CHLORIDE SERPL-SCNC: 105 MMOL/L (ref 94–109)
CO2 SERPL-SCNC: 25 MMOL/L (ref 20–32)
CREAT SERPL-MCNC: 0.76 MG/DL (ref 0.66–1.25)
DIFFERENTIAL METHOD BLD: ABNORMAL
EOSINOPHIL # BLD AUTO: 0.1 10E9/L (ref 0–0.7)
EOSINOPHIL NFR BLD AUTO: 2.6 %
ERYTHROCYTE [DISTWIDTH] IN BLOOD BY AUTOMATED COUNT: 19.7 % (ref 10–15)
GFR SERPL CREATININE-BSD FRML MDRD: >90 ML/MIN/1.7M2
GLUCOSE BLDC GLUCOMTR-MCNC: 101 MG/DL (ref 70–99)
GLUCOSE BLDC GLUCOMTR-MCNC: 136 MG/DL (ref 70–99)
GLUCOSE BLDC GLUCOMTR-MCNC: 164 MG/DL (ref 70–99)
GLUCOSE SERPL-MCNC: 103 MG/DL (ref 70–99)
HCT VFR BLD AUTO: 22.8 % (ref 40–53)
HGB BLD-MCNC: 7.3 G/DL (ref 13.3–17.7)
IMM GRANULOCYTES # BLD: 0 10E9/L (ref 0–0.4)
IMM GRANULOCYTES NFR BLD: 0.2 %
LYMPHOCYTES # BLD AUTO: 1.5 10E9/L (ref 0.8–5.3)
LYMPHOCYTES NFR BLD AUTO: 27 %
MCH RBC QN AUTO: 26.4 PG (ref 26.5–33)
MCHC RBC AUTO-ENTMCNC: 32 G/DL (ref 31.5–36.5)
MCV RBC AUTO: 83 FL (ref 78–100)
MONOCYTES # BLD AUTO: 0.3 10E9/L (ref 0–1.3)
MONOCYTES NFR BLD AUTO: 4.8 %
NEUTROPHILS # BLD AUTO: 3.5 10E9/L (ref 1.6–8.3)
NEUTROPHILS NFR BLD AUTO: 64.5 %
NRBC # BLD AUTO: 0 10*3/UL
NRBC BLD AUTO-RTO: 0 /100
NUM BPU REQUESTED: 2
PLATELET # BLD AUTO: 136 10E9/L (ref 150–450)
POTASSIUM SERPL-SCNC: 3.8 MMOL/L (ref 3.4–5.3)
PROT SERPL-MCNC: 5.5 G/DL (ref 6.8–8.8)
RBC # BLD AUTO: 2.76 10E12/L (ref 4.4–5.9)
SODIUM SERPL-SCNC: 138 MMOL/L (ref 133–144)
SPECIMEN EXP DATE BLD: NORMAL
TRANSFUSION STATUS PATIENT QL: NORMAL
TRANSFUSION STATUS PATIENT QL: NORMAL
WBC # BLD AUTO: 5.4 10E9/L (ref 4–11)

## 2017-11-12 PROCEDURE — 36592 COLLECT BLOOD FROM PICC: CPT | Performed by: INTERNAL MEDICINE

## 2017-11-12 PROCEDURE — 00000146 ZZHCL STATISTIC GLUCOSE BY METER IP

## 2017-11-12 PROCEDURE — 25000132 ZZH RX MED GY IP 250 OP 250 PS 637: Performed by: INTERNAL MEDICINE

## 2017-11-12 PROCEDURE — P9016 RBC LEUKOCYTES REDUCED: HCPCS | Performed by: STUDENT IN AN ORGANIZED HEALTH CARE EDUCATION/TRAINING PROGRAM

## 2017-11-12 PROCEDURE — 80053 COMPREHEN METABOLIC PANEL: CPT | Performed by: INTERNAL MEDICINE

## 2017-11-12 PROCEDURE — 25000128 H RX IP 250 OP 636: Performed by: INTERNAL MEDICINE

## 2017-11-12 PROCEDURE — 99239 HOSP IP/OBS DSCHRG MGMT >30: CPT | Mod: GC | Performed by: INTERNAL MEDICINE

## 2017-11-12 PROCEDURE — 85025 COMPLETE CBC W/AUTO DIFF WBC: CPT | Performed by: INTERNAL MEDICINE

## 2017-11-12 RX ORDER — HEPARIN SODIUM,PORCINE 10 UNIT/ML
5-10 VIAL (ML) INTRAVENOUS EVERY 24 HOURS
Status: DISCONTINUED | OUTPATIENT
Start: 2017-11-12 | End: 2017-11-12 | Stop reason: HOSPADM

## 2017-11-12 RX ORDER — OMEPRAZOLE 40 MG/1
40 CAPSULE, DELAYED RELEASE ORAL
Qty: 30 CAPSULE | Refills: 3 | Status: SHIPPED | OUTPATIENT
Start: 2017-11-12

## 2017-11-12 RX ORDER — HEPARIN SODIUM (PORCINE) LOCK FLUSH IV SOLN 100 UNIT/ML 100 UNIT/ML
5 SOLUTION INTRAVENOUS
Status: DISCONTINUED | OUTPATIENT
Start: 2017-11-12 | End: 2017-11-12 | Stop reason: HOSPADM

## 2017-11-12 RX ORDER — HEPARIN SODIUM,PORCINE 10 UNIT/ML
5-10 VIAL (ML) INTRAVENOUS
Status: DISCONTINUED | OUTPATIENT
Start: 2017-11-12 | End: 2017-11-12 | Stop reason: HOSPADM

## 2017-11-12 RX ORDER — MIRTAZAPINE 7.5 MG/1
7.5 TABLET, FILM COATED ORAL
Qty: 30 TABLET | Refills: 0 | Status: SHIPPED | OUTPATIENT
Start: 2017-11-12

## 2017-11-12 RX ORDER — NICOTINE 21 MG/24HR
1 PATCH, TRANSDERMAL 24 HOURS TRANSDERMAL DAILY
Qty: 30 PATCH | Refills: 1 | Status: ON HOLD | OUTPATIENT
Start: 2017-11-12 | End: 2017-12-21

## 2017-11-12 RX ADMIN — PANTOPRAZOLE SODIUM 40 MG: 40 TABLET, DELAYED RELEASE ORAL at 08:41

## 2017-11-12 RX ADMIN — SODIUM CHLORIDE, PRESERVATIVE FREE 5 ML: 5 INJECTION INTRAVENOUS at 13:32

## 2017-11-12 RX ADMIN — INSULIN ASPART 1 UNITS: 100 INJECTION, SOLUTION INTRAVENOUS; SUBCUTANEOUS at 08:41

## 2017-11-12 ASSESSMENT — PAIN DESCRIPTION - DESCRIPTORS: DESCRIPTORS: ACHING

## 2017-11-12 NOTE — PLAN OF CARE
Problem: Patient Care Overview  Goal: Individualization & Mutuality  Outcome: No Change     Pain well controlled with current meds, denies nausea. Tolerating Regular diet, pt ate 80% of his dinner meal. Patient is voiding and ambulating without difficulty, port in place, blood sugar 150 covered. Pt has  no signs of bleeding, on previous hospitalization pt had Hgb 3.5, hemoglobin more stable now 7.3. Patient is independent with cares, call light in reach.

## 2017-11-12 NOTE — DISCHARGE SUMMARY
"[facility] Hematology/Oncology Discharge Summary    Dashawn Ordoñez MRN# [id number]   Age: 57 year old YOB: 1960     Date of Admission:  11/9/2017  Date of Discharge:  11/12/2017  Admitting Physician:  Saima Barajas MD  Discharge Physician:  Se brandt Euceda MD (fellow)/Saima Barajas MD    Primary Heme/Oncologist: Dr. Healy ([facility])         Discharge Diagnoses:     Recurrent GI bleeding from likely previous large gastric ulcer (no interventions done, spontaneous resolution with transfusion support)  Recent history of metastatic gastric cancer  Anemia due to acute blood loss  Hx of ETOH abuse  Hx of tobacco abuse       Discharge Physical Exam/Labs:   /74  Pulse 102  Temp 98.5  F (36.9  C)  Resp 18  Ht 1.778 m (5' 10\")  Wt 70.7 kg (155 lb 13.8 oz)  SpO2 100%  BMI 22.36 kg/m2  Vitals:    11/09/17 2215 11/10/17 0000   Weight: 70.7 kg (155 lb 13.8 oz) 70.7 kg (155 lb 13.8 oz)     GEN: NAD comfortably lying in a bed  HEENT: no icterus/injection/pallor  LUNGS: CTA BL  CV: RRR, no m/r/g  ABD: soft, NT/ND, quiet bowel sounds  EXT: warm, no edema  NEURO: AAOx3    CBC  Recent Labs  Lab 11/12/17  0651 11/11/17  0753 11/10/17  2031 11/10/17  0415 11/09/17  2240   WBC 5.4 4.5  --  6.5 7.3   RBC 2.76* 2.77*  --  2.64* 2.89*   HGB 7.3* 7.3* 7.7* 7.0* 7.7*   HCT 22.8* 22.9*  --  21.8* 24.2*   MCV 83 83  --  83 84   MCH 26.4* 26.4*  --  26.5 26.6   MCHC 32.0 31.9  --  32.1 31.8   RDW 19.7* 19.2*  --  20.7* 20.9*   * 161  --  177 226       Recent Labs  Lab 11/11/17  0753 11/10/17  0420 11/09/17  2240 11/09/17  1448   INR 1.19* 1.13 1.12 1.20*            Consultations:   GI consult         Imaging/Procedures:     Results for orders placed or performed during the hospital encounter of 11/09/17   CT Abdomen Pelvis w Contrast    Narrative    CT ABDOMEN AND PELVIS WITH CONTRAST 11/9/2017 3:56 PM    HISTORY: History of stomach cancer. Recurrent black stools and new  left flank " pain.    TECHNIQUE: Helical axial scans from dome of liver through pubic  symphysis with 83 mL Isovue-370 IV contrast. Radiation dose for this  scan was reduced using automated exposure control, adjustment of the  mA and/or kV according to patient size, or iterative reconstruction  technique.    COMPARISON: 10/20/2017.    FINDINGS: Again seen is a large benign cavernous hemangioma in the  posterior right lobe of the liver which is not significantly changed  since the prior exam and was reported stable since 5/4/2015. There are  a few benign stable cysts in the left lobe of the liver, one  anteriorly in the lateral segment and another posteriorly in the  medial segment of the left lobe. There is a more ill-defined lucency  in the mid lateral right lobe of the liver measuring 1 cm which is  unchanged since the most recent prior exam but is new since prior exam  of 5/4/2015. This remains nonspecific. The spleen, pancreas, bilateral  adrenal glands and kidneys bilaterally show no acute abnormality and  are unchanged. Few small calcifications in the tail of the pancreas  may be related to chronic pancreatitis.    Again seen is a large ulceration at the junction of the cardia and  lesser curvature of the stomach, probably not significantly changed.  Ill-defined induration of adjacent fat inferior to the area of the  ulcer is not significantly changed and may represent tumor  infiltration. There are a few slightly enlarged retroperitoneal lymph  nodes adjacent to this region (image 21 of series 2) not significantly  changed and presumably representing metastatic disease. Mild  gastrohepatic ligament and portacaval adenopathy is also seen and  appears stable. There is no new adenopathy. Mild vascular  calcifications are seen. The remainder of the bowel and mesentery in  the upper abdomen are unremarkable.    Scans through the pelvis show no acute abnormality. There is extensive  diverticulosis in the distal descending and  sigmoid portions of the  colon. Colonic wall thickening in the sigmoid region is likely related  to diverticulosis alone and has been present since 5/4/2015. No  evidence for acute diverticulitis. No free fluid. Moderately enlarged  prostate gland. Previously seen Michel catheter in the urinary bladder  has been removed. The appendix is not definitely identified but there  is no CT evidence for appendicitis.      Impression    IMPRESSION:  1. Multiple lesions in the liver which all appear stable since the  most recent prior exam, including a large hemangioma in the right  lobe. Other lesions appear to be cysts and are unchanged since  multiple prior exams. A 1 cm ill-defined lucency in the mid right lobe  is unchanged since 10/20/2017 but was new at that time and is  nonspecific. A metastatic lesion is not excluded.  2. Large gastric ulceration is again noted. Mild upper abdominal  adenopathy as described above.  3. Extensive sigmoid diverticulosis with chronic benign colonic wall  thickening.    SERA MORRELL MD          Discharge Medications:     Current Discharge Medication List      START taking these medications    Details   mirtazapine (REMERON) 7.5 MG TABS tablet Take 1 tablet (7.5 mg) by mouth nightly as needed  Qty: 30 tablet, Refills: 0    Associated Diagnoses: Sleep disorder      nicotine (NICODERM CQ) 21 MG/24HR 24 hr patch Place 1 patch onto the skin daily  Qty: 30 patch, Refills: 1    Associated Diagnoses: Tobacco use disorder         CONTINUE these medications which have CHANGED    Details   omeprazole (PRILOSEC) 40 MG capsule Take 1 capsule (40 mg) by mouth 2 times daily (before meals)  Qty: 30 capsule, Refills: 3    Associated Diagnoses: Acute upper gastrointestinal bleeding         CONTINUE these medications which have NOT CHANGED    Details   acetaminophen (TYLENOL) 325 MG tablet Take 2 tablets (650 mg) by mouth every 6 hours as needed for mild pain  Qty: 100 tablet    Associated Diagnoses:  Gastrointestinal hemorrhage associated with gastric ulcer                Brief History of Illness:   Please refer to the H&P dated 11/9/2017 for more detailed history. Summarized as:    Dashawn Ordoñez is a 57 year old male with relatively newly diagnosed metastatic gastric cancer, signet ring cell type, with liver mets, s/p C1 FOLFOX 11/6 ~ 11/8 from [facility] who was   admitted with recurrent UGI bleed from large gastric tumor.          Hospital Course:     During last hospitalization, EGD 10/18 noted >10cm gastric ulcer which was not endoscopically amenable. It seemed a source of rebleeding although it could be from gastric tumor itself after having C1 FOLOX. Hemoglobin was 5.6 on admission, for this he was admitted to ICU for possible intervention. Given a large gastric ulcer, GI felt it would not be fixable by endoscopy. Thus, monitored Hb serially with transfusion support. The plan was to consider IR consult for coil embolization, if significant bleeding recurs. Hb has been stable after last transfusion 11/10 AM without recurrent acute bleeding noted. He resumed diet and tolerated general diet without recurrent bleeding or new GI symptoms. PPI (pantoprazole 40 mg BID) was switched to PO.We also emphasized that he needs to stay away from ETOH use or NSAIDs. He will follow up with [facility] (Dr. Healy) for blood labs and continuation of next round of chemotherapy.           Discharge Instructions and Follow-Up:   Discharge diet: Regular. Pt instructed to drink plenty of non-caffeinated beverages;  supplement diet with high-calorie snacks or nutritional supplements   (e.g. Boost, Ensure, Resource Breeze) as tolerated to ensure   adequate calorie intake; and notify primary provider if poor appetite,   not eating well, and/or losing weight.    Discharge activity: Activity as tolerated. Pt instructed not to drive or engage in strenuous   activity while taking narcotics, if having headache / dizziness / vision    changes, or if feeling generally unwell.   Discharge follow-up: He will follow up with [facility] (Dr. Healy) for blood labs and continuation of next round of chemotherapy.           Discharge Disposition:   Discharged to home.      Attestation:  I have reviewed today's vital signs, notes, medications, labs and imaging.    The patient was seen and care plans discussed with Dr. Casas.    Se brandt Euceda MD  Hematology Oncology and Transplantation Fellow  Pager: [phone]

## 2017-11-12 NOTE — PLAN OF CARE
Problem: Patient Care Overview  Goal: Plan of Care/Patient Progress Review  Outcome: Improving  AVSS.  99% RA.  Pt with some pain/discomfort, but declined available pain meds.    Denies nausea, tolerating regular diet.  Port HL.  PIV x2 SL.  Up independently.  Voiding good vols.    No BM overnight, no s/s of bleeding - cont to monitor closely.  Yesterday Hgb=7.3.  Check AM labs.  Plan for DC today.

## 2017-11-12 NOTE — PLAN OF CARE
Problem: Patient Care Overview  Goal: Plan of Care/Patient Progress Review  Outcome: Adequate for Discharge Date Met:  11/12/17  DISCHARGE      D: Orders written for discharge to home     A/I: UAL, voiding adequately. Tolerating regular diet. Denies NV, abdominal pain. PIV removed. Port a cath with good blood return. Blood transfusion given with no reaction noted after the completion of the blood transfusion. OVSS, afebrile. Port a cath needle de access. Site CDI. AVS reviewed and appropriate for discharge. Discharge instruction done, pt verbalized understanding of the discharge plan.      PLAN: Discharge to home ambulatory.

## 2017-11-14 ENCOUNTER — TELEPHONE (OUTPATIENT)
Dept: ONCOLOGY | Facility: CLINIC | Age: 57
End: 2017-11-14

## 2017-11-14 NOTE — TELEPHONE ENCOUNTER
Called patient to see how he was doing after discharge. He is doing fine, just having standard low back pain. He had an instance of upper back pain when he woke up this morning but it has since resolved. No signs or symptoms of GI bleed. Has an appointment for labs and a follow up with his oncologist tomorrow. Appreciated the call.    Tamia Ho RN

## 2017-12-19 ENCOUNTER — HOSPITAL ENCOUNTER (EMERGENCY)
Facility: CLINIC | Age: 57
Discharge: ANOTHER HOSPITAL | End: 2017-12-20
Attending: EMERGENCY MEDICINE | Admitting: EMERGENCY MEDICINE
Payer: MEDICAID

## 2017-12-19 DIAGNOSIS — E87.20 ACIDOSIS: ICD-10-CM

## 2017-12-19 DIAGNOSIS — R65.10 SIRS (SYSTEMIC INFLAMMATORY RESPONSE SYNDROME) (H): ICD-10-CM

## 2017-12-19 PROCEDURE — 36415 COLL VENOUS BLD VENIPUNCTURE: CPT | Performed by: EMERGENCY MEDICINE

## 2017-12-19 PROCEDURE — 87804 INFLUENZA ASSAY W/OPTIC: CPT | Performed by: EMERGENCY MEDICINE

## 2017-12-19 PROCEDURE — 25000128 H RX IP 250 OP 636: Performed by: EMERGENCY MEDICINE

## 2017-12-19 PROCEDURE — 85025 COMPLETE CBC W/AUTO DIFF WBC: CPT | Performed by: EMERGENCY MEDICINE

## 2017-12-19 PROCEDURE — 81001 URINALYSIS AUTO W/SCOPE: CPT | Performed by: EMERGENCY MEDICINE

## 2017-12-19 PROCEDURE — 25000132 ZZH RX MED GY IP 250 OP 250 PS 637: Performed by: EMERGENCY MEDICINE

## 2017-12-19 PROCEDURE — 99285 EMERGENCY DEPT VISIT HI MDM: CPT | Mod: 25

## 2017-12-19 PROCEDURE — 83605 ASSAY OF LACTIC ACID: CPT | Performed by: EMERGENCY MEDICINE

## 2017-12-19 PROCEDURE — 80053 COMPREHEN METABOLIC PANEL: CPT | Performed by: EMERGENCY MEDICINE

## 2017-12-19 PROCEDURE — 87040 BLOOD CULTURE FOR BACTERIA: CPT | Performed by: EMERGENCY MEDICINE

## 2017-12-19 RX ORDER — LIDOCAINE 40 MG/G
CREAM TOPICAL
Status: DISCONTINUED | OUTPATIENT
Start: 2017-12-19 | End: 2017-12-20 | Stop reason: HOSPADM

## 2017-12-19 RX ORDER — ACETAMINOPHEN 325 MG/1
650 TABLET ORAL ONCE
Status: COMPLETED | OUTPATIENT
Start: 2017-12-19 | End: 2017-12-19

## 2017-12-19 RX ADMIN — ACETAMINOPHEN 650 MG: 325 TABLET, FILM COATED ORAL at 23:57

## 2017-12-19 RX ADMIN — SODIUM CHLORIDE 2000 ML: 9 INJECTION, SOLUTION INTRAVENOUS at 23:57

## 2017-12-19 ASSESSMENT — ENCOUNTER SYMPTOMS
DIZZINESS: 1
MYALGIAS: 1
COUGH: 1
LIGHT-HEADEDNESS: 1
FEVER: 1
SHORTNESS OF BREATH: 1

## 2017-12-20 ENCOUNTER — HOSPITAL ENCOUNTER (INPATIENT)
Facility: CLINIC | Age: 57
LOS: 1 days | Discharge: HOME | End: 2017-12-21
Attending: INTERNAL MEDICINE | Admitting: INTERNAL MEDICINE
Payer: MEDICAID

## 2017-12-20 ENCOUNTER — APPOINTMENT (OUTPATIENT)
Dept: GENERAL RADIOLOGY | Facility: CLINIC | Age: 57
End: 2017-12-20
Attending: EMERGENCY MEDICINE
Payer: MEDICAID

## 2017-12-20 VITALS
SYSTOLIC BLOOD PRESSURE: 161 MMHG | OXYGEN SATURATION: 99 % | HEART RATE: 139 BPM | DIASTOLIC BLOOD PRESSURE: 56 MMHG | TEMPERATURE: 100.5 F | RESPIRATION RATE: 18 BRPM

## 2017-12-20 DIAGNOSIS — J20.9 ACUTE BRONCHITIS, UNSPECIFIED ORGANISM: Primary | ICD-10-CM

## 2017-12-20 PROBLEM — R50.9 FEVER: Status: ACTIVE | Noted: 2017-12-20

## 2017-12-20 LAB
ALBUMIN SERPL-MCNC: 3.7 G/DL (ref 3.4–5)
ALBUMIN UR-MCNC: NEGATIVE MG/DL
ALP SERPL-CCNC: 83 U/L (ref 40–150)
ALT SERPL W P-5'-P-CCNC: 18 U/L (ref 0–70)
ANION GAP SERPL CALCULATED.3IONS-SCNC: 13 MMOL/L (ref 3–14)
APPEARANCE UR: CLEAR
AST SERPL W P-5'-P-CCNC: 18 U/L (ref 0–45)
BACTERIA SPEC CULT: ABNORMAL
BASOPHILS # BLD AUTO: 0 10E9/L (ref 0–0.2)
BASOPHILS NFR BLD AUTO: 0.3 %
BILIRUB SERPL-MCNC: 0.5 MG/DL (ref 0.2–1.3)
BILIRUB UR QL STRIP: NEGATIVE
BUN SERPL-MCNC: 19 MG/DL (ref 7–30)
CALCIUM SERPL-MCNC: 8.7 MG/DL (ref 8.5–10.1)
CHLORIDE SERPL-SCNC: 102 MMOL/L (ref 94–109)
CO2 SERPL-SCNC: 24 MMOL/L (ref 20–32)
COLOR UR AUTO: YELLOW
CREAT SERPL-MCNC: 0.72 MG/DL (ref 0.66–1.25)
DIFFERENTIAL METHOD BLD: ABNORMAL
EOSINOPHIL # BLD AUTO: 0 10E9/L (ref 0–0.7)
EOSINOPHIL NFR BLD AUTO: 0.3 %
ERYTHROCYTE [DISTWIDTH] IN BLOOD BY AUTOMATED COUNT: 22.6 % (ref 10–15)
FLUAV+FLUBV AG SPEC QL: NEGATIVE
FLUAV+FLUBV AG SPEC QL: NEGATIVE
FLUAV+FLUBV RNA SPEC QL NAA+PROBE: NEGATIVE
FLUAV+FLUBV RNA SPEC QL NAA+PROBE: NEGATIVE
GFR SERPL CREATININE-BSD FRML MDRD: >90 ML/MIN/1.7M2
GLUCOSE SERPL-MCNC: 271 MG/DL (ref 70–99)
GLUCOSE UR STRIP-MCNC: >499 MG/DL
GRAM STN SPEC: ABNORMAL
HCT VFR BLD AUTO: 31.6 % (ref 40–53)
HGB BLD-MCNC: 9.7 G/DL (ref 13.3–17.7)
HGB UR QL STRIP: NEGATIVE
IMM GRANULOCYTES # BLD: 0 10E9/L (ref 0–0.4)
IMM GRANULOCYTES NFR BLD: 0.2 %
INTERPRETATION ECG - MUSE: NORMAL
KETONES UR STRIP-MCNC: 20 MG/DL
LACTATE BLD-SCNC: 1.7 MMOL/L (ref 0.7–2)
LACTATE SERPL-SCNC: 3.5 MMOL/L (ref 0.4–2)
LACTATE SERPL-SCNC: 5.6 MMOL/L (ref 0.4–2)
LEUKOCYTE ESTERASE UR QL STRIP: NEGATIVE
LYMPHOCYTES # BLD AUTO: 0.9 10E9/L (ref 0.8–5.3)
LYMPHOCYTES NFR BLD AUTO: 15.1 %
Lab: ABNORMAL
Lab: ABNORMAL
MCH RBC QN AUTO: 24 PG (ref 26.5–33)
MCHC RBC AUTO-ENTMCNC: 30.7 G/DL (ref 31.5–36.5)
MCV RBC AUTO: 78 FL (ref 78–100)
MONOCYTES # BLD AUTO: 0.8 10E9/L (ref 0–1.3)
MONOCYTES NFR BLD AUTO: 13 %
MUCOUS THREADS #/AREA URNS LPF: PRESENT /LPF
NEUTROPHILS # BLD AUTO: 4.3 10E9/L (ref 1.6–8.3)
NEUTROPHILS NFR BLD AUTO: 71.1 %
NITRATE UR QL: NEGATIVE
NRBC # BLD AUTO: 0 10*3/UL
NRBC BLD AUTO-RTO: 0 /100
PH UR STRIP: 7 PH (ref 5–7)
PLATELET # BLD AUTO: 207 10E9/L (ref 150–450)
POTASSIUM SERPL-SCNC: 4 MMOL/L (ref 3.4–5.3)
PROCALCITONIN SERPL-MCNC: 0.19 NG/ML
PROT SERPL-MCNC: 7 G/DL (ref 6.8–8.8)
RBC # BLD AUTO: 4.05 10E12/L (ref 4.4–5.9)
RBC #/AREA URNS AUTO: 0 /HPF (ref 0–2)
RSV RNA SPEC NAA+PROBE: NEGATIVE
SODIUM SERPL-SCNC: 139 MMOL/L (ref 133–144)
SOURCE: ABNORMAL
SP GR UR STRIP: 1.03 (ref 1–1.03)
SPECIMEN SOURCE: ABNORMAL
SPECIMEN SOURCE: NORMAL
SPECIMEN SOURCE: NORMAL
TROPONIN I SERPL-MCNC: <0.015 UG/L (ref 0–0.04)
UROBILINOGEN UR STRIP-MCNC: 2 MG/DL (ref 0–2)
WBC # BLD AUTO: 6 10E9/L (ref 4–11)
WBC #/AREA URNS AUTO: <1 /HPF (ref 0–2)

## 2017-12-20 PROCEDURE — 99207 ZZC NON-BILLABLE SERV PER CHARTING: CPT | Performed by: INTERNAL MEDICINE

## 2017-12-20 PROCEDURE — 99207 ZZC APP CREDIT; MD BILLING SHARED VISIT: CPT | Performed by: INTERNAL MEDICINE

## 2017-12-20 PROCEDURE — 87070 CULTURE OTHR SPECIMN AEROBIC: CPT | Performed by: INTERNAL MEDICINE

## 2017-12-20 PROCEDURE — 40000894 ZZH STATISTIC OT IP EVAL DEFER

## 2017-12-20 PROCEDURE — 25000128 H RX IP 250 OP 636: Performed by: INTERNAL MEDICINE

## 2017-12-20 PROCEDURE — 25000128 H RX IP 250 OP 636: Performed by: EMERGENCY MEDICINE

## 2017-12-20 PROCEDURE — 12000000 ZZH R&B MED SURG/OB

## 2017-12-20 PROCEDURE — 84484 ASSAY OF TROPONIN QUANT: CPT | Performed by: EMERGENCY MEDICINE

## 2017-12-20 PROCEDURE — 83605 ASSAY OF LACTIC ACID: CPT | Performed by: INTERNAL MEDICINE

## 2017-12-20 PROCEDURE — 25000132 ZZH RX MED GY IP 250 OP 250 PS 637: Performed by: INTERNAL MEDICINE

## 2017-12-20 PROCEDURE — 84145 PROCALCITONIN (PCT): CPT | Performed by: INTERNAL MEDICINE

## 2017-12-20 PROCEDURE — 87205 SMEAR GRAM STAIN: CPT | Performed by: INTERNAL MEDICINE

## 2017-12-20 PROCEDURE — 93005 ELECTROCARDIOGRAM TRACING: CPT

## 2017-12-20 PROCEDURE — 99223 1ST HOSP IP/OBS HIGH 75: CPT | Mod: AI | Performed by: INTERNAL MEDICINE

## 2017-12-20 PROCEDURE — 36415 COLL VENOUS BLD VENIPUNCTURE: CPT | Performed by: INTERNAL MEDICINE

## 2017-12-20 PROCEDURE — 96366 THER/PROPH/DIAG IV INF ADDON: CPT

## 2017-12-20 PROCEDURE — 83605 ASSAY OF LACTIC ACID: CPT | Performed by: EMERGENCY MEDICINE

## 2017-12-20 PROCEDURE — 96361 HYDRATE IV INFUSION ADD-ON: CPT

## 2017-12-20 PROCEDURE — 87631 RESP VIRUS 3-5 TARGETS: CPT | Performed by: EMERGENCY MEDICINE

## 2017-12-20 PROCEDURE — 96365 THER/PROPH/DIAG IV INF INIT: CPT

## 2017-12-20 PROCEDURE — 99207 ZZC CDG-CODE INCORRECT PER BILLING BASED ON TIME: CPT | Performed by: INTERNAL MEDICINE

## 2017-12-20 PROCEDURE — 25000132 ZZH RX MED GY IP 250 OP 250 PS 637: Performed by: EMERGENCY MEDICINE

## 2017-12-20 PROCEDURE — 71010 XR CHEST 1 VW: CPT

## 2017-12-20 PROCEDURE — HZ2ZZZZ DETOXIFICATION SERVICES FOR SUBSTANCE ABUSE TREATMENT: ICD-10-PCS | Performed by: INTERNAL MEDICINE

## 2017-12-20 PROCEDURE — 96375 TX/PRO/DX INJ NEW DRUG ADDON: CPT

## 2017-12-20 PROCEDURE — 99222 1ST HOSP IP/OBS MODERATE 55: CPT | Performed by: PSYCHIATRY & NEUROLOGY

## 2017-12-20 RX ORDER — LORAZEPAM 2 MG/ML
1-2 INJECTION INTRAMUSCULAR EVERY 30 MIN PRN
Status: DISCONTINUED | OUTPATIENT
Start: 2017-12-20 | End: 2017-12-21 | Stop reason: HOSPADM

## 2017-12-20 RX ORDER — LIDOCAINE 40 MG/G
CREAM TOPICAL
Status: DISCONTINUED | OUTPATIENT
Start: 2017-12-20 | End: 2017-12-21 | Stop reason: HOSPADM

## 2017-12-20 RX ORDER — VANCOMYCIN HYDROCHLORIDE 1 G/200ML
1000 INJECTION, SOLUTION INTRAVENOUS EVERY 8 HOURS
Status: DISCONTINUED | OUTPATIENT
Start: 2017-12-20 | End: 2017-12-21

## 2017-12-20 RX ORDER — HEPARIN SODIUM,PORCINE 10 UNIT/ML
5-10 VIAL (ML) INTRAVENOUS EVERY 24 HOURS
Status: DISCONTINUED | OUTPATIENT
Start: 2017-12-20 | End: 2017-12-21 | Stop reason: HOSPADM

## 2017-12-20 RX ORDER — HYDROMORPHONE HYDROCHLORIDE 1 MG/ML
0.2 INJECTION, SOLUTION INTRAMUSCULAR; INTRAVENOUS; SUBCUTANEOUS
Status: DISCONTINUED | OUTPATIENT
Start: 2017-12-20 | End: 2017-12-21 | Stop reason: HOSPADM

## 2017-12-20 RX ORDER — MULTIVITAMIN,THERAPEUTIC
1 TABLET ORAL DAILY
Status: DISCONTINUED | OUTPATIENT
Start: 2017-12-20 | End: 2017-12-21 | Stop reason: HOSPADM

## 2017-12-20 RX ORDER — POTASSIUM CHLORIDE 1.5 G/1.58G
20-40 POWDER, FOR SOLUTION ORAL
Status: DISCONTINUED | OUTPATIENT
Start: 2017-12-20 | End: 2017-12-21 | Stop reason: HOSPADM

## 2017-12-20 RX ORDER — OSELTAMIVIR PHOSPHATE 75 MG/1
75 CAPSULE ORAL DAILY
Status: DISCONTINUED | OUTPATIENT
Start: 2017-12-20 | End: 2017-12-20

## 2017-12-20 RX ORDER — LANOLIN ALCOHOL/MO/W.PET/CERES
100 CREAM (GRAM) TOPICAL DAILY
Status: DISCONTINUED | OUTPATIENT
Start: 2017-12-20 | End: 2017-12-20

## 2017-12-20 RX ORDER — FOLIC ACID 1 MG/1
1 TABLET ORAL DAILY
Status: DISCONTINUED | OUTPATIENT
Start: 2017-12-20 | End: 2017-12-20

## 2017-12-20 RX ORDER — MIRTAZAPINE 7.5 MG/1
7.5 TABLET, FILM COATED ORAL AT BEDTIME
Status: DISCONTINUED | OUTPATIENT
Start: 2017-12-20 | End: 2017-12-21 | Stop reason: HOSPADM

## 2017-12-20 RX ORDER — NICOTINE 21 MG/24HR
1 PATCH, TRANSDERMAL 24 HOURS TRANSDERMAL DAILY
Status: DISCONTINUED | OUTPATIENT
Start: 2017-12-20 | End: 2017-12-21 | Stop reason: HOSPADM

## 2017-12-20 RX ORDER — MULTIPLE VITAMINS W/ MINERALS TAB 9MG-400MCG
1 TAB ORAL DAILY
Status: DISCONTINUED | OUTPATIENT
Start: 2017-12-20 | End: 2017-12-20

## 2017-12-20 RX ORDER — ONDANSETRON 4 MG/1
4 TABLET, ORALLY DISINTEGRATING ORAL EVERY 6 HOURS PRN
Status: DISCONTINUED | OUTPATIENT
Start: 2017-12-20 | End: 2017-12-21 | Stop reason: HOSPADM

## 2017-12-20 RX ORDER — FOLIC ACID 1 MG/1
1 TABLET ORAL DAILY
Status: DISCONTINUED | OUTPATIENT
Start: 2017-12-20 | End: 2017-12-21 | Stop reason: HOSPADM

## 2017-12-20 RX ORDER — MAGNESIUM SULFATE HEPTAHYDRATE 40 MG/ML
4 INJECTION, SOLUTION INTRAVENOUS EVERY 4 HOURS PRN
Status: DISCONTINUED | OUTPATIENT
Start: 2017-12-20 | End: 2017-12-21 | Stop reason: HOSPADM

## 2017-12-20 RX ORDER — POTASSIUM CL/LIDO/0.9 % NACL 10MEQ/0.1L
10 INTRAVENOUS SOLUTION, PIGGYBACK (ML) INTRAVENOUS
Status: DISCONTINUED | OUTPATIENT
Start: 2017-12-20 | End: 2017-12-21 | Stop reason: HOSPADM

## 2017-12-20 RX ORDER — POTASSIUM CHLORIDE 29.8 MG/ML
20 INJECTION INTRAVENOUS
Status: DISCONTINUED | OUTPATIENT
Start: 2017-12-20 | End: 2017-12-21 | Stop reason: HOSPADM

## 2017-12-20 RX ORDER — LANOLIN ALCOHOL/MO/W.PET/CERES
100 CREAM (GRAM) TOPICAL DAILY
Status: DISCONTINUED | OUTPATIENT
Start: 2017-12-20 | End: 2017-12-21 | Stop reason: HOSPADM

## 2017-12-20 RX ORDER — ACETAMINOPHEN 650 MG/1
650 SUPPOSITORY RECTAL EVERY 4 HOURS PRN
Status: DISCONTINUED | OUTPATIENT
Start: 2017-12-20 | End: 2017-12-21 | Stop reason: HOSPADM

## 2017-12-20 RX ORDER — OXYCODONE HYDROCHLORIDE 5 MG/1
5-10 TABLET ORAL ONCE
Status: COMPLETED | OUTPATIENT
Start: 2017-12-20 | End: 2017-12-20

## 2017-12-20 RX ORDER — AMOXICILLIN 250 MG
2 CAPSULE ORAL 2 TIMES DAILY PRN
Status: DISCONTINUED | OUTPATIENT
Start: 2017-12-20 | End: 2017-12-21 | Stop reason: HOSPADM

## 2017-12-20 RX ORDER — OSELTAMIVIR PHOSPHATE 75 MG/1
75 CAPSULE ORAL ONCE
Status: COMPLETED | OUTPATIENT
Start: 2017-12-20 | End: 2017-12-20

## 2017-12-20 RX ORDER — HEPARIN SODIUM,PORCINE 10 UNIT/ML
5-10 VIAL (ML) INTRAVENOUS
Status: DISCONTINUED | OUTPATIENT
Start: 2017-12-20 | End: 2017-12-21 | Stop reason: HOSPADM

## 2017-12-20 RX ORDER — BISACODYL 10 MG
10 SUPPOSITORY, RECTAL RECTAL DAILY PRN
Status: DISCONTINUED | OUTPATIENT
Start: 2017-12-20 | End: 2017-12-21 | Stop reason: HOSPADM

## 2017-12-20 RX ORDER — HEPARIN SODIUM (PORCINE) LOCK FLUSH IV SOLN 100 UNIT/ML 100 UNIT/ML
5 SOLUTION INTRAVENOUS
Status: DISCONTINUED | OUTPATIENT
Start: 2017-12-20 | End: 2017-12-21 | Stop reason: HOSPADM

## 2017-12-20 RX ORDER — NALOXONE HYDROCHLORIDE 0.4 MG/ML
.1-.4 INJECTION, SOLUTION INTRAMUSCULAR; INTRAVENOUS; SUBCUTANEOUS
Status: DISCONTINUED | OUTPATIENT
Start: 2017-12-20 | End: 2017-12-21 | Stop reason: HOSPADM

## 2017-12-20 RX ORDER — PROCHLORPERAZINE MALEATE 5 MG
10 TABLET ORAL EVERY 6 HOURS PRN
Status: DISCONTINUED | OUTPATIENT
Start: 2017-12-20 | End: 2017-12-21 | Stop reason: HOSPADM

## 2017-12-20 RX ORDER — ACETAMINOPHEN 325 MG/1
650 TABLET ORAL EVERY 4 HOURS PRN
Status: DISCONTINUED | OUTPATIENT
Start: 2017-12-20 | End: 2017-12-21 | Stop reason: HOSPADM

## 2017-12-20 RX ORDER — LORAZEPAM 1 MG/1
1-2 TABLET ORAL EVERY 30 MIN PRN
Status: DISCONTINUED | OUTPATIENT
Start: 2017-12-20 | End: 2017-12-21 | Stop reason: HOSPADM

## 2017-12-20 RX ORDER — AMOXICILLIN 250 MG
1 CAPSULE ORAL 2 TIMES DAILY PRN
Status: DISCONTINUED | OUTPATIENT
Start: 2017-12-20 | End: 2017-12-21 | Stop reason: HOSPADM

## 2017-12-20 RX ORDER — PROCHLORPERAZINE 25 MG
25 SUPPOSITORY, RECTAL RECTAL EVERY 12 HOURS PRN
Status: DISCONTINUED | OUTPATIENT
Start: 2017-12-20 | End: 2017-12-21 | Stop reason: HOSPADM

## 2017-12-20 RX ORDER — OSELTAMIVIR PHOSPHATE 75 MG/1
75 CAPSULE ORAL 2 TIMES DAILY
Status: DISCONTINUED | OUTPATIENT
Start: 2017-12-20 | End: 2017-12-21 | Stop reason: HOSPADM

## 2017-12-20 RX ORDER — OXYCODONE AND ACETAMINOPHEN 5; 325 MG/1; MG/1
1-2 TABLET ORAL EVERY 4 HOURS PRN
Status: ON HOLD | COMMUNITY
End: 2018-01-09

## 2017-12-20 RX ORDER — POTASSIUM CHLORIDE 1500 MG/1
20-40 TABLET, EXTENDED RELEASE ORAL
Status: DISCONTINUED | OUTPATIENT
Start: 2017-12-20 | End: 2017-12-21 | Stop reason: HOSPADM

## 2017-12-20 RX ORDER — POTASSIUM CHLORIDE 7.45 MG/ML
10 INJECTION INTRAVENOUS
Status: DISCONTINUED | OUTPATIENT
Start: 2017-12-20 | End: 2017-12-21 | Stop reason: HOSPADM

## 2017-12-20 RX ORDER — POLYETHYLENE GLYCOL 3350 17 G
2 POWDER IN PACKET (EA) ORAL
Status: DISCONTINUED | OUTPATIENT
Start: 2017-12-20 | End: 2017-12-21 | Stop reason: HOSPADM

## 2017-12-20 RX ORDER — OXYCODONE HYDROCHLORIDE 5 MG/1
5-10 TABLET ORAL
Status: DISCONTINUED | OUTPATIENT
Start: 2017-12-20 | End: 2017-12-21 | Stop reason: HOSPADM

## 2017-12-20 RX ORDER — ONDANSETRON 2 MG/ML
4 INJECTION INTRAMUSCULAR; INTRAVENOUS EVERY 6 HOURS PRN
Status: DISCONTINUED | OUTPATIENT
Start: 2017-12-20 | End: 2017-12-21 | Stop reason: HOSPADM

## 2017-12-20 RX ORDER — POLYETHYLENE GLYCOL 3350 17 G/17G
17 POWDER, FOR SOLUTION ORAL DAILY PRN
Status: DISCONTINUED | OUTPATIENT
Start: 2017-12-20 | End: 2017-12-21 | Stop reason: HOSPADM

## 2017-12-20 RX ADMIN — OSELTAMIVIR PHOSPHATE 75 MG: 75 CAPSULE ORAL at 09:09

## 2017-12-20 RX ADMIN — OSELTAMIVIR PHOSPHATE 75 MG: 75 CAPSULE ORAL at 21:29

## 2017-12-20 RX ADMIN — VANCOMYCIN HYDROCHLORIDE 1000 MG: 1 INJECTION, SOLUTION INTRAVENOUS at 09:09

## 2017-12-20 RX ADMIN — VANCOMYCIN HYDROCHLORIDE 1500 MG: 10 INJECTION, POWDER, LYOPHILIZED, FOR SOLUTION INTRAVENOUS at 02:19

## 2017-12-20 RX ADMIN — OSELTAMIVIR PHOSPHATE 75 MG: 75 CAPSULE ORAL at 03:48

## 2017-12-20 RX ADMIN — SODIUM CHLORIDE, PRESERVATIVE FREE 5 ML: 5 INJECTION INTRAVENOUS at 19:37

## 2017-12-20 RX ADMIN — VANCOMYCIN HYDROCHLORIDE 1000 MG: 1 INJECTION, SOLUTION INTRAVENOUS at 18:13

## 2017-12-20 RX ADMIN — OMEPRAZOLE 40 MG: 20 CAPSULE, DELAYED RELEASE ORAL at 09:09

## 2017-12-20 RX ADMIN — SODIUM CHLORIDE, PRESERVATIVE FREE 5 ML: 5 INJECTION INTRAVENOUS at 13:27

## 2017-12-20 RX ADMIN — Medication 100 MG: at 09:09

## 2017-12-20 RX ADMIN — SODIUM CHLORIDE 1000 ML: 9 INJECTION, SOLUTION INTRAVENOUS at 07:45

## 2017-12-20 RX ADMIN — PIPERACILLIN SODIUM AND TAZOBACTAM SODIUM 3.38 G: 36; 4.5 INJECTION, POWDER, FOR SOLUTION INTRAVENOUS at 13:56

## 2017-12-20 RX ADMIN — OXYCODONE HYDROCHLORIDE 10 MG: 5 TABLET ORAL at 09:10

## 2017-12-20 RX ADMIN — PROCHLORPERAZINE EDISYLATE 10 MG: 5 INJECTION INTRAMUSCULAR; INTRAVENOUS at 18:09

## 2017-12-20 RX ADMIN — OXYCODONE HYDROCHLORIDE 10 MG: 5 TABLET ORAL at 13:55

## 2017-12-20 RX ADMIN — THERA TABS 1 TABLET: TAB at 09:10

## 2017-12-20 RX ADMIN — MIRTAZAPINE 7.5 MG: 7.5 TABLET, FILM COATED ORAL at 21:29

## 2017-12-20 RX ADMIN — LORAZEPAM 1 MG: 1 TABLET ORAL at 20:56

## 2017-12-20 RX ADMIN — PIPERACILLIN SODIUM AND TAZOBACTAM SODIUM 3.38 G: 36; 4.5 INJECTION, POWDER, FOR SOLUTION INTRAVENOUS at 19:37

## 2017-12-20 RX ADMIN — LORAZEPAM 1 MG: 1 TABLET ORAL at 22:00

## 2017-12-20 RX ADMIN — NICOTINE 1 PATCH: 21 PATCH, EXTENDED RELEASE TRANSDERMAL at 09:07

## 2017-12-20 RX ADMIN — OXYCODONE HYDROCHLORIDE 5 MG: 5 TABLET ORAL at 03:11

## 2017-12-20 RX ADMIN — PIPERACILLIN SODIUM AND TAZOBACTAM SODIUM 3.38 G: 36; 4.5 INJECTION, POWDER, FOR SOLUTION INTRAVENOUS at 09:07

## 2017-12-20 RX ADMIN — OMEPRAZOLE 40 MG: 20 CAPSULE, DELAYED RELEASE ORAL at 16:45

## 2017-12-20 RX ADMIN — SODIUM CHLORIDE, PRESERVATIVE FREE 5 ML: 5 INJECTION INTRAVENOUS at 13:56

## 2017-12-20 RX ADMIN — TAZOBACTAM SODIUM AND PIPERACILLIN SODIUM 4.5 G: 500; 4 INJECTION, SOLUTION INTRAVENOUS at 01:41

## 2017-12-20 RX ADMIN — OXYCODONE HYDROCHLORIDE 10 MG: 5 TABLET ORAL at 19:44

## 2017-12-20 RX ADMIN — FOLIC ACID 1 MG: 1 TABLET ORAL at 09:10

## 2017-12-20 ASSESSMENT — ACTIVITIES OF DAILY LIVING (ADL)
SWALLOWING: 0-->SWALLOWS FOODS/LIQUIDS WITHOUT DIFFICULTY
DRESS: 0-->INDEPENDENT
TOILETING: 0-->INDEPENDENT
AMBULATION: 0-->INDEPENDENT
TRANSFERRING: 0-->INDEPENDENT
BATHING: 0-->INDEPENDENT
FALL_HISTORY_WITHIN_LAST_SIX_MONTHS: YES
RETIRED_EATING: 0-->INDEPENDENT
RETIRED_COMMUNICATION: 0-->UNDERSTANDS/COMMUNICATES WITHOUT DIFFICULTY
NUMBER_OF_TIMES_PATIENT_HAS_FALLEN_WITHIN_LAST_SIX_MONTHS: 1
COGNITION: 0 - NO COGNITION ISSUES REPORTED

## 2017-12-20 NOTE — IP AVS SNAPSHOT
MRN:[id number]                      After Visit Summary   12/20/2017    Dashawn Ordoñez    MRN: [id number]           Thank you!     Thank you for choosing [facility] for your care. Our goal is always to provide you with excellent care. Hearing back from our patients is one way we can continue to improve our services. Please take a few minutes to complete the written survey that you may receive in the mail after you visit with us. Thank you!        Patient Information     Date Of Birth          1960        Designated Caregiver       Most Recent Value    Caregiver    Will someone help with your care after discharge? no      About your hospital stay     You were admitted on:  December 20, 2017 You last received care in the:  [facility] [address] [facility]    You were discharged on:  December 21, 2017        Reason for your hospital stay       Bronchitis.                  Who to Call     For medical emergencies, please call 911.  For non-urgent questions about your medical care, please call your primary care provider or clinic, [phone]          Attending Provider     Provider Specialty    Mcgrath, Shalom Dove MD Internal Medicine       Primary Care Provider Office Phone # Fax #    Cristian Healy [phone]      After Care Instructions     Activity       Your activity upon discharge: activity as tolerated            Diet       Follow this diet upon discharge: Orders Placed This Encounter      Combination Diet Regular Diet Adult, Safe Tray - with utensils                    Follow-up Appointments     Follow Up and recommended labs and tests       Follow up with primary care provider, Cristian Healy, within 7 days for hospital follow- up.  The following labs/tests are recommended: BMP, CBC.                  Pending Results     Date and Time Order Name Status Description    12/19/2017 2334 Blood culture Preliminary     12/19/2017 2334 Blood culture Preliminary            "  Statement of Approval     Ordered          17 1130  I have reviewed and agree with all the recommendations and orders detailed in this document.  EFFECTIVE NOW     Approved and electronically signed by:  Jun Roman MD           17 1122  I have reviewed and agree with all the recommendations and orders detailed in this document.  EFFECTIVE NOW     Approved and electronically signed by:  Jun Roman MD             Admission Information     Date & Time Provider Department Dept. Phone    2017 Mcgrath, Shalom Dove MD [facility] [address] [facility] [phone]      Your Vitals Were     Blood Pressure Pulse Temperature Respirations Weight Pulse Oximetry    141/91 (BP Location: Right arm) 92 99  F (37.2  C) (Oral) 16 75.1 kg (165 lb 9.1 oz) 99%    BMI (Body Mass Index)                   23.76 kg/m2           MyChart Information     [employer] lets you send messages to your doctor, view your test results, renew your prescriptions, schedule appointments and more. To sign up, go to [email or web address] . Click on \"Log in\" on the left side of the screen, which will take you to the Welcome page. Then click on \"Sign up Now\" on the right side of the page.     You will be asked to enter the access code listed below, as well as some personal information. Please follow the directions to create your username and password.     Your access code is: [email or web address]  Expires: 2018 10:04 AM     Your access code will  in 90 days. If you need help or a new code, please call your [facility] clinic or [phone].        Care EveryWhere ID     This is your Care EveryWhere ID. This could be used by other organizations to access your [facility] medical records  [id number]        Equal Access to Services     [facility] MADELYN : Charan Brewster, thomas bennett, carlo zavala, emma simon. So [facility] [phone].    ATENCIÓN: Si habla " español, tiene a sanz disposición servicios gratuitos de asistencia lingüística. Jennifer pereira [phone].    We comply with applicable federal civil rights laws and [place] laws. We do not discriminate on the basis of race, color, national origin, age, disability, sex, sexual orientation, or gender identity.               Review of your medicines      START taking        Dose / Directions    levofloxacin 500 MG tablet   Commonly known as:  LEVAQUIN   Indication:  Bronchitis   Used for:  Acute bronchitis, unspecified organism        Dose:  500 mg   Take 1 tablet (500 mg) by mouth daily   Quantity:  4 tablet   Refills:  0         CONTINUE these medicines which have NOT CHANGED        Dose / Directions    acetaminophen 325 MG tablet   Commonly known as:  TYLENOL   Used for:  Gastrointestinal hemorrhage associated with gastric ulcer        Dose:  650 mg   Take 2 tablets (650 mg) by mouth every 6 hours as needed for mild pain   Quantity:  100 tablet   Refills:  0       mirtazapine 7.5 MG Tabs tablet   Commonly known as:  REMERON   Used for:  Sleep disorder        Dose:  7.5 mg   Take 1 tablet (7.5 mg) by mouth nightly as needed   Quantity:  30 tablet   Refills:  0       omeprazole 40 MG capsule   Commonly known as:  priLOSEC   Used for:  Acute upper gastrointestinal bleeding        Dose:  40 mg   Take 1 capsule (40 mg) by mouth 2 times daily (before meals)   Quantity:  30 capsule   Refills:  3       oxyCODONE-acetaminophen 5-325 MG per tablet   Commonly known as:  PERCOCET        Dose:  1-2 tablet   Take 1-2 tablets by mouth every 4 hours as needed for moderate to severe pain   Refills:  0         STOP taking     nicotine 21 MG/24HR 24 hr patch   Commonly known as:  NICODERM CQ                Where to get your medicines      These medications were sent to [id number] - [place], [place] - [address] [place] [address] [address] [place] [address]. [address], [facility] [address]     Phone:  [phone]     levofloxacin 500 MG tablet                ANTIBIOTIC INSTRUCTION     You've Been Prescribed an Antibiotic - Now What?  Your healthcare team thinks that you or your loved one might have an infection. Some infections can be treated with antibiotics, which are powerful, life-saving drugs. Like all medications, antibiotics have side effects and should only be used when necessary. There are some important things you should know about your antibiotic treatment.      Your healthcare team may run tests before you start taking an antibiotic.    Your team may take samples (e.g., from your blood, urine or other areas) to run tests to look for bacteria. These test can be important to determine if you need an antibiotic at all and, if you do, which antibiotic will work best.      Within a few days, your healthcare team might change or even stop your antibiotic.    Your team may start you on an antibiotic while they are working to find out what is making you sick.    Your team might change your antibiotic because test results show that a different antibiotic would be better to treat your infection.    In some cases, once your team has more information, they learn that you do not need an antibiotic at all. They may find out that you don't have an infection, or that the antibiotic you're taking won't work against your infection. For example, an infection caused by a virus can't be treated with antibiotics. Staying on an antibiotic when you don't need it is more likely to be harmful than helpful.      You may experience side effects from your antibiotic.    Like all medications, antibiotics have side effects. Some of these can be serious.    Let you healthcare team know if you have any known allergies when you are admitted to the hospital.    One significant side effect of nearly all antibiotics is the risk of severe and sometimes deadly diarrhea caused by Clostridium difficile (C. Difficile). This occurs when a person takes antibiotics because some good germs are  destroyed. Antibiotic use allows C. diificile to take over, putting patients at high risk for this serious infection.    As a patient or caregiver, it is important to understand your or your loved one's antibiotic treatment. It is especially important for caregivers to speak up when patients can't speak for themselves. Here are some important questions to ask your healthcare team.    What infection is this antibiotic treating and how do you know I have that infection?    What side effects might occur from this antibiotic?    How long will I need to take this antibiotic?    Is it safe to take this antibiotic with other medications or supplements (e.g., vitamins) that I am taking?     Are there any special directions I need to know about taking this antibiotic? For example, should I take it with food?    How will I be monitored to know whether my infection is responding to the antibiotic?    What tests may help to make sure the right antibiotic is prescribed for me?      Information provided by:  www.cdc.gov/getsmart  U.S. Department of Health and Human Services  Centers for disease Control and Prevention  National Center for Emerging and Zoonotic Infectious Diseases  Division of Healthcare Quality Promotion         Protect others around you: Learn how to safely use, store and throw away your medicines at www.disposemymeds.org.             Medication List: This is a list of all your medications and when to take them. Check marks below indicate your daily home schedule. Keep this list as a reference.      Medications           Morning Afternoon Evening Bedtime As Needed    acetaminophen 325 MG tablet   Commonly known as:  TYLENOL   Take 2 tablets (650 mg) by mouth every 6 hours as needed for mild pain                                   levofloxacin 500 MG tablet   Commonly known as:  LEVAQUIN   Take 1 tablet (500 mg) by mouth daily   Next Dose Due:  Start today                                   mirtazapine 7.5 MG Tabs  tablet   Commonly known as:  REMERON   Take 1 tablet (7.5 mg) by mouth nightly as needed   Last time this was given:  7.5 mg on 12/20/2017  9:29 PM                                   omeprazole 40 MG capsule   Commonly known as:  priLOSEC   Take 1 capsule (40 mg) by mouth 2 times daily (before meals)   Last time this was given:  40 mg on 12/21/2017  6:50 AM   Next Dose Due:  This evening                                      oxyCODONE-acetaminophen 5-325 MG per tablet   Commonly known as:  PERCOCET   Take 1-2 tablets by mouth every 4 hours as needed for moderate to severe pain

## 2017-12-20 NOTE — CONSULTS
"[facility] Initial Psychiatric Consult Note      TIME SPENT IN PSYCHIATRY INITIAL CONSULT: 55 MINUTES    Consult ordered by: Shalom Mcgrath MD.  Reason: Situational depression.     Initial History     The patient's care was discussed, patient seen and chart notes were reviewed.    Patient examined for psychiatric consultation.     IDENTIFICATION     Pt is a 57 year old   male currently living in [place]. Pt sees PCP Cristian Rollins. Pt seen on 66 for situational depression related to his medical condition.    HISTORY OF PRESENT ILLNESS    Mr. Dashawn Ordoñez is a 57 year old male who presented with stage IV gastric cancer metastasized to the liver who presented to the emergency department with complaints of dyspnea, body aches, and fever. He was due to receive chemotherapy on 12/18/17, but ultimately decided against it as he \"wanted to feel good for Michael.\" He endorsed to hospitalist that he took eight shots of hard liquor the morning he was to have chemo as he expressed feeling scared about his poor prognosis of 6 months to a year.  Pt has endorsed severely depressed mood, motivation poor, concentration poor, low energy, hopeless, helpless, and worthless with SI, no plan, able to contract for safety. Pt is an anxious person, a worrier. Pt endorses consistent, pervasive sense of anxiety and worry. Pt finds this anxiety difficult to control, often resulting in restlessness, feeling on edge, irritability, and sleep disturbance.  He has used alcohol in the past, but insists that he has not been using to excess.  However, now he has stabilized and would like to amends with others.    CHEMICAL DEPENDENCY HISTORY    History of alcohol use, he claims that he only had 4 drinks this month. He reports that he used marijuana for the first time last week. Utox is negative on admission.    PAST PSYCHIATRIC HISTORY    He is prescribed Remeron but has not taken the medication for the past month. No prior " psychiatric history.     FAMILY HISTORY    His uncle committed suicide. His grandfather used alcohol. His father had depression. His sister has anxiety.    SOCIAL HISTORY    Pt was born and raised in [place] with 1 sister and two half-sisters. He states his childhood was unremarkable. 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 He was involved in a band when he was younger. He is  for 35 years with three grandchildren.      Medications     Prescriptions Prior to Admission   Medication Sig Dispense Refill Last Dose     oxyCODONE-acetaminophen (PERCOCET) 5-325 MG per tablet Take 1-2 tablets by mouth every 4 hours as needed for moderate to severe pain   Past Week at prn     mirtazapine (REMERON) 7.5 MG TABS tablet Take 1 tablet (7.5 mg) by mouth nightly as needed 30 tablet 0 Past Month at prn     nicotine (NICODERM CQ) 21 MG/24HR 24 hr patch Place 1 patch onto the skin daily 30 patch 1 Past Month at prn     omeprazole (PRILOSEC) 40 MG capsule Take 1 capsule (40 mg) by mouth 2 times daily (before meals) 30 capsule 3 12/19/2017 at am     acetaminophen (TYLENOL) 325 MG tablet Take 2 tablets (650 mg) by mouth every 6 hours as needed for mild pain 100 tablet  prn at prn       Scheduled Medications    piperacillin-tazobactam  3.375 g Intravenous Q6H     vancomycin (VANCOCIN) IV  1,000 mg Intravenous Q8H     omeprazole  40 mg Oral BID AC     mirtazapine  7.5 mg Oral At Bedtime     nicotine   Transdermal Q8H     [START ON 12/21/2017] nicotine   Transdermal Daily     nicotine  1 patch Transdermal Daily     oseltamivir  75 mg Oral BID     multivitamin, therapeutic  1 tablet Oral Daily     folic acid  1 mg Oral Daily     thiamine  100 mg Oral Daily     heparin lock flush  5-10 mL Intracatheter Q24H     heparin  5 mL Intracatheter Q28 Days     PRNs:  naloxone, potassium chloride, potassium chloride, potassium chloride, potassium chloride with lidocaine, potassium chloride, magnesium sulfate,  acetaminophen, acetaminophen, oxyCODONE IR, HYDROmorphone, senna-docusate **OR** senna-docusate, polyethylene glycol, bisacodyl, ondansetron **OR** ondansetron, prochlorperazine **OR** prochlorperazine **OR** prochlorperazine, nicotine polacrilex, lidocaine 4%, lidocaine (buffered or not buffered), lidocaine 4%, sodium chloride (PF), heparin lock flush, sodium chloride (PF)      Allergies      No Known Allergies     Previous Medical History     Past Medical History:   Diagnosis Date     Cancer (H)      Depressive disorder      Diabetes (H)      Hypertension      Stomach cancer (H)      Substance abuse         Medical Review of Systems     BP (!) 148/94 (BP Location: Left arm)  Pulse 100  Temp 99.1  F (37.3  C) (Oral)  Resp 18  SpO2 98%  There is no height or weight on file to calculate BMI.    Previous 10-point ROS completed by Shalom Mcgrath MD on 12/20/17 reviewed by Favio Harrell MD on December 20, 2017 and is unchanged except for those problems mentioned within the HPI.      Mental Status Examination     Appearance Sitting in bed, dressed in gown. Appears stated age.   Attitude Cooperative   Orientation Oriented to person, place, time   Eye Contact Fair   Speech Regular rate, rhythm, volume and tone   Language Normal   Psychomotor Behavior Normal   Thought Process Goal-Oriented, Intact   Associations Intact   Thought Content Patient is currently negative for suicide ideation, negative for plan or intent, able to contract no self harm and identify barriers to suicide.  Negative for obsessions, compulsions or psychosis.      Mood Mildly depressed   Affect Flat   Fund of Knowledge Average   Insight Impaired   Judgement Fair   Attention Span & Concentration Intact   Recent & Remote Memory Intact   Gait Normal   Muscle Tone Intact      Labs     Labs reviewed.  Recent Results (from the past 24 hour(s))   Comprehensive metabolic panel    Collection Time: 12/19/17 11:37 PM   Result Value Ref Range    Sodium 139  133 - 144 mmol/L    Potassium 4.0 3.4 - 5.3 mmol/L    Chloride 102 94 - 109 mmol/L    Carbon Dioxide 24 20 - 32 mmol/L    Anion Gap 13 3 - 14 mmol/L    Glucose 271 (H) 70 - 99 mg/dL    Urea Nitrogen 19 7 - 30 mg/dL    Creatinine 0.72 0.66 - 1.25 mg/dL    GFR Estimate >90 >60 mL/min/1.7m2    GFR Estimate If Black >90 >60 mL/min/1.7m2    Calcium 8.7 8.5 - 10.1 mg/dL    Bilirubin Total 0.5 0.2 - 1.3 mg/dL    Albumin 3.7 3.4 - 5.0 g/dL    Protein Total 7.0 6.8 - 8.8 g/dL    Alkaline Phosphatase 83 40 - 150 U/L    ALT 18 0 - 70 U/L    AST 18 0 - 45 U/L   CBC with platelets differential    Collection Time: 12/19/17 11:37 PM   Result Value Ref Range    WBC 6.0 4.0 - 11.0 10e9/L    RBC Count 4.05 (L) 4.4 - 5.9 10e12/L    Hemoglobin 9.7 (L) 13.3 - 17.7 g/dL    Hematocrit 31.6 (L) 40.0 - 53.0 %    MCV 78 78 - 100 fl    MCH 24.0 (L) 26.5 - 33.0 pg    MCHC 30.7 (L) 31.5 - 36.5 g/dL    RDW 22.6 (H) 10.0 - 15.0 %    Platelet Count 207 150 - 450 10e9/L    Diff Method Automated Method     % Neutrophils 71.1 %    % Lymphocytes 15.1 %    % Monocytes 13.0 %    % Eosinophils 0.3 %    % Basophils 0.3 %    % Immature Granulocytes 0.2 %    Nucleated RBCs 0 0 /100    Absolute Neutrophil 4.3 1.6 - 8.3 10e9/L    Absolute Lymphocytes 0.9 0.8 - 5.3 10e9/L    Absolute Monocytes 0.8 0.0 - 1.3 10e9/L    Absolute Eosinophils 0.0 0.0 - 0.7 10e9/L    Absolute Basophils 0.0 0.0 - 0.2 10e9/L    Abs Immature Granulocytes 0.0 0 - 0.4 10e9/L    Absolute Nucleated RBC 0.0    Lactic acid    Collection Time: 12/19/17 11:37 PM   Result Value Ref Range    Lactic Acid 5.6 (HH) 0.4 - 2.0 mmol/L   Blood culture    Collection Time: 12/19/17 11:37 PM   Result Value Ref Range    Specimen Description Blood Port     Special Requests Aerobic and anaerobic bottles received     Culture Micro No growth after 3 hours    UA with Microscopic reflex to Culture    Collection Time: 12/19/17 11:54 PM   Result Value Ref Range    Color Urine Yellow     Appearance Urine Clear      Glucose Urine >499 (A) NEG^Negative mg/dL    Bilirubin Urine Negative NEG^Negative    Ketones Urine 20 (A) NEG^Negative mg/dL    Specific Gravity Urine 1.026 1.003 - 1.035    Blood Urine Negative NEG^Negative    pH Urine 7.0 5.0 - 7.0 pH    Protein Albumin Urine Negative NEG^Negative mg/dL    Urobilinogen mg/dL 2.0 0.0 - 2.0 mg/dL    Nitrite Urine Negative NEG^Negative    Leukocyte Esterase Urine Negative NEG^Negative    Source Midstream Urine     WBC Urine <1 0 - 2 /HPF    RBC Urine 0 0 - 2 /HPF    Mucous Urine Present (A) NEG^Negative /LPF   Influenza A/B antigen    Collection Time: 12/19/17 11:59 PM   Result Value Ref Range    Influenza A/B Agn Specimen Nose     Influenza A Negative NEG^Negative    Influenza B Negative NEG^Negative   Influenza A and B and RSV PCR    Collection Time: 12/20/17  1:20 AM   Result Value Ref Range    Specimen Description Nasopharyngeal     Influenza A PCR Negative NEG^Negative    Influenza B PCR Negative NEG^Negative    Resp Syncytial Virus Negative NEG^Negative   Lactic acid    Collection Time: 12/20/17  1:20 AM   Result Value Ref Range    Lactic Acid 3.5 (H) 0.4 - 2.0 mmol/L   Troponin I    Collection Time: 12/20/17  1:20 AM   Result Value Ref Range    Troponin I ES <0.015 0.000 - 0.045 ug/L   EKG 12 lead    Collection Time: 12/20/17  2:49 AM   Result Value Ref Range    Interpretation ECG Click View Image link to view waveform and result    Procalcitonin    Collection Time: 12/20/17  8:58 AM   Result Value Ref Range    Procalcitonin 0.19 ng/ml   Lactic acid whole blood    Collection Time: 12/20/17  8:58 AM   Result Value Ref Range    Lactic Acid 1.7 0.7 - 2.0 mmol/L        Impression     This is a 57 year old male with a history of stage IV gastric cancer who presented with depressed mood regarding his situation. He will restart on Remeron 7.5mg qhs. Discourage use of opiate medications, pt does not wish to take them. Dr. Harrell reviewed the side effects and complications of  marijuana use, particularly for an individual with a mental illness. Pt acknowledged.       Diagnoses     1. Adjustment Disorder with Depressed Mood.      Plan     1. Explained side effects, benefits and complications of medications to the patient.  2. Medication Changes: Restart Remeron 7.5mg qhs.  3. Discussed treatment plan with patient and team.  4. Re-consult Psychiatry as needed.      TIME SPENT IN PSYCHIATRY INITIAL CONSULT: 55 MINUTES     Attestation:   Patient has been seen and evaluated by me, Favio Harrell MD.    Patient ID:  Name: Dashawn Ordoñez   MRN: [id number]  Admission: 12/20/2017  YOB: 1960

## 2017-12-20 NOTE — ED NOTES
Patient comes in for evaluation of shortness of breath and dizziness. Has a history of chancer (last chemo 12/4) and a bleeding ulcer. Patient states he woke today with the symptoms, was unsure if the dizziness was due to drinking earlier this AM but now symptoms have continued. Does have a history of severe anemia. Patient states had 5-6 shots last was 0400.

## 2017-12-20 NOTE — PLAN OF CARE
Problem: Patient Care Overview  Goal: Plan of Care/Patient Progress Review  Outcome: Improving  New admit. Pt. A&Ox4. Tachycardic, BP elevated 164/97. Sats >95% on RA. C/o SOB. On droplet precaution. Up independently.

## 2017-12-20 NOTE — PHARMACY-VANCOMYCIN DOSING SERVICE
Pharmacy Vancomycin Initial Note  Date of Service 2017  Patient's  1960  57 year old, male    Indication: Sepsis    Current estimated CrCl = CrCl cannot be calculated (Unknown ideal weight.).    Creatinine for last 3 days  2017: 11:37 PM Creatinine 0.72 mg/dL    Recent Vancomycin Level(s) for last 3 days  No results found for requested labs within last 72 hours.      Vancomycin IV Administrations (past 72 hours)                   vancomycin (VANCOCIN) 1500 mg in 0.9% NaCl 250 mL PREMIX (mg) 1,500 mg New Bag 17 0219                Nephrotoxins and other renal medications (Future)    Start     Dose/Rate Route Frequency Ordered Stop    17 1000  vancomycin (VANCOCIN) 1000 mg in dextrose 5% 200 mL PREMIX      1,000 mg  200 mL/hr over 1 Hours Intravenous EVERY 8 HOURS 17 0518      17 0800  piperacillin-tazobactam (ZOSYN) 3.375 in 15 mL NS Premix Syringe     Comments:  Pharmacy can adjust dose based on renal function.    3.375 g  over 3 Minutes Intravenous EVERY 6 HOURS 17 0509            Contrast Orders - past 72 hours     None                Plan:  1.  Start vancomycin  1000 mg IV q8h.   2.  Goal Trough Level: 15-20 mg/L   3.  Pharmacy will check trough levels as appropriate in 1-3 Days.    4. Serum creatinine levels will be ordered daily for the first week of therapy and at least twice weekly for subsequent weeks.    5. [place] method utilized to dose vancomycin therapy: Method 1    Elmer Taylor

## 2017-12-20 NOTE — H&P
[facility]    History and Physical  Hospitalist       Date of Admission:  12/20/2017    Assessment & Plan   Dashawn Ordoñez is a 57 year old male who presents with fever, tachycardia, cough, lactic acid elevation     Fever: Patient with fever to 100.5, tachycardia into the 130s range, cough, lactic acid elevation. Chest x-ray clear, though patient on FOLFOX chemotherapy. Primary suspicion is for a viral etiology as patient's wife with cough as well. No abdominal discomfort, nausea, or emesis. Lactic acid elevated, as are ketones. Patient endorses normal oral intake (stable since chemo, but overall reduced).  -RSV and influenza PCR pending (rapid swab influenza negative)  -Continuing Tamiflu 75 b.i.d.   -Vancomycin and Zosyn initiated prior to transfer, these will also be continued  -Low threshold for consult to infectious disease for assistance in weaning anti-infectives. No clear infectious etiology identified at this time, though primary suspicion for viral etiology as above  -Blood culture x2 pending  -urinalysis unremarkable  -It is possible that patient's tachycardia, Lactic acid elevation and urine ketone positivity are secondary to alcohol withdrawal as below. patient does not believe this to be the case, and would rule out/treat for infectious etiologies first in the setting of low-grade fever with chemotherapy  -Sputum culture/Gram stain    Metastatic gastric adenocarcinoma: Undergoing palliative FOLFOX chemotherapy. History of significant GI bleeding associated with ulceration of malignancy. Last chemotherapy 12/4, was due 12/18.  -PCDs for DVT prophylaxis at this time; additional anticoagulation deferred to oncology service  -[facility] consulted. Patient's primary oncologist is Dr. Healy  - resumption of chemotherapy as per oncology   -Patient has been consented for blood products given history of significant GI bleeds associated with malignancy    Depression, suspect situational:  "Patient endorses missing chemotherapy/oncology follow-up 12/18/17 secondary to depression. Describes focusing on his mortality with metastatic gastric adenocarcinoma and feeling \"for the first time\" \"scared of dying.\" This actually precipitated patient's heavy alcohol use 12/18 a.m.  -Psychiatry consulted  -Remeron 15 mg h.s. scheduled  -Patient declines chaplaincy services at this time.    Alcohol abuse: Patient with a history of heavy alcohol use. Denies history of withdrawal. This said, patient's tachycardia, mild tremulousness, history of heavy alcohol use yesterday morning and known history of alcohol abuse all concerning for alcohol withdrawal  -CIWA protocol. No benzodiazepines have been ordered at this time as patient states that prior to yesterday morning, his last drink was nearly 8 months ago.  -Daily thiamine, folate, multivitamin    Tobacco dependence: Patient continues to smoke 1 pack per day  -Nicotine patch  -Nicotine lozenges available p.r.n.    DVT Prophylaxis: Pneumatic Compression Devices. Patient is at increased risk for thrombosis in the setting of active malignancy, though with historical bleeding related to gastric ulcerations with gastric adenocarcinoma requiring multiple blood transfusions, Lovenox is high risk.    Code Status: Full Code    Disposition: Expected discharge in likely 2-3 days pending weaning of anti-infective therapies    [facility]    Primary Care Physician   Cristian Healy    Chief Complaint   Malaise, fevers, aches    History is obtained from the patient, chart review, discussion with transferring ED provider at [facility]    History of Present Illness   Dashawn Ordoñez is a 57 year old male who presents with complaints of malaise, fever, Muscle aches, cough over the past 2 days. Patient states that his wife has recently had an upper respiratory illness including a cough and low-grade fever.In outside emergency department was noted to have a fever of 100.5 , " "tachycardia to the 130s range, lactic acid  Elevated, ketones positive in urine. Blood cultures were obtained and patient was started on vancomycin, Zosyn, and empiric Tamiflu. Initial influenza rapid swab negative, PCR pending. Currently cough is nonproductive, though when it has been productive, he notes clear sputum.    Patient does not have any abdominal symptoms. No significant abdominal discomfort or tenderness, no emesis. Has had some constipation related to chemotherapy historically, current bowel movements are normal.  No blood in stool    Patient actually feels quite well, sitting in a chair, and is somewhat surprised when we discuss that he will not likely discharged today in the setting of broad-spectrum IV antibiotics.    Patient did not receive chemotherapy 12/18/17 secondary to not feeling well. Patient initially attributed to feeling unwell secondary to cough, though later describes drinking approximately 8 shots of alcohol that morning as he was feeling down. Describes becoming scared regarding mortality of his metastatic adenocarcinoma. Describes to me that he typically feels as though his cancer diagnosis was a \"blessing\" in that it gives him an opportunity to get his affairs in order and make amends with others,  Though states that he did not feel this way as of the 18th. Patient describes using this as a way to avoid chemotherapy as chemotherapy has been making him feel less well as of last round. Subsequently felt guilty for missing chemotherapy.    Patient with a history of significant alcohol use, denies alcohol withdrawal. Notes that his hands feel somewhat shaky today. States that his last significant  Alcohol use was 8 months ago, and he has been sober since up until 12/18. LFTs within normal limit. Patient does not believe he is currently in alcohol withdrawal; this was discussed with patient on admission.    Past Medical History    I have reviewed this patient's medical history and " updated it with pertinent information if needed.   Past Medical History:   Diagnosis Date     Cancer (H)      Depressive disorder      Diabetes (H)      Hypertension      Stomach cancer (H)      Substance abuse        Past Surgical History   I have reviewed this patient's surgical history and updated it with pertinent information if needed.  Past Surgical History:   Procedure Laterality Date     APPENDECTOMY       ENT SURGERY       ESOPHAGOSCOPY, GASTROSCOPY, DUODENOSCOPY (EGD), COMBINED N/A 10/18/2017    Procedure: COMBINED ESOPHAGOSCOPY, GASTROSCOPY, DUODENOSCOPY (EGD), BIOPSY SINGLE OR MULTIPLE;  ESOPHAGOSCOPY, GASTROSCOPY, DUODENOSCOPY (EGD) with biopsies using cold forceps;  Surgeon: Filippo Mclean MD;  Location:  GI       Prior to Admission Medications   Prior to Admission Medications   Prescriptions Last Dose Informant Patient Reported? Taking?   acetaminophen (TYLENOL) 325 MG tablet   No No   Sig: Take 2 tablets (650 mg) by mouth every 6 hours as needed for mild pain   mirtazapine (REMERON) 7.5 MG TABS tablet   No No   Sig: Take 1 tablet (7.5 mg) by mouth nightly as needed   nicotine (NICODERM CQ) 21 MG/24HR 24 hr patch   No No   Sig: Place 1 patch onto the skin daily   omeprazole (PRILOSEC) 40 MG capsule   No No   Sig: Take 1 capsule (40 mg) by mouth 2 times daily (before meals)      Facility-Administered Medications: None     Allergies   No Known Allergies    Social History   I have reviewed this patient's social history and updated it with pertinent information if needed. Dashawn MANE Smita reports that he has been smoking.  He has been smoking about 1.00 packs per day, historically with heavier tobacco use, up to 2 packs per day.. He has never used smokeless tobacco. He reports that he drinks alcohol. He reports that he does not use illicit drugs.    Family History   I have reviewed this patient's family history and updated it with pertinent information if needed.   Father with CVA  Maternal  grandmother and grandfather with alcoholism  Mother with heart disease in her late 70s    Review of Systems   The 10 point Review of Systems is negative other than noted in the HPI or here  Patient feels well currently, no chest pain, unaware of tachycardia.    Physical Exam   Temp: 98.8  F (37.1  C) Temp src: Oral BP: (!) 164/97 Pulse: 118   Resp: 18 SpO2: 99 % O2 Device: None (Room air)    Vital Signs with Ranges  Temp:  [98.8  F (37.1  C)-100.5  F (38.1  C)] 98.8  F (37.1  C)  Pulse:  [118-139] 118  Heart Rate:  [] 93  Resp:  [18-26] 18  BP: (131-174)/(56-97) 164/97  SpO2:  [95 %-99 %] 99 %  0 lbs 0 oz    Constitutional: no acute distress, alert, conversant. Appears well  Eyes: no scleral icterus or injection  HEENT: moist mucous membranes  Respiratory: breath sounds clear bilaterally to auscultation, no wheezes, no crackles. Good air movement throughout lung fields  Cardiovascular: Patient is tachycardic to approximately 130 bpm. No appreciable murmur at this time, the patient reports historical heart murmur. Pronounced S2 best appreciated at apex.  GI: abdomen soft, non-tender, normoactive bowel sounds, no masses  Lymph/Hematologic: no lower extremity swelling  Genitourinary: not examined  Skin: no rashes. Port incision overlying the right anterior chest wall well approximated and healing/early scar formation  Musculoskeletal: muscular tone intact in all extremities  Neurologic: mental status grossly intact, no focal deficits, alert. Patient with mild tremulousness of distal upper extremities bilaterally.  Psychiatric: normal affect    Data   Data reviewed today:  I personally reviewed the chest x-ray image(s) showing Clear lungs, port in place.    Recent Labs  Lab 12/20/17  0120 12/19/17  2337   WBC  --  6.0   HGB  --  9.7*   MCV  --  78   PLT  --  207   NA  --  139   POTASSIUM  --  4.0   CHLORIDE  --  102   CO2  --  24   BUN  --  19   CR  --  0.72   ANIONGAP  --  13   JULIO CESAR  --  8.7   GLC  --  271*    ALBUMIN  --  3.7   PROTTOTAL  --  7.0   BILITOTAL  --  0.5   ALKPHOS  --  83   ALT  --  18   AST  --  18   TROPI <0.015  --        Recent Results (from the past 24 hour(s))   XR Chest 1 View    Narrative    CHEST SINGLE VIEW  12/20/2017 12:45 AM     HISTORY: Fever, cough, and dyspnea.    COMPARISON: 10/17/2017.    FINDINGS: Hypoinflated lungs. The lungs are clear. Normal-sized  cardiac silhouette. Right anterior chest wall port with catheter  entering the right internal jugular vein and distal tip in the  superior vena cava.      Impression    IMPRESSION: No evidence of active cardiopulmonary disease.    LESLIE JOYCE MD

## 2017-12-20 NOTE — PHARMACY-ADMISSION MEDICATION HISTORY
Admission medication history interview status for the 12/20/2017  admission is complete. See EPIC admission navigator for prior to admission medications     Medication history source reliability:Good    Actions taken by pharmacist (provider contacted, etc):None     Additional medication history information not noted on PTA med list :    Med rec recently completed by pharmacy on 11/9/17.    Patient has not been taking Remeron lately because he ran out and the Percocet has been effective in helping with Insomnia.     Medication reconciliation/reorder completed by provider prior to medication history? Yes    Time spent in this activity: 15 mins    Prior to Admission medications    Medication Sig Last Dose Taking? Auth Provider   oxyCODONE-acetaminophen (PERCOCET) 5-325 MG per tablet Take 1-2 tablets by mouth every 4 hours as needed for moderate to severe pain Past Week at prn Yes Unknown, Entered By History   mirtazapine (REMERON) 7.5 MG TABS tablet Take 1 tablet (7.5 mg) by mouth nightly as needed Past Month at prn Yes Se Red Euceda MD   nicotine (NICODERM CQ) 21 MG/24HR 24 hr patch Place 1 patch onto the skin daily Past Month at prn Yes Se Red Euceda MD   omeprazole (PRILOSEC) 40 MG capsule Take 1 capsule (40 mg) by mouth 2 times daily (before meals) 12/19/2017 at am Yes Se Red Euceda MD   acetaminophen (TYLENOL) 325 MG tablet Take 2 tablets (650 mg) by mouth every 6 hours as needed for mild pain prn at prn  Dora Last MD Anna S. Miller

## 2017-12-20 NOTE — PROGRESS NOTES
"[facility]    Internal Medicine Hospitalist Progress Note  12/20/2017  I evaluated patient on the above date.    Jun Roman Jr., MD  [phone] (p)  Text Page (7 am to 6 pm)      Assessment & Plan   Dashawn Ordoñez is a 57 year old male with history including metastatic gastric adenocarcinoma undergoing palliative chemo, who presented 12/20 with fever, tachycardia, cough, lactic acid elevation.     Respiratory infection, possibly viral vs bacterial bronchitis, with possible sepsis.  Pt has been on FOLFOX chemotherapy. Pt presented 12/19 with cough and was febrile to 100.5, tachycardic into the 130s range with  lactic acid elevation, WBC normal, rapid influenza negative. CXR 12/20 negative. Started empirically on vanco and Zosyn as well as Tamiflu 12/20. Procalcitonin 12/20 0.19 (low risk). Tachycardia and lactic acid elevation could be due to EtOH w/d.  Micro: BC's 12/19 NGTD. Sputum 12/20 pending.  - Continue vanco (started 12/20) and Zosyn (started 12/20).  - Continue Tamiflu (started 12/20).  - Monitor cultures.  - F/u RSV and influenza PCR.     Metastatic gastric adenocarcinoma.  Undergoing palliative FOLFOX chemotherapy. History of significant GI bleeding associated with ulceration of malignancy. Last chemotherapy 12/4, was due 12/18.  - [facility] consulted, appreciate help.    Anemia, suspect chronic component, also h/o GIB related to GI malignancy.  - Monitor CBC.  - Patient has been consented for blood products given history of significant GI bleeds associated with malignancy.     Depression, suspect situational.  On admission, patient endorsed missing chemotherapy/oncology follow-up 12/18/17 secondary to depression. Described focusing on his mortality with metastatic gastric adenocarcinoma and feeling \"for the first time\" \"scared of dying.\" This actually precipitated patient's heavy alcohol use 12/18 am. Resumed on Remeron on admission. Seen by Psychiatry 12/20 and agreed with Remeron at " home dose.  - Continue Remeron 7.5 mg qHS.  - Appreciate help from Psychiatry.     Alcohol abuse.  Patient with a history of heavy alcohol use. Denied history of withdrawal. However, patient's tachycardia, mild tremulousness, history of heavy alcohol use 12/19 morning and known history of alcohol abuse all concerning for alcohol withdrawal.  - Continue CIWA scoring.  - No PRN lorazepam for now unless scoring high.  - Continue daily thiamine, folate, multivitamin.     Tobacco dependence.  Patient continues to smoke 1 pack per day  - Continue nicotine patch.  - Continue PRN nicotine lozenges.     Prophylaxis.   Patient is at increased risk for thrombosis in the setting of active malignancy, though with historical bleeding related to gastric ulcerations with gastric adenocarcinoma requiring multiple blood transfusions, Lovenox is high risk.  - Pneumatic Compression Devices.  - Ambulation.      CODE STATUS: FULL.    Dispo.  - If continues to improve, possible d/c soon.    Interval History   Admitted by Dr. Mcgrath early this am.  Doing better presently.  Had dry heaves earlier, but able to tolerate some lunch now. Denies abdominal pain.    -Data reviewed today: I reviewed all new labs and imaging over the last 24 hours. I personally reviewed no images or EKG's today.    Physical Exam    , Blood pressure (!) 148/94, pulse 100, temperature 99.1  F (37.3  C), temperature source Oral, resp. rate 20, SpO2 98 %.  There were no vitals filed for this visit.  Vital Signs with Ranges  Temp:  [98.8  F (37.1  C)-100.5  F (38.1  C)] 99.1  F (37.3  C)  Pulse:  [100-139] 100  Heart Rate:  [] 93  Resp:  [18-26] 20  BP: (131-174)/(56-97) 148/94  SpO2:  [95 %-99 %] 98 %  Patient Vitals for the past 24 hrs:   BP Temp Temp src Pulse Resp SpO2   12/20/17 1400 - - - - 20 -   12/20/17 1010 - - - - 20 -   12/20/17 0910 - - - - 20 -   12/20/17 0752 (!) 148/94 99.1  F (37.3  C) Oral 100 18 98 %   12/20/17 0514 (!) 164/97 98.8  F (37.1  C)  Oral 118 18 99 %     I/O's Last 24 hours       Constitutional: Alert, oriented, pleasant.  Respiratory: Clear, no crackles/wheezes.  Cardiovascular: RRR no m/r/g.  GI: Mild distension, nt, few BS.  Skin/Integumen:   Other:        Data     Recent Labs  Lab 12/20/17  0120 12/19/17  2337   WBC  --  6.0   HGB  --  9.7*   MCV  --  78   PLT  --  207   NA  --  139   POTASSIUM  --  4.0   CHLORIDE  --  102   CO2  --  24   BUN  --  19   CR  --  0.72   ANIONGAP  --  13   JULIO CESAR  --  8.7   GLC  --  271*   ALBUMIN  --  3.7   PROTTOTAL  --  7.0   BILITOTAL  --  0.5   ALKPHOS  --  83   ALT  --  18   AST  --  18   TROPI <0.015  --      Recent Labs   Lab Test  12/19/17   2337  11/12/17   0837  11/12/17   0651  11/12/17   0325  11/11/17   2356  11/11/17   2028  11/11/17   1731   11/11/17   0753   11/10/17   0420   11/09/17   2240   GLC  271*   --   103*   --    --    --    --    --   87   --   132*   --   169*   BGM   --   164*   --   101*  136*  150*  145*   < >   --    < >   --    < >   --     < > = values in this interval not displayed.         Recent Results (from the past 24 hour(s))   XR Chest 1 View    Narrative    CHEST SINGLE VIEW  12/20/2017 12:45 AM     HISTORY: Fever, cough, and dyspnea.    COMPARISON: 10/17/2017.    FINDINGS: Hypoinflated lungs. The lungs are clear. Normal-sized  cardiac silhouette. Right anterior chest wall port with catheter  entering the right internal jugular vein and distal tip in the  superior vena cava.      Impression    IMPRESSION: No evidence of active cardiopulmonary disease.    LESLIE JOYCE MD       Medications   All medications were reviewed.       piperacillin-tazobactam  3.375 g Intravenous Q6H     vancomycin (VANCOCIN) IV  1,000 mg Intravenous Q8H     omeprazole  40 mg Oral BID AC     mirtazapine  7.5 mg Oral At Bedtime     nicotine   Transdermal Q8H     [START ON 12/21/2017] nicotine   Transdermal Daily     nicotine  1 patch Transdermal Daily     oseltamivir  75 mg Oral BID      multivitamin, therapeutic  1 tablet Oral Daily     folic acid  1 mg Oral Daily     thiamine  100 mg Oral Daily     heparin lock flush  5-10 mL Intracatheter Q24H     heparin  5 mL Intracatheter Q28 Days

## 2017-12-20 NOTE — PLAN OF CARE
Problem: Patient Care Overview  Goal: Plan of Care/Patient Progress Review  OT: Smoking cessation orders received. Chart reviewed and met with pt. Pt reports he hand-makes his cigarettes, uses 1-2 packs a day and has no want or intentions to quit smoking although he does report he understands the negative impact they have on his health. Pt open to therapist leaving informational handout on smoking cessation and Quitplan. Will complete orders.

## 2017-12-20 NOTE — ED PROVIDER NOTES
"  History     Chief Complaint:  Multiple complaints    HPI   Dashawn Ordoñez is a 57 year old male with a history of DDD and stage 4 gastric cancer metastasized to the liver who presents to the emergency department today for evaluation of multiple complaints including dyspnea, body aches, fevers, among others. The patient was diagnosed with gastric cancer this past October, is following Dr. Healy of [facility], and is currently receiving chemotherapy last on December 4th. The patient was supposed to receive another dose yesterday, but as he began to feel unwell yesterday, had this postponed. Today, the patient reports continuing to feel \"lousy,\" with lightheadedness, dizziness, cough, shortness of breath, fever, and body aches, and that today is the first day he has been afraid of his diagnosis. He also reports back pain that is consistent with his history of DDD, but more intense than normal. Of note, the patient has not received a flu shot this year and has been out of pain medications for the past week, but has been tolerating pain well.    Allergies:  No Known Drug Allergies    Medications:    Remeron  Nicoderm  Omeprazole  Tylenol    Past Medical History:    Degenerative Disc Disease  Gastric cancer, stage 4  Diabetes  Hypertension  Substance abuse    Past Surgical History:    Appendectomy  ENT surgery  EGD combined    Family History:    History reviewed. No pertinent family history.     Social History:  The patient presented to the ED alone.  Smoking Status: Current Every Day Smoker  Smokeless Tobacco: Never used  Alcohol Use: Yes  Marital Status:   [2]    Review of Systems   Constitutional: Positive for fever.   Respiratory: Positive for cough and shortness of breath.    Musculoskeletal: Positive for myalgias.   Neurological: Positive for dizziness and light-headedness.   All other systems reviewed and are negative.    Physical Exam     Patient Vitals for the past 24 hrs:   BP Temp Pulse Heart " Rate Resp SpO2   12/20/17 0300 149/85 - - - - -   12/20/17 0200 145/89 - - - - -   12/20/17 0145 146/81 - - - - -   12/20/17 0130 164/76 - - - - -   12/20/17 0115 (!) 144/91 - - 134 - 98 %   12/20/17 0030 151/82 - - 138 - 95 %   12/20/17 0015 171/85 - - 131 - 98 %   12/20/17 0000 (!) 131/94 - - 130 - -   12/19/17 2345 (!) 153/93 - - 130 - 96 %   12/19/17 2330 (!) 174/97 - - 134 - 99 %   12/19/17 2300 (!) 163/92 100.5  F (38.1  C) 139 - 26 99 %     Physical Exam  Nursing note and vitals reviewed.  Constitutional: Cooperative.   HENT:   Mouth/Throat: Moist mucous membranes.   Eyes: EOMI, nonicteric sclera  Cardiovascular: tachycardic, regular rhythm, no murmurs, rubs, or gallops  Pulmonary/Chest: Effort normal and breath sounds normal. No respiratory distress. No wheezes. No rales.   Abdominal: Soft. Nontender, nondistended, no guarding or rigidity. BS present.   Musculoskeletal: Normal range of motion.   Neurological: Alert. Moves all extremities spontaneously.   Skin: Skin is warm and dry. No rash noted.   Psychiatric: Normal mood and affect.       Emergency Department Course     ECG:  ECG taken at 0249, ECG read at 0300  Sinus tachycardia  Right bundle branch block  Abnormal ECG  Rate 105 bpm. ID interval 140. QRS duration 126. QT/QTc 388/512. P-R-T axes 33 20 37.    Imaging:  Radiology findings were communicated with the patient and his wife who voiced understanding of the findings.    XR Chest 1 View  No evidence of active cardiopulmonary disease.  Reading per radiology    Laboratory:  Laboratory findings were communicated with the patient and and his wife who voiced understanding of the findings.    CBC: HGB 9.7 (L). (WBC 6.0, )   CMP: Glucose 271 (H) o/w WNL (Creatinine 0.72)  Lactic Acid (Collected at 2337): 5.6 (HH)  Lactic Acid (Collected at 0120): 3.5 (H)  Blood culture: Pending x2    Influenza A/B antigen: Negative  Influenza A, B, and RSV PCR: Pending    UA with micro: Glucose >499 (A), Ketones 20  (A), Mucous present (A) o/w negative    Interventions:  2357 ns 2 L IV  2357 Tylenol 650 mg PO  0141 Zosyn 4.5 g IV  0219 Vancomycin 1500 mg IV  0311 oxycodone 5 mg PO     Emergency Department Course:  Nursing notes and vitals reviewed.  2324: I performed an exam of the patient as documented above.   IV was inserted and blood was drawn for laboratory testing, results above.  The patient was sent for a XR Chest 1 View while in the emergency department, results above.   0114: I rechecked the patient and updated him on the results of laboratory and imaging studies.   0146: I rechecked the patient and updated him on the results of further laboratory studies and the plan of care.  0204: I spoke with Dr. Mcgrath of the hospitalist service from [facility] who agreed to accept the patient for transfer.  0318: I rechecked the patient and updated on the plan of care. All questions were answered. His heart rate decreased to the 100's.    Impression & Plan    CMS Diagnoses: Even though lactate > 4, with SIRS criteria, there is no source of infection found, therefore cannot diagnosis septic shock and/or severe sepsis.     Medical Decision Making:  Dashawn Ordoñez is a 57 year old male who presents with multiple complaints including dyspnea, cough, body aches, fever, among more. He has a history of metastatic gastric cancer on palliative chemotherapy through [facility]. Clinically, patient appears to likely have influenza though other things in the differential include pneumonia, urinary tract infection, bacteremia, sepsis, among may other etiologies. Vital signs are notable for significant tachycardia into the 130's since arrival. This occasionally improves into the 120's but jumps back up. Seems to have near-completely resolved after 3L of fluid. Patient is also febrile and received some Tylenol for this. Patient's lactic acid was quite elevated at 5.6. After 2 liters of fluid, this was repeated and 3.5.  Rapid flu is negative but PCR is pending. We will treat him empirically for influenza. Additionally, I started him on Vancomycin and Zosyn given his immunosuppressed status. Blood cultures are pending.Ultimately, our hospitalist is unable to admit him tonight, therefore patient required transfer to [facility] where Dr. Mcgrath accepted the patient. Patient wishes to go by private vehicle and has a ride up there. All questions were answered and he is in agreement with the plan. He is transferred in stable condition.    Diagnosis:    ICD-10-CM    1. SIRS (systemic inflammatory response syndrome) (H) R65.10 Troponin I     Troponin I     CANCELED: Troponin I   2. Acidosis E87.2      Disposition:   Transfer to [facility]    Scribe Disclosure:  I, Michela Saavedra, am serving as a scribe at 11:24 PM on 12/19/2017 to document services personally performed by Praneeth Villagran MD, based on my observations and the provider's statements to me.    12/19/2017   [facility] EMERGENCY DEPARTMENT       Praneeth Villagran MD  12/20/17 0605

## 2017-12-20 NOTE — PLAN OF CARE
Problem: Patient Care Overview  Goal: Plan of Care/Patient Progress Review  Outcome: Improving  Pt alert and oriented. Fair appetite. Up independently. C/O low back pain,some relief with oxycodone.CIWA 0. Productive cough clear sputum,LSC. Nicotine patch left arm. Psych consult and oncology consults today.

## 2017-12-20 NOTE — IP AVS SNAPSHOT
[facility] [facility]    [address] [place] [address]    Phone:  [phone]                                       After Visit Summary   12/20/2017    Dashawn Ordoñez    MRN: [id number]           After Visit Summary Signature Page     I have received my discharge instructions, and my questions have been answered. I have discussed any challenges I see with this plan with the nurse or doctor.    ..........................................................................................................................................  Patient/Patient Representative Signature      ..........................................................................................................................................  Patient Representative Print Name and Relationship to Patient    ..................................................               ................................................  Date                                            Time    ..........................................................................................................................................  Reviewed by Signature/Title    ...................................................              ..............................................  Date                                                            Time

## 2017-12-21 VITALS
HEART RATE: 92 BPM | SYSTOLIC BLOOD PRESSURE: 141 MMHG | TEMPERATURE: 99 F | WEIGHT: 165.57 LBS | RESPIRATION RATE: 16 BRPM | DIASTOLIC BLOOD PRESSURE: 91 MMHG | OXYGEN SATURATION: 99 % | BODY MASS INDEX: 23.76 KG/M2

## 2017-12-21 LAB
ABO + RH BLD: NORMAL
ABO + RH BLD: NORMAL
ANION GAP SERPL CALCULATED.3IONS-SCNC: 5 MMOL/L (ref 3–14)
BASOPHILS # BLD AUTO: 0 10E9/L (ref 0–0.2)
BASOPHILS NFR BLD AUTO: 1 %
BLD GP AB SCN SERPL QL: NORMAL
BLOOD BANK CMNT PATIENT-IMP: NORMAL
BUN SERPL-MCNC: 5 MG/DL (ref 7–30)
CALCIUM SERPL-MCNC: 8.7 MG/DL (ref 8.5–10.1)
CHLORIDE SERPL-SCNC: 106 MMOL/L (ref 94–109)
CO2 SERPL-SCNC: 28 MMOL/L (ref 20–32)
CREAT SERPL-MCNC: 0.68 MG/DL (ref 0.66–1.25)
DIFFERENTIAL METHOD BLD: ABNORMAL
EOSINOPHIL # BLD AUTO: 0.2 10E9/L (ref 0–0.7)
EOSINOPHIL NFR BLD AUTO: 3.8 %
ERYTHROCYTE [DISTWIDTH] IN BLOOD BY AUTOMATED COUNT: 22.4 % (ref 10–15)
GFR SERPL CREATININE-BSD FRML MDRD: >90 ML/MIN/1.7M2
GLUCOSE SERPL-MCNC: 132 MG/DL (ref 70–99)
HCT VFR BLD AUTO: 26.6 % (ref 40–53)
HGB BLD-MCNC: 8.1 G/DL (ref 13.3–17.7)
IMM GRANULOCYTES # BLD: 0 10E9/L (ref 0–0.4)
IMM GRANULOCYTES NFR BLD: 0.2 %
LYMPHOCYTES # BLD AUTO: 1 10E9/L (ref 0.8–5.3)
LYMPHOCYTES NFR BLD AUTO: 23.6 %
MCH RBC QN AUTO: 23.8 PG (ref 26.5–33)
MCHC RBC AUTO-ENTMCNC: 30.5 G/DL (ref 31.5–36.5)
MCV RBC AUTO: 78 FL (ref 78–100)
MONOCYTES # BLD AUTO: 0.6 10E9/L (ref 0–1.3)
MONOCYTES NFR BLD AUTO: 15 %
NEUTROPHILS # BLD AUTO: 2.4 10E9/L (ref 1.6–8.3)
NEUTROPHILS NFR BLD AUTO: 56.4 %
NRBC # BLD AUTO: 0 10*3/UL
NRBC BLD AUTO-RTO: 0 /100
PLATELET # BLD AUTO: 147 10E9/L (ref 150–450)
POTASSIUM SERPL-SCNC: 3.6 MMOL/L (ref 3.4–5.3)
RBC # BLD AUTO: 3.41 10E12/L (ref 4.4–5.9)
SODIUM SERPL-SCNC: 139 MMOL/L (ref 133–144)
SPECIMEN EXP DATE BLD: NORMAL
VANCOMYCIN SERPL-MCNC: 15 MG/L
WBC # BLD AUTO: 4.2 10E9/L (ref 4–11)

## 2017-12-21 PROCEDURE — 80202 ASSAY OF VANCOMYCIN: CPT | Performed by: INTERNAL MEDICINE

## 2017-12-21 PROCEDURE — 25000128 H RX IP 250 OP 636: Performed by: INTERNAL MEDICINE

## 2017-12-21 PROCEDURE — 85025 COMPLETE CBC W/AUTO DIFF WBC: CPT | Performed by: INTERNAL MEDICINE

## 2017-12-21 PROCEDURE — 86850 RBC ANTIBODY SCREEN: CPT | Performed by: INTERNAL MEDICINE

## 2017-12-21 PROCEDURE — 86900 BLOOD TYPING SEROLOGIC ABO: CPT | Performed by: INTERNAL MEDICINE

## 2017-12-21 PROCEDURE — 86901 BLOOD TYPING SEROLOGIC RH(D): CPT | Performed by: INTERNAL MEDICINE

## 2017-12-21 PROCEDURE — 99239 HOSP IP/OBS DSCHRG MGMT >30: CPT | Performed by: INTERNAL MEDICINE

## 2017-12-21 PROCEDURE — 80048 BASIC METABOLIC PNL TOTAL CA: CPT | Performed by: INTERNAL MEDICINE

## 2017-12-21 PROCEDURE — 25000132 ZZH RX MED GY IP 250 OP 250 PS 637: Performed by: INTERNAL MEDICINE

## 2017-12-21 RX ORDER — LEVOFLOXACIN 500 MG/1
500 TABLET, FILM COATED ORAL DAILY
Status: DISCONTINUED | OUTPATIENT
Start: 2017-12-21 | End: 2017-12-21 | Stop reason: HOSPADM

## 2017-12-21 RX ORDER — LEVOFLOXACIN 500 MG/1
500 TABLET, FILM COATED ORAL DAILY
Qty: 4 TABLET | Refills: 0 | Status: SHIPPED | OUTPATIENT
Start: 2017-12-21 | End: 2018-01-07

## 2017-12-21 RX ORDER — LEVOFLOXACIN 500 MG/1
500 TABLET, FILM COATED ORAL DAILY
Qty: 4 TABLET | Refills: 0 | Status: SHIPPED | OUTPATIENT
Start: 2017-12-21 | End: 2017-12-21

## 2017-12-21 RX ADMIN — SODIUM CHLORIDE, PRESERVATIVE FREE 5 ML: 5 INJECTION INTRAVENOUS at 07:00

## 2017-12-21 RX ADMIN — VANCOMYCIN HYDROCHLORIDE 1000 MG: 1 INJECTION, SOLUTION INTRAVENOUS at 02:02

## 2017-12-21 RX ADMIN — OMEPRAZOLE 40 MG: 20 CAPSULE, DELAYED RELEASE ORAL at 06:50

## 2017-12-21 RX ADMIN — SODIUM CHLORIDE, PRESERVATIVE FREE 5 ML: 5 INJECTION INTRAVENOUS at 11:29

## 2017-12-21 RX ADMIN — PIPERACILLIN SODIUM AND TAZOBACTAM SODIUM 3.38 G: 36; 4.5 INJECTION, POWDER, FOR SOLUTION INTRAVENOUS at 02:02

## 2017-12-21 NOTE — PLAN OF CARE
Problem: Patient Care Overview  Goal: Plan of Care/Patient Progress Review  Outcome: No Change  Patient A&O x4. C/o back pain PRN oxycodone given with relief. CIWA score 13. 1mg of oral ativan given. After re-check patient scoring 13. Another 1 mg of Ativan given. Patient calm and able to sleep.  Up independently  in room. Regular diet. Will continue to monitor.

## 2017-12-21 NOTE — DISCHARGE SUMMARY
[facility]  Discharge Summary        Dashawn Ordoñez MRN# [id number]   YOB: 1960 Age: 57 year old     Date of Admission: 12/20/2017  Date of Discharge: 12/21/2017  Admitting Physician: Shalom Mcgrath MD  Discharge Physician: Jun Roman MD     Primary Provider: Cristian Healy  Primary Care Physician Phone Number: [phone]         Discharge Diagnoses:   1. Respiratory infection, suspect bacterial bronchitis.  2. Metastatic gastric adenocarcinoma.  3. Anemia, suspect chronic component, also h/o GIB related to GI malignancy.  4. Depression, suspect situational.  5. Alcohol abuse.  6. Tobacco dependence.       Allergies:       No Known Allergies        Discharge Medications:        Current Discharge Medication List      START taking these medications    Details   levofloxacin (LEVAQUIN) 500 MG tablet Take 1 tablet (500 mg) by mouth daily  Qty: 4 tablet, Refills: 0    Associated Diagnoses: Acute bronchitis, unspecified organism         CONTINUE these medications which have NOT CHANGED    Details   oxyCODONE-acetaminophen (PERCOCET) 5-325 MG per tablet Take 1-2 tablets by mouth every 4 hours as needed for moderate to severe pain      mirtazapine (REMERON) 7.5 MG TABS tablet Take 1 tablet (7.5 mg) by mouth nightly as needed  Qty: 30 tablet, Refills: 0    Associated Diagnoses: Sleep disorder      omeprazole (PRILOSEC) 40 MG capsule Take 1 capsule (40 mg) by mouth 2 times daily (before meals)  Qty: 30 capsule, Refills: 3    Associated Diagnoses: Acute upper gastrointestinal bleeding      acetaminophen (TYLENOL) 325 MG tablet Take 2 tablets (650 mg) by mouth every 6 hours as needed for mild pain  Qty: 100 tablet    Associated Diagnoses: Gastrointestinal hemorrhage associated with gastric ulcer         STOP taking these medications       nicotine (NICODERM CQ) 21 MG/24HR 24 hr patch Comments:   Reason for Stopping:                   Discharge Instructions and Follow-Up:     "  Follow-up Appointments     Follow Up and recommended labs and tests       Follow up with primary care provider, Cristian Healy, within 7 days for   hospital follow- up.  The following labs/tests are recommended: BMP, CBC.                            Consultations This Hospital Stay:      Oncology.        Admission History:      Please see the H&P by Shalom Mcgrath MD on 12/20/2017 for complete details. Briefly, Dashawn Ordoñez is a 57 year old male with history including metastatic gastric adenocarcinoma undergoing palliative chemo, who presented 12/20 with fever, tachycardia, cough, lactic acid elevation.        Problem Oriented Hospital Course:      Respiratory infection, possibly viral vs bacterial bronchitis, with possible sepsis.  Pt has been on FOLFOX chemotherapy. Pt presented 12/19 with cough and was febrile to 100.5, tachycardic into the 130s range with  lactic acid elevation, WBC normal, rapid influenza negative. CXR 12/20 negative. Started empirically on vanco and Zosyn as well as Tamiflu 12/20. Procalcitonin 12/20 0.19 (low risk). Tachycardia and lactic acid elevation could be due to EtOH w/d.  Micro/virology: BC's 12/19 NGTD. Sputum 12/20 contaminated. Viral PCR negative for influenza A/B and RSV.  - Pt will discharge home on levofloxacin for 4 more days.     Metastatic gastric adenocarcinoma.  Undergoing palliative FOLFOX chemotherapy. History of significant GI bleeding associated with ulceration of malignancy. Last chemotherapy 12/4, was due 12/18.  - Pt will f/u with Oncology.     Anemia, suspect chronic component, also h/o GIB related to GI malignancy.  - Monitor CBC outpatient.      Depression, suspect situational.  On admission, patient endorsed missing chemotherapy/oncology follow-up 12/18/17 secondary to depression. Described focusing on his mortality with metastatic gastric adenocarcinoma and feeling \"for the first time\" \"scared of dying.\" This actually precipitated patient's heavy alcohol use " 12/18 am. Resumed on Remeron on admission. Seen by Psychiatry 12/20 and agreed with Remeron at home dose.  - Pt will continue Remeron 7.5 mg qHS.      Alcohol abuse.  Patient with a history of heavy alcohol use. Denied history of withdrawal. However, patient's tachycardia, mild tremulousness, history of heavy alcohol use 12/19 morning and known history of alcohol abuse all concerning for alcohol withdrawal. Had increased scores 12/20 evening needing Ativan but scoring 0 12/21. At the time of discharge, pt not tremulous or agitated, not needing Ativan.          Code Status:      Full Code        Pending Results:        Unresulted Labs Ordered in the Past 30 Days of this Admission     Date and Time Order Name Status Description    12/19/2017 2334 Blood culture Preliminary     12/19/2017 2334 Blood culture Preliminary                 Discharge Disposition:      Discharged to home.        Discharge Time:      Greater than 30 minutes.        Key Imaging Studies, Lab Findings and Procedures/Surgeries:        Results for orders placed or performed during the hospital encounter of 12/19/17   XR Chest 1 View    Narrative    CHEST SINGLE VIEW  12/20/2017 12:45 AM     HISTORY: Fever, cough, and dyspnea.    COMPARISON: 10/17/2017.    FINDINGS: Hypoinflated lungs. The lungs are clear. Normal-sized  cardiac silhouette. Right anterior chest wall port with catheter  entering the right internal jugular vein and distal tip in the  superior vena cava.      Impression    IMPRESSION: No evidence of active cardiopulmonary disease.    LESLIE JOYCE MD

## 2017-12-21 NOTE — PROGRESS NOTES
"[facility]    Internal Medicine Hospitalist Progress Note  12/21/2017  I evaluated patient on the above date.    Jun Roman Jr., MD  [phone] (p)  Text Page (7 am to 6 pm)      Assessment & Plan   Dashawn Ordoñez is a 57 year old male with history including metastatic gastric adenocarcinoma undergoing palliative chemo, who presented 12/20 with fever, tachycardia, cough, lactic acid elevation.     Respiratory infection, possibly viral vs bacterial bronchitis, with possible sepsis.  Pt has been on FOLFOX chemotherapy. Pt presented 12/19 with cough and was febrile to 100.5, tachycardic into the 130s range with  lactic acid elevation, WBC normal, rapid influenza negative. CXR 12/20 negative. Started empirically on vanco and Zosyn as well as Tamiflu 12/20. Procalcitonin 12/20 0.19 (low risk). Tachycardia and lactic acid elevation could be due to EtOH w/d.  Micro/virology: BC's 12/19 NGTD. Sputum 12/20 contaminated. Viral PCR negative for influenza A/B and RSV.  - D/c antibiotics.  - Discharge home on levofloxacin for 4 more days.  - Monitor cultures.    Metastatic gastric adenocarcinoma.  Undergoing palliative FOLFOX chemotherapy. History of significant GI bleeding associated with ulceration of malignancy. Last chemotherapy 12/4, was due 12/18.  - F/u with Oncology.    Anemia, suspect chronic component, also h/o GIB related to GI malignancy.  - Monitor CBC outpatient.     Depression, suspect situational.  On admission, patient endorsed missing chemotherapy/oncology follow-up 12/18/17 secondary to depression. Described focusing on his mortality with metastatic gastric adenocarcinoma and feeling \"for the first time\" \"scared of dying.\" This actually precipitated patient's heavy alcohol use 12/18 am. Resumed on Remeron on admission. Seen by Psychiatry 12/20 and agreed with Remeron at home dose.  - Continue Remeron 7.5 mg qHS.     Alcohol abuse.  Patient with a history of heavy alcohol use. Denied history " of withdrawal. However, patient's tachycardia, mild tremulousness, history of heavy alcohol use 12/19 morning and known history of alcohol abuse all concerning for alcohol withdrawal. Had increased scores 12/20 evening needing Ativan but scoring 0 today 12/21.     Tobacco dependence.  Patient continues to smoke 1 pack per day     Prophylaxis.   Patient is at increased risk for thrombosis in the setting of active malignancy, though with historical bleeding related to gastric ulcerations with gastric adenocarcinoma requiring multiple blood transfusions, Lovenox is high risk.  - Pneumatic Compression Devices.  - Ambulation.      CODE STATUS: FULL.    Dispo.  - D/C home.    Interval History   Doing better.    -Data reviewed today: I reviewed all new labs and imaging over the last 24 hours. I personally reviewed no images or EKG's today.    Physical Exam   Heart Rate: 85, Blood pressure (!) 141/91, pulse 92, temperature 99  F (37.2  C), temperature source Oral, resp. rate 16, weight 75.1 kg (165 lb 9.1 oz), SpO2 99 %.  Vitals:    12/21/17 0631   Weight: 75.1 kg (165 lb 9.1 oz)     Vital Signs with Ranges  Temp:  [98  F (36.7  C)-99  F (37.2  C)] 99  F (37.2  C)  Pulse:  [92] 92  Heart Rate:  [70-85] 85  Resp:  [16-20] 16  BP: (141-154)/(91-96) 141/91  SpO2:  [97 %-99 %] 99 %  Patient Vitals for the past 24 hrs:   BP Temp Temp src Pulse Heart Rate Resp SpO2 Weight   12/21/17 0722 (!) 141/91 99  F (37.2  C) Oral - 85 16 99 % -   12/21/17 0631 - - - - - - - 75.1 kg (165 lb 9.1 oz)   12/21/17 0000 (!) 147/93 98  F (36.7  C) Oral - 70 16 97 % -   12/20/17 2100 - - - - - 16 - -   12/20/17 2000 (!) 154/93 98  F (36.7  C) Oral - 77 16 - -   12/20/17 1546 (!) 146/96 98.7  F (37.1  C) Oral 92 - 16 97 % -   12/20/17 1400 - - - - - 20 - -     I/O's Last 24 hours       Constitutional: Alert, oriented, pleasant, conversant, appropriate.  Respiratory: Clear, no crackles/wheezes.  Cardiovascular: RRR no m/r/g.  GI: Soft,  +BS.  Skin/Integumen:   Other:        Data     Recent Labs  Lab 12/21/17  0705 12/20/17  0120 12/19/17  2337   WBC 4.2  --  6.0   HGB 8.1*  --  9.7*   MCV 78  --  78   *  --  207     --  139   POTASSIUM 3.6  --  4.0   CHLORIDE 106  --  102   CO2 28  --  24   BUN 5*  --  19   CR 0.68  --  0.72   ANIONGAP 5  --  13   JULIO CESAR 8.7  --  8.7   *  --  271*   ALBUMIN  --   --  3.7   PROTTOTAL  --   --  7.0   BILITOTAL  --   --  0.5   ALKPHOS  --   --  83   ALT  --   --  18   AST  --   --  18   TROPI  --  <0.015  --      Recent Labs   Lab Test  12/21/17   0705  12/19/17   2337  11/12/17   0837  11/12/17   0651  11/12/17   0325  11/11/17   2356  11/11/17   2028  11/11/17   1731   11/11/17   0753   11/10/17   0420   GLC  132*  271*   --   103*   --    --    --    --    --   87   --   132*   BGM   --    --   164*   --   101*  136*  150*  145*   < >   --    < >   --     < > = values in this interval not displayed.         No results found for this or any previous visit (from the past 24 hour(s)).    Medications   All medications were reviewed.       piperacillin-tazobactam  3.375 g Intravenous Q6H     vancomycin (VANCOCIN) IV  1,000 mg Intravenous Q8H     omeprazole  40 mg Oral BID AC     mirtazapine  7.5 mg Oral At Bedtime     nicotine   Transdermal Q8H     nicotine   Transdermal Daily     nicotine  1 patch Transdermal Daily     oseltamivir  75 mg Oral BID     multivitamin, therapeutic  1 tablet Oral Daily     folic acid  1 mg Oral Daily     thiamine  100 mg Oral Daily     heparin lock flush  5-10 mL Intracatheter Q24H     heparin  5 mL Intracatheter Q28 Days

## 2017-12-21 NOTE — PROVIDER NOTIFICATION
MD Notification    Notified Person:  MD    Notified Persons Name: Eugene    Notification Date/Time:12/20/2017, 8:46 PM    Notification Interaction:  Talked with Physician via phone    Purpose of Notification:pt c/o increased anxiety, SOB, rasing thoughts, pacing in the room, nauseated earlier    Orders Received:CIWA and ativan prn    Comments:

## 2017-12-21 NOTE — PHARMACY-VANCOMYCIN DOSING SERVICE
Pharmacy Vancomycin Note  Date of Service 2017  Patient's  1960   57 year old, male    Indication: Sepsis  Goal Trough Level: 15-20 mg/L  Day of Therapy: 3  Current Vancomycin regimen:  1000 mg IV q8h    Current estimated CrCl = CrCl cannot be calculated (Unknown ideal weight.).    Creatinine for last 3 days  2017: 11:37 PM Creatinine 0.72 mg/dL  2017:  7:05 AM Creatinine 0.68 mg/dL    Recent Vancomycin Levels (past 3 days)  2017:  9:20 AM Vancomycin Level 15.0 mg/L    Vancomycin IV Administrations (past 72 hours)                   vancomycin (VANCOCIN) 1000 mg in dextrose 5% 200 mL PREMIX (mg) 1,000 mg New Bag 17 0202     1,000 mg New Bag 17 1813     1,000 mg New Bag  0909    vancomycin (VANCOCIN) 1500 mg in 0.9% NaCl 250 mL PREMIX (mg) 1,500 mg New Bag 17 0219                Nephrotoxins and other renal medications (Future)    Start     Dose/Rate Route Frequency Ordered Stop    17 1000  vancomycin (VANCOCIN) 1000 mg in dextrose 5% 200 mL PREMIX      1,000 mg  200 mL/hr over 1 Hours Intravenous EVERY 8 HOURS 17 0518      17 0800  piperacillin-tazobactam (ZOSYN) 3.375 in 15 mL NS Premix Syringe     Comments:  Pharmacy can adjust dose based on renal function.    3.375 g  over 3 Minutes Intravenous EVERY 6 HOURS 17 0509               Contrast Orders - past 72 hours     None          Interpretation of levels and current regimen:  Trough level is  Therapeutic    Has serum creatinine changed > 50% in last 72 hours: No    Urine output:  unable to determine    Renal Function: Stable    Plan:  1.  Continue Current Dose  2.  Pharmacy will check trough levels as appropriate in 3-5 Days.    3. Serum creatinine levels will be ordered daily for the first week of therapy and at least twice weekly for subsequent weeks.      Wesly Newell        .

## 2017-12-21 NOTE — PLAN OF CARE
[facility]    Hospitalist Update Note    Received page from nursing that patient with increased anxiety and scoring 13 on CIWA-Ar. CIWA-Ar scoring ordered with PRN lorazepam and daily vitamins.     Ember Stuart MD  [facility] hospitalist    [phone] (P)  Text page (until 9pm)

## 2017-12-26 LAB
BACTERIA SPEC CULT: NO GROWTH
BACTERIA SPEC CULT: NO GROWTH
Lab: NORMAL
Lab: NORMAL
SPECIMEN SOURCE: NORMAL
SPECIMEN SOURCE: NORMAL

## 2018-01-07 ENCOUNTER — HOSPITAL ENCOUNTER (EMERGENCY)
Facility: CLINIC | Age: 58
Discharge: ANOTHER HOSPITAL | End: 2018-01-08
Attending: EMERGENCY MEDICINE | Admitting: EMERGENCY MEDICINE
Payer: COMMERCIAL

## 2018-01-07 VITALS
TEMPERATURE: 99.3 F | HEIGHT: 70 IN | SYSTOLIC BLOOD PRESSURE: 123 MMHG | RESPIRATION RATE: 12 BRPM | DIASTOLIC BLOOD PRESSURE: 83 MMHG | OXYGEN SATURATION: 100 %

## 2018-01-07 DIAGNOSIS — K92.2 GASTROINTESTINAL HEMORRHAGE, UNSPECIFIED GASTROINTESTINAL HEMORRHAGE TYPE: ICD-10-CM

## 2018-01-07 LAB
ABO + RH BLD: NORMAL
ABO + RH BLD: NORMAL
ALBUMIN SERPL-MCNC: 3.3 G/DL (ref 3.4–5)
ALP SERPL-CCNC: 67 U/L (ref 40–150)
ALT SERPL W P-5'-P-CCNC: 15 U/L (ref 0–70)
ANION GAP SERPL CALCULATED.3IONS-SCNC: 11 MMOL/L (ref 3–14)
AST SERPL W P-5'-P-CCNC: 9 U/L (ref 0–45)
BASOPHILS # BLD AUTO: 0 10E9/L (ref 0–0.2)
BASOPHILS NFR BLD AUTO: 0.2 %
BILIRUB SERPL-MCNC: 0.7 MG/DL (ref 0.2–1.3)
BLD GP AB SCN SERPL QL: NORMAL
BLOOD BANK CMNT PATIENT-IMP: NORMAL
BUN SERPL-MCNC: 39 MG/DL (ref 7–30)
CALCIUM SERPL-MCNC: 8.6 MG/DL (ref 8.5–10.1)
CHLORIDE SERPL-SCNC: 104 MMOL/L (ref 94–109)
CO2 SERPL-SCNC: 24 MMOL/L (ref 20–32)
CREAT SERPL-MCNC: 0.78 MG/DL (ref 0.66–1.25)
DIFFERENTIAL METHOD BLD: ABNORMAL
EOSINOPHIL # BLD AUTO: 0.1 10E9/L (ref 0–0.7)
EOSINOPHIL NFR BLD AUTO: 1.1 %
ERYTHROCYTE [DISTWIDTH] IN BLOOD BY AUTOMATED COUNT: 22.8 % (ref 10–15)
GFR SERPL CREATININE-BSD FRML MDRD: >90 ML/MIN/1.7M2
GLUCOSE SERPL-MCNC: 201 MG/DL (ref 70–99)
HCT VFR BLD AUTO: 26.2 % (ref 40–53)
HCT VFR BLD CALC: 24 %PCV (ref 40–53)
HGB BLD CALC-MCNC: 8.2 G/DL (ref 13.3–17.7)
HGB BLD-MCNC: 7.8 G/DL (ref 13.3–17.7)
IMM GRANULOCYTES # BLD: 0 10E9/L (ref 0–0.4)
IMM GRANULOCYTES NFR BLD: 0.2 %
INR PPP: 1.14 (ref 0.86–1.14)
LYMPHOCYTES # BLD AUTO: 1.3 10E9/L (ref 0.8–5.3)
LYMPHOCYTES NFR BLD AUTO: 12.2 %
MCH RBC QN AUTO: 23.2 PG (ref 26.5–33)
MCHC RBC AUTO-ENTMCNC: 29.8 G/DL (ref 31.5–36.5)
MCV RBC AUTO: 78 FL (ref 78–100)
MONOCYTES # BLD AUTO: 0.6 10E9/L (ref 0–1.3)
MONOCYTES NFR BLD AUTO: 5.6 %
NEUTROPHILS # BLD AUTO: 8.9 10E9/L (ref 1.6–8.3)
NEUTROPHILS NFR BLD AUTO: 80.7 %
PLATELET # BLD AUTO: 215 10E9/L (ref 150–450)
POTASSIUM BLD-SCNC: 3.9 MMOL/L (ref 3.4–5.3)
POTASSIUM SERPL-SCNC: 4 MMOL/L (ref 3.4–5.3)
PROT SERPL-MCNC: 6.4 G/DL (ref 6.8–8.8)
RBC # BLD AUTO: 3.36 10E12/L (ref 4.4–5.9)
SODIUM BLD-SCNC: 138 MMOL/L (ref 133–144)
SODIUM SERPL-SCNC: 139 MMOL/L (ref 133–144)
SPECIMEN EXP DATE BLD: NORMAL
WBC # BLD AUTO: 11 10E9/L (ref 4–11)

## 2018-01-07 PROCEDURE — 96366 THER/PROPH/DIAG IV INF ADDON: CPT

## 2018-01-07 PROCEDURE — 96365 THER/PROPH/DIAG IV INF INIT: CPT

## 2018-01-07 PROCEDURE — 25000125 ZZHC RX 250: Performed by: EMERGENCY MEDICINE

## 2018-01-07 PROCEDURE — 25000128 H RX IP 250 OP 636

## 2018-01-07 PROCEDURE — 40000501 ZZHCL STATISTIC HEMATOCRIT ED POCT

## 2018-01-07 PROCEDURE — 96376 TX/PRO/DX INJ SAME DRUG ADON: CPT

## 2018-01-07 PROCEDURE — 40000498 ZZHCL STATISTIC POTASSIUM ED POCT

## 2018-01-07 PROCEDURE — 85610 PROTHROMBIN TIME: CPT | Performed by: EMERGENCY MEDICINE

## 2018-01-07 PROCEDURE — 80053 COMPREHEN METABOLIC PANEL: CPT | Performed by: EMERGENCY MEDICINE

## 2018-01-07 PROCEDURE — 86901 BLOOD TYPING SEROLOGIC RH(D): CPT | Performed by: EMERGENCY MEDICINE

## 2018-01-07 PROCEDURE — 96375 TX/PRO/DX INJ NEW DRUG ADDON: CPT

## 2018-01-07 PROCEDURE — 40000497 ZZHCL STATISTIC SODIUM ED POCT

## 2018-01-07 PROCEDURE — 86900 BLOOD TYPING SEROLOGIC ABO: CPT | Performed by: EMERGENCY MEDICINE

## 2018-01-07 PROCEDURE — 86850 RBC ANTIBODY SCREEN: CPT | Performed by: EMERGENCY MEDICINE

## 2018-01-07 PROCEDURE — 25000128 H RX IP 250 OP 636: Performed by: EMERGENCY MEDICINE

## 2018-01-07 PROCEDURE — 85025 COMPLETE CBC W/AUTO DIFF WBC: CPT | Performed by: EMERGENCY MEDICINE

## 2018-01-07 PROCEDURE — 99285 EMERGENCY DEPT VISIT HI MDM: CPT | Mod: 25

## 2018-01-07 RX ORDER — HYDROMORPHONE HYDROCHLORIDE 1 MG/ML
INJECTION, SOLUTION INTRAMUSCULAR; INTRAVENOUS; SUBCUTANEOUS
Status: COMPLETED
Start: 2018-01-07 | End: 2018-01-07

## 2018-01-07 RX ORDER — HYDROMORPHONE HYDROCHLORIDE 1 MG/ML
0.5 INJECTION, SOLUTION INTRAMUSCULAR; INTRAVENOUS; SUBCUTANEOUS
Status: COMPLETED | OUTPATIENT
Start: 2018-01-07 | End: 2018-01-07

## 2018-01-07 RX ORDER — HYDROCODONE BITARTRATE AND ACETAMINOPHEN 5; 325 MG/1; MG/1
1 TABLET ORAL EVERY 6 HOURS PRN
Status: ON HOLD | COMMUNITY
End: 2018-01-09

## 2018-01-07 RX ADMIN — Medication 0.5 MG: at 20:22

## 2018-01-07 RX ADMIN — Medication 0.5 MG: at 18:54

## 2018-01-07 RX ADMIN — Medication 0.5 MG: at 18:33

## 2018-01-07 RX ADMIN — Medication 0.5 MG: at 23:58

## 2018-01-07 RX ADMIN — SODIUM CHLORIDE 8 MG/HR: 9 INJECTION, SOLUTION INTRAVENOUS at 19:22

## 2018-01-07 RX ADMIN — Medication 0.5 MG: at 19:22

## 2018-01-07 RX ADMIN — SODIUM CHLORIDE 80 MG: 9 INJECTION, SOLUTION INTRAVENOUS at 19:11

## 2018-01-07 ASSESSMENT — ENCOUNTER SYMPTOMS
ABDOMINAL PAIN: 1
BLOOD IN STOOL: 1
BACK PAIN: 1
DIZZINESS: 1

## 2018-01-08 ENCOUNTER — HOSPITAL ENCOUNTER (INPATIENT)
Facility: CLINIC | Age: 58
LOS: 1 days | Discharge: HOME | DRG: 375 | End: 2018-01-09
Attending: INTERNAL MEDICINE | Admitting: INTERNAL MEDICINE
Payer: COMMERCIAL

## 2018-01-08 DIAGNOSIS — K25.4 GASTROINTESTINAL HEMORRHAGE ASSOCIATED WITH GASTRIC ULCER: ICD-10-CM

## 2018-01-08 DIAGNOSIS — C16.0 MALIGNANT NEOPLASM OF CARDIA OF STOMACH (H): ICD-10-CM

## 2018-01-08 DIAGNOSIS — C16.9 MALIGNANT NEOPLASM OF STOMACH, UNSPECIFIED LOCATION (H): Primary | ICD-10-CM

## 2018-01-08 PROBLEM — K92.2 GI BLEED: Status: ACTIVE | Noted: 2017-10-19

## 2018-01-08 LAB
ABO + RH BLD: NORMAL
ABO + RH BLD: NORMAL
ALBUMIN SERPL-MCNC: 3.1 G/DL (ref 3.4–5)
ALP SERPL-CCNC: 61 U/L (ref 40–150)
ALT SERPL W P-5'-P-CCNC: 13 U/L (ref 0–70)
ANION GAP SERPL CALCULATED.3IONS-SCNC: 9 MMOL/L (ref 3–14)
AST SERPL W P-5'-P-CCNC: 4 U/L (ref 0–45)
BASOPHILS # BLD AUTO: 0 10E9/L (ref 0–0.2)
BASOPHILS NFR BLD AUTO: 0.4 %
BILIRUB SERPL-MCNC: 0.5 MG/DL (ref 0.2–1.3)
BLD GP AB SCN SERPL QL: NORMAL
BLD PROD TYP BPU: NORMAL
BLD UNIT ID BPU: 0
BLD UNIT ID BPU: 0
BLOOD BANK CMNT PATIENT-IMP: NORMAL
BLOOD PRODUCT CODE: NORMAL
BLOOD PRODUCT CODE: NORMAL
BPU ID: NORMAL
BPU ID: NORMAL
BUN SERPL-MCNC: 25 MG/DL (ref 7–30)
CALCIUM SERPL-MCNC: 8.2 MG/DL (ref 8.5–10.1)
CHLORIDE SERPL-SCNC: 111 MMOL/L (ref 94–109)
CO2 SERPL-SCNC: 24 MMOL/L (ref 20–32)
CREAT SERPL-MCNC: 0.67 MG/DL (ref 0.66–1.25)
DIFFERENTIAL METHOD BLD: ABNORMAL
EOSINOPHIL # BLD AUTO: 0.1 10E9/L (ref 0–0.7)
EOSINOPHIL NFR BLD AUTO: 1.3 %
ERYTHROCYTE [DISTWIDTH] IN BLOOD BY AUTOMATED COUNT: 23.1 % (ref 10–15)
GFR SERPL CREATININE-BSD FRML MDRD: >90 ML/MIN/1.7M2
GLUCOSE SERPL-MCNC: 138 MG/DL (ref 70–99)
HCT VFR BLD AUTO: 24.4 % (ref 40–53)
HGB BLD-MCNC: 6.9 G/DL (ref 13.3–17.7)
HGB BLD-MCNC: 7.7 G/DL (ref 13.3–17.7)
HGB BLD-MCNC: 7.7 G/DL (ref 13.3–17.7)
IMM GRANULOCYTES # BLD: 0 10E9/L (ref 0–0.4)
IMM GRANULOCYTES NFR BLD: 0.1 %
INR PPP: 1.18 (ref 0.86–1.14)
LYMPHOCYTES # BLD AUTO: 1.5 10E9/L (ref 0.8–5.3)
LYMPHOCYTES NFR BLD AUTO: 18.7 %
MAGNESIUM SERPL-MCNC: 2 MG/DL (ref 1.6–2.3)
MCH RBC QN AUTO: 22.3 PG (ref 26.5–33)
MCHC RBC AUTO-ENTMCNC: 28.3 G/DL (ref 31.5–36.5)
MCV RBC AUTO: 79 FL (ref 78–100)
MONOCYTES # BLD AUTO: 0.3 10E9/L (ref 0–1.3)
MONOCYTES NFR BLD AUTO: 3.4 %
NEUTROPHILS # BLD AUTO: 6.2 10E9/L (ref 1.6–8.3)
NEUTROPHILS NFR BLD AUTO: 76.1 %
NRBC # BLD AUTO: 0 10*3/UL
NRBC BLD AUTO-RTO: 0 /100
NUM BPU REQUESTED: 4
PLATELET # BLD AUTO: 184 10E9/L (ref 150–450)
POTASSIUM SERPL-SCNC: 3.5 MMOL/L (ref 3.4–5.3)
PROT SERPL-MCNC: 6.1 G/DL (ref 6.8–8.8)
RBC # BLD AUTO: 3.09 10E12/L (ref 4.4–5.9)
SODIUM SERPL-SCNC: 143 MMOL/L (ref 133–144)
SPECIMEN EXP DATE BLD: NORMAL
TRANSFUSION STATUS PATIENT QL: NORMAL
WBC # BLD AUTO: 8.2 10E9/L (ref 4–11)

## 2018-01-08 PROCEDURE — 25000125 ZZHC RX 250: Performed by: PHYSICIAN ASSISTANT

## 2018-01-08 PROCEDURE — 25000132 ZZH RX MED GY IP 250 OP 250 PS 637: Performed by: PHYSICIAN ASSISTANT

## 2018-01-08 PROCEDURE — 86923 COMPATIBILITY TEST ELECTRIC: CPT | Performed by: INTERNAL MEDICINE

## 2018-01-08 PROCEDURE — 12000008 ZZH R&B INTERMEDIATE UMMC

## 2018-01-08 PROCEDURE — 36415 COLL VENOUS BLD VENIPUNCTURE: CPT | Performed by: INTERNAL MEDICINE

## 2018-01-08 PROCEDURE — 99233 SBSQ HOSP IP/OBS HIGH 50: CPT | Performed by: INTERNAL MEDICINE

## 2018-01-08 PROCEDURE — 80053 COMPREHEN METABOLIC PANEL: CPT | Performed by: INTERNAL MEDICINE

## 2018-01-08 PROCEDURE — 25000128 H RX IP 250 OP 636: Performed by: INTERNAL MEDICINE

## 2018-01-08 PROCEDURE — 85018 HEMOGLOBIN: CPT | Performed by: INTERNAL MEDICINE

## 2018-01-08 PROCEDURE — 85610 PROTHROMBIN TIME: CPT | Performed by: INTERNAL MEDICINE

## 2018-01-08 PROCEDURE — 25000128 H RX IP 250 OP 636: Performed by: PHYSICIAN ASSISTANT

## 2018-01-08 PROCEDURE — 83735 ASSAY OF MAGNESIUM: CPT | Performed by: INTERNAL MEDICINE

## 2018-01-08 PROCEDURE — 36415 COLL VENOUS BLD VENIPUNCTURE: CPT | Performed by: PHYSICIAN ASSISTANT

## 2018-01-08 PROCEDURE — 86850 RBC ANTIBODY SCREEN: CPT | Performed by: INTERNAL MEDICINE

## 2018-01-08 PROCEDURE — P9016 RBC LEUKOCYTES REDUCED: HCPCS | Performed by: INTERNAL MEDICINE

## 2018-01-08 PROCEDURE — 25000125 ZZHC RX 250: Performed by: INTERNAL MEDICINE

## 2018-01-08 PROCEDURE — 86900 BLOOD TYPING SEROLOGIC ABO: CPT | Performed by: INTERNAL MEDICINE

## 2018-01-08 PROCEDURE — 25000131 ZZH RX MED GY IP 250 OP 636 PS 637: Performed by: PHYSICIAN ASSISTANT

## 2018-01-08 PROCEDURE — 85025 COMPLETE CBC W/AUTO DIFF WBC: CPT | Performed by: INTERNAL MEDICINE

## 2018-01-08 PROCEDURE — 86901 BLOOD TYPING SEROLOGIC RH(D): CPT | Performed by: INTERNAL MEDICINE

## 2018-01-08 PROCEDURE — 85018 HEMOGLOBIN: CPT | Performed by: PHYSICIAN ASSISTANT

## 2018-01-08 PROCEDURE — 25800025 ZZH RX 258: Performed by: INTERNAL MEDICINE

## 2018-01-08 RX ORDER — HEPARIN SODIUM (PORCINE) LOCK FLUSH IV SOLN 100 UNIT/ML 100 UNIT/ML
500 SOLUTION INTRAVENOUS
Status: DISCONTINUED | OUTPATIENT
Start: 2018-01-08 | End: 2018-01-09 | Stop reason: HOSPADM

## 2018-01-08 RX ORDER — NALOXONE HYDROCHLORIDE 0.4 MG/ML
.1-.4 INJECTION, SOLUTION INTRAMUSCULAR; INTRAVENOUS; SUBCUTANEOUS
Status: DISCONTINUED | OUTPATIENT
Start: 2018-01-08 | End: 2018-01-09 | Stop reason: HOSPADM

## 2018-01-08 RX ORDER — AMOXICILLIN 250 MG
1 CAPSULE ORAL 2 TIMES DAILY
Status: DISCONTINUED | OUTPATIENT
Start: 2018-01-08 | End: 2018-01-09 | Stop reason: HOSPADM

## 2018-01-08 RX ORDER — HEPARIN SODIUM,PORCINE 10 UNIT/ML
5 VIAL (ML) INTRAVENOUS
Status: DISCONTINUED | OUTPATIENT
Start: 2018-01-08 | End: 2018-01-09 | Stop reason: HOSPADM

## 2018-01-08 RX ORDER — SODIUM CHLORIDE 9 MG/ML
INJECTION, SOLUTION INTRAVENOUS CONTINUOUS
Status: DISCONTINUED | OUTPATIENT
Start: 2018-01-08 | End: 2018-01-09 | Stop reason: HOSPADM

## 2018-01-08 RX ORDER — DOCUSATE SODIUM 100 MG/1
100 CAPSULE, LIQUID FILLED ORAL 2 TIMES DAILY
Status: DISCONTINUED | OUTPATIENT
Start: 2018-01-08 | End: 2018-01-08

## 2018-01-08 RX ORDER — ONDANSETRON 2 MG/ML
4 INJECTION INTRAMUSCULAR; INTRAVENOUS EVERY 6 HOURS PRN
Status: DISCONTINUED | OUTPATIENT
Start: 2018-01-08 | End: 2018-01-09 | Stop reason: HOSPADM

## 2018-01-08 RX ADMIN — ONDANSETRON 4 MG: 2 INJECTION INTRAMUSCULAR; INTRAVENOUS at 18:10

## 2018-01-08 RX ADMIN — SODIUM CHLORIDE: 9 INJECTION, SOLUTION INTRAVENOUS at 15:50

## 2018-01-08 RX ADMIN — OCTREOTIDE ACETATE 50 MCG/HR: 200 INJECTION, SOLUTION INTRAVENOUS; SUBCUTANEOUS at 17:13

## 2018-01-08 RX ADMIN — HYDROMORPHONE HYDROCHLORIDE 1 MG: 1 INJECTION, SOLUTION INTRAMUSCULAR; INTRAVENOUS; SUBCUTANEOUS at 02:08

## 2018-01-08 RX ADMIN — HYDROMORPHONE HYDROCHLORIDE 1 MG: 1 INJECTION, SOLUTION INTRAMUSCULAR; INTRAVENOUS; SUBCUTANEOUS at 23:21

## 2018-01-08 RX ADMIN — SODIUM CHLORIDE 8 MG/HR: 9 INJECTION, SOLUTION INTRAVENOUS at 05:09

## 2018-01-08 RX ADMIN — HYDROMORPHONE HYDROCHLORIDE 1 MG: 1 INJECTION, SOLUTION INTRAMUSCULAR; INTRAVENOUS; SUBCUTANEOUS at 06:08

## 2018-01-08 RX ADMIN — SODIUM CHLORIDE, PRESERVATIVE FREE 5 ML: 5 INJECTION INTRAVENOUS at 10:36

## 2018-01-08 RX ADMIN — ONDANSETRON 4 MG: 2 INJECTION INTRAMUSCULAR; INTRAVENOUS at 02:07

## 2018-01-08 RX ADMIN — SODIUM CHLORIDE 8 MG/HR: 9 INJECTION, SOLUTION INTRAVENOUS at 02:16

## 2018-01-08 RX ADMIN — PANTOPRAZOLE SODIUM 40 MG: 40 INJECTION, POWDER, FOR SOLUTION INTRAVENOUS at 19:03

## 2018-01-08 RX ADMIN — SODIUM CHLORIDE 8 MG/HR: 9 INJECTION, SOLUTION INTRAVENOUS at 13:16

## 2018-01-08 RX ADMIN — SENNOSIDES AND DOCUSATE SODIUM 1 TABLET: 8.6; 5 TABLET ORAL at 19:03

## 2018-01-08 RX ADMIN — HYDROMORPHONE HYDROCHLORIDE 1 MG: 1 INJECTION, SOLUTION INTRAMUSCULAR; INTRAVENOUS; SUBCUTANEOUS at 18:56

## 2018-01-08 RX ADMIN — DEXTROSE AND SODIUM CHLORIDE: 5; 450 INJECTION, SOLUTION INTRAVENOUS at 10:12

## 2018-01-08 RX ADMIN — HYDROMORPHONE HYDROCHLORIDE 1 MG: 1 INJECTION, SOLUTION INTRAMUSCULAR; INTRAVENOUS; SUBCUTANEOUS at 10:36

## 2018-01-08 RX ADMIN — HYDROMORPHONE HYDROCHLORIDE 1 MG: 1 INJECTION, SOLUTION INTRAMUSCULAR; INTRAVENOUS; SUBCUTANEOUS at 14:46

## 2018-01-08 ASSESSMENT — PAIN DESCRIPTION - DESCRIPTORS
DESCRIPTORS: SORE
DESCRIPTORS: DISCOMFORT
DESCRIPTORS: BURNING;SHARP
DESCRIPTORS: SORE
DESCRIPTORS: SHARP

## 2018-01-08 ASSESSMENT — ACTIVITIES OF DAILY LIVING (ADL)
FALL_HISTORY_WITHIN_LAST_SIX_MONTHS: YES
DRESS: 0-->INDEPENDENT
BATHING: 0-->INDEPENDENT
AMBULATION: 0-->INDEPENDENT
COGNITION: 0 - NO COGNITION ISSUES REPORTED
RETIRED_EATING: 0-->INDEPENDENT
NUMBER_OF_TIMES_PATIENT_HAS_FALLEN_WITHIN_LAST_SIX_MONTHS: 1
TOILETING: 0-->INDEPENDENT
TRANSFERRING: 0-->INDEPENDENT
SWALLOWING: 0-->SWALLOWS FOODS/LIQUIDS WITHOUT DIFFICULTY
RETIRED_COMMUNICATION: 0-->UNDERSTANDS/COMMUNICATES WITHOUT DIFFICULTY

## 2018-01-08 NOTE — IP AVS SNAPSHOT
MRN:[id number]                      After Visit Summary   1/8/2018    Dashawn Ordoñez    MRN: [id number]           Thank you!     Thank you for choosing [facility] for your care. Our goal is always to provide you with excellent care. Hearing back from our patients is one way we can continue to improve our services. Please take a few minutes to complete the written survey that you may receive in the mail after you visit with us. Thank you!        Patient Information     Date Of Birth          1960        About your hospital stay     You were admitted on:  January 8, 2018 You last received care in the:  [address] [facility]    You were discharged on:  January 9, 2018        Reason for your hospital stay       You were here due to concern for a GI bleed. You received 2 units of red blood cells and your Hgb has been stable since then.                  Who to Call     For medical emergencies, please call 911.  For non-urgent questions about your medical care, please call your primary care provider or clinic, [phone]          Attending Provider     Provider Specialty    Shalom Mercer MD Hematology & Oncology       Primary Care Provider Office Phone # Fax #    Cristian Shakeel Healy [phone]       When to contact your care team       Please call Dr Healy's team as needed for health concerns. You may also call the [facility] at [phone] if you notice dark or bloody stools, or bleeding from any other part of your body. Please also call if you notice dizziness, shortness of breath, chest pain, temperature above 100.4, headaches, vision changes, uncontrolled nausea, vomiting, diarrhea, or pain.                  After Care Instructions     Activity       Your activity upon discharge: activity as tolerated            Diet       Follow this diet upon discharge: Regular            Discharge Instructions       -- A small amount of  Dilaudid (hydromorphone) has been prescribed for pain control. You will need to follow-up with Dr. Healy to discuss long-term pain management strategies.     -- While you are taking opioid pain medications you should continue to take medications to prevent constipation. If you develop loose stools it is ok to skip a dose. If you do not have a stool for 3 days or more your should call Dr. Healy or the [facility] triage line ([phone]).    -- Please have your hemoglobin checked once per week to make sure it is remaining stable. You may go to any [place] location to have these labs drawn.    -- Please start taking iron supplements daily. Please be aware that this may make constipation worse, so you may need to take additional stool softeners. Iron can also cause stools to appear dark.                  Follow-up Appointments     Follow Up and recommended labs and tests       You will need to make an appointment with Dr. Healy for hospital follow-up and to recheck your hemoglobin.     You will also need to reschedule the appointment for the CT scans Dr. Healy would like you to have.                  Your next 10 appointments already scheduled     Jan 11, 2018  3:00 PM CST   (Arrive by 2:45 PM)   CT CHEST/ABDOMEN/PELVIS W CONTRAST with [address] [facility] ([facility])    [address] [address] [address] [phone]           Please bring any scans or X-rays taken at other hospitals, if similar tests were done. Also bring a list of your medicines, including vitamins, minerals and over-the-counter drugs. It is safest to leave personal items at home.  Be sure to tell your doctor:   If you have any allergies.   If there s any chance you are pregnant.   If you are breastfeeding.   If you have any special needs.  You may have contrast for this exam. To prepare:   Do not eat or drink for 2 hours before your exam. If you need to take  medicine, you may take it with small sips of water. (We may ask you to take liquid medicine as well.)   The day before your exam, drink extra fluids at least six 8-ounce glasses (unless your doctor tells you to restrict your fluids).  Patients over 70 or patients with diabetes or kidney problems:   If you haven t had a blood test (creatinine test) within the last 30 days, go to your clinic or Diagnostic Imaging Department for this test.  If you have diabetes:   If your kidney function is normal, continue taking your metformin (Avandamet, Glucophage, Glucovance, Metaglip) on the day of your exam.   If your kidney function is abnormal, wait 48 hours before restarting this medicine.  You will have oral contrast for this exam:   You will drink the contrast at home. Get this from your clinic or Diagnostic Imaging Department. Please follow the directions given.  Please wear loose clothing, such as a sweat suit or jogging clothes. Avoid snaps, zippers and other metal. We may ask you to undress and put on a hospital gown.  If you have any questions, please call the Imaging Department where you will have your exam.              Future tests that were ordered for you     Hemoglobin       Last Lab Result: Hemoglobin (g/dL)       Date                     Value                 01/09/2018               7.6 (L)          ----------                  Pending Results     No orders found for last 3 day(s).            Statement of Approval     Ordered          01/09/18 6288  I have reviewed and agree with all the recommendations and orders detailed in this document.  EFFECTIVE NOW     Approved and electronically signed by:  Gena Fernandez PA-C             Admission Information     Date & Time Provider Department Dept. Phone    1/8/2018 Shalom Mercer MD [address] [facility] [phone]      Your Vitals Were     Blood Pressure Pulse Temperature Respirations Weight Pulse Oximetry    145/80 64 97.6  F (36.4  C) (Oral)  "16 72.1 kg (158 lb 14.4 oz) 98%    BMI (Body Mass Index)                   22.8 kg/m2           [email or web address] Information     [email or web address] lets you send messages to your doctor, view your test results, renew your prescriptions, schedule appointments and more. To sign up, go to [email or web address] . Click on \"Log in\" on the left side of the screen, which will take you to the Welcome page. Then click on \"Sign up Now\" on the right side of the page.     You will be asked to enter the access code listed below, as well as some personal information. Please follow the directions to create your username and password.     Your access code is: [email or web address]  Expires: 2018 10:04 AM     Your access code will  in 90 days. If you need help or a new code, please call your [facility] clinic or [phone].        Care EveryWhere ID     This is your Care EveryWhere ID. This could be used by other organizations to access your [facility] medical records  [id number]        Equal Access to Services     [facility] CampusKENDRA : Hadii nito Brewster, watuan bennett, qaybrenee malhotraalnhi zavala, emma grigsby . So [facility] [phone].    ATENCIÓN: Si habla español, tiene a sanz disposición servicios gratuitos de asistencia lingüística. Jennifer al [phone].    We comply with applicable federal civil rights laws and [place] laws. We do not discriminate on the basis of race, color, national origin, age, disability, sex, sexual orientation, or gender identity.               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)        Dose:  4 mg   Take 1 tablet (4 mg) by mouth every 3 hours as needed for " moderate to severe pain   Quantity:  56 tablet   Refills:  0       octreotide 100 MCG/ML Soln injection   Commonly known as:  sandoSTATIN        Dose:  100 mcg   Inject 1 mL (100 mcg) Subcutaneous 3 times daily   Quantity:  90 mL   Refills:  0         CONTINUE these medicines which have NOT CHANGED        Dose / Directions    mirtazapine 7.5 MG Tabs tablet   Commonly known as:  REMERON   Used for:  Sleep disorder        Dose:  7.5 mg   Take 1 tablet (7.5 mg) by mouth nightly as needed   Quantity:  30 tablet   Refills:  0       omeprazole 40 MG capsule   Commonly known as:  priLOSEC   Used for:  Acute upper gastrointestinal bleeding        Dose:  40 mg   Take 1 capsule (40 mg) by mouth 2 times daily (before meals)   Quantity:  30 capsule   Refills:  3         STOP taking     HYDROcodone-acetaminophen 5-325 MG per tablet   Commonly known as:  NORCO           oxyCODONE-acetaminophen 5-325 MG per tablet   Commonly known as:  PERCOCET                Where to get your medicines      These medications were sent to [facility] [address] [address]     Phone:  [phone]     ferrous sulfate 325 (65 FE) MG tablet    octreotide 100 MCG/ML Soln injection         Some of these will need a paper prescription and others can be bought over the counter. Ask your nurse if you have questions.     Bring a paper prescription for each of these medications     HYDROmorphone 4 MG tablet                Protect others around you: Learn how to safely use, store and throw away your medicines at www.disposemymeds.org.             Medication List: This is a list of all your medications and when to take them. Check marks below indicate your daily home schedule. Keep this list as a reference.      Medications           Morning Afternoon Evening Bedtime As Needed    ferrous sulfate 325 (65 FE) MG tablet   Commonly known as:  IRON   Take 1 tablet (325 mg) by mouth daily  (with breakfast)                                HYDROmorphone 4 MG tablet   Commonly known as:  DILAUDID   Take 1 tablet (4 mg) by mouth every 3 hours as needed for moderate to severe pain   Last time this was given:  4 mg on 1/9/2018  2:28 PM                                mirtazapine 7.5 MG Tabs tablet   Commonly known as:  REMERON   Take 1 tablet (7.5 mg) by mouth nightly as needed                                octreotide 100 MCG/ML Soln injection   Commonly known as:  sandoSTATIN   Inject 1 mL (100 mcg) Subcutaneous 3 times daily                                omeprazole 40 MG capsule   Commonly known as:  priLOSEC   Take 1 capsule (40 mg) by mouth 2 times daily (before meals)

## 2018-01-08 NOTE — PLAN OF CARE
Problem: Patient Care Overview  Goal: Plan of Care/Patient Progress Review  Outcome: No Change  Pt transferred from [facility], arrived ~0030. VSS. Pain controlled with PRN Dilaudid. Nausea controlled with PRN Zofran. Hgb 6.9, 2 units of blood ordered. 2nd unit currently infusing. Protonix drip. Up with SBA. Voiding adequate amounts. Port not accessed, pt wanted to wait until AM. 2 PIVs. DNR/DNI. Continue to monitor.

## 2018-01-08 NOTE — H&P
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 He did have one episode of tarry stool, typical of GI bleed. There was no recurrence yet, minimal dizziness at the time that has resolved.   - Admit to oncology team  - 2 IVs in place in addition to port on the right side of the chest  - All vitals are stable at this time, pulse 83. Hgb at [facility] was 8.1, unchanged from prior. Type and cross, transfuse if hgb<7 ordered  - NPO. Pantoprazole GGT at 8mg/hr  - repeat hgb here pending. If significantly down will contact GI team, otherwise consult in the am. May need to call IR should he have signifiicnat bleed for embolization  - No NSAIDs.   - Epigastric tenderness noted yet there is no evidence for peritoneal signs at this time. Will get CT abdomen should he worsen overnight otherwise may obtain this in the AM  - Pain management with dilaudid as needed    DNR/DNI, discussed repeatedly with patient. 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 He also has a history of recurrent GI bleed from the large gastric  ulcerated mass lesion, seen by GI in the past and EGD done 10/2017. It was felt that he is not a good candidate for endoscopic interventions at the time and that IR may need to perform embolization if needed. However his bleeding has stopped, received multiple blood transfusions for a hgb of 5.6. Since the time he was admitted recently for URI. Otherwise no new issues. Again had chemotherapy on Wednesday and he has not felt well ever since. He did have reduced appetite as well as some epigastric discomfort that felt somewhat worse than in the past. He did have dry heaves but no emesis. He also had constipation in the setting of pain med use, he did have small formed bm earlier today x2 and later on one episode of dark and more tarry stool with a bad smell typical to him for GI bleed. No hematemesis and no recurrence of the episodes. He presented to [facility], hgb was stable. He was started on pantoprazole ggt and transferred to [facility] for further evaluation.  He denies NSAID use, also did not have etoh for the past week or two. No other complaints.            Past Medical History:     Past Medical History:   Diagnosis Date     Cancer (H)      Depressive disorder      Diabetes (H)      Hypertension      Stomach cancer (H)      Substance abuse           Past Surgical History:      Past Surgical History:   Procedure Laterality Date     APPENDECTOMY       ENT SURGERY       ESOPHAGOSCOPY, GASTROSCOPY, DUODENOSCOPY (EGD), COMBINED N/A 10/18/2017    Procedure: COMBINED ESOPHAGOSCOPY, GASTROSCOPY, DUODENOSCOPY (EGD), BIOPSY SINGLE OR MULTIPLE;  ESOPHAGOSCOPY, GASTROSCOPY, DUODENOSCOPY (EGD) with biopsies using cold forceps;  Surgeon: Filippo Mclean MD;  Location:  GI           Social History:     Social History   Substance Use Topics     Smoking status: Current Every Day Smoker     Packs/day: 2.00     Smokeless tobacco: Never Used     Alcohol use Yes      Comment: occasionally            Family History:   Family  history non contributory         Allergies:   No Known Allergies          Medications:     Prescriptions Prior to Admission   Medication Sig Dispense Refill Last Dose     HYDROcodone-acetaminophen (NORCO) 5-325 MG per tablet Take 1 tablet by mouth every 6 hours as needed for moderate to severe pain   1/7/2018     oxyCODONE-acetaminophen (PERCOCET) 5-325 MG per tablet Take 1-2 tablets by mouth every 4 hours as needed for moderate to severe pain   1/7/2018     mirtazapine (REMERON) 7.5 MG TABS tablet Take 1 tablet (7.5 mg) by mouth nightly as needed 30 tablet 0 1/7/2018     omeprazole (PRILOSEC) 40 MG capsule Take 1 capsule (40 mg) by mouth 2 times daily (before meals) 30 capsule 3 1/7/2018          Review of Systems:   12 point ROS performed and is negative except as noted in the HPI           Physical Exam:   Vitals were reviewed  Temp: 98  F (36.7  C) Temp src: Oral BP: 134/78 Pulse: 83   Resp: 16 SpO2: 100 % O2 Device: None (Room air)  General: in bed, in NAD  HEENT: NCAT, no icterus noted  Neck: Supple, no LMP or JVD  Cardiac: RRR, no mrg  Chest: CTAB. Port over the right side of the chest noted, intact, no skin lesions, no erythema  Abdomen: there is tenderness in the epigastric area but there is no rebound, no guarding.   Extremities: no edema. Pulses 2+  Rectal exam: there are small non bleeding external hemorrhoids. Pretty dark brown stool on exam, no bright red blood  Neuro: AO3, non focal          Data:     Lab Results   Component Value Date    WBC 11.0 01/07/2018    HGB 8.2 (L) 01/07/2018    HCT 26.2 (L) 01/07/2018     01/07/2018     01/07/2018    POTASSIUM 3.9 01/07/2018    CHLORIDE 104 01/07/2018    CO2 24 01/07/2018    BUN 39 (H) 01/07/2018    CR 0.78 01/07/2018     (H) 01/07/2018    TROPONIN <0.04 12/05/2007    TROPI <0.015 12/20/2017    AST 9 01/07/2018    ALT 15 01/07/2018    ALKPHOS 67 01/07/2018    BILITOTAL 0.7 01/07/2018    INR 1.14 01/07/2018      All prior imaging available  reviewed, including CT abdomen from 11/2017 as well as repeat EGDs.

## 2018-01-08 NOTE — PHARMACY-ADMISSION MEDICATION HISTORY
Admission medication history interview status for the 1/7/2018  admission is complete. See EPIC admission navigator for prior to admission medications     Medication history source reliability:Good    Actions taken by pharmacist (provider contacted, etc):None     Additional medication history information not noted on PTA med list :None    Medication reconciliation/reorder completed by provider prior to medication history? No    Time spent in this activity: 10min    Prior to Admission medications    Medication Sig Last Dose Taking? Auth Provider   HYDROcodone-acetaminophen (NORCO) 5-325 MG per tablet Take 1 tablet by mouth every 6 hours as needed for moderate to severe pain  Yes Unknown, Entered By History   oxyCODONE-acetaminophen (PERCOCET) 5-325 MG per tablet Take 1-2 tablets by mouth every 4 hours as needed for moderate to severe pain  Yes Unknown, Entered By History   mirtazapine (REMERON) 7.5 MG TABS tablet Take 1 tablet (7.5 mg) by mouth nightly as needed  Yes Se Red Euceda MD   omeprazole (PRILOSEC) 40 MG capsule Take 1 capsule (40 mg) by mouth 2 times daily (before meals) 1/7/2018 at Unknown time Yes Se Red Euceda MD

## 2018-01-08 NOTE — ED PROVIDER NOTES
History     Chief Complaint:  Dark Tarry Stools and Rectal Bleeding    HPI   Dashawn Ordoñez is a 57 year old male with stage 4 stomach cancer with mets to his lungs who recently stopped chemotherapy 2 days ago who presents with rectal bleeding and dark tarry stools. The patient  reports that he has history of a prior GI bleed with anemia and hemoglobin down to a level of 3.5. The patient reports having 12 blood transfusions in the past. He now reports new abdominal pain which radiates through his back accompanied by dark tarry stools today. He feels dizzy when sitting up and notes this to be similar to his previous episodes of GI bleeds.     Allergies:  No known drug allergies      Medications:    Levaquin  Percocet  Remeron  Prilosec  Tylenol     Past Medical History:    Cancer  Depression  Diabetes  Hypertension  Substance abuse  GI bleed    Past Surgical History:    Appendectomy  ENT surgery  Esophagoscopy, gastroscopy, duodenoscopy     Family History:    History review. No contributing family history.     Social History:  Smoking status: yes  Alcohol use: yes   PCP: Cristian Healy   Patient presents via EMS.   Marital Status:       Review of Systems   Gastrointestinal: Positive for abdominal pain and blood in stool.   Musculoskeletal: Positive for back pain.   Neurological: Positive for dizziness.     10 point review of systems performed and is negative except as above and in HPI.     Physical Exam     Patient Vitals for the past 24 hrs:   BP Temp Temp src Heart Rate Resp SpO2 Height   01/07/18 2115 (!) 114/104 - - - - - -   01/07/18 2100 109/80 - - 105 13 100 % -   01/07/18 2045 125/86 - - 120 14 98 % -   01/07/18 2030 125/71 - - 113 10 99 % -   01/07/18 2015 131/90 - - 138 17 (!) 86 % -   01/07/18 2000 136/88 - - 116 10 100 % -   01/07/18 1945 124/79 - - 115 18 100 % -   01/07/18 1930 110/85 - - 113 16 99 % -   01/07/18 1922 126/88 - - 106 16 100 % -   01/07/18 1901 - - - 109 10 100 % -   01/07/18  "1845 111/79 - - 117 18 100 % -   01/07/18 1830 114/79 - - - - - -   01/07/18 1828 - - - - - 100 % -   01/07/18 1824 - 99.3  F (37.4  C) Oral - - - -   01/07/18 1823 107/88 - - 118 20 100 % 1.778 m (5' 10\")      Physical Exam  General: Resting on the gurney, appears uncomfortable  Head:  The scalp, face, and head appear normal  Mouth/Throat: Mucus membranes are moist  CV:  Rapid but regular rate    Normal S1 and S2  No pathological murmur   Resp:  Breath sounds clear and equal bilaterally    Non-labored, no retractions or accessory muscle use    No coarseness    No wheezing   GI:  Abdomen is soft, no rigidity    Diffusely tender to palpation  MS:  Normal motor assessment of all extremities.    Good capillary refill noted.  Skin:  Slightly pale, No rash or lesions noted.  Neuro: Speech is normal and fluent. No apparent deficit.  Psych:  Awake. Alert.  Normal affect.      Appropriate interactions.     Emergency Department Course     Laboratory:  CBC: HGB 7.8 (L), o/w WNL (WBC 11.0, )   CMP: Glucose 201 (H), BUN 39 (H), Albumin 3.3 (L), Protein total 6.4 (L)  INR: 1.14  Blood type: O -    Interventions:   1911: Protonix 80 mg IV  1922: Protonix 8 mg/hr IV  1922: Dilaudid 0.5 mg IV    Emergency Department Course:  Past medical records, nursing notes, and vitals reviewed.  1822: I performed an exam of the patient and obtained history, as documented above. GCS 15.   IV inserted and blood drawn.   1943: Discussed the patient with Dr. Will, who recommended that the patient be transferred for further care.  1950: I discussed disposition options with the patient.   2023: I discussed the patient with Dr. Monet, at the [facility].  2102: I discussed the patient with Dr. Monet, [facility] oncology.  Findings and plan explained to the Patient.    2108: Patient will be transferred to the [facility] via EMS. Discussed the case with Dr. Monet, who will admit the patient to a monitored bed for further " monitoring, evaluation, and treatment.      Impression & Plan      Medical Decision Making:  Dashawn Ordoñez is a 57 year old male presents to the ED with GI bleeding. The patient has metastatic gastric cancer. He has had prior episodes of bleeding with prior profound anemia. On the patient's most recent visit, he did have an endoscopy which showed a bleeding malignancy. At that time, there was concern that he may need a gastrectomy or at a minimum, emergent IR evaluation. He was transferred to the [facility]. Patient was discussed with our hospitalist here,  who felt the patient would be best served at the [facility] as he has another episode of bleeding at this time which previously may have needed emergent, aggressive intervention. The patient was discussed with the oncology team and hospitalist service at the [facility]. He was transferred there for ongoing care. He has remained hemodynamically stable during his stay in the ED here and was transferred in stable condition.   Critical Care time: 30 minutes    Diagnosis:    ICD-10-CM    1. Gastrointestinal hemorrhage, unspecified gastrointestinal hemorrhage type K92.2        Disposition:  Admitted to [facility]    Cleo Short  1/7/2018    EMERGENCY DEPARTMENT  Cleo ARZOLA am serving as a scribe at 6:22 PM on 1/7/2018 to document services personally performed by Luz Marina Escobar MD based on my observations and the provider's statements to me.        Luz Marina Escobar MD  01/08/18 182

## 2018-01-08 NOTE — PROGRESS NOTES
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     Presumed upper GI bleed 2/2 gastric cancer.   Anemia 2/2 GI bleed, recent chemotherapy.  Previously admitted 11/9-11/12 with GI bleed. Due to extensive nature of the tumor, GI team was unable to offer endoscopic intervention. Hemoglobin stabilized with conservative treatment only. Was admitted 12/20-12/21 with stable Hgb ~8, and presumably has had stable Hgb at outpatient oncology visits. Patient notes some dizziness starting after chemo and one episode of melena on Sunday. Hgb stable at 8.1 on admission. No further episodes here so far, no active sources of bleeding reported.   -Hgb dropped to 6.9 this AM, will get 2 units PRBCs  -BID Hgb checks  -Transfuse for Hgb <7  -GI consult, appreciate assistance  -Transition Protonix gtt to IV twice daily  -Start octreotide gtt.     Metastatic signet ring gastric cancer. Diagnosed 10/2017. Followed by Dr. Healy of [facility] at [place]. Currently on FOLFOX (C1D1 11/6-11/8). Last given on 1/3-1/5. Per patient is was a week later than it was supposed to be due to a faulty FOLFOX pump placed the previous week.   -Per patient, he was to have re-imaging completed this week, will need to reschedule with [facility] on discharge.    Constipation 2/2 opioid use. PTA on Norco and Percocet. Recently started on long acting morphine, but only took 2 doses before he elected to stop the medication (see below).  -Senokot-S 1 tab BID    Pain  related to cancer. Patient was recently started on long acting morphine for pain control. Took first dose on 1/4, but developed abdominal pain after taking second dose. Unclear whether timing suggests poor medication tolerance or adverse effects from chemo.  -Transition to IV Dilaudid 1mg q4h prn for now  -Plan for f/u appt with Palliative care through [facility]    FEN:  -Continue D5 + 1/2 NS at 50ml/hr  -Check lytes daily and replete prn  -Advance to CLD today, consider advancing further tomorrow if tolerating    Prophy:  -VTE: mechanical only given GIB  -GI: Protonix as above  -Bowel: Senokot-S BID, hold for loose stools    Code Status: DNR/DNI  Disposition: Will likely require 1-2 days inpatient for stabilization of Hgb.    This plan of care has been discussed with the staff physician, Dr. Monet.    Gena Fernandez PA-C  Hematology/Oncology  Pager: [phone]    Interval History   Dashawn feels better compared to yesterday. Dizziness is resolved. Continues to have epigastric pain. He states he has not been eating since this past Thursday when he started to have abdominal pain. Today he feels like he could eat, however, currently NPO for possible procedure. Denies any visible sources of bleeding, no stools so far today.     Vital Signs with Ranges  Temp:  [97  F (36.1  C)-99.3  F (37.4  C)] 98.5  F (36.9  C)  Pulse:  [68-89] 89  Heart Rate:  [] 90  Resp:  [10-20] 16  BP: (107-136)/() 129/79  SpO2:  [86 %-100 %] 97 %    I/O last 3 completed shifts:  In: 300   Out: -     Vitals:    01/08/18 0040   Weight: 71.4 kg (157 lb 8 oz)       Physical Exam   Constitutional: Pleasant and cooperative male seen sitting up in bed. Awake, alert, NAD.  HEENT: NC/AT, EOMI, sclera clear, conjunctiva normal  Respiratory: No increased work of breathing, CTAB, no crackles or wheezing.  Cardiovascular: RRR, no murmur noted. No peripheral edema.  GI: Normal bowel sounds, soft, non-distended, mildly tender to light  palpation in epigastric region, otherwise non-tender.  Skin: Warm, dry, well-perfused. No bruising, bleeding, rashes, or lesions on limited exam.  Neurologic: A&O. Answers questions appropriately. Moves all extremities spontaneously.  Neuropsychiatric: Calm, appropriate affect    Medications     - MEDICATION INSTRUCTIONS -       dextrose 5% and 0.45% NaCl 50 mL/hr at 01/08/18 1012         heparin  500 Units Intracatheter Q30 Days     senna-docusate  1 tablet Oral BID     pantoprazole  40 mg Intravenous BID       Data   CBC     Recent Labs  Lab 01/08/18  0752 01/08/18  0205 01/07/18  1835 01/07/18  1827   WBC  --  8.2  --  11.0   RBC  --  3.09*  --  3.36*   HGB 7.7* 6.9* 8.2* 7.8*   HCT  --  24.4*  --  26.2*   MCV  --  79  --  78   MCH  --  22.3*  --  23.2*   MCHC  --  28.3*  --  29.8*   RDW  --  23.1*  --  22.8*   PLT  --  184  --  215       CMP     Recent Labs  Lab 01/08/18  0205 01/07/18  1835 01/07/18  1827    138 139   POTASSIUM 3.5 3.9 4.0   CHLORIDE 111*  --  104   CO2 24  --  24   ANIONGAP 9  --  11   *  --  201*   BUN 25  --  39*   CR 0.67  --  0.78   GFRESTIMATED >90  --  >90   GFRESTBLACK >90  --  >90   JULIO CESAR 8.2*  --  8.6   MAG 2.0  --   --    PROTTOTAL 6.1*  --  6.4*   ALBUMIN 3.1*  --  3.3*   BILITOTAL 0.5  --  0.7   ALKPHOS 61  --  67   AST 4  --  9   ALT 13  --  15       LFTs     Recent Labs  Lab 01/08/18 0205   PROTTOTAL 6.1*   ALBUMIN 3.1*   BILITOTAL 0.5   ALKPHOS 61   AST 4   ALT 13       Coagulation Studies     Recent Labs  Lab 01/08/18  0205   INR 1.18*

## 2018-01-08 NOTE — ED NOTES
Bed: ED20  Expected date:   Expected time:   Means of arrival:   Comments:  533  57 M poss GI bleed/ca  1815

## 2018-01-08 NOTE — IP AVS SNAPSHOT
[address] [address]    Phone:  [phone]                                       After Visit Summary   1/8/2018    Dashawn Ordoñez    MRN: [id number]           After Visit Summary Signature Page     I have received my discharge instructions, and my questions have been answered. I have discussed any challenges I see with this plan with the nurse or doctor.    ..........................................................................................................................................  Patient/Patient Representative Signature      ..........................................................................................................................................  Patient Representative Print Name and Relationship to Patient    ..................................................               ................................................  Date                                            Time    ..........................................................................................................................................  Reviewed by Signature/Title    ...................................................              ..............................................  Date                                                            Time

## 2018-01-08 NOTE — CONSULTS
GASTROENTEROLOGY CONSULTATION      Date of Admission:  1/8/2018          ASSESSMENT AND RECOMMENDATIONS:   Assessment:  Dashawn Ordoñez is a 57 year old male with a history of metastatic signet ring gastric cancer on FOLFOX chemotherapy diagnosed in 10/2017 during evaluation for gastrointestinal bleed, recurrent gastrointestinal bleeding with admission 11/9-11/12/17 resolved with conservative management and hypertension who is presenting with a four day history of abdominal pain, two day history of light-headedness, and one day history of melena who was found to have melena on rectal examination and hemoglobin drop from 8.1 to 6.9 with initial tachycardia to 115 and otherwise normal vital signs.     Etiology of GI bleeding most likely due to known gastric cancer/associated large gastric ulcer which is likely to intermittently bleed based on malignancy and ongoing chemotherapy.. Endoscopy unlikely to identify targeted lesion or help overall clinical course. Would favor supportive care in setting of hemodynamic stability with IV PPI, octreotide gtt to decrease splanchnic pressures, and oral iron supplementation. If patient develops hemodynamically significant bleeding, would evaluate endoscopically and IR intervention if unable to be controlled endoscopically.      Recommendations:   - Trend hemoglobin; transfuse for hemoglobin <7 from GI perspective   - Ensure two large-bore peripheral IVs   - Recommend oral iron supplementation   - Continue PPI IV BID   - Would start octreotide infusion as above   - Clear liquid diet today; if hemoglobin stabilizes, no further evidence of GI blood loss could consider advancing diet as tolerated starting tomorrow   - NSAID avoidance   - GI will continue to follow; recommendations relayed to primary service   - Please contact our service with any hemodynamically significant GI blood loss    Gastroenterology outpatient follow up recommendations: Pending recommendations    Thank you  "for involving us in this patient's care. Please do not hesitate to contact the GI service with any questions or concerns.     Pt care plan discussed with Dr. Kimble, GI staff physician.    Cleo Molina MD  Gastroenterology Fellow  -------------------------------------------------------------------------------------------------------------------          Chief Complaint:   We were asked by Dr. Tam of Heme/Onc to evaluate this patient with metastatic gastric cancer, GI bleeding    History is obtained from the patient and the medical record.          History of Present Illness:   Dashawn Ordoñez is a 57 year old male with a history of metastatic signet ring gastric cancer on FOLFOX chemotherapy diagnosed in 10/2017 during evaluation for gastrointestinal bleed, recurrent gastrointestinal bleeding with admission 11/9-11/12/17 resolved with conservative management and hypertension who is presenting with a four day history of abdominal pain, two day history of light-headedness, and one day history of melena.     Patient states he received a dose of chemotherapy on Wednesday. Thursday he developed epigastric abdominal pain, nausea, and dry heaves. Saturday morning he developed dizziness/lightheadedness that seemed to increase a bit on Sunday morning. He had a large bowel movement after being constipated for many days at 5am on Sunday, but he did not see what it looked like. At 3:30-4:00 pm he had a loose, black tarry bowel movement that looked like a \"melted Misti bar\" so he presented to the emergency department at [facility] and was subsequently transferred to [facility] for further evaluation and management. He has had normal bowel movements between his last admission in November 2017 and now - about two bowel movements daily, no melena or hematochezia. He denies chest pain, shortness of breath, syncope. He notes no change in his urine. He has some chest wall pain due to dry heaving. He has a " headache. He overall feels significantly better than when he presented after receiving IVF, packed RBCs and ppi gtt.             Past Medical History:     Metastatic signet ring gastric cancer on FOLFOX chemotherapy  Depression  Hypertension         Past Surgical History:     Appendectomy         Previous Endoscopy:     EGD 10/18/17  Impression:               - Extrinsic compression at the gastroesophageal                             junction.                             - Non-bleeding gastric ulcer with pigmented                             material. Biopsied.                             - Normal examined duodenum.   Recommendation:           I am concerned this is more than a peptic ulcer                             given its giant size, proximal location and heaped                             margins. For now, continue PPI therapy. Stop                             Octreotide.     EGD 10/19/17  Impression:               - Normal esophagus.                             - Non-bleeding gastric ulcer with adherent clot.                             Injected.                             - Normal examined duodenum.                             - No specimens collected.   Recommendation:           Worrisome with recurrent bleeding with limited                             ability to control it. Would transfer to a hospital                             with appropriate surgical staffing in case                             gastrectomy needed.            Social History:     1 PPD smoker; no alcohol use in the past one month (prior 10 year history of alcohol abuse per patient); no recreational drug use (tried marijuana once last month for cancer symptoms, he did not like it)         Family History:     No known history of colorectal cancer, liver disease, or inflammatory bowel disease.         Allergies:     No known drug allergies.         Medications:     FOLFOX chemotherapy  Norco/Percocet  Remeron  Omeprazole 40 mg BID    No  NSAID use since October 2017         Review of Systems:   A complete review of systems was performed and is negative except as noted in the HPI           Physical Exam:   /79  Pulse 89  Temp 98.5  F (36.9  C) (Oral)  Resp 16  Wt 71.4 kg (157 lb 8 oz)  SpO2 97%  BMI 22.6 kg/m2  Wt:   Wt Readings from Last 2 Encounters:   01/08/18 71.4 kg (157 lb 8 oz)   12/21/17 75.1 kg (165 lb 9.1 oz)      Constitutional: cooperative, pleasant, not dyspneic/diaphoretic, no acute distress  Eyes: Sclera anicteric  Ears/nose/mouth/throat: Normal oropharynx without ulcers or exudate, mucus membranes moist, hearing intact  Neck: supple  CV: No edema  Respiratory: Unlabored breathing  Lymph: No axillary, submandibular, supraclavicular lymphadenopathy  Abd: Nondistended, +bs, no hepatosplenomegaly, TTP in epigastric region, no peritoneal signs  Skin: warm, perfused, no jaundice  Neuro: AAO x 3, no focal deficits  Psych: Normal affect  MSK: No gross deformities  Rectal: + melenic stool in rectal vault; + external hemorrhoids         Data:   Labs and imaging below were independently reviewed and interpreted    BMP  Recent Labs  Lab 01/08/18  0205 01/07/18  1835 01/07/18  1827    138 139   POTASSIUM 3.5 3.9 4.0   CHLORIDE 111*  --  104   JULIO CESAR 8.2*  --  8.6   CO2 24  --  24   BUN 25  --  39*   CR 0.67  --  0.78   *  --  201*     CBC  Recent Labs  Lab 01/08/18  0752 01/08/18  0205  01/07/18  1827   WBC  --  8.2  --  11.0   RBC  --  3.09*  --  3.36*   HGB 7.7* 6.9*  < > 7.8*   HCT  --  24.4*  --  26.2*   MCV  --  79  --  78   MCH  --  22.3*  --  23.2*   MCHC  --  28.3*  --  29.8*   RDW  --  23.1*  --  22.8*   PLT  --  184  --  215   < > = values in this interval not displayed.  INR  Recent Labs  Lab 01/08/18  0205 01/07/18  1827   INR 1.18* 1.14     LFTs  Recent Labs  Lab 01/08/18  0205 01/07/18  1827   ALKPHOS 61 67   AST 4 9   ALT 13 15   BILITOTAL 0.5 0.7   PROTTOTAL 6.1* 6.4*   ALBUMIN 3.1* 3.3*      PANCNo lab  results found in last 7 days.    Imaging: Prior cross-sectional imaging reviewed.

## 2018-01-08 NOTE — PROGRESS NOTES
"CLINICAL NUTRITION SERVICES - ASSESSMENT NOTE     Nutrition Prescription    RECOMMENDATIONS FOR MDs/PROVIDERS TO ORDER:  -Advance diet when appropriate.   -If patient continues with taste changes consider checking zinc levels however may be related to chemotherapy.    Malnutrition Status:    Non-Severe malnutrition in context of chronic illness.    Recommendations already ordered by Registered Dietitian (RD):  None at this time.     Future/Additional Recommendations:  1. Monitor PO intakes, if pt eating <50% of meals consider calorie counts to assess need for further nutritional interventions.   2. Monitor intake and the need for oral nutrition supplements once diet has advanced.  3. Monitor BS-if consistently elevated consider changing to non-dextrose IVF     REASON FOR ASSESSMENT  Dashawn Ordoñez is a/an 57 year old male assessed by the dietitian for Admission Nutrition Risk Screen for unintentional loss of 10# or more in the past two months.    NUTRITION HISTORY  Information obtained from chart:   - Newly diagnosed stage D metastatic signet ring cell gastric cancer as of 11/2017, currently treated with chemotherapy.  - Hx of recurrent GI bleed from large gastric ulcerated mass.  - DM II.  - Previous ETOH abuse.  Information obtained from patient:   - Pt follows a regular diet at home and avoids sugary foods due to his DM II. Pt states baseline intake are meals TID with 2-3 snacks daily. A typical day includes: Breakfast - 2 pancakes, del real, orange juice. Lunch - canned ravioli. Dinner - goes out to eat with wife (prakash cheese steak sandwiches, italian food etc.)  Snacks include cheese and nuts.   - Pt does not monitor his glucose and controls his DM by solely avoiding soda and candy -  but verbalized that it is \"under control.\"   - Pt has only tolerated Gatorade and grape juice since last chemotherapy session on 1/3 - No solid PO intake during this time due taste changes and nausea 2/2 chemo.    CURRENT NUTRITION " "ORDERS  Diet: NPO  Intake/Tolerance: No intake yet, but pt states he is hungry with an appetite to eat.     LABS  Labs reviewed  Glucose 201 on 1/7, 138 on 1/8  Hemoglobin at 7.7 on 1/8    MEDICATIONS  Medications reviewed  Zofran   D5 @ 50 mL/hr  No insulin therapy for DM II    ANTHROPOMETRICS  Height: 177.8 cm (5' 10\")    Most Recent Weight: 71.4 kg (157 lb 8 oz)    IBW: 75.5 kg  BMI: 22.5 kg/m^2 - Normal BMI  Weight History: Wt trending down. Pt states a UBW of 165#. Considering weight of 176# from 10/19 is likely an outlier, pt has an overall wt loss of 10# in the past 3 months (6% of body wt)   Wt Readings from Last 10 Encounters:   01/08/18 71.4 kg (157 lb 8 oz)   12/21/17 75.1 kg (165 lb 9.1 oz)   11/10/17 70.7 kg (155 lb 13.8 oz)   11/09/17 74.8 kg (165 lb)   10/20/17 75.8 kg (167 lb 1.6 oz)   10/19/17 80 kg (176 lb 5.9 oz)   09/13/16 74.8 kg (165 lb)   06/27/14 94 kg (207 lb 3.7 oz)       Dosing Weight: 71 kg (based on lowest wt this admit of 71.4 kg)     ASSESSED NUTRITION NEEDS  Estimated Energy Needs: 1785 - 2142 kcals/day (25 - 30 kcals/kg)  Justification: Maintenance  Estimated Protein Needs: 86 - 107 grams protein/day (1.2 - 1.5 grams of pro/kg)  Justification: Hypercatabolism with critical illness  Estimated Fluid Needs: 1 mL/kcal   Justification: Maintenance    PHYSICAL FINDINGS  See malnutrition section below.    MALNUTRITION  % Intake: </= 50% for >/= 5 days (severe)  % Weight Loss: Wt. Loss does not meet criteria but is at risk   Subcutaneous Fat Loss: Upper arm and Lower arm: Mild  Muscle Loss: Thoracic region (clavicle, acromium bone, deltoid, trapezius, pectoral):  Mild and Dorsal hand: Mild    Fluid Accumulation/Edema: None noted   Malnutrition Diagnosis: Non-severe malnutrition in the context of chronic illness.     NUTRITION DIAGNOSIS  Inadequate oral intake related to abdominal pain with nausea and taste changes 2/2 recent chemotherapy as evidenced by consuming <50% of baseline intake x5 " days.     INTERVENTIONS  Implementation  Nutrition Education: Role of RD in POC.  Collaboration with other providers - Discussed POC during rounds     Goals  Diet to advance from NPO within the next 24 hours.      Monitoring/Evaluation  Progress toward goals will be monitored and evaluated per protocol.    Silva Cheung, Dietetic Intern    I agree with the assessment, interventions, and recommendations.    Renetta Rust RD, LD  Unit pager: [phone]

## 2018-01-08 NOTE — ED NOTES
[facility]  ED Nurse Handoff Report    ED Chief complaint: Rectal Bleeding (found dark black stools this AM.  Hx stage 4 stomach cancer)      ED Diagnosis:   Final diagnoses:   Gastrointestinal hemorrhage, unspecified gastrointestinal hemorrhage type       Code Status: Full Code    Allergies: No Known Allergies    Activity level - Baseline/Home:  Independent    Activity Level - Current:   Independent     Needed?: No    Isolation: No  Infection: Not Applicable    Bariatric?: No    Vital Signs:   Vitals:    01/07/18 2030 01/07/18 2045 01/07/18 2100 01/07/18 2115   BP: 125/71 125/86 109/80 (!) 114/104   Resp: 10 14 13    Temp:       TempSrc:       SpO2: 99% 98% 100%    Height:           Cardiac Rhythm: ,   Cardiac  Cardiac Rhythm: Sinus tachycardia    Pain level: 0-10 Pain Scale: 7    Is this patient confused?: No    Patient Report: Initial Complaint: pt. With stage 4 inoperable stomach cancer, undergoing radiation, reports black stools, increase in stomach pain, dizziness since this AM  Focused Assessment: alert, oriented, ambulatory, +BS, black stools per pt., epigastric/BUQ pain somewhat relieved by IV dilaudid, Hgb trending down   Tests Performed: lab  Abnormal Results: decreasing Hgb  Treatments provided: IV fluids, pain medications, IV protonix infusing    Family Comments: wife leaving town for [place], Mother had severe stroke this AM and not expected to survive    OBS brochure/video discussed/provided to patient: N/A    ED Medications:   Medications   pantoprazole (PROTONIX) 80 mg in NaCl 0.9 % 100 mL infusion (8 mg/hr Intravenous New Bag 1/7/18 1922)   pantoprazole (PROTONIX) 80 mg in NaCl 0.9 % 100 mL intermittent infusion (80 mg Intravenous New Bag 1/7/18 1911)   HYDROmorphone (PF) (DILAUDID) injection 0.5 mg (0.5 mg Intravenous Given 1/7/18 2022)       Drips infusing?:  Yes      ED NURSE PHONE NUMBER: [id number]

## 2018-01-08 NOTE — PLAN OF CARE
Problem: Patient Care Overview  Goal: Plan of Care/Patient Progress Review  Dashawn received two units PRBC for Hgb of 6.9 this morning.  Tolerated well.  Next hemoglobin check is at 2000.  Protonix drip infusing at 8 mg per hour.  Using dilaudid to control the pain in his abdomen and back.  Remains NPO except Ice chips.  Up walking around independently.  No stool this shift.  Continue plan of care.    Problem: Gastrointestinal Bleeding (Adult)  Goal: Signs and Symptoms of Listed Potential Problems Will be Absent, Minimized or Managed (Gastrointestinal Bleeding)  Signs and symptoms of listed potential problems will be absent, minimized or managed by discharge/transition of care (reference Gastrointestinal Bleeding (Adult) CPG).   Outcome: No Change   01/08/18 1348   Gastrointestinal Bleeding   Problems Assessed (GI Bleeding) all   Problems Present (GI Bleeding) other (see comments)  (drop in hemoglobin)

## 2018-01-09 VITALS
SYSTOLIC BLOOD PRESSURE: 134 MMHG | OXYGEN SATURATION: 100 % | BODY MASS INDEX: 22.8 KG/M2 | RESPIRATION RATE: 18 BRPM | HEART RATE: 77 BPM | WEIGHT: 158.9 LBS | TEMPERATURE: 97.8 F | DIASTOLIC BLOOD PRESSURE: 73 MMHG

## 2018-01-09 LAB
ANION GAP SERPL CALCULATED.3IONS-SCNC: 10 MMOL/L (ref 3–14)
BUN SERPL-MCNC: 9 MG/DL (ref 7–30)
CALCIUM SERPL-MCNC: 8.4 MG/DL (ref 8.5–10.1)
CHLORIDE SERPL-SCNC: 106 MMOL/L (ref 94–109)
CO2 SERPL-SCNC: 24 MMOL/L (ref 20–32)
CREAT SERPL-MCNC: 0.73 MG/DL (ref 0.66–1.25)
ERYTHROCYTE [DISTWIDTH] IN BLOOD BY AUTOMATED COUNT: 21.3 % (ref 10–15)
GFR SERPL CREATININE-BSD FRML MDRD: >90 ML/MIN/1.7M2
GLUCOSE SERPL-MCNC: 145 MG/DL (ref 70–99)
HCT VFR BLD AUTO: 25.7 % (ref 40–53)
HGB BLD-MCNC: 7.6 G/DL (ref 13.3–17.7)
MCH RBC QN AUTO: 24.1 PG (ref 26.5–33)
MCHC RBC AUTO-ENTMCNC: 29.6 G/DL (ref 31.5–36.5)
MCV RBC AUTO: 82 FL (ref 78–100)
PLATELET # BLD AUTO: 134 10E9/L (ref 150–450)
POTASSIUM SERPL-SCNC: 3.6 MMOL/L (ref 3.4–5.3)
RBC # BLD AUTO: 3.15 10E12/L (ref 4.4–5.9)
SODIUM SERPL-SCNC: 139 MMOL/L (ref 133–144)
WBC # BLD AUTO: 5 10E9/L (ref 4–11)

## 2018-01-09 PROCEDURE — 25000125 ZZHC RX 250: Performed by: PHYSICIAN ASSISTANT

## 2018-01-09 PROCEDURE — 99238 HOSP IP/OBS DSCHRG MGMT 30/<: CPT | Performed by: INTERNAL MEDICINE

## 2018-01-09 PROCEDURE — 25000132 ZZH RX MED GY IP 250 OP 250 PS 637: Performed by: PHYSICIAN ASSISTANT

## 2018-01-09 PROCEDURE — 85027 COMPLETE CBC AUTOMATED: CPT | Performed by: PHYSICIAN ASSISTANT

## 2018-01-09 PROCEDURE — 25000128 H RX IP 250 OP 636: Performed by: INTERNAL MEDICINE

## 2018-01-09 PROCEDURE — 25000131 ZZH RX MED GY IP 250 OP 636 PS 637: Performed by: PHYSICIAN ASSISTANT

## 2018-01-09 PROCEDURE — 80048 BASIC METABOLIC PNL TOTAL CA: CPT | Performed by: PHYSICIAN ASSISTANT

## 2018-01-09 PROCEDURE — 36592 COLLECT BLOOD FROM PICC: CPT | Performed by: PHYSICIAN ASSISTANT

## 2018-01-09 RX ORDER — OCTREOTIDE ACETATE 100 UG/ML
100 INJECTION, SOLUTION INTRAVENOUS; SUBCUTANEOUS 3 TIMES DAILY
Status: DISCONTINUED | OUTPATIENT
Start: 2018-01-09 | End: 2018-01-09 | Stop reason: HOSPADM

## 2018-01-09 RX ORDER — FERROUS SULFATE 325(65) MG
325 TABLET ORAL
Qty: 30 TABLET | Refills: 2 | Status: SHIPPED | OUTPATIENT
Start: 2018-01-09

## 2018-01-09 RX ORDER — HYDROMORPHONE HYDROCHLORIDE 4 MG/1
4 TABLET ORAL
Status: DISCONTINUED | OUTPATIENT
Start: 2018-01-09 | End: 2018-01-09 | Stop reason: HOSPADM

## 2018-01-09 RX ORDER — HYDROMORPHONE HYDROCHLORIDE 4 MG/1
4 TABLET ORAL
Qty: 56 TABLET | Refills: 0 | Status: ON HOLD | OUTPATIENT
Start: 2018-01-09 | End: 2018-03-12

## 2018-01-09 RX ORDER — OCTREOTIDE ACETATE 100 UG/ML
100 INJECTION, SOLUTION INTRAVENOUS; SUBCUTANEOUS 3 TIMES DAILY
Qty: 90 ML | Refills: 0 | Status: ON HOLD | OUTPATIENT
Start: 2018-01-09 | End: 2018-03-11

## 2018-01-09 RX ADMIN — OCTREOTIDE ACETATE 100 MCG: 100 INJECTION, SOLUTION INTRAVENOUS; SUBCUTANEOUS at 17:37

## 2018-01-09 RX ADMIN — SENNOSIDES AND DOCUSATE SODIUM 1 TABLET: 8.6; 5 TABLET ORAL at 07:59

## 2018-01-09 RX ADMIN — HYDROMORPHONE HYDROCHLORIDE 1 MG: 1 INJECTION, SOLUTION INTRAMUSCULAR; INTRAVENOUS; SUBCUTANEOUS at 09:27

## 2018-01-09 RX ADMIN — SODIUM CHLORIDE, PRESERVATIVE FREE 500 UNITS: 5 INJECTION INTRAVENOUS at 16:11

## 2018-01-09 RX ADMIN — OMEPRAZOLE 40 MG: 20 CAPSULE, DELAYED RELEASE ORAL at 16:08

## 2018-01-09 RX ADMIN — HYDROMORPHONE HYDROCHLORIDE 1 MG: 1 INJECTION, SOLUTION INTRAMUSCULAR; INTRAVENOUS; SUBCUTANEOUS at 04:41

## 2018-01-09 RX ADMIN — HYDROMORPHONE HYDROCHLORIDE 4 MG: 4 TABLET ORAL at 14:28

## 2018-01-09 RX ADMIN — PANTOPRAZOLE SODIUM 40 MG: 40 INJECTION, POWDER, FOR SOLUTION INTRAVENOUS at 07:59

## 2018-01-09 ASSESSMENT — PAIN DESCRIPTION - DESCRIPTORS
DESCRIPTORS: ACHING
DESCRIPTORS: SHARP
DESCRIPTORS: SHARP

## 2018-01-09 NOTE — PLAN OF CARE
Problem: Patient Care Overview  Goal: Plan of Care/Patient Progress Review  Outcome: Adequate for Discharge Date Met: 01/09/18  AVSS, afebrile. Denies pain/nausea. Pt is stable and discharged home at 1744, discharge education and handout provided, octreotide administration demonstrated in steps and pt verbalized understanding with teach back. Medications and supplies picked up from pharmacy and sent with patient. Chest port heparin locked and de-accessed. Dilaudid script sent with patient and will  med from home pharmacy. Escorted home by wife.

## 2018-01-09 NOTE — PLAN OF CARE
Problem: Gastrointestinal Bleeding (Adult)  Goal: Signs and Symptoms of Listed Potential Problems Will be Absent, Minimized or Managed (Gastrointestinal Bleeding)  Signs and symptoms of listed potential problems will be absent, minimized or managed by discharge/transition of care (reference Gastrointestinal Bleeding (Adult) CPG).   Outcome: Improving  VSS. A&O. Pain controlled with dilaudid and aqua k pad. No c/o bleeding with voiding or BM's. Up ad yessi in room. Tolerating regular diet. Plans to d/c early this evening. Pt to d/c with octreotide sub cut injections. Will educate pt on injections when up from pharmacy. Continue with POC.

## 2018-01-09 NOTE — DISCHARGE SUMMARY
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 He also has a history of recurrent GI bleed from the large gastric ulcerated mass lesion, seen by GI in the past and EGD done 10/2017. It was felt that he is not a good candidate for endoscopic interventions at the time and that IR may need to perform embolization if needed. However his bleeding has stopped, received multiple blood transfusions for a hgb of 5.6. Since the time he was admitted recently for URI. Otherwise no new issues. Again had chemotherapy on Wednesday and he has not felt well ever since. He did have reduced appetite as well as some epigastric discomfort that felt somewhat worse than in the past. He did have dry heaves but no emesis. He also had constipation in the setting of pain med use, he did have small formed bm earlier today x2 and later on one episode of dark and more tarry stool with a bad smell typical to him for GI bleed. No hematemesis and no recurrence of the episodes. He presented to [facility], hgb was stable. He was started on pantoprazole ggt and transferred to [facility] for further evaluation.  He denies NSAID use, also did not have etoh for the past week or two. No other complaints.     Hospital Course   Dashawn Ordoñez is a 57 year old male with metastatic  signet ring gastric cancer (mets to liver, lymph node, retroperitoneal, and bone) on FOLFOX (last given on 1/3) and h/o GIB r/t large gastric tumor who was admitted on 1/8/2018 with 3-4 days of abdominal discomfort, decreased appetite, dizziness, and one episode of tarry stool on Sunday. The following problems were addressed during his hospitalization:     Presumed upper GI bleed 2/2 gastric cancer.   Anemia 2/2 GI bleed, recent chemotherapy.  Previously admitted 11/9-11/12 with GI bleed. Due to extensive nature of the tumor, GI team was unable to offer endoscopic intervention. Hemoglobin stabilized with conservative treatment only. Was admitted 12/20-12/21 with stable Hgb ~8, and presumably has had stable Hgb at outpatient oncology visits. Patient notes some dizziness starting after chemo and one episode of melena on Sunday. Hgb stable at 8.1 on admission. No further episodes here so far, no active sources of bleeding reported.  -Hgb dropped to 6.9 1/8 and received 2 units PRBCs. Hgb stable since then (7.7 --> 7.7 --> 7.6).  -Initially placed on Protonix gtt, then transitioned to IV twice daily, and then to back to home regimen on day of discharge.  -Started octreotide gtt on 1/8. Will discharge on octreotide 100mcg SQ q8h, plan for one month of treatment.   -No further bowel movements while admitted.  -Will have weekly Hgb checks that Dr. Molina (GI team) will follow.   -Start daily iron supplements     Metastatic signet ring gastric cancer. Diagnosed 10/2017. Followed by Dr. Healy of [facility] at [place]. Currently on FOLFOX (C1D1 11/6-11/8). Last given on 1/3-1/5. Per patient is was a week later than it was supposed to be due to a faulty FOLFOX pump placed the previous week.   -Per patient, he was to have re-imaging completed this week, will need to reschedule with [facility].     Constipation 2/2 opioid use. PTA on Norco and Percocet. Recently started on long acting morphine, but only took 2 doses  before he elected to stop the medication (see below).  -Bowels managed with Senokot-S 1 tab BID while here. Patient states he had bowel meds at home and expressed understanding on how to use them. Instructed to call either Dr. Healy or [facility] nurse triage line if no BM for 3 days.     Pain related to cancer. Patient was recently started on long acting morphine for pain control. Took first dose on 1/4, but developed abdominal pain after taking second dose. Unclear whether timing suggests poor medication tolerance or adverse effects from chemo.   -Pain managed with IV Dilaudid 1mg q4h prn and then transitioned to oral Dilaudid 4mg q3h on day of discharge. Provided with script for 1 week of pain meds.  -Will f/u Dr. Healy regarding long-term pain strategy    Gena Fernandez PA-C  Hematology/Oncology  Pager: [phone]    Code Status   DNR / DNI as discussed with patient on admission    Primary Care Physician   Cristian Healy    Vital Signs with Ranges  Temp:  [96  F (35.6  C)-97.6  F (36.4  C)] 97.6  F (36.4  C)  Pulse:  [64-89] 64  Resp:  [15-16] 16  BP: (120-158)/(71-84) 145/80  SpO2:  [95 %-100 %] 98 %  158 lbs 14.4 oz    Physical Exam   Constitutional: Pleasant and cooperative male seen sitting up in bed. Awake, alert, NAD.  HEENT: NC/AT, EOMI, sclera clear, conjunctiva normal  Respiratory: No increased work of breathing, CTAB, no crackles or wheezing.  Cardiovascular: RRR, no murmur noted. No peripheral edema.  GI: Normal bowel sounds, soft, non-distended, mildly tender to light palpation in epigastric region, otherwise non-tender.  Skin: Warm, dry, well-perfused. No bruising, bleeding, rashes, or lesions on limited exam.  Neurologic: A&O. Answers questions appropriately. Moves all extremities spontaneously.  Neuropsychiatric: Calm, appropriate affect    Discharge Disposition   Discharged to home  Condition at discharge: Stable    Consultations This Hospital Stay   GI LUMINAL ADULT IP CONSULT    Discharge  Orders     Hemoglobin   Last Lab Result: Hemoglobin (g/dL)      Date                     Value                01/09/2018               7.6 (L)          ----------     Reason for your hospital stay   You were here due to concern for a GI bleed. You received 2 units of red blood cells and your Hgb has been stable since then.     Follow Up and recommended labs and tests   You will need to make an appointment with Dr. Healy for hospital follow-up and to recheck your hemoglobin.     You will also need to reschedule the appointment for the CT scans Dr. Healy would like you to have.     Activity   Your activity upon discharge: activity as tolerated     When to contact your care team   Please call Dr Healy's team as needed for health concerns. You may also call the [facility] at [phone] if you notice dark or bloody stools, or bleeding from any other part of your body. Please also call if you notice dizziness, shortness of breath, chest pain, temperature above 100.4, headaches, vision changes, uncontrolled nausea, vomiting, diarrhea, or pain.     Discharge Instructions   -- A small amount of Dilaudid (hydromorphone) has been prescribed for pain control. You will need to follow-up with Dr. Healy to discuss long-term pain management strategies.     -- While you are taking opioid pain medications you should continue to take medications to prevent constipation. If you develop loose stools it is ok to skip a dose. If you do not have a stool for 3 days or more your should call Dr. Healy or the [facility] triage line ([phone]).    -- Please have your hemoglobin checked once per week to make sure it is remaining stable. You may go to any [place] location to have these labs drawn.    -- Please start taking iron supplements daily. Please be aware that this may make constipation worse, so you may need to take additional stool softeners. Iron can also cause stools to  appear dark.     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)      octreotide (SANDOSTATIN) 100 MCG/ML SOLN injection Inject 1 mL (100 mcg) Subcutaneous 3 times daily  Qty: 90 mL, Refills: 0    Associated Diagnoses: Gastrointestinal hemorrhage associated with gastric ulcer      ferrous sulfate (IRON) 325 (65 FE) MG tablet Take 1 tablet (325 mg) by mouth daily (with breakfast)  Qty: 30 tablet, Refills: 2    Associated Diagnoses: Gastrointestinal hemorrhage associated with gastric ulcer         CONTINUE these medications which have NOT CHANGED    Details   mirtazapine (REMERON) 7.5 MG TABS tablet Take 1 tablet (7.5 mg) by mouth nightly as needed  Qty: 30 tablet, Refills: 0    Associated Diagnoses: Sleep disorder      omeprazole (PRILOSEC) 40 MG capsule Take 1 capsule (40 mg) by mouth 2 times daily (before meals)  Qty: 30 capsule, Refills: 3    Associated Diagnoses: Acute upper gastrointestinal bleeding         STOP taking these medications       HYDROcodone-acetaminophen (NORCO) 5-325 MG per tablet Comments:   Reason for Stopping:         oxyCODONE-acetaminophen (PERCOCET) 5-325 MG per tablet Comments:   Reason for Stopping:             Allergies   No Known Allergies    Data   Most Recent 3 CBC's:  Recent Labs   Lab Test  01/09/18 0813 01/08/18   2036  01/08/18   0752  01/08/18   0205   01/07/18   1827   WBC  5.0   --    --   8.2   --   11.0   HGB  7.6*  7.7*  7.7*  6.9*   < >  7.8*   MCV  82   --    --   79   --   78   PLT  134*   --    --   184   --   215    < > = values in this interval not displayed.      Most Recent 3 BMP's:  Recent Labs   Lab Test  01/09/18 0813   01/08/18   0205  01/07/18   1835  01/07/18   1827   NA  139  143  138  139   POTASSIUM  3.6  3.5  3.9  4.0   CHLORIDE  106  111*   --   104   CO2  24  24   --   24   BUN  9  25   --   39*   CR  0.73  0.67   --   0.78   ANIONGAP  10  9   --   11   JULIO CESAR  8.4*  8.2*   --   8.6   GLC  145*  138*   --   201*     Most Recent 2 LFT's:  Recent Labs   Lab Test  01/08/18   0205  01/07/18   1827   AST  4  9   ALT  13  15   ALKPHOS  61  67   BILITOTAL  0.5  0.7     Most Recent INR's and Anticoagulation Dosing History:  Anticoagulation Dose History     Recent Dosing and Labs Latest Ref Rng & Units 11/3/2017 11/9/2017 11/9/2017 11/10/2017 11/11/2017 1/7/2018 1/8/2018    INR 0.86 - 1.14 1.05 1.20(H) 1.12 1.13 1.19(H) 1.14 1.18(H)

## 2018-01-09 NOTE — PROGRESS NOTES
GASTROENTEROLOGY PROGRESS NOTE  Date of Service: 01/09/2018    ASSESSMENT:  Dashawn Ordoñez is a 58 yo M with metastatic gastric cancer on FOLFOX chemotherapy dx 10/2017 with recurrent GIB 2/2 malignancy who is presenting with same. Endoscopy felt to be unlikely to identify targeted lesion or help overall clinical course and conservative medical management was pursued with addition of octreotide to decrease splanchnic pressures. Patient to be discharged today, will plan follow-up as below.     RECOMMENDATIONS:   - Octreotide 100 mcg TID on discharge   - Weekly hemoglobin checks; if hemoglobin stable to improved would transition octreotide to long-acting; if continues to down-trend or need for transfusion arises, would increase octreotide to 200 mcg TID. If no improvement with octreotide would discontinue after max trial of 200 mcg TID.   - Will ask our RN coordinator to assist with coordination of outpatient office visit appointment with me in next month   - Oral iron supplementation    The patient was discussed and plan agreed upon with GI staff.    Cleo Molina MD  GI Consult Service  _______________________________________________________________  S:   No acute events overnight.   Ambulating frequently to smoke.   Abdominal pain resolved.  Tolerating diet. No N/V.  One small black stool last night.   Hemoglobin stable.    O:  Blood pressure 134/73, pulse 77, temperature 97.8  F (36.6  C), temperature source Oral, resp. rate 18, weight 72.1 kg (158 lb 14.4 oz), SpO2 100 %.    Gen: NAD  HEENT: NC/AT  CV: RRR  Lungs: No increased WOB  Abd: Soft, NT, ND  Skin: No rashes  MS: WWP  Neuro: Alert and oriented      LABS:  BMP  Recent Labs  Lab 01/09/18  0813 01/08/18  0205 01/07/18  1835 01/07/18  1827    143 138 139   POTASSIUM 3.6 3.5 3.9 4.0   CHLORIDE 106 111*  --  104   JULIO CESAR 8.4* 8.2*  --  8.6   CO2 24 24  --  24   BUN 9 25  --  39*   CR 0.73 0.67  --  0.78   * 138*  --  201*     CBC  Recent Labs  Lab  01/09/18 0813 01/08/18 0205 01/07/18 1827   WBC 5.0  --  8.2  --  11.0   RBC 3.15*  --  3.09*  --  3.36*   HGB 7.6*  < > 6.9*  < > 7.8*   HCT 25.7*  --  24.4*  --  26.2*   MCV 82  --  79  --  78   MCH 24.1*  --  22.3*  --  23.2*   MCHC 29.6*  --  28.3*  --  29.8*   RDW 21.3*  --  23.1*  --  22.8*   *  --  184  --  215   < > = values in this interval not displayed.  INR  Recent Labs  Lab 01/08/18 0205 01/07/18 1827   INR 1.18* 1.14     LFTs  Recent Labs  Lab 01/08/18 0205 01/07/18 1827   ALKPHOS 61 67   AST 4 9   ALT 13 15   BILITOTAL 0.5 0.7   PROTTOTAL 6.1* 6.4*   ALBUMIN 3.1* 3.3*      PANCNo lab results found in last 7 days.

## 2018-01-09 NOTE — PLAN OF CARE
VSS. Up ad yessi. Voiding, some occurrences unmeasured. No BM. Abdominal pain tolerable with IV dilaudid q 4hrs. One episode of nausea, prn zofran given with relief. Taking clear liquids. Hgb check at 2000 was 7.7. IVF infusing @ 50 mL/hr and octreotide drip infusing @ 10 mL/hr into PIV. Port hep locked. Pt would like bedside report. Continue with POC.

## 2018-01-11 ENCOUNTER — HOSPITAL ENCOUNTER (OUTPATIENT)
Dept: CT IMAGING | Facility: CLINIC | Age: 58
Discharge: HOME | End: 2018-01-11
Attending: INTERNAL MEDICINE | Admitting: INTERNAL MEDICINE
Payer: COMMERCIAL

## 2018-01-11 DIAGNOSIS — C16.9 MALIGNANT NEOPLASM OF STOMACH, UNSPECIFIED LOCATION (H): ICD-10-CM

## 2018-01-11 PROCEDURE — 71260 CT THORAX DX C+: CPT

## 2018-01-11 PROCEDURE — 25000128 H RX IP 250 OP 636: Performed by: RADIOLOGY

## 2018-01-11 RX ORDER — IOPAMIDOL 755 MG/ML
500 INJECTION, SOLUTION INTRAVASCULAR ONCE
Status: COMPLETED | OUTPATIENT
Start: 2018-01-11 | End: 2018-01-11

## 2018-01-11 RX ADMIN — IOPAMIDOL 78 ML: 755 INJECTION, SOLUTION INTRAVENOUS at 15:16

## 2018-01-11 RX ADMIN — SODIUM CHLORIDE 59 ML: 9 INJECTION, SOLUTION INTRAVENOUS at 15:16

## 2018-01-12 ENCOUNTER — APPOINTMENT (OUTPATIENT)
Dept: LAB | Facility: CLINIC | Age: 58
End: 2018-01-12
Attending: PHYSICIAN ASSISTANT
Payer: COMMERCIAL

## 2018-01-12 LAB
BLD PROD TYP BPU: NORMAL
BLD PROD TYP BPU: NORMAL
BLD UNIT ID BPU: 0
BLD UNIT ID BPU: 0
BLOOD PRODUCT CODE: NORMAL
BLOOD PRODUCT CODE: NORMAL
BPU ID: NORMAL
BPU ID: NORMAL
TRANSFUSION STATUS PATIENT QL: NORMAL

## 2018-01-15 LAB — COPATH REPORT: NORMAL

## 2018-01-17 ENCOUNTER — TRANSFERRED RECORDS (OUTPATIENT)
Dept: HEALTH INFORMATION MANAGEMENT | Facility: CLINIC | Age: 58
End: 2018-01-17

## 2018-01-18 LAB — LAB SCANNED RESULT: NORMAL

## 2018-01-21 ENCOUNTER — HOSPITAL ENCOUNTER (OUTPATIENT)
Dept: LAB | Facility: CLINIC | Age: 58
Discharge: HOME | End: 2018-01-21
Attending: INTERNAL MEDICINE | Admitting: INTERNAL MEDICINE
Payer: COMMERCIAL

## 2018-01-21 DIAGNOSIS — C16.0 MALIGNANT NEOPLASM OF CARDIA OF STOMACH (H): ICD-10-CM

## 2018-01-21 LAB
ABO + RH BLD: NORMAL
ABO + RH BLD: NORMAL
BLD GP AB SCN SERPL QL: NORMAL
BLD PROD TYP BPU: NORMAL
BLOOD BANK CMNT PATIENT-IMP: NORMAL
NUM BPU REQUESTED: 1
SPECIMEN EXP DATE BLD: NORMAL

## 2018-01-21 PROCEDURE — 86901 BLOOD TYPING SEROLOGIC RH(D): CPT | Performed by: INTERNAL MEDICINE

## 2018-01-21 PROCEDURE — 86850 RBC ANTIBODY SCREEN: CPT | Performed by: INTERNAL MEDICINE

## 2018-01-21 PROCEDURE — 86900 BLOOD TYPING SEROLOGIC ABO: CPT | Performed by: INTERNAL MEDICINE

## 2018-01-21 PROCEDURE — 36415 COLL VENOUS BLD VENIPUNCTURE: CPT | Performed by: INTERNAL MEDICINE

## 2018-01-21 PROCEDURE — 86923 COMPATIBILITY TEST ELECTRIC: CPT | Performed by: INTERNAL MEDICINE

## 2018-01-22 DIAGNOSIS — C16.9 GASTRIC CANCER (H): Primary | ICD-10-CM

## 2018-01-23 ENCOUNTER — HOSPITAL ENCOUNTER (OUTPATIENT)
Dept: LAB | Facility: CLINIC | Age: 58
Discharge: HOME | End: 2018-01-23
Attending: INTERNAL MEDICINE | Admitting: INTERNAL MEDICINE
Payer: COMMERCIAL

## 2018-01-23 DIAGNOSIS — C16.9 GASTRIC CANCER (H): ICD-10-CM

## 2018-01-23 LAB
ABO + RH BLD: NORMAL
ABO + RH BLD: NORMAL
BLD GP AB SCN SERPL QL: NORMAL
BLOOD BANK CMNT PATIENT-IMP: NORMAL
SPECIMEN EXP DATE BLD: NORMAL

## 2018-01-23 PROCEDURE — 36415 COLL VENOUS BLD VENIPUNCTURE: CPT | Performed by: INTERNAL MEDICINE

## 2018-01-23 PROCEDURE — 86850 RBC ANTIBODY SCREEN: CPT | Performed by: INTERNAL MEDICINE

## 2018-01-23 PROCEDURE — 86901 BLOOD TYPING SEROLOGIC RH(D): CPT | Performed by: INTERNAL MEDICINE

## 2018-01-23 PROCEDURE — 86900 BLOOD TYPING SEROLOGIC ABO: CPT | Performed by: INTERNAL MEDICINE

## 2018-01-24 ENCOUNTER — INFUSION THERAPY VISIT (OUTPATIENT)
Dept: INFUSION THERAPY | Facility: CLINIC | Age: 58
End: 2018-01-24
Attending: INTERNAL MEDICINE
Payer: COMMERCIAL

## 2018-01-24 VITALS
OXYGEN SATURATION: 98 % | DIASTOLIC BLOOD PRESSURE: 74 MMHG | TEMPERATURE: 98.9 F | SYSTOLIC BLOOD PRESSURE: 139 MMHG | HEART RATE: 80 BPM | RESPIRATION RATE: 16 BRPM

## 2018-01-24 DIAGNOSIS — C16.0 MALIGNANT NEOPLASM OF CARDIA OF STOMACH (H): Primary | ICD-10-CM

## 2018-01-24 LAB
BLD PROD TYP BPU: NORMAL
BLD UNIT ID BPU: 0
BLOOD PRODUCT CODE: NORMAL
BPU ID: NORMAL
TRANSFUSION STATUS PATIENT QL: NORMAL
TRANSFUSION STATUS PATIENT QL: NORMAL

## 2018-01-24 PROCEDURE — P9016 RBC LEUKOCYTES REDUCED: HCPCS | Performed by: INTERNAL MEDICINE

## 2018-01-24 PROCEDURE — 36430 TRANSFUSION BLD/BLD COMPNT: CPT

## 2018-01-24 NOTE — PROGRESS NOTES
Infusion Nursing Note:  Dashawn Jeanubbs presents today for PRBC.    Patient seen by provider today: No   present during visit today: Not Applicable.    Note: PRBC infused at 150ml/hr for 10 minutes, then 300ml/hr for remainder of bag.    Intravenous Access:  Implanted Port.    Treatment Conditions:  Blood transfusion consent signed 1/24/18      Post Infusion Assessment:  Patient tolerated infusion without incident.  Blood return noted pre and post infusion.  Site patent and intact, free from redness, edema or discomfort.  No evidence of extravasations.  Access discontinued per protocol.    Discharge Plan:   Patient declined prescription refills.  Discharge instructions reviewed with: Patient.  Patient and/or family verbalized understanding of discharge instructions and all questions answered.  Copy of AVS reviewed with patient and/or family.  Patient will return as needed for next appointment.  Patient discharged in stable condition accompanied by: self.  Departure Mode: Ambulatory.    Margot Arora RN

## 2018-01-24 NOTE — MR AVS SNAPSHOT
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  Normal or non-critical lab and imaging results will be communicated to you by [employer], letter or phone within 4 business days after the clinic has received the results. If you do not hear from us within 7 days, please contact the clinic through [employer] or phone. If you have a critical or abnormal lab result, we will notify you by phone as soon as possible.  Submit refill requests through [employer] or call your pharmacy and they will forward the refill request to us. Please allow 3 business days for your refill to be completed.          Additional Information About Your Visit        MyChart Information     [employer] lets you send messages to your doctor, view your test results, renew your prescriptions, schedule appointments and more. To sign up, go to [email or web address] . Click on \"Log in\" on the left side of the screen, which will take you to the Welcome page. Then click on \"Sign up Now\" on the right side of the page.     You will be asked to enter the access code listed below, as well as some personal information. Please follow the directions to create your username and password.     Your access code is: [email or web address]  Expires: 2018  1:08 PM     Your access code will  in 90 days. If you need help or a new code, please call your [place] clinic or [phone].        Care EveryWhere ID     This " is your Care EveryWhere ID. This could be used by other organizations to access your [place] medical records  [id number]        Your Vitals Were     Pulse Temperature Respirations Pulse Oximetry          80 98.9  F (37.2  C) (Tympanic) 16 98%         Blood Pressure from Last 3 Encounters:   01/24/18 139/74   01/09/18 134/73   01/07/18 123/83    Weight from Last 3 Encounters:   01/09/18 72.1 kg (158 lb 14.4 oz)   12/21/17 75.1 kg (165 lb 9.1 oz)   11/10/17 70.7 kg (155 lb 13.8 oz)              We Performed the Following     Transfuse red blood cell unit        Primary Care Provider Office Phone # Fax #    Cristian Shakeel Healy [phone]       [facility] [address] [facility] [address]  [facility] [address]        Equal Access to Services     [facility]: Hadii aad ku hadasho Soomaali, waaxda luqadaha, qaybta kaalmada adeegyada, waxay rossyin hayaan amilcar grigsby . So [facility] [phone].    ATENCIÓN: Si habla español, tiene a sanz disposición servicios gratuitos de asistencia lingüística. Jennifer al [phone].    We comply with applicable federal civil rights laws and [place] laws. We do not discriminate on the basis of race, color, national origin, age, disability, sex, sexual orientation, or gender identity.            Thank you!     Thank you for choosing [facility]  for your care. Our goal is always to provide you with excellent care. Hearing back from our patients is one way we can continue to improve our services. Please take a few minutes to complete the written survey that you may receive in the mail after your visit with us. Thank you!             Your Updated Medication List - Protect others around you: Learn how to safely use, store and throw away your medicines at www.disposemymeds.org.          This list is accurate as of 1/24/18  1:08 PM.  Always use your most recent med list.                   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

## 2018-01-31 ENCOUNTER — HOSPITAL ENCOUNTER (OUTPATIENT)
Dept: LAB | Facility: CLINIC | Age: 58
End: 2018-01-31
Attending: INTERNAL MEDICINE
Payer: COMMERCIAL

## 2018-01-31 ENCOUNTER — HOSPITAL ENCOUNTER (OUTPATIENT)
Dept: GENERAL RADIOLOGY | Facility: CLINIC | Age: 58
Discharge: HOME | End: 2018-01-31
Attending: INTERNAL MEDICINE | Admitting: INTERNAL MEDICINE
Payer: COMMERCIAL

## 2018-01-31 ENCOUNTER — TRANSFERRED RECORDS (OUTPATIENT)
Dept: HEALTH INFORMATION MANAGEMENT | Facility: CLINIC | Age: 58
End: 2018-01-31

## 2018-01-31 DIAGNOSIS — K25.4 GASTROINTESTINAL HEMORRHAGE ASSOCIATED WITH GASTRIC ULCER: ICD-10-CM

## 2018-01-31 DIAGNOSIS — C16.0 MALIGNANT NEOPLASM OF CARDIA OF STOMACH (H): ICD-10-CM

## 2018-01-31 DIAGNOSIS — M54.50 LOW BACK PAIN: ICD-10-CM

## 2018-01-31 DIAGNOSIS — C16.9 GASTRIC CANCER (H): Primary | ICD-10-CM

## 2018-01-31 DIAGNOSIS — C16.9 MALIGNANT NEOPLASM OF STOMACH, UNSPECIFIED LOCATION (H): ICD-10-CM

## 2018-01-31 DIAGNOSIS — C16.9 GASTRIC CANCER (H): ICD-10-CM

## 2018-01-31 LAB — HGB BLD-MCNC: 8.3 G/DL (ref 13.3–17.7)

## 2018-01-31 PROCEDURE — 86870 RBC ANTIBODY IDENTIFICATION: CPT | Performed by: INTERNAL MEDICINE

## 2018-01-31 PROCEDURE — 86880 COOMBS TEST DIRECT: CPT | Performed by: INTERNAL MEDICINE

## 2018-01-31 PROCEDURE — 86900 BLOOD TYPING SEROLOGIC ABO: CPT | Performed by: INTERNAL MEDICINE

## 2018-01-31 PROCEDURE — 40000343 ZZHCL STATISTIC RH: Performed by: INTERNAL MEDICINE

## 2018-01-31 PROCEDURE — 86901 BLOOD TYPING SEROLOGIC RH(D): CPT | Performed by: INTERNAL MEDICINE

## 2018-01-31 PROCEDURE — 40000342 ZZHCL STATISTIC ABO: Performed by: INTERNAL MEDICINE

## 2018-01-31 PROCEDURE — 86850 RBC ANTIBODY SCREEN: CPT | Performed by: INTERNAL MEDICINE

## 2018-01-31 PROCEDURE — 86922 COMPATIBILITY TEST ANTIGLOB: CPT | Performed by: INTERNAL MEDICINE

## 2018-01-31 PROCEDURE — 72100 X-RAY EXAM L-S SPINE 2/3 VWS: CPT

## 2018-01-31 PROCEDURE — 85018 HEMOGLOBIN: CPT | Performed by: PHYSICIAN ASSISTANT

## 2018-01-31 PROCEDURE — 36415 COLL VENOUS BLD VENIPUNCTURE: CPT | Performed by: PHYSICIAN ASSISTANT

## 2018-02-02 ENCOUNTER — INFUSION THERAPY VISIT (OUTPATIENT)
Dept: INFUSION THERAPY | Facility: CLINIC | Age: 58
End: 2018-02-02
Attending: INTERNAL MEDICINE
Payer: COMMERCIAL

## 2018-02-02 VITALS — TEMPERATURE: 97.1 F | HEART RATE: 72 BPM | DIASTOLIC BLOOD PRESSURE: 81 MMHG | SYSTOLIC BLOOD PRESSURE: 141 MMHG

## 2018-02-02 DIAGNOSIS — C16.0 MALIGNANT NEOPLASM OF CARDIA OF STOMACH (H): Primary | ICD-10-CM

## 2018-02-02 PROCEDURE — 36430 TRANSFUSION BLD/BLD COMPNT: CPT

## 2018-02-02 PROCEDURE — P9016 RBC LEUKOCYTES REDUCED: HCPCS | Performed by: INTERNAL MEDICINE

## 2018-02-02 NOTE — MR AVS SNAPSHOT
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 directly at [phone].  Normal or non-critical lab and imaging results will be communicated to you by Qifang[employer], letter or phone within 4 business days after the clinic has received the results. If you do not hear from us within 7 days, please contact the clinic through [employer] or phone. If you have a critical or abnormal lab result, we will notify you by phone as soon as possible.  Submit refill requests through [employer] or call your pharmacy and they will forward the refill request to us. Please allow 3 business days for your refill to be completed.          Additional Information About Your Visit        MyChart Information     [employer] lets you send messages to your doctor, view your test results, renew your prescriptions, schedule appointments and more. To sign up, go to [email or web address] . Click on \"Log in\" on the left side of the screen, which will take you to the Welcome page. Then click on \"Sign up Now\" on the right side of the page.     You will be asked to enter the access code listed below, as well as some personal information. Please follow the directions to create your username and password.     Your access code is: [email or web address]  Expires: 2018  1:08 PM     Your access code will  in 90 days. If you need help or a new code, please call your [place] clinic or [phone].        Care EveryWhere ID     This is " your Care EveryWhere ID. This could be used by other organizations to access your [facility] medical records  [id number]        Your Vitals Were     Pulse Temperature                72 97.1  F (36.2  C) (Tympanic)           Blood Pressure from Last 3 Encounters:   02/02/18 141/81   01/24/18 139/74   01/09/18 134/73    Weight from Last 3 Encounters:   01/09/18 72.1 kg (158 lb 14.4 oz)   12/21/17 75.1 kg (165 lb 9.1 oz)   11/10/17 70.7 kg (155 lb 13.8 oz)              We Performed the Following     Transfuse red blood cell unit        Primary Care Provider Office Phone # Fax #    Cristian Shakeel Healy [phone]       [facility] [address]  [facility] [address]        Equal Access to Services     [facility]: Hadii aad ku hadasho Soomaali, waaxda luqadaha, qaybta kaalmada adeegyada, emma conway hayleeann grigsby . So [facility] [phone].    ATENCIÓN: Si habla español, tiene a sanz disposición servicios gratuitos de asistencia lingüística. Llame al [phone].    We comply with applicable federal civil rights laws and [place] laws. We do not discriminate on the basis of race, color, national origin, age, disability, sex, sexual orientation, or gender identity.            Thank you!     Thank you for choosing [facility]  for your care. Our goal is always to provide you with excellent care. Hearing back from our patients is one way we can continue to improve our services. Please take a few minutes to complete the written survey that you may receive in the mail after your visit with us. Thank you!             Your Updated Medication List - Protect others around you: Learn how to safely use, store and throw away your medicines at www.disposemymeds.org.          This list is accurate as of 2/2/18 11:05 AM.  Always use your most recent med list.                   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

## 2018-02-02 NOTE — PROGRESS NOTES
Infusion Nursing Note:  Dashawn Ordoñez presents today for 1 unit prbcs.    Patient seen by provider today: No   present during visit today: Not Applicable.    Note: Dashawn came with his port accessed. He had an appt this am for a pump disconnect prior to coming here. He reports a productive cough x1 week with some general malaise. He says his oncologist is aware. Provided instruction on signs and symptoms to return to clinic or ER.     Intravenous Access:  Implanted Port.    Treatment Conditions:  Blood transfusion consent signed 1/24/18.  Hgb 7.7.      Post Infusion Assessment:  Patient tolerated infusion without incident.  Blood return noted pre and post infusion.  Site patent and intact, free from redness, edema or discomfort.  Access discontinued per protocol.    Discharge Plan:   Discharge instructions reviewed with: Patient.  Patient and/or family verbalized understanding of discharge instructions and all questions answered.  Patient discharged in stable condition accompanied by: self.  Departure Mode: Ambulatory.    Tena Chang RN

## 2018-02-03 LAB
ABO + RH BLD: ABNORMAL
ABO + RH BLD: ABNORMAL
BLD GP AB INVEST PLASRBC-IMP: ABNORMAL
BLD GP AB SCN SERPL QL: ABNORMAL
BLD PROD TYP BPU: ABNORMAL
BLOOD BANK CMNT PATIENT-IMP: ABNORMAL
BLOOD BANK CMNT PATIENT-IMP: ABNORMAL
NUM BPU REQUESTED: 2
SPECIMEN EXP DATE BLD: ABNORMAL

## 2018-03-10 ENCOUNTER — HOSPITAL ENCOUNTER (INPATIENT)
Facility: CLINIC | Age: 58
LOS: 1 days | Discharge: HOME | DRG: 383 | End: 2018-03-12
Attending: EMERGENCY MEDICINE | Admitting: INTERNAL MEDICINE
Payer: COMMERCIAL

## 2018-03-10 ENCOUNTER — APPOINTMENT (OUTPATIENT)
Dept: GENERAL RADIOLOGY | Facility: CLINIC | Age: 58
DRG: 383 | End: 2018-03-10
Attending: EMERGENCY MEDICINE
Payer: COMMERCIAL

## 2018-03-10 ENCOUNTER — APPOINTMENT (OUTPATIENT)
Dept: CT IMAGING | Facility: CLINIC | Age: 58
DRG: 383 | End: 2018-03-10
Attending: EMERGENCY MEDICINE
Payer: COMMERCIAL

## 2018-03-10 DIAGNOSIS — C16.9 MALIGNANT NEOPLASM OF STOMACH, UNSPECIFIED LOCATION (H): ICD-10-CM

## 2018-03-10 DIAGNOSIS — R10.12 ABDOMINAL PAIN, LEFT UPPER QUADRANT: Primary | ICD-10-CM

## 2018-03-10 DIAGNOSIS — R10.13 ABDOMINAL PAIN, EPIGASTRIC: ICD-10-CM

## 2018-03-10 DIAGNOSIS — K25.9 GASTRIC ULCER WITHOUT HEMORRHAGE OR PERFORATION, UNSPECIFIED CHRONICITY: ICD-10-CM

## 2018-03-10 LAB
ALBUMIN SERPL-MCNC: 3.5 G/DL (ref 3.4–5)
ALP SERPL-CCNC: 101 U/L (ref 40–150)
ALT SERPL W P-5'-P-CCNC: 16 U/L (ref 0–70)
ANION GAP SERPL CALCULATED.3IONS-SCNC: 8 MMOL/L (ref 3–14)
AST SERPL W P-5'-P-CCNC: 17 U/L (ref 0–45)
BASOPHILS # BLD AUTO: 0 10E9/L (ref 0–0.2)
BASOPHILS NFR BLD AUTO: 0.7 %
BILIRUB SERPL-MCNC: 0.6 MG/DL (ref 0.2–1.3)
BUN SERPL-MCNC: 8 MG/DL (ref 7–30)
CALCIUM SERPL-MCNC: 8.6 MG/DL (ref 8.5–10.1)
CHLORIDE SERPL-SCNC: 107 MMOL/L (ref 94–109)
CO2 SERPL-SCNC: 25 MMOL/L (ref 20–32)
CREAT SERPL-MCNC: 0.66 MG/DL (ref 0.66–1.25)
D DIMER PPP FEU-MCNC: 1 UG/ML FEU (ref 0–0.5)
DIFFERENTIAL METHOD BLD: ABNORMAL
EOSINOPHIL # BLD AUTO: 0 10E9/L (ref 0–0.7)
EOSINOPHIL NFR BLD AUTO: 0.5 %
ERYTHROCYTE [DISTWIDTH] IN BLOOD BY AUTOMATED COUNT: 23.4 % (ref 10–15)
GFR SERPL CREATININE-BSD FRML MDRD: >90 ML/MIN/1.7M2
GLUCOSE SERPL-MCNC: 161 MG/DL (ref 70–99)
HCT VFR BLD AUTO: 35.2 % (ref 40–53)
HGB BLD-MCNC: 10.6 G/DL (ref 13.3–17.7)
IMM GRANULOCYTES # BLD: 0 10E9/L (ref 0–0.4)
IMM GRANULOCYTES NFR BLD: 0.4 %
INR PPP: 1.17 (ref 0.86–1.14)
LIPASE SERPL-CCNC: 308 U/L (ref 73–393)
LYMPHOCYTES # BLD AUTO: 1.2 10E9/L (ref 0.8–5.3)
LYMPHOCYTES NFR BLD AUTO: 21.1 %
MCH RBC QN AUTO: 23.8 PG (ref 26.5–33)
MCHC RBC AUTO-ENTMCNC: 30.1 G/DL (ref 31.5–36.5)
MCV RBC AUTO: 79 FL (ref 78–100)
MONOCYTES # BLD AUTO: 0.7 10E9/L (ref 0–1.3)
MONOCYTES NFR BLD AUTO: 11.8 %
NEUTROPHILS # BLD AUTO: 3.6 10E9/L (ref 1.6–8.3)
NEUTROPHILS NFR BLD AUTO: 65.5 %
NRBC # BLD AUTO: 0 10*3/UL
NRBC BLD AUTO-RTO: 0 /100
PLATELET # BLD AUTO: 162 10E9/L (ref 150–450)
POTASSIUM SERPL-SCNC: 3.7 MMOL/L (ref 3.4–5.3)
PROT SERPL-MCNC: 7.2 G/DL (ref 6.8–8.8)
RBC # BLD AUTO: 4.46 10E12/L (ref 4.4–5.9)
SODIUM SERPL-SCNC: 140 MMOL/L (ref 133–144)
TROPONIN I SERPL-MCNC: <0.015 UG/L (ref 0–0.04)
WBC # BLD AUTO: 5.5 10E9/L (ref 4–11)

## 2018-03-10 PROCEDURE — 85610 PROTHROMBIN TIME: CPT | Performed by: EMERGENCY MEDICINE

## 2018-03-10 PROCEDURE — 25000132 ZZH RX MED GY IP 250 OP 250 PS 637: Performed by: EMERGENCY MEDICINE

## 2018-03-10 PROCEDURE — 96374 THER/PROPH/DIAG INJ IV PUSH: CPT | Mod: 59

## 2018-03-10 PROCEDURE — 93005 ELECTROCARDIOGRAM TRACING: CPT

## 2018-03-10 PROCEDURE — 83690 ASSAY OF LIPASE: CPT | Performed by: EMERGENCY MEDICINE

## 2018-03-10 PROCEDURE — 99285 EMERGENCY DEPT VISIT HI MDM: CPT | Mod: 25

## 2018-03-10 PROCEDURE — 74177 CT ABD & PELVIS W/CONTRAST: CPT

## 2018-03-10 PROCEDURE — 85379 FIBRIN DEGRADATION QUANT: CPT | Performed by: EMERGENCY MEDICINE

## 2018-03-10 PROCEDURE — 84484 ASSAY OF TROPONIN QUANT: CPT | Performed by: EMERGENCY MEDICINE

## 2018-03-10 PROCEDURE — 80053 COMPREHEN METABOLIC PANEL: CPT | Performed by: EMERGENCY MEDICINE

## 2018-03-10 PROCEDURE — 96376 TX/PRO/DX INJ SAME DRUG ADON: CPT

## 2018-03-10 PROCEDURE — 99205 OFFICE O/P NEW HI 60 MIN: CPT | Performed by: INTERNAL MEDICINE

## 2018-03-10 PROCEDURE — 85025 COMPLETE CBC W/AUTO DIFF WBC: CPT | Performed by: EMERGENCY MEDICINE

## 2018-03-10 PROCEDURE — 25000128 H RX IP 250 OP 636: Performed by: EMERGENCY MEDICINE

## 2018-03-10 PROCEDURE — 99207 ZZC CDG-CODE CATEGORY CHANGED: CPT | Performed by: INTERNAL MEDICINE

## 2018-03-10 PROCEDURE — 71046 X-RAY EXAM CHEST 2 VIEWS: CPT

## 2018-03-10 PROCEDURE — 25000125 ZZHC RX 250: Performed by: EMERGENCY MEDICINE

## 2018-03-10 PROCEDURE — 96375 TX/PRO/DX INJ NEW DRUG ADDON: CPT

## 2018-03-10 PROCEDURE — 96361 HYDRATE IV INFUSION ADD-ON: CPT

## 2018-03-10 RX ORDER — KETOROLAC TROMETHAMINE 30 MG/ML
30 INJECTION, SOLUTION INTRAMUSCULAR; INTRAVENOUS ONCE
Status: COMPLETED | OUTPATIENT
Start: 2018-03-10 | End: 2018-03-10

## 2018-03-10 RX ORDER — SODIUM CHLORIDE 9 MG/ML
1000 INJECTION, SOLUTION INTRAVENOUS CONTINUOUS
Status: DISCONTINUED | OUTPATIENT
Start: 2018-03-10 | End: 2018-03-11 | Stop reason: CLARIF

## 2018-03-10 RX ORDER — HYDROMORPHONE HYDROCHLORIDE 1 MG/ML
0.5 INJECTION, SOLUTION INTRAMUSCULAR; INTRAVENOUS; SUBCUTANEOUS
Status: DISCONTINUED | OUTPATIENT
Start: 2018-03-10 | End: 2018-03-11 | Stop reason: CLARIF

## 2018-03-10 RX ORDER — ONDANSETRON 2 MG/ML
4 INJECTION INTRAMUSCULAR; INTRAVENOUS ONCE
Status: COMPLETED | OUTPATIENT
Start: 2018-03-10 | End: 2018-03-10

## 2018-03-10 RX ORDER — IOPAMIDOL 755 MG/ML
500 INJECTION, SOLUTION INTRAVASCULAR ONCE
Status: COMPLETED | OUTPATIENT
Start: 2018-03-10 | End: 2018-03-10

## 2018-03-10 RX ADMIN — HYDROMORPHONE HYDROCHLORIDE 1 MG: 1 INJECTION, SOLUTION INTRAMUSCULAR; INTRAVENOUS; SUBCUTANEOUS at 21:56

## 2018-03-10 RX ADMIN — LIDOCAINE HYDROCHLORIDE 30 ML: 20 SOLUTION ORAL; TOPICAL at 23:32

## 2018-03-10 RX ADMIN — ONDANSETRON 4 MG: 2 INJECTION INTRAMUSCULAR; INTRAVENOUS at 20:49

## 2018-03-10 RX ADMIN — SODIUM CHLORIDE 61 ML: 9 INJECTION, SOLUTION INTRAVENOUS at 22:20

## 2018-03-10 RX ADMIN — IOPAMIDOL 80 ML: 755 INJECTION, SOLUTION INTRAVENOUS at 22:20

## 2018-03-10 RX ADMIN — Medication 0.5 MG: at 21:14

## 2018-03-10 RX ADMIN — KETOROLAC TROMETHAMINE 30 MG: 30 INJECTION, SOLUTION INTRAMUSCULAR at 21:56

## 2018-03-10 RX ADMIN — HYDROMORPHONE HYDROCHLORIDE 1 MG: 1 INJECTION, SOLUTION INTRAMUSCULAR; INTRAVENOUS; SUBCUTANEOUS at 20:52

## 2018-03-10 RX ADMIN — SODIUM CHLORIDE 1000 ML: 9 INJECTION, SOLUTION INTRAVENOUS at 20:49

## 2018-03-10 ASSESSMENT — ENCOUNTER SYMPTOMS
BACK PAIN: 1
RHINORRHEA: 1
DIFFICULTY URINATING: 1
COUGH: 1

## 2018-03-10 NOTE — LETTER
"Transition Communication Hand-off for Care Transitions to Next Level of Care Provider    Name: Dashawn Ordoñez  : 1960  MRN #: [id number]  Primary Care Provider: Cristian Hurt  Primary Care MD Name: Niraj  Primary Clinic: [facility] [address]  [facility] [address]  Primary Care Clinic Name: [facility]  Reason for Hospitalization:  Abdominal pain, epigastric [R10.13]  Malignant neoplasm of stomach, unspecified location (H) [C16.9]  Gastric ulcer without hemorrhage or perforation, unspecified chronicity [K25.9]  Admit Date/Time: 3/10/2018  8:12 PM  Discharge Date: 3/12/18  Payor Source: Payor: [employer] / Plan: UC[employer] [place] / Product Type: HMO /     Readmission Assessment Measure (CHAPO) Risk Score/category: VERY HIGH    Plan of Care Goals/Milestone Events:   Patient Concerns: financial concerns-SW saw and gave resources     Reason for Communication Hand-off Referral: Fragility    Discharge Plan:       Concern for non-adherence with plan of care:   NO  Discharge Needs Assessment:  Needs       Most Recent Value    Equipment Currently Used at Home none          Already enrolled in Tele-monitoring program and name of program:  na  Follow-up specialty is recommended: Yes    Follow-up plan:  No future appointments.    Any outstanding tests or procedures:             Follow-up and recommended labs and tests        Follow up with the oncologist on Wednesday as scheduled     We are also giving you a liquid medication called \"Carafate\", which will help with the ulcer pain                Key Recommendations:  Pt admitted with acute on chronic abdominal pain.  VERY HIGH CHAPO, oncology pt.  He has several new medications we started him on. F/u with Dr hurt which is scheduled.        Gracie Vazquez    AVS/Discharge Summary is the source of truth; this is a helpful guide for improved communication of patient story          "

## 2018-03-10 NOTE — IP AVS SNAPSHOT
[place] [address] [facility]    [address]    [facility] [address]    Phone:  [phone]    Fax:  [phone]                                       After Visit Summary   3/10/2018    Dashawn Ordoñez    MRN: [id number]           After Visit Summary Signature Page     I have received my discharge instructions, and my questions have been answered. I have discussed any challenges I see with this plan with the nurse or doctor.    ..........................................................................................................................................  Patient/Patient Representative Signature      ..........................................................................................................................................  Patient Representative Print Name and Relationship to Patient    ..................................................               ................................................  Date                                            Time    ..........................................................................................................................................  Reviewed by Signature/Title    ...................................................              ..............................................  Date                                                            Time

## 2018-03-10 NOTE — IP AVS SNAPSHOT
"                  MRN:[id number]                      After Visit Summary   3/10/2018    Dashawn Ordoñez    MRN: [id number]           Thank you!     Thank you for choosing [facility] for your care. Our goal is always to provide you with excellent care. Hearing back from our patients is one way we can continue to improve our services. Please take a few minutes to complete the written survey that you may receive in the mail after you visit. If you would like to speak to someone directly about your visit please contact Patient Relations at [phone]. Thank you!          Patient Information     Date Of Birth          1960        Designated Caregiver       Most Recent Value    Caregiver    Will someone help with your care after discharge? yes    Name of designated caregiver Isa    Phone number of caregiver [phone]    Caregiver address [place]      About your hospital stay     You were admitted on:  March 11, 2018 You last received care in the:  [place] [address] [facility]    You were discharged on:  March 12, 2018        Reason for your hospital stay       Pain due to cancer                  Who to Call     For medical emergencies, please call 911.  For non-urgent questions about your medical care, please call your primary care provider or clinic, [phone]          Attending Provider     Provider Specialty    Joaquin Gaona MD Emergency Medicine    Orion Ferrer MD Internal Medicine    Jillian Buckner DO Internal Medicine       Primary Care Provider Office Phone # Fax #    Cristian Shakeel Healy [phone]      Follow-up Appointments     Follow-up and recommended labs and tests        Follow up with the oncologist on Wednesday as scheduled     We are also giving you a liquid medication called \"Carafate\", which will help with the ulcer pain                  Further instructions from your care team       Opioid Medication Information    You have been " given a prescription for an opioid (narcotic) pain medicine and/or have received a pain medicine. These medicines can make you drowsy or impaired. You must not drive, operate dangerous equipment, or engage in any other dangerous activities while taking these medications. If you drive while taking these medications, you could be arrested for DUI, or driving under the influence. Do not drink any alcohol while you are taking these medications.   Opioid pain medications can cause addiction. If you have a history of chemical dependency of any type, you are at a higher risk of becoming addicted to pain medications.  Only take these prescribed medications to treat your pain when all other options have been tried. Take it for as short a time and as few doses as possible. Store your pain pills in a secure place, as they are frequently stolen and provide a dangerous opportunity for children or visitors in your house to start abusing these powerful medications. We will not replace any lost or stolen medicine.  As soon as your pain is better, you should seek out a drug take back program (see your local police department) to dispose of them.   Over-the-counter medications and prescription drugs can pollute [place] and be harmful to humans, fish, and other wildlife when disposed of improperly -- do not flush medications down the toilet or place in the trash.  Properly disposing of medicines is important to prevent abuse or poisoning and protect the environment.     Prescription and over-the-counter medications are collected anonymously from residents for free at [facility] drop-off locations. Visit the [facility]'s Office Prescription Drug Drop-Off page for the list of drop-off sites and information about the program.       [place]  Police Department [phone] Mon-Fri  8am to 4:30pm     [place]  Police Department [phone] 24hrs a day    [place]  Police Department [phone] Mon-Fri  8am to 6:00pm      [place]  Police Department [phone] Mon-Fri  8am to 4:30pm     [place]  Police Department [phone] 24hrs a day      [place]  Police Department [phone] Mon-Fri  8am to 4:30pm   Many prescription pain medications contain Tylenol  (acetaminophen), including Vicodin , Tylenol #3 , Norco , Lortab , and Percocet .  You should not take any extra pills of Tylenol  if you are using these prescription medications or you can get very sick.  Do not ever take more than 3000 mg of acetaminophen in any 24 hour period.  All opioids tend to cause constipation. Drink plenty of water and eat foods that have a lot of fiber, such as fruits, vegetables, prune juice, apple juice and high fiber cereal.  Take a laxative if you don t move your bowels at least every other day. Miralax , Milk of Magnesia, Colace , or Senna  can be used to keep you regular.  You will likely need to continue stool softeners and stimulants while taking opioids.   Naloxone Prescription  It has been determined you are at high risk of overdose of opioids as you are on multiple medications that can cause sedation at the same time as your opioid pain medications , you are a smoker, you are taking a high amount of opioids in 24 hour period, you have a history of substance abuse disorder, you have anxiety, depression or PTSD and you have lung disease.  You have been prescribed intranasal naloxone which is a medication that can temporarily reverse the effects of opioid pain medications if administered by a friend and family member while waiting for EMS to arrive.      The following video has been shown to you in the hospital, it recommend that you share it with your close family or friends in case you need assistance in an emergency.  http://prescribetoprevent.org/patient-education/videos/     You have been given the following information on Opioid overdose prevention and opioid safety    "http://store.[facility].gov/crowder/content/HZL59-1543/Toolkit_Patients.pdf     CM: Financial resources  1. Disability Linkage Line 1-500.239.6384  2. Social security Administration 1-400.582.4032  Www.Compiere.gov  3.[facility]   1-525.613.3363  Www.patientadvocate.org  4. American cancer Society 1-345.179.3227      Pending Results     No orders found from 3/8/2018 to 3/11/2018.            Statement of Approval     Ordered          18 1516  I have reviewed and agree with all the recommendations and orders detailed in this document.  EFFECTIVE NOW     Approved and electronically signed by:  Florida Stanley MD             Admission Information     Date & Time Department Dept. Phone    3/10/2018 [place] [address] [facility] [phone]      Your Vitals Were     Blood Pressure Pulse Temperature Respirations Height Weight    133/86 (BP Location: Left arm) 73 98.7  F (37.1  C) (Oral) 16 1.753 m (5' 9\") 68.7 kg (151 lb 8 oz)    Pulse Oximetry BMI (Body Mass Index)                100% 22.37 kg/m2          MyChart Information     [facility] [employer] lets you send messages to your doctor, view your test results, renew your prescriptions, schedule appointments and more. To sign up, go to [email or web address] . Click on \"Log in\" on the left side of the screen, which will take you to the Welcome page. Then click on \"Sign up Now\" on the right side of the page.     You will be asked to enter the access code listed below, as well as some personal information. Please follow the directions to create your username and password.     Your access code is: [email or web address]  Expires: 2018  2:08 PM     Your access code will  in 90 days. If you need help or a new code, please call your [facility] clinic or [phone].        Care EveryWhere ID     This is your Care EveryWhere ID. This could be used by other organizations to access your [facility] medical records  [id number]        Equal Access to Services     VANESSA JOSHI" AH: Hadii nito hagan Sozanaali, waaxda luqadaha, qaybta kaalmada sally, waxjanice zoraida farzanamarcos wineverettlila simon. So [facility] [phone].    ATENCIÓN: Si habla español, tiene a sanz disposición servicios gratuitos de asistencia lingüística. Llame al [phone].    We comply with applicable federal civil rights laws and [place] laws. We do not discriminate on the basis of race, color, national origin, age, disability, sex, sexual orientation, or gender identity.               Review of your medicines      START taking        Dose / Directions    diclofenac 1 % Gel topical gel   Commonly known as:  VOLTAREN   Used for:  Abdominal pain, left upper quadrant        Dose:  2 g   Apply 2 g topically 4 times daily To upper quadrant of abdomin and ribs.   Quantity:  1 Tube   Refills:  0       fentaNYL 12 mcg/hr 72 hr patch   Commonly known as:  DURAGESIC   Used for:  Malignant neoplasm of stomach, unspecified location (H), Abdominal pain, left upper quadrant        Dose:  1 patch   Start taking on:  3/14/2018   Place 1 patch onto the skin every 72 hours remove old patch. Next change on 3/14 Wednesday at 11 am   Quantity:  2 patch   Refills:  0       naloxone 1 mg/mL for intranasal kit (2 syringes with 2 mucosal atomizer device)   Commonly known as:  NARCAN   Used for:  Malignant neoplasm of stomach, unspecified location (H)        In opioid overdose put cone in nostril and push 1/2 of contents into each nostril.  Repeat every 3 min if no response until help arrives.   Quantity:  1 kit   Refills:  0       polyethylene glycol powder   Commonly known as:  MIRALAX   Used for:  Abdominal pain, left upper quadrant        Dose:  1 capful   Take 17 g (1 capful) by mouth daily as needed for constipation   Quantity:  225 g   Refills:  0       sucralfate 1 GM/10ML suspension   Commonly known as:  CARAFATE   Used for:  Malignant neoplasm of stomach, unspecified location (H), Abdominal pain, left upper quadrant        Dose:  1 g    Take 10 mLs (1 g) by mouth 4 times daily   Quantity:  420 mL   Refills:  0         CONTINUE these medicines which may have CHANGED, or have new prescriptions. If we are uncertain of the size of tablets/capsules you have at home, strength may be listed as something that might have changed.        Dose / Directions    HYDROmorphone 4 MG tablet   Commonly known as:  DILAUDID   This may have changed:    - medication strength  - how much to take   Used for:  Malignant neoplasm of stomach, unspecified location (H), Abdominal pain, left upper quadrant        Dose:  4 mg   Take 1 tablet (4 mg) by mouth every 4 hours as needed for moderate to severe pain   Quantity:  20 tablet   Refills:  0         CONTINUE these medicines which have NOT CHANGED        Dose / Directions    ferrous sulfate 325 (65 FE) MG tablet   Commonly known as:  IRON   Used for:  Gastrointestinal hemorrhage associated with gastric ulcer        Dose:  325 mg   Take 1 tablet (325 mg) by mouth daily (with breakfast)   Quantity:  30 tablet   Refills:  2       GABAPENTIN PO        Dose:  600 mg   Take 600 mg by mouth daily   Refills:  0       LORAZEPAM PO        Dose:  0.5 mg   Take 0.5 mg by mouth every 6 hours as needed for anxiety   Refills:  0       mirtazapine 7.5 MG Tabs tablet   Commonly known as:  REMERON   Used for:  Sleep disorder        Dose:  7.5 mg   Take 1 tablet (7.5 mg) by mouth nightly as needed   Quantity:  30 tablet   Refills:  0       omeprazole 40 MG capsule   Commonly known as:  priLOSEC   Used for:  Acute upper gastrointestinal bleeding        Dose:  40 mg   Take 1 capsule (40 mg) by mouth 2 times daily (before meals)   Quantity:  30 capsule   Refills:  3       REVATIO PO        Dose:  20 mg   Take 20 mg by mouth daily as needed   Refills:  0         STOP taking     MS CONTIN PO                Where to get your medicines      These medications were sent to [place] - [address] [facility]  [address]  [facility] [address]     Phone:  [phone]     diclofenac 1 % Gel topical gel    naloxone 1 mg/mL for intranasal kit (2 syringes with 2 mucosal atomizer device)    polyethylene glycol powder    sucralfate 1 GM/10ML suspension         Some of these will need a paper prescription and others can be bought over the counter. Ask your nurse if you have questions.     Bring a paper prescription for each of these medications     fentaNYL 12 mcg/hr 72 hr patch    HYDROmorphone 4 MG tablet                Protect others around you: Learn how to safely use, store and throw away your medicines at www.disposemymeds.org.        Information about OPIOIDS     PRESCRIPTION OPIOIDS: WHAT YOU NEED TO KNOW    Prescription opioids can be used to help relieve moderate to severe pain and are often prescribed following a surgery or injury, or for certain health conditions. These medications can be an important part of treatment but also come with serious risks. It is important to work with your health care provider to make sure you are getting the safest, most effective care.    WHAT ARE THE RISKS AND SIDE EFFECTS OF OPIOID USE?  Prescription opioids carry serious risks of addiction and overdose, especially with prolonged use. An opioid overdose, often marked by slowed breathing can cause sudden death. The use of prescription opioids can have a number of side effects as well, even when taken as directed:      Tolerance - meaning you might need to take more of a medication for the same pain relief    Physical dependence - meaning you have symptoms of withdrawal when a medication is stopped    Increased sensitivity to pain    Constipation    Nausea, vomiting, and dry mouth    Sleepiness and dizziness    Confusion    Depression    Low levels of testosterone that can result in lower sex drive, energy, and strength    Itching and sweating    RISKS ARE GREATER WITH:    History of drug misuse, substance use disorder, or  overdose    Mental health conditions (such as depression or anxiety)    Sleep apnea    Older age (65 years or older)    Pregnancy    Avoid alcohol while taking prescription opioids.   Also, unless specifically advised by your health care provider, medications to avoid include:    Benzodiazepines (such as Xanax or Valium)    Muscle relaxants (such as Soma or Flexeril)    Hypnotics (such as Ambien or Lunesta)    Other prescription opioids    KNOW YOUR OPTIONS:  Talk to your health care provider about ways to manage your pain that do not involve prescription opioids. Some of these options may actually work better and have fewer risks and side effects:    Pain relievers such as acetaminophen, ibuprofen, and naproxen    Some medications that are also used for depression or seizures    Physical therapy and exercise    Cognitive behavioral therapy, a psychological, goal-directed approach, in which patients learn how to modify physical, behavioral, and emotional triggers of pain and stress    IF YOU ARE PRESCRIBED OPIOIDS FOR PAIN:    Never take opioids in greater amounts or more often than prescribed    Follow up with your primary health care provider and work together to create a plan on how to manage your pain.    Talk about ways to help manage your pain that do not involve prescription opioids    Talk about all concerns and side effects    Help prevent misuse and abuse    Never sell or share prescription opioids    Never use another person's prescription opioids    Store prescription opioids in a secure place and out of reach of others (this may include visitors, children, friends, and family)    Visit www.cdc.gov/drugoverdose to learn about risks of opioid abuse and overdose    If you believe you may be struggling with addiction, tell your health care provider and ask for guidance or call [facility]'s National Helpline at 4-009-072-HELP    LEARN MORE / www.cdc.gov/drugoverdose/prescribing/guideline.html    Safely dispose  of unused prescription opioids: Find your local drug take-back programs and more information about the importance of safe disposal at www.doseofreality.[place].gov             Medication List: This is a list of all your medications and when to take them. Check marks below indicate your daily home schedule. Keep this list as a reference.      Medications           Morning Afternoon Evening Bedtime As Needed    diclofenac 1 % Gel topical gel   Commonly known as:  VOLTAREN   Apply 2 g topically 4 times daily To upper quadrant of abdomin and ribs.   Last time this was given:  2 g on 3/12/2018  1:35 PM                                fentaNYL 12 mcg/hr 72 hr patch   Commonly known as:  DURAGESIC   Place 1 patch onto the skin every 72 hours remove old patch. Next change on 3/14 Wednesday at 11 am   Start taking on:  3/14/2018   Last time this was given:  1 patch on 3/11/2018 11:55 AM   Next Dose Due:  Next change on Wednesday 3/14 @ 11:00 AM                                ferrous sulfate 325 (65 FE) MG tablet   Commonly known as:  IRON   Take 1 tablet (325 mg) by mouth daily (with breakfast)   Next Dose Due:  Resume as Prescribed                                GABAPENTIN PO   Take 600 mg by mouth daily   Next Dose Due:  Resume as Prescribed                                HYDROmorphone 4 MG tablet   Commonly known as:  DILAUDID   Take 1 tablet (4 mg) by mouth every 4 hours as needed for moderate to severe pain   Last time this was given:  6 mg on 3/12/2018  1:17 PM   Next Dose Due:  Resume as Prescribed As early as 5:20 PM                                LORAZEPAM PO   Take 0.5 mg by mouth every 6 hours as needed for anxiety   Next Dose Due:  Resume as Prescribed 3/12                                mirtazapine 7.5 MG Tabs tablet   Commonly known as:  REMERON   Take 1 tablet (7.5 mg) by mouth nightly as needed   Next Dose Due:  Resume as Prescribed this evening 3/12                                naloxone 1 mg/mL for intranasal  kit (2 syringes with 2 mucosal atomizer device)   Commonly known as:  NARCAN   In opioid overdose put cone in nostril and push 1/2 of contents into each nostril.  Repeat every 3 min if no response until help arrives.   Next Dose Due:  Resume as Prescribed                                omeprazole 40 MG capsule   Commonly known as:  priLOSEC   Take 1 capsule (40 mg) by mouth 2 times daily (before meals)   Next Dose Due:  Resume as Prescribed                                polyethylene glycol powder   Commonly known as:  MIRALAX   Take 17 g (1 capful) by mouth daily as needed for constipation   Next Dose Due:  Resume as Prescribed                                REVATIO PO   Take 20 mg by mouth daily as needed   Next Dose Due:  Resume as Prescribed                                sucralfate 1 GM/10ML suspension   Commonly known as:  CARAFATE   Take 10 mLs (1 g) by mouth 4 times daily   Next Dose Due:  Resume as Prescribed

## 2018-03-11 PROBLEM — K25.9 GASTRIC ULCER: Status: ACTIVE | Noted: 2018-03-11

## 2018-03-11 LAB
ANION GAP SERPL CALCULATED.3IONS-SCNC: 7 MMOL/L (ref 3–14)
BUN SERPL-MCNC: 10 MG/DL (ref 7–30)
CALCIUM SERPL-MCNC: 8.3 MG/DL (ref 8.5–10.1)
CHLORIDE SERPL-SCNC: 106 MMOL/L (ref 94–109)
CO2 SERPL-SCNC: 25 MMOL/L (ref 20–32)
CREAT SERPL-MCNC: 0.75 MG/DL (ref 0.66–1.25)
ERYTHROCYTE [DISTWIDTH] IN BLOOD BY AUTOMATED COUNT: 23.3 % (ref 10–15)
GFR SERPL CREATININE-BSD FRML MDRD: >90 ML/MIN/1.7M2
GLUCOSE SERPL-MCNC: 135 MG/DL (ref 70–99)
HCT VFR BLD AUTO: 30.8 % (ref 40–53)
HGB BLD-MCNC: 9.2 G/DL (ref 13.3–17.7)
INTERPRETATION ECG - MUSE: NORMAL
MCH RBC QN AUTO: 24.2 PG (ref 26.5–33)
MCHC RBC AUTO-ENTMCNC: 29.9 G/DL (ref 31.5–36.5)
MCV RBC AUTO: 81 FL (ref 78–100)
PLATELET # BLD AUTO: 132 10E9/L (ref 150–450)
POTASSIUM SERPL-SCNC: 4.2 MMOL/L (ref 3.4–5.3)
RBC # BLD AUTO: 3.8 10E12/L (ref 4.4–5.9)
SODIUM SERPL-SCNC: 138 MMOL/L (ref 133–144)
WBC # BLD AUTO: 6.2 10E9/L (ref 4–11)

## 2018-03-11 PROCEDURE — 25000128 H RX IP 250 OP 636: Performed by: INTERNAL MEDICINE

## 2018-03-11 PROCEDURE — 96376 TX/PRO/DX INJ SAME DRUG ADON: CPT

## 2018-03-11 PROCEDURE — 25000132 ZZH RX MED GY IP 250 OP 250 PS 637: Performed by: PHYSICIAN ASSISTANT

## 2018-03-11 PROCEDURE — 85027 COMPLETE CBC AUTOMATED: CPT | Performed by: PHYSICIAN ASSISTANT

## 2018-03-11 PROCEDURE — 96375 TX/PRO/DX INJ NEW DRUG ADDON: CPT

## 2018-03-11 PROCEDURE — G0378 HOSPITAL OBSERVATION PER HR: HCPCS

## 2018-03-11 PROCEDURE — 25000125 ZZHC RX 250: Performed by: INTERNAL MEDICINE

## 2018-03-11 PROCEDURE — 12000000 ZZH R&B MED SURG/OB

## 2018-03-11 PROCEDURE — 25000125 ZZHC RX 250: Performed by: EMERGENCY MEDICINE

## 2018-03-11 PROCEDURE — 25000132 ZZH RX MED GY IP 250 OP 250 PS 637: Performed by: INTERNAL MEDICINE

## 2018-03-11 PROCEDURE — 25000128 H RX IP 250 OP 636: Performed by: EMERGENCY MEDICINE

## 2018-03-11 PROCEDURE — 99233 SBSQ HOSP IP/OBS HIGH 50: CPT | Performed by: INTERNAL MEDICINE

## 2018-03-11 PROCEDURE — 80048 BASIC METABOLIC PNL TOTAL CA: CPT | Performed by: PHYSICIAN ASSISTANT

## 2018-03-11 PROCEDURE — 25000132 ZZH RX MED GY IP 250 OP 250 PS 637: Performed by: HOSPITALIST

## 2018-03-11 RX ORDER — ACETAMINOPHEN 325 MG/1
650 TABLET ORAL EVERY 4 HOURS PRN
Status: DISCONTINUED | OUTPATIENT
Start: 2018-03-11 | End: 2018-03-11

## 2018-03-11 RX ORDER — ONDANSETRON 2 MG/ML
4 INJECTION INTRAMUSCULAR; INTRAVENOUS EVERY 6 HOURS PRN
Status: DISCONTINUED | OUTPATIENT
Start: 2018-03-11 | End: 2018-03-12 | Stop reason: HOSPADM

## 2018-03-11 RX ORDER — ONDANSETRON 2 MG/ML
4 INJECTION INTRAMUSCULAR; INTRAVENOUS EVERY 6 HOURS PRN
Status: DISCONTINUED | OUTPATIENT
Start: 2018-03-11 | End: 2018-03-11

## 2018-03-11 RX ORDER — PANTOPRAZOLE SODIUM 40 MG/1
40 TABLET, DELAYED RELEASE ORAL
Status: DISCONTINUED | OUTPATIENT
Start: 2018-03-11 | End: 2018-03-12 | Stop reason: HOSPADM

## 2018-03-11 RX ORDER — NICOTINE 21 MG/24HR
1 PATCH, TRANSDERMAL 24 HOURS TRANSDERMAL DAILY
Status: DISCONTINUED | OUTPATIENT
Start: 2018-03-11 | End: 2018-03-12 | Stop reason: HOSPADM

## 2018-03-11 RX ORDER — HYDROMORPHONE HYDROCHLORIDE 1 MG/ML
0.3 INJECTION, SOLUTION INTRAMUSCULAR; INTRAVENOUS; SUBCUTANEOUS
Status: DISCONTINUED | OUTPATIENT
Start: 2018-03-11 | End: 2018-03-11

## 2018-03-11 RX ORDER — HYDROMORPHONE HYDROCHLORIDE 1 MG/ML
.3-.5 INJECTION, SOLUTION INTRAMUSCULAR; INTRAVENOUS; SUBCUTANEOUS
Status: DISCONTINUED | OUTPATIENT
Start: 2018-03-11 | End: 2018-03-12 | Stop reason: HOSPADM

## 2018-03-11 RX ORDER — MIRTAZAPINE 7.5 MG/1
7.5 TABLET, FILM COATED ORAL ONCE
Status: DISCONTINUED | OUTPATIENT
Start: 2018-03-11 | End: 2018-03-12

## 2018-03-11 RX ORDER — PROCHLORPERAZINE 25 MG
25 SUPPOSITORY, RECTAL RECTAL EVERY 12 HOURS PRN
Status: DISCONTINUED | OUTPATIENT
Start: 2018-03-11 | End: 2018-03-12 | Stop reason: HOSPADM

## 2018-03-11 RX ORDER — OXYCODONE HYDROCHLORIDE 5 MG/1
5-10 TABLET ORAL
Status: DISCONTINUED | OUTPATIENT
Start: 2018-03-11 | End: 2018-03-11

## 2018-03-11 RX ORDER — ACETAMINOPHEN 650 MG/1
650 SUPPOSITORY RECTAL EVERY 4 HOURS PRN
Status: DISCONTINUED | OUTPATIENT
Start: 2018-03-11 | End: 2018-03-12 | Stop reason: HOSPADM

## 2018-03-11 RX ORDER — ONDANSETRON 2 MG/ML
4 INJECTION INTRAMUSCULAR; INTRAVENOUS ONCE
Status: COMPLETED | OUTPATIENT
Start: 2018-03-11 | End: 2018-03-11

## 2018-03-11 RX ORDER — ONDANSETRON 4 MG/1
4 TABLET, ORALLY DISINTEGRATING ORAL EVERY 6 HOURS PRN
Status: DISCONTINUED | OUTPATIENT
Start: 2018-03-11 | End: 2018-03-12 | Stop reason: HOSPADM

## 2018-03-11 RX ORDER — HYDROMORPHONE HYDROCHLORIDE 2 MG/1
4-6 TABLET ORAL
Status: DISCONTINUED | OUTPATIENT
Start: 2018-03-11 | End: 2018-03-12

## 2018-03-11 RX ORDER — METOCLOPRAMIDE HYDROCHLORIDE 5 MG/ML
10 INJECTION INTRAMUSCULAR; INTRAVENOUS EVERY 6 HOURS PRN
Status: DISCONTINUED | OUTPATIENT
Start: 2018-03-11 | End: 2018-03-12 | Stop reason: HOSPADM

## 2018-03-11 RX ORDER — PROCHLORPERAZINE MALEATE 5 MG
10 TABLET ORAL EVERY 6 HOURS PRN
Status: DISCONTINUED | OUTPATIENT
Start: 2018-03-11 | End: 2018-03-12 | Stop reason: HOSPADM

## 2018-03-11 RX ORDER — ONDANSETRON 4 MG/1
4 TABLET, ORALLY DISINTEGRATING ORAL EVERY 6 HOURS PRN
Status: DISCONTINUED | OUTPATIENT
Start: 2018-03-11 | End: 2018-03-11

## 2018-03-11 RX ORDER — FENTANYL 12.5 UG/1
12 PATCH TRANSDERMAL
Status: DISCONTINUED | OUTPATIENT
Start: 2018-03-11 | End: 2018-03-12 | Stop reason: HOSPADM

## 2018-03-11 RX ORDER — LORAZEPAM 0.5 MG/1
0.5 TABLET ORAL EVERY 8 HOURS PRN
Status: DISCONTINUED | OUTPATIENT
Start: 2018-03-11 | End: 2018-03-12 | Stop reason: HOSPADM

## 2018-03-11 RX ORDER — SODIUM CHLORIDE AND POTASSIUM CHLORIDE 150; 900 MG/100ML; MG/100ML
INJECTION, SOLUTION INTRAVENOUS CONTINUOUS
Status: DISCONTINUED | OUTPATIENT
Start: 2018-03-11 | End: 2018-03-12

## 2018-03-11 RX ORDER — METOCLOPRAMIDE 5 MG/1
10 TABLET ORAL EVERY 6 HOURS PRN
Status: DISCONTINUED | OUTPATIENT
Start: 2018-03-11 | End: 2018-03-12 | Stop reason: HOSPADM

## 2018-03-11 RX ORDER — NALOXONE HYDROCHLORIDE 0.4 MG/ML
.1-.4 INJECTION, SOLUTION INTRAMUSCULAR; INTRAVENOUS; SUBCUTANEOUS
Status: DISCONTINUED | OUTPATIENT
Start: 2018-03-11 | End: 2018-03-12 | Stop reason: HOSPADM

## 2018-03-11 RX ORDER — SODIUM CHLORIDE 9 MG/ML
INJECTION, SOLUTION INTRAVENOUS CONTINUOUS
Status: DISCONTINUED | OUTPATIENT
Start: 2018-03-11 | End: 2018-03-11

## 2018-03-11 RX ADMIN — SODIUM CHLORIDE 80 MG: 900 INJECTION, SOLUTION INTRAVENOUS at 00:25

## 2018-03-11 RX ADMIN — Medication 0.3 MG: at 01:53

## 2018-03-11 RX ADMIN — PROCHLORPERAZINE EDISYLATE 10 MG: 5 INJECTION INTRAMUSCULAR; INTRAVENOUS at 09:07

## 2018-03-11 RX ADMIN — DOXYLAMINE SUCCINATE 25 MG: 25 TABLET ORAL at 22:30

## 2018-03-11 RX ADMIN — HYDROMORPHONE HYDROCHLORIDE 6 MG: 2 TABLET ORAL at 22:29

## 2018-03-11 RX ADMIN — HYDROMORPHONE HYDROCHLORIDE 4 MG: 2 TABLET ORAL at 10:55

## 2018-03-11 RX ADMIN — PANTOPRAZOLE SODIUM 40 MG: 40 INJECTION, POWDER, FOR SOLUTION INTRAVENOUS at 09:07

## 2018-03-11 RX ADMIN — HYDROMORPHONE HYDROCHLORIDE 4 MG: 2 TABLET ORAL at 14:49

## 2018-03-11 RX ADMIN — PANTOPRAZOLE SODIUM 40 MG: 40 TABLET, DELAYED RELEASE ORAL at 16:46

## 2018-03-11 RX ADMIN — ONDANSETRON 4 MG: 2 INJECTION INTRAMUSCULAR; INTRAVENOUS at 06:39

## 2018-03-11 RX ADMIN — Medication 0.3 MG: at 09:07

## 2018-03-11 RX ADMIN — ONDANSETRON 4 MG: 2 INJECTION INTRAMUSCULAR; INTRAVENOUS at 00:24

## 2018-03-11 RX ADMIN — POTASSIUM CHLORIDE AND SODIUM CHLORIDE: 900; 150 INJECTION, SOLUTION INTRAVENOUS at 12:25

## 2018-03-11 RX ADMIN — Medication 0.3 MG: at 06:33

## 2018-03-11 RX ADMIN — FENTANYL 1 PATCH: 12.5 PATCH TRANSDERMAL at 11:55

## 2018-03-11 RX ADMIN — PROCHLORPERAZINE EDISYLATE 10 MG: 5 INJECTION INTRAMUSCULAR; INTRAVENOUS at 01:43

## 2018-03-11 RX ADMIN — SODIUM CHLORIDE: 9 INJECTION, SOLUTION INTRAVENOUS at 04:01

## 2018-03-11 RX ADMIN — HYDROMORPHONE HYDROCHLORIDE 6 MG: 2 TABLET ORAL at 18:21

## 2018-03-11 RX ADMIN — NICOTINE 1 PATCH: 21 PATCH, EXTENDED RELEASE TRANSDERMAL at 10:34

## 2018-03-11 ASSESSMENT — ACTIVITIES OF DAILY LIVING (ADL)
ADLS_ACUITY_SCORE: 11
ADLS_ACUITY_SCORE: 11

## 2018-03-11 ASSESSMENT — PAIN DESCRIPTION - DESCRIPTORS
DESCRIPTORS: ACHING;CONSTANT;DULL
DESCRIPTORS: SHARP

## 2018-03-11 NOTE — PROGRESS NOTES
Pt complains of 9/10 pain in left upper abd and chest that radiates to left back. IV dilaudid 0.5 given. Pt became nauseous after receiving the dilaudid. IV zofran given.    Pt states that the pain had decreased to 5/10 after the earlier IV dilaudid.

## 2018-03-11 NOTE — PLAN OF CARE
Problem: Patient Care Overview  Goal: Plan of Care/Patient Progress Review  PRIMARY DIAGNOSIS: ACUTE PAIN  OUTPATIENT/OBSERVATION GOALS TO BE MET BEFORE DISCHARGE:  1. Pain Status: Improved but still requiring IV narcotics.    2. Return to near baseline physical activity: Yes    3. Cleared for discharge by consultants (if involved): N/A    Discharge Planner Nurse   Safe discharge environment identified: Yes  Barriers to discharge: No       Entered by: Nani Porras 03/11/2018 9:18 AM     A/Ox4, Independent, VSS, LS clear, BS+, c/o abd pain of 8, IV dilaudid given X1, c/o nausea, IV compazine given X1, NS@100 to port, c/o numbness/tingling to bilat hands and feet, baseline per patient. POC reviewed with patient, questions answered.

## 2018-03-11 NOTE — PHARMACY-ADMISSION MEDICATION HISTORY
Admission medication history interview status for this patient is complete. See [facility] admission navigator for allergy information, prior to admission medications and immunization status.     Medication history interview source(s):Patient  Medication history resources (including written lists, pill bottles, clinic record):None  [facility]    Changes made to PTA medication list:  Added: none  Deleted: Octreotide inj.  Changed: none    Actions taken by pharmacist (provider contacted, etc):None     Additional medication history information:None    Medication reconciliation/reorder completed by provider prior to medication history? Yes    Do you take OTC medications (eg tylenol, ibuprofen, fish oil, eye/ear drops, etc)? Yes    For patients on insulin therapy: No    Prior to Admission medications    Medication Sig Last Dose Taking? Auth Provider   HYDROmorphone (DILAUDID) 4 MG tablet Take 1 tablet (4 mg) by mouth every 3 hours as needed for moderate to severe pain 3/10/2018 at Unknown time Yes Gena Fernandez PA-C   mirtazapine (REMERON) 7.5 MG TABS tablet Take 1 tablet (7.5 mg) by mouth nightly as needed Past Month at Unknown time Yes Se Red Euceda MD   omeprazole (PRILOSEC) 40 MG capsule Take 1 capsule (40 mg) by mouth 2 times daily (before meals) 3/9/2018 at Unknown time Yes Se Red Euceda MD   ferrous sulfate (IRON) 325 (65 FE) MG tablet Take 1 tablet (325 mg) by mouth daily (with breakfast) 3/9/2018  Gena Fernandez PA-C

## 2018-03-11 NOTE — H&P
Hospitalist Observation Admission Note([facility])    Name: Dashawn Ordoñez    MRN: [id number]          YOB: 1960    Age: 57 year old    Date of admission: 3/10/2018    Primary care provider: Cristian Healy  Admitting physician:Orion Ferrer                 Assessment      Brief summary of admission assessment:Dashawn Ordoñez is a 57 year old  male with a significant past medical history of gastric cancer, diabetes, hypertension who presents with abdominal pain and chest pain.  Patient was diagnosed with gastric cancer in October 2017 and has been having abdominal pain which is on and off but symptoms got worse today and came to the emergency room for evaluation.  In the emergency department patient had a CT scan of the abdomen which showed gastric mass with evidence of ulceration extending anteriorly and appears to be new.  Patient was given GI cocktail in the emergency department but his symptoms did not improve and patient was admitted for pain control, PPI and monitoring.        #1.Abdominal pain and lower chest pain likely secondary to ulceration of stomach cancer: No evidence of bleeding.  #2.  Recent diagnosis of gastric cancer in October 2017 follows with [facility], Had 8th chemo rounds       Reason for admission:    Observation admission for monitoring and management of abdominal pain chest pain    Observation Goals:    --Pain controlled, patient able to take p.o.               Plan     > Admission Status:Admit to observation     >Care plan:  --Admitted to observation bed, intravenous PPI, IV pain medication including Dilaudid, monitor hemoglobin, troponins and reevaluate patient in the morning for possible discharge.    >Supportive care:IV fluids continued  Pain management: anxiolytics, oral narcotics and IV narcotics  Nausea and vomiting control measures    >Diet:Diet advanced    >Activity:Regular activity    >Education/Counseling :Discussed treatment plan with the  patient    >Consults:none    >VTE prophylactic measures:prophylaxis against venous thromboembolism    >Therapies:none       >Additional orders    --Care plan discussed with the patient/family and agreed to care plan   --Patient will be transferred to care of hospitalist attending for further evaluation and management as appropriate   --Old medical orders reviewed   --imaging result independently reviewed by me     (See orders placed for this visit by me )     - Home medication reviewed and will be continued as appropriate once pharmacy reconciliation is completed             Code Status:     Full Code         Disposition:       >anticipate discharge to home and Anticipate discharge tomorrow            Disclaimer: This note consists of symbols derived from keyboarding, dictation and/or voice recognition software. As a result, there may be errors in the script that have gone undetected. Please consider this when interpreting information found in this chart.             Chief Complaint:     Chest pain and abdominal pain      History is obtained from the patient          History of Present Illness:   This patient is a 57 year old  male with a significant past medical history of gastric cancer  who presents with the following condition requiring a hospital admission:    Gastric cancer with ulceration     Dashawn Ordoñez is a 57 year old male with a history of stomach cancer, DM, and HTN who presents to the ED for evaluation of chest pain. The patient was diagnosed with gastric cancer in 10/2017. The patient's intermittent sharp chest pain started last night and has worsened today. He came to the ED as he wants relief from his pain.  Patien patient denies any bleeding but had episode of diarrhea yesterday.  No shortness of breath or fever.  He has been having some upper respiratory infection-like symptoms such as rhinorrhea, cough over the last several days.  He goes to [facility] for his cancer care and has been  through his 8th round of chemotherapy on the next appointment is on Monday but he does not think he would go for further treatment.  Patient was given GI cocktail after workup in the emergency department with no significant improvement.  Patient was admitted for pain control and possibly discharge to care of his oncologist and primary care physician.                 Past Medical History:     Past Medical History:   Diagnosis Date     Cancer (H)      Depressive disorder      Diabetes (H)      Hypertension      Stomach cancer (H)      Substance abuse             Past Surgical History:     Past Surgical History:   Procedure Laterality Date     APPENDECTOMY       ENT SURGERY       ESOPHAGOSCOPY, GASTROSCOPY, DUODENOSCOPY (EGD), COMBINED N/A 10/18/2017    Procedure: COMBINED ESOPHAGOSCOPY, GASTROSCOPY, DUODENOSCOPY (EGD), BIOPSY SINGLE OR MULTIPLE;  ESOPHAGOSCOPY, GASTROSCOPY, DUODENOSCOPY (EGD) with biopsies using cold forceps;  Surgeon: Filippo Mclean MD;  Location: [facility]             Social History:     Social History   Substance Use Topics     Smoking status: Current Every Day Smoker     Packs/day: 2.00     Smokeless tobacco: Never Used     Alcohol use Yes      Comment: occasionally              Family History:   Reviewed and non contributory        Allergies:     Allergies   Allergen Reactions     Blood Transfusion Related (Informational Only) Other (See Comments)     Patient has a history of a clinically significant antibody against RBC antigens.  A delay in compatible RBCs may occur.             Medications:         Prior to Admission medications    Medication Sig Last Dose Taking? Auth Provider   HYDROmorphone (DILAUDID) 4 MG tablet Take 1 tablet (4 mg) by mouth every 3 hours as needed for moderate to severe pain 3/10/2018 at Unknown time Yes Gena Fernandez PA-C   omeprazole (PRILOSEC) 40 MG capsule Take 1 capsule (40 mg) by mouth 2 times daily (before meals) 3/10/2018 at Unknown time Yes Se Ok  MD Red   octreotide (SANDOSTATIN) 100 MCG/ML SOLN injection Inject 1 mL (100 mcg) Subcutaneous 3 times daily   Gena Fernandez PA-C   ferrous sulfate (IRON) 325 (65 FE) MG tablet Take 1 tablet (325 mg) by mouth daily (with breakfast)   Gena Fernandez PA-C   mirtazapine (REMERON) 7.5 MG TABS tablet Take 1 tablet (7.5 mg) by mouth nightly as needed   Se Red Euceda MD          Review of Systems:     A Comprehensive greater than 10 system review of systems was carried out.  Pertinent positives and negatives are noted above in HPI.  Otherwise negative for contributory information.              Physical Exam:     Vitals were reviewed    Temp:  [97.9  F (36.6  C)] 97.9  F (36.6  C)  Pulse:  [] 80  Resp:  [18-20] 20  BP: (118-131)/(81-98) 119/81  SpO2:  [92 %-99 %] 97 %      GEN:   Alert, oriented x 3, appears uncomfortable, NAD.  NECK:Supple ,no mass or thyromegaly   HEENT:   Normocephalic/atraumatic, no scleral icterus, no nasal discharge, mouth moist.  CV:   Regular rate and rhythm, no murmur or JVD.  S1 + S2 noted, no S3 or S4.  LUNGS:   Clear to auscultation bilaterally without rales/rhonchi/wheezing/retractions.  Symmetric chest rise on inhalation noted.  Has right-sided Port-A-Cath  ABD:  Active bowel sounds, soft, tender epigastrium.  No rebound/guarding/rigidity.  EXT:  No edema.  No cyanosis.  No joint synovitis noted.  SKIN:  Dry to touch, no exanthems noted in the visualized areas.  Neurologic:Grossly intact,non focal     Neuropsychiatric:  General: normal, calm and normal eye contact  Level of consciousness: alert / normal  Affect: normal  Orientation: oriented to self, place, time and situation           Data:       All laboratory and imaging data in the past 24 hours reviewed     Results for orders placed or performed during the hospital encounter of 03/10/18   XR Chest 2 Views    Narrative    CHEST TWO VIEW   3/10/2018 9:50 PM     HISTORY: Chest pain.     COMPARISON:  12/20/2017    FINDINGS: Port-A-Cath with the tip in the SVC. Heart size normal.  Lungs clear. No interval change.      Impression    IMPRESSION: Nothing acute and no interval change.    PRIYA RUSH MD   CT Chest/Abdomen/Pelvis w Contrast    Narrative    Exam: CT CHEST/ABDOMEN/PELVIS W CONTRAST 3/10/2018 10:30 PM    History: History of gastric cancer, now with left upper quadrant and  back pain and positive d-dimer. Concern for pulmonary embolus.    Comparison: 1/11/2018    Technique: Volumetric acquisition with reconstruction in the axial,  coronal planes with contrast through the chest in the pulmonary  arterial angiographic phase and through the abdomen and pelvis in the  portal venous phase. Radiation dose for this scan was reduced using  automated exposure control, adjustment of the mA and/or kV according  to patient size, or iterative reconstruction technique.    Contrast: 80mL Isovue-370    Findings:   Chest: Contrast bolus timing is adequate for evaluation of the  pulmonary arteries. No pulmonary embolus is seen. No evidence of right  heart strain.    Mild apically predominant emphysema. 2 mm right upper lobe pulmonary  nodule again seen and unchanged. Lungs otherwise clear.    No pleural or pericardial effusion. Heart size normal. No thoracic  lymphadenopathy right internal jugular implanted central venous port  tip in the right atrium.    Abdomen: Gastric mass in the cardia, adjacent to the gastroesophageal  junction does not appear significantly changed. Adjacent prominent  lymph nodes are also unchanged. There does appear to be at least some  degree of ulceration within this mass (series 9 image 51 and series 8  image 20), this aspect appears slightly worsened since 1/11/2018. No  free air or abscess is seen at this time.    Large right hepatic dome hemangioma again seen and unchanged.  Additional smaller hypoechoic lesions again seen in the liver and  unchanged. No new liver lesions are seen.  Spleen, adrenal glands,  kidneys, pancreas and gallbladder appear normal.    Colonic diverticulosis without signs of diverticulitis. No findings  concerning for bowel obstruction.    Pelvic structures appear normal. No free fluid.    Bones: No concerning lytic or sclerotic lesions.      Impression    Impression:   1. Mass in the gastric cardia and adjacent to the gastroesophageal  junction is again seen. The overall size of the mass does not appear  significantly changed since 1/11/2018. However, there is an ulceration  extending anteriorly that appears new or at least worsened since that  time, and may account for the patient's symptoms. No free air or  abscess is seen.  2. No evidence of pulmonary embolus or other acute findings in the  chest.  3. Mild apically predominant emphysema.  4. Colonic diverticulosis without signs of diverticulitis.  5. Large right hepatic dome hemangioma and other smaller hepatic  hypodensities are again seen and unchanged.    ADRIANNE HILL MD   CBC with platelets differential   Result Value Ref Range    WBC 5.5 4.0 - 11.0 10e9/L    RBC Count 4.46 4.4 - 5.9 10e12/L    Hemoglobin 10.6 (L) 13.3 - 17.7 g/dL    Hematocrit 35.2 (L) 40.0 - 53.0 %    MCV 79 78 - 100 fl    MCH 23.8 (L) 26.5 - 33.0 pg    MCHC 30.1 (L) 31.5 - 36.5 g/dL    RDW 23.4 (H) 10.0 - 15.0 %    Platelet Count 162 150 - 450 10e9/L    Diff Method Automated Method     % Neutrophils 65.5 %    % Lymphocytes 21.1 %    % Monocytes 11.8 %    % Eosinophils 0.5 %    % Basophils 0.7 %    % Immature Granulocytes 0.4 %    Nucleated RBCs 0 0 /100    Absolute Neutrophil 3.6 1.6 - 8.3 10e9/L    Absolute Lymphocytes 1.2 0.8 - 5.3 10e9/L    Absolute Monocytes 0.7 0.0 - 1.3 10e9/L    Absolute Eosinophils 0.0 0.0 - 0.7 10e9/L    Absolute Basophils 0.0 0.0 - 0.2 10e9/L    Abs Immature Granulocytes 0.0 0 - 0.4 10e9/L    Absolute Nucleated RBC 0.0    Troponin I   Result Value Ref Range    Troponin I ES <0.015 0.000 - 0.045 ug/L   INR   Result  Value Ref Range    INR 1.17 (H) 0.86 - 1.14   Comprehensive metabolic panel   Result Value Ref Range    Sodium 140 133 - 144 mmol/L    Potassium 3.7 3.4 - 5.3 mmol/L    Chloride 107 94 - 109 mmol/L    Carbon Dioxide 25 20 - 32 mmol/L    Anion Gap 8 3 - 14 mmol/L    Glucose 161 (H) 70 - 99 mg/dL    Urea Nitrogen 8 7 - 30 mg/dL    Creatinine 0.66 0.66 - 1.25 mg/dL    GFR Estimate >90 >60 mL/min/1.7m2    GFR Estimate If Black >90 >60 mL/min/1.7m2    Calcium 8.6 8.5 - 10.1 mg/dL    Bilirubin Total 0.6 0.2 - 1.3 mg/dL    Albumin 3.5 3.4 - 5.0 g/dL    Protein Total 7.2 6.8 - 8.8 g/dL    Alkaline Phosphatase 101 40 - 150 U/L    ALT 16 0 - 70 U/L    AST 17 0 - 45 U/L   Lipase   Result Value Ref Range    Lipase 308 73 - 393 U/L   D dimer quantitative   Result Value Ref Range    D Dimer 1.0 (H) 0.0 - 0.50 ug/ml FEU   EKG 12 lead   Result Value Ref Range    Interpretation ECG Click View Image link to view waveform and result             Recent Results (from the past 48 hour(s))   XR Chest 2 Views    Narrative    CHEST TWO VIEW   3/10/2018 9:50 PM     HISTORY: Chest pain.     COMPARISON: 12/20/2017    FINDINGS: Port-A-Cath with the tip in the SVC. Heart size normal.  Lungs clear. No interval change.      Impression    IMPRESSION: Nothing acute and no interval change.    PRIYA RUSH MD   CT Chest/Abdomen/Pelvis w Contrast    Narrative    Exam: CT CHEST/ABDOMEN/PELVIS W CONTRAST 3/10/2018 10:30 PM    History: History of gastric cancer, now with left upper quadrant and  back pain and positive d-dimer. Concern for pulmonary embolus.    Comparison: 1/11/2018    Technique: Volumetric acquisition with reconstruction in the axial,  coronal planes with contrast through the chest in the pulmonary  arterial angiographic phase and through the abdomen and pelvis in the  portal venous phase. Radiation dose for this scan was reduced using  automated exposure control, adjustment of the mA and/or kV according  to patient size, or iterative  reconstruction technique.    Contrast: 80mL Isovue-370    Findings:   Chest: Contrast bolus timing is adequate for evaluation of the  pulmonary arteries. No pulmonary embolus is seen. No evidence of right  heart strain.    Mild apically predominant emphysema. 2 mm right upper lobe pulmonary  nodule again seen and unchanged. Lungs otherwise clear.    No pleural or pericardial effusion. Heart size normal. No thoracic  lymphadenopathy right internal jugular implanted central venous port  tip in the right atrium.    Abdomen: Gastric mass in the cardia, adjacent to the gastroesophageal  junction does not appear significantly changed. Adjacent prominent  lymph nodes are also unchanged. There does appear to be at least some  degree of ulceration within this mass (series 9 image 51 and series 8  image 20), this aspect appears slightly worsened since 1/11/2018. No  free air or abscess is seen at this time.    Large right hepatic dome hemangioma again seen and unchanged.  Additional smaller hypoechoic lesions again seen in the liver and  unchanged. No new liver lesions are seen. Spleen, adrenal glands,  kidneys, pancreas and gallbladder appear normal.    Colonic diverticulosis without signs of diverticulitis. No findings  concerning for bowel obstruction.    Pelvic structures appear normal. No free fluid.    Bones: No concerning lytic or sclerotic lesions.      Impression    Impression:   1. Mass in the gastric cardia and adjacent to the gastroesophageal  junction is again seen. The overall size of the mass does not appear  significantly changed since 1/11/2018. However, there is an ulceration  extending anteriorly that appears new or at least worsened since that  time, and may account for the patient's symptoms. No free air or  abscess is seen.  2. No evidence of pulmonary embolus or other acute findings in the  chest.  3. Mild apically predominant emphysema.  4. Colonic diverticulosis without signs of diverticulitis.  5.  Large right hepatic dome hemangioma and other smaller hepatic  hypodensities are again seen and unchanged.    ADRIANNE HILL MD       EKG results: Normal sinus rhythm with right bundle branch block       All imaging studies reviewed by me.       Patient`s old medical records reviewed and case discussed with the ED physician.    ED course-Reviewed

## 2018-03-11 NOTE — PROGRESS NOTES
"[facility]  Hospitalist Observation Unit Progress Note  Name: Dashawn Ordoñez    MRN: [id number]  Provider:  Jillian Buckner DO    Initial presenting complaint/issue to hospital (Diagnosis): abd pain         Assessment and Plan:      Summary of Stay: Dashawn Ordoñez is a 57 year old male admitted on 3/10/2018 with severe abd pain    Problem List:   1.  Severe abd pain - acute on chronic  d/t gastric mass with ulceration  Currently he is having pain crisis as his home po dilaudid has not been given as he has been taking at home  He states he was taking short acting morphine earlier in the week but then \"running out\"  He was needing to take 4mg po dilaudid very 2-3 hours to keep his pain controlled over the last week  Here, since admission, has not received any po pain meds ---IV dilaudid has been given intermittently, but only helps for a short time    He currently is behind on pain control.  I have ordered po dialudid and d/w nursing that they need to be checking on his pain every 2-3 hours.  Will continue IV dilaudid for breakthrough pain.     He will need something for longer pain control.  He is open to a fentanyl patch, which we will place.    No NSAIDS b/c of acute ulceration seen on CT scan    2.  Gastric cancer   CT was compared to imaging 1/2018 - size similar but now with ulceration (diagnosis 10/2017)  Attempt to improve pain control, as above  Will ask Oncology to assist with co-management  He is due for next chemo treatment 3/12/18    PPI bid    3.  Nausea  Will add prn ativan, if needed, for nausea  zofran has improved some of his symptoms  Will advance diet as tolerated    4.  Tobacco abuse  nicotene patch offered and placed    5.  Dehydration  Feeling hungry  Will decrease IVF rate  Allow slow advancement of diet      DVT Prophylaxis:  -  Up out of bed, scd's,   Code Status: Full Code  Discharge Dispo: home  Estimated Disch Date / # of Days until Discharge: >2 additional days for pain " control    Observation unit physician is available until 1400.  After 1400, please contact the observation unit Physician Assistant if questions/concerns.        Interval History:      Pain is 8 1/2/10.  Dilaudid helps briefly but then returns. No CP.  Pain in the left upper abd quad --severe. Hurts at time to breathe (Feels like I have broken ribs).  No HA, anterior chest wall pain.  No cough or hemoptysis.  Softer stools - no melena.                  Physical Exam:      Last Vital Signs:  Temp: 96.7  F (35.9  C) Temp src: Oral BP: 152/87 Pulse: 85   Resp: 16 SpO2: 97 % O2 Device: None (Room air)         GEN:  Alert, restless and in pain--unable to stay in bed or get comfortable  HEENT:  Normocephalic/atraumatic, PERRL, no scleral icterus, no nasal discharge, mouth slightly dry  NECK:  No clear thyromegaly of clear JVD  CV:  Regular rate and rhythm, no murmur to ausc.  S1 + S2 noted, no S3 or S4.  LUNGS:  Clear to auscultation bilaterally. Somewhat diminished at the bases d/t pain.  No clear rales/rhonchi/wheezing auscultated bilaterally.  No costal retractions bilaterally.  Symmetric chest rise on inhalation noted.  ABD:  Active bowel sounds, soft, tenderness to palpation of the LUQ, moderately distended.  No clear rebound/guarding/rigidity.  No masses palpated.  No obvious HSM to exam.  EXT:  No edema or cyanosis bilaterally. No joint synovitis noted.  No calf-tenderness or asymmetry noted.  SKIN:  Dry to touch, no rashes or jaundice noted.  PSYCH:  Mood flattened, anxious---just appears miserable,  Difficult to maintain direct eye contact.  NEURO:  No tremors at rest, restless but moving without clear deficit or difficulty in the bed/room.       Medications:      All current medications were reviewed.         Data:      All new lab and imaging data was reviewed.   Labs:    Recent Labs  Lab 03/11/18  0610 03/10/18  2040    140   POTASSIUM 4.2 3.7   CHLORIDE 106 107   CO2 25 25   ANIONGAP 7 8   * 161*    BUN 10 8   CR 0.75 0.66   GFRESTIMATED >90 >90   GFRESTBLACK >90 >90   JULIO CESAR 8.3* 8.6       Recent Labs  Lab 03/11/18  0610 03/10/18  2040   WBC 6.2 5.5   HGB 9.2* 10.6*   HCT 30.8* 35.2*   MCV 81 79   * 162       Recent Labs  Lab 03/11/18  0610 03/10/18  2040    140   POTASSIUM 4.2 3.7   CHLORIDE 106 107   CO2 25 25   ANIONGAP 7 8   * 161*   BUN 10 8   CR 0.75 0.66   GFRESTIMATED >90 >90   GFRESTBLACK >90 >90   JULIO CESAR 8.3* 8.6   PROTTOTAL  --  7.2   ALBUMIN  --  3.5   BILITOTAL  --  0.6   ALKPHOS  --  101   AST  --  17   ALT  --  16       Recent Labs  Lab 03/10/18  2040   AST 17   ALT 16   ALKPHOS 101   BILITOTAL 0.6       Recent Labs  Lab 03/10/18  2040   INR 1.17*      Recent Imaging:   Recent Results (from the past 24 hour(s))   XR Chest 2 Views    Narrative    CHEST TWO VIEW   3/10/2018 9:50 PM     HISTORY: Chest pain.     COMPARISON: 12/20/2017    FINDINGS: Port-A-Cath with the tip in the SVC. Heart size normal.  Lungs clear. No interval change.      Impression    IMPRESSION: Nothing acute and no interval change.    PRIYA RUSH MD   CT Chest/Abdomen/Pelvis w Contrast    Narrative    Exam: CT CHEST/ABDOMEN/PELVIS W CONTRAST 3/10/2018 10:30 PM    History: History of gastric cancer, now with left upper quadrant and  back pain and positive d-dimer. Concern for pulmonary embolus.    Comparison: 1/11/2018    Technique: Volumetric acquisition with reconstruction in the axial,  coronal planes with contrast through the chest in the pulmonary  arterial angiographic phase and through the abdomen and pelvis in the  portal venous phase. Radiation dose for this scan was reduced using  automated exposure control, adjustment of the mA and/or kV according  to patient size, or iterative reconstruction technique.    Contrast: 80mL Isovue-370    Findings:   Chest: Contrast bolus timing is adequate for evaluation of the  pulmonary arteries. No pulmonary embolus is seen. No evidence of right  heart  strain.    Mild apically predominant emphysema. 2 mm right upper lobe pulmonary  nodule again seen and unchanged. Lungs otherwise clear.    No pleural or pericardial effusion. Heart size normal. No thoracic  lymphadenopathy right internal jugular implanted central venous port  tip in the right atrium.    Abdomen: Gastric mass in the cardia, adjacent to the gastroesophageal  junction does not appear significantly changed. Adjacent prominent  lymph nodes are also unchanged. There does appear to be at least some  degree of ulceration within this mass (series 9 image 51 and series 8  image 20), this aspect appears slightly worsened since 1/11/2018. No  free air or abscess is seen at this time.    Large right hepatic dome hemangioma again seen and unchanged.  Additional smaller hypoechoic lesions again seen in the liver and  unchanged. No new liver lesions are seen. Spleen, adrenal glands,  kidneys, pancreas and gallbladder appear normal.    Colonic diverticulosis without signs of diverticulitis. No findings  concerning for bowel obstruction.    Pelvic structures appear normal. No free fluid.    Bones: No concerning lytic or sclerotic lesions.      Impression    Impression:   1. Mass in the gastric cardia and adjacent to the gastroesophageal  junction is again seen. The overall size of the mass does not appear  significantly changed since 1/11/2018. However, there is an ulceration  extending anteriorly that appears new or at least worsened since that  time, and may account for the patient's symptoms. No free air or  abscess is seen.  2. No evidence of pulmonary embolus or other acute findings in the  chest.  3. Mild apically predominant emphysema.  4. Colonic diverticulosis without signs of diverticulitis.  5. Large right hepatic dome hemangioma and other smaller hepatic  hypodensities are again seen and unchanged.    ADRIANNE HILL MD

## 2018-03-11 NOTE — PLAN OF CARE
Problem: Patient Care Overview  Goal: Plan of Care/Patient Progress Review  Outcome: No Change  Ao, vss. Pain managed by prn medication. IVF infusing. Ambulated halls.

## 2018-03-11 NOTE — ED NOTES
[facility]  ED Nurse Handoff Report    Dashawn Ordoñez is a 57 year old male   ED Chief complaint: No chief complaint on file.  . ED Diagnosis:   Final diagnoses:   Abdominal pain, epigastric   Malignant neoplasm of stomach, unspecified location (H)   Gastric ulcer without hemorrhage or perforation, unspecified chronicity     Allergies:   Allergies   Allergen Reactions     Blood Transfusion Related (Informational Only) Other (See Comments)     Patient has a history of a clinically significant antibody against RBC antigens.  A delay in compatible RBCs may occur.       Code Status: Full Code  Activity level - Baseline/Home:  Independent. Activity Level - Current:   Independent. Lift room needed: No. Bariatric: No   Needed: No   Isolation: No. Infection: Not Applicable.     Vital Signs:   Vitals:    03/10/18 2100 03/10/18 2130 03/10/18 2330 03/11/18 0030   BP: 118/90 125/89 119/81 (!) 147/103   Pulse: 98 85 80 79   Resp: 20      Temp:       TempSrc:       SpO2: 92% 99% 97% 98%       Cardiac Rhythm:  ,      Pain level: 0-10 Pain Scale: 6  Patient confused: No. Patient Falls Risk: No.   Elimination Status: Has voided   Patient Report - Initial Complaint: abdominal pain. Focused Assessment: diffuse tenderness over abdomen   Tests Performed: labs. Abnormal Results:   Lab Results   Component Value Date    WBC 5.5 03/10/2018     Lab Results   Component Value Date    RBC 4.46 03/10/2018     Lab Results   Component Value Date    HGB 10.6 03/10/2018     Lab Results   Component Value Date    HCT 35.2 03/10/2018     No components found for: MCT  Lab Results   Component Value Date    MCV 79 03/10/2018     Lab Results   Component Value Date    MCH 23.8 03/10/2018     Lab Results   Component Value Date    MCHC 30.1 03/10/2018     Lab Results   Component Value Date    RDW 23.4 03/10/2018     Lab Results   Component Value Date     03/10/2018     Last Basic Metabolic Panel:  Lab Results   Component Value  Date     03/10/2018      Lab Results   Component Value Date    POTASSIUM 3.7 03/10/2018     Lab Results   Component Value Date    CHLORIDE 107 03/10/2018     Lab Results   Component Value Date    JULIO CESAR 8.6 03/10/2018     Lab Results   Component Value Date    CO2 25 03/10/2018     Lab Results   Component Value Date    BUN 8 03/10/2018     Lab Results   Component Value Date    CR 0.66 03/10/2018     Lab Results   Component Value Date     03/10/2018     Treatments provided: port acessed  Family Comments: family was here, they recently left  OBS brochure/video discussed/provided to patient:  Yes  ED Medications:   Medications   0.9% sodium chloride BOLUS (0 mLs Intravenous Stopped 3/10/18 2156)     Followed by   sodium chloride 0.9% infusion (not administered)   HYDROmorphone (PF) (DILAUDID) injection 0.5 mg (0.5 mg Intravenous Given 3/10/18 2114)   ondansetron (ZOFRAN) injection 4 mg (4 mg Intravenous Given 3/10/18 2049)   HYDROmorphone (DILAUDID) injection 1 mg (1 mg Intravenous Given 3/10/18 2052)   HYDROmorphone (DILAUDID) injection 1 mg (1 mg Intravenous Given 3/10/18 2156)   ketorolac (TORADOL) injection 30 mg (30 mg Intravenous Given 3/10/18 2156)   0.9% sodium chloride BOLUS (0 mLs Intravenous Stopped 3/10/18 2222)   iopamidol (ISOVUE-370) solution 500 mL (80 mLs Intravenous Given 3/10/18 2220)   lidocaine (viscous) (XYLOCAINE) 2 % 15 mL, alum & mag hydroxide-simethicone (MYLANTA ES/MAALOX  ES) 15 mL GI Cocktail (30 mLs Oral Given 3/10/18 2332)   pantoprazole (PROTONIX) 80 mg in sodium chloride 0.9 % 100 mL intermittent infusion (80 mg Intravenous Given 3/11/18 0025)   ondansetron (ZOFRAN) injection 4 mg (4 mg Intravenous Given 3/11/18 0024)     Drips infusing:  Yes, protonix  For the majority of the shift, the patient's behavior Green.     Severe Sepsis OR Septic Shock Diagnosis Present: No      ED Nurse Name/Phone Number: Annamarie Hodge,   12:34 AM    RECEIVING UNIT ED HANDOFF REVIEW    Above ED  Nurse Handoff Report was reviewed: Yes  Reviewed by: Keshia Stern on March 11, 2018 at 12:51 AM

## 2018-03-11 NOTE — ED PROVIDER NOTES
History     Chief Complaint:  Chest pain      The history is provided by the patient and the spouse.      Dashawn Ordoñez is a 57 year old male with a history of stomach cancer, DM, and HTN who presents to the ED for evaluation of chest pain. The patient was diagnosed with gastric cancer in 10/2017. The patient's intermittent sharp chest pain started last night and has worsened today. He came to the ED as he wants relief from his pain.     Here in the ED, he additionally reports radiating back and bilateral hand pain. He states that he has had this pain before, but this episode is greater in intensity. He does not want to move or breathe with this pain. Additionally, he reports difficulty urinating, bloody cough, and rhinorrhea. His wife states that he has had chest pain and neuropathy from chemotherapy. He denies diarrhea, constipation, or a history of blood clots.     Allergies:  Blood Transfusion Related (Informational Only)    Medications:    Omeprazole    Past Medical History:    Cancer (H)   Depressive disorder   Diabetes (H)   Hypertension   Stomach cancer (H)   Substance abuse     Past Surgical History:    Appendectomy  ENT  Carpal tunnel    Family History:    No past pertinent family history.    Social History:  Presents with his wife and son.   Current every day smoker: 2.00 ppd   Negative for smokeless tobacco.   Positive for alcohol use.   Marital Status:   [2]     Review of Systems   HENT: Positive for rhinorrhea.    Respiratory: Positive for cough.    Cardiovascular: Positive for chest pain.   Genitourinary: Positive for difficulty urinating.   Musculoskeletal: Positive for back pain.        Positive for bilateral hand pain.    All other systems reviewed and are negative.    Physical Exam   First Vitals:  BP: (!) 131/98  Pulse: 104  Temp: 97.9  F (36.6  C)  Resp: 18    Physical Exam  General: The patient is alert, in no respiratory distress. Uncomfortable on the gurney.     HENT: Mucous  membranes moist.    Cardiovascular: Regular rhythm.Tachycardic. Good pulses in all four extremities. Normal capillary refill and skin turgor. Right-sided chest port.     Respiratory: Lungs are clear. No nasal flaring. No retractions. No wheezing, no crackles.    Gastrointestinal: Abdomen soft. No rebound. No palpable hernias. LUQ tenderness without guarding.     Musculoskeletal: No gross deformity.     Skin: No rashes or petechiae.     Neurologic: The patient is alert and oriented x3. GCS 15. No testable cranial nerve deficit. Follows commands with clear and appropriate speech. Gives appropriate answers. Good strength in all extremities. No gross neurologic deficit. Gross sensation intact. Pupils are round and reactive. No meningismus.     Lymphatic: No cervical adenopathy. No lower extremity swelling.    Psychiatric: The patient is non-tearful.    Emergency Department Course   ECG:  Indication: Chest and abdominal pain  Time: 2018  Vent. Rate 89 bpm. WY interval 146. QRS duration 128. QT/QTc 388/472. P-R-T axis 63 28 55. Normal sinus rhythm. Right bundle branch block.  Read time: 2025    Imaging:  Radiographic findings were communicated with the patient who voiced understanding of the findings.    XR Chest, 2 views:   IMPRESSION: Nothing acute and no interval change. As per radiology.     CT Chest/Abdomen Pelvis with contrast:   Impression:   1. Mass in the gastric cardia and adjacent to the gastroesophageal  junction is again seen. The overall size of the mass does not appear  significantly changed since 1/11/2018. However, there is an ulceration  extending anteriorly that appears new or at least worsened since that  time, and may account for the patient's symptoms. No free air or  abscess is seen.  2. No evidence of pulmonary embolus or other acute findings in the  chest.  3. Mild apically predominant emphysema.  4. Colonic diverticulosis without signs of diverticulitis.  5. Large right hepatic dome hemangioma  and other smaller hepatic  hypodensities are again seen and unchanged. As per radiology.    Laboratory:  2040 Troponin: <0.015  D dimer: 1.0 (H)    INR: 1.17 (H)  Lipase: 308    CBC: WBC: 5.5, HGB: 10.6 (L), PLT: 162 (HGG from 01/31/2018 was 8.3 (L))  CMP: Glucose 161 (H), o/w WNL (Creatinine: 0.66)    Interventions:  2049 NS 1L IV   Zofran 4 mg IV  2052 Dilaudid 1 mg IV  2114 Dilaudid 0.5 mg IV  2156 Dilaudid 1 mg IV   Toradol 30 mg IV  2332 GI cocktail 30 mL oral    Emergency Department Course:  Nursing notes and vitals reviewed. 2035 I performed an exam of the patient as documented above. EKG obtained in the ED, see results above.     IV inserted. Medicine administered as documented above. Blood drawn. This was sent to the lab for further testing, results above.    The patient was sent for a Chest XR and CT while in the emergency department, findings above.     2342 I rechecked the patient and discussed the results of his workup thus far.     0000  I consulted with Dr. Ferrer of the hospitalist services. They are in agreement to accept the patient for admission.    Findings and plan explained to the Patient who consents to admission. Discussed the patient with Dr. Ferrer, who will admit the patient to a obs bed for further monitoring, evaluation, and treatment.    Impression & Plan    Medical Decision Making:  Dashawn Ordoñez is a 57 year old male has a known history of gastric cancer. He presents complaining of abdominal pain. He previously, but says it is more intense and severe. I did consider PE, especially considering his history of cancer. Also considered ASC, PE, pneumothorax, pneumonia, intraabdominal processes like small bowel obstruction, diverticulitis, and dissection. His d dimer was elevated. Patient did consent to CT scan of chest/abdomen pelvis. There was signs of a gastric ulcer, but no signs of perforation. His hemoglobin was actually higher than previous and is otherwise stable. Despite giving  him multiple doses of pain medication and a GI cocktail, he is still having pain. Therefore, the patient will require admission for pain control. There is no signs of cardiac ischemia or perforation.       Diagnosis:    ICD-10-CM   1. Abdominal pain, epigastric R10.13   2. Malignant neoplasm of stomach, unspecified location (H) C16.9   3. Gastric ulcer without hemorrhage or perforation, unspecified chronicity K25.9       Disposition:  Admitted to obs      I, Hellen Perez, am serving as a scribe on 3/10/2018 at 8:35 PM to personally document services performed by Joaquin Gaona MD based on my observations and the provider's statements to me.       Hellen Perez  3/10/2018   [facility] EMERGENCY DEPARTMENT       Joaquin Gaona MD  03/11/18 0007

## 2018-03-11 NOTE — PROGRESS NOTES
CTS identifies pt as moderate risk due to very high CHAPO. CM will continue to follow patient for any additional discharge needs.     Cleo Varela RN BSN CTS   [phone]  Care Transitions Team  [facility]

## 2018-03-12 VITALS
WEIGHT: 151.5 LBS | SYSTOLIC BLOOD PRESSURE: 133 MMHG | DIASTOLIC BLOOD PRESSURE: 86 MMHG | BODY MASS INDEX: 22.44 KG/M2 | HEIGHT: 69 IN | TEMPERATURE: 98.7 F | HEART RATE: 73 BPM | RESPIRATION RATE: 16 BRPM | OXYGEN SATURATION: 100 %

## 2018-03-12 LAB
ALBUMIN SERPL-MCNC: 2.9 G/DL (ref 3.4–5)
ALP SERPL-CCNC: 113 U/L (ref 40–150)
ALT SERPL W P-5'-P-CCNC: 17 U/L (ref 0–70)
ANION GAP SERPL CALCULATED.3IONS-SCNC: 5 MMOL/L (ref 3–14)
AST SERPL W P-5'-P-CCNC: 14 U/L (ref 0–45)
BASOPHILS # BLD AUTO: 0 10E9/L (ref 0–0.2)
BASOPHILS NFR BLD AUTO: 0.5 %
BILIRUB SERPL-MCNC: 0.5 MG/DL (ref 0.2–1.3)
BUN SERPL-MCNC: 10 MG/DL (ref 7–30)
CALCIUM SERPL-MCNC: 8.1 MG/DL (ref 8.5–10.1)
CHLORIDE SERPL-SCNC: 104 MMOL/L (ref 94–109)
CO2 SERPL-SCNC: 29 MMOL/L (ref 20–32)
CREAT SERPL-MCNC: 0.68 MG/DL (ref 0.66–1.25)
DIFFERENTIAL METHOD BLD: ABNORMAL
EOSINOPHIL # BLD AUTO: 0 10E9/L (ref 0–0.7)
EOSINOPHIL NFR BLD AUTO: 1.1 %
ERYTHROCYTE [DISTWIDTH] IN BLOOD BY AUTOMATED COUNT: 22.5 % (ref 10–15)
GFR SERPL CREATININE-BSD FRML MDRD: >90 ML/MIN/1.7M2
GLUCOSE SERPL-MCNC: 141 MG/DL (ref 70–99)
HCT VFR BLD AUTO: 26.9 % (ref 40–53)
HGB BLD-MCNC: 7.7 G/DL (ref 13.3–17.7)
IMM GRANULOCYTES # BLD: 0 10E9/L (ref 0–0.4)
IMM GRANULOCYTES NFR BLD: 0.3 %
LYMPHOCYTES # BLD AUTO: 1 10E9/L (ref 0.8–5.3)
LYMPHOCYTES NFR BLD AUTO: 28.6 %
MCH RBC QN AUTO: 23.8 PG (ref 26.5–33)
MCHC RBC AUTO-ENTMCNC: 28.6 G/DL (ref 31.5–36.5)
MCV RBC AUTO: 83 FL (ref 78–100)
MONOCYTES # BLD AUTO: 0.4 10E9/L (ref 0–1.3)
MONOCYTES NFR BLD AUTO: 10.7 %
NEUTROPHILS # BLD AUTO: 2.1 10E9/L (ref 1.6–8.3)
NEUTROPHILS NFR BLD AUTO: 58.8 %
NRBC # BLD AUTO: 0 10*3/UL
NRBC BLD AUTO-RTO: 0 /100
PLATELET # BLD AUTO: 107 10E9/L (ref 150–450)
POTASSIUM SERPL-SCNC: 4.4 MMOL/L (ref 3.4–5.3)
PROT SERPL-MCNC: 5.6 G/DL (ref 6.8–8.8)
RBC # BLD AUTO: 3.24 10E12/L (ref 4.4–5.9)
SODIUM SERPL-SCNC: 138 MMOL/L (ref 133–144)
WBC # BLD AUTO: 3.6 10E9/L (ref 4–11)

## 2018-03-12 PROCEDURE — 25000128 H RX IP 250 OP 636: Performed by: INTERNAL MEDICINE

## 2018-03-12 PROCEDURE — 25000132 ZZH RX MED GY IP 250 OP 250 PS 637: Performed by: PHYSICIAN ASSISTANT

## 2018-03-12 PROCEDURE — 25000132 ZZH RX MED GY IP 250 OP 250 PS 637: Performed by: INTERNAL MEDICINE

## 2018-03-12 PROCEDURE — 80053 COMPREHEN METABOLIC PANEL: CPT | Performed by: INTERNAL MEDICINE

## 2018-03-12 PROCEDURE — 85025 COMPLETE CBC W/AUTO DIFF WBC: CPT | Performed by: INTERNAL MEDICINE

## 2018-03-12 PROCEDURE — 25000132 ZZH RX MED GY IP 250 OP 250 PS 637: Performed by: CLINICAL NURSE SPECIALIST

## 2018-03-12 PROCEDURE — 99239 HOSP IP/OBS DSCHRG MGMT >30: CPT | Performed by: INTERNAL MEDICINE

## 2018-03-12 RX ORDER — BISACODYL 10 MG
10 SUPPOSITORY, RECTAL RECTAL DAILY PRN
Status: DISCONTINUED | OUTPATIENT
Start: 2018-03-12 | End: 2018-03-12 | Stop reason: HOSPADM

## 2018-03-12 RX ORDER — GABAPENTIN 300 MG/1
600 CAPSULE ORAL AT BEDTIME
Status: DISCONTINUED | OUTPATIENT
Start: 2018-03-12 | End: 2018-03-12 | Stop reason: HOSPADM

## 2018-03-12 RX ORDER — FENTANYL 12.5 UG/1
1 PATCH TRANSDERMAL
Qty: 2 PATCH | Refills: 0 | Status: SHIPPED | OUTPATIENT
Start: 2018-03-14

## 2018-03-12 RX ORDER — HYDROMORPHONE HYDROCHLORIDE 2 MG/1
4-6 TABLET ORAL EVERY 4 HOURS PRN
Status: DISCONTINUED | OUTPATIENT
Start: 2018-03-12 | End: 2018-03-12 | Stop reason: HOSPADM

## 2018-03-12 RX ORDER — AMOXICILLIN 250 MG
2 CAPSULE ORAL 2 TIMES DAILY
Status: DISCONTINUED | OUTPATIENT
Start: 2018-03-12 | End: 2018-03-12 | Stop reason: HOSPADM

## 2018-03-12 RX ORDER — SUCRALFATE ORAL 1 G/10ML
1 SUSPENSION ORAL 4 TIMES DAILY
Qty: 420 ML | Refills: 0 | Status: SHIPPED | OUTPATIENT
Start: 2018-03-12

## 2018-03-12 RX ORDER — HEPARIN SODIUM (PORCINE) LOCK FLUSH IV SOLN 100 UNIT/ML 100 UNIT/ML
5 SOLUTION INTRAVENOUS
Status: DISCONTINUED | OUTPATIENT
Start: 2018-03-12 | End: 2018-03-12 | Stop reason: HOSPADM

## 2018-03-12 RX ORDER — HEPARIN SODIUM,PORCINE 10 UNIT/ML
5-10 VIAL (ML) INTRAVENOUS EVERY 24 HOURS
Status: DISCONTINUED | OUTPATIENT
Start: 2018-03-12 | End: 2018-03-12 | Stop reason: HOSPADM

## 2018-03-12 RX ORDER — HYDROMORPHONE HYDROCHLORIDE 4 MG/1
4 TABLET ORAL EVERY 4 HOURS PRN
Qty: 20 TABLET | Refills: 0 | Status: SHIPPED | OUTPATIENT
Start: 2018-03-12 | End: 2018-05-01

## 2018-03-12 RX ORDER — POLYETHYLENE GLYCOL 3350 17 G/17G
1 POWDER, FOR SOLUTION ORAL DAILY PRN
Qty: 225 G | Refills: 0 | Status: SHIPPED | OUTPATIENT
Start: 2018-03-12

## 2018-03-12 RX ORDER — POLYETHYLENE GLYCOL 3350 17 G/17G
17 POWDER, FOR SOLUTION ORAL DAILY PRN
Status: DISCONTINUED | OUTPATIENT
Start: 2018-03-12 | End: 2018-03-12 | Stop reason: HOSPADM

## 2018-03-12 RX ORDER — LANOLIN ALCOHOL/MO/W.PET/CERES
3 CREAM (GRAM) TOPICAL AT BEDTIME
Status: DISCONTINUED | OUTPATIENT
Start: 2018-03-12 | End: 2018-03-12 | Stop reason: HOSPADM

## 2018-03-12 RX ORDER — HEPARIN SODIUM,PORCINE 10 UNIT/ML
5-10 VIAL (ML) INTRAVENOUS
Status: DISCONTINUED | OUTPATIENT
Start: 2018-03-12 | End: 2018-03-12 | Stop reason: HOSPADM

## 2018-03-12 RX ADMIN — NICOTINE 1 PATCH: 21 PATCH, EXTENDED RELEASE TRANSDERMAL at 08:34

## 2018-03-12 RX ADMIN — SODIUM CHLORIDE, PRESERVATIVE FREE 5 ML: 5 INJECTION INTRAVENOUS at 12:43

## 2018-03-12 RX ADMIN — HYDROMORPHONE HYDROCHLORIDE 6 MG: 2 TABLET ORAL at 10:06

## 2018-03-12 RX ADMIN — HYDROMORPHONE HYDROCHLORIDE 6 MG: 2 TABLET ORAL at 06:26

## 2018-03-12 RX ADMIN — PANTOPRAZOLE SODIUM 40 MG: 40 TABLET, DELAYED RELEASE ORAL at 06:26

## 2018-03-12 RX ADMIN — SODIUM CHLORIDE, PRESERVATIVE FREE 5 ML: 5 INJECTION INTRAVENOUS at 15:51

## 2018-03-12 RX ADMIN — POTASSIUM CHLORIDE AND SODIUM CHLORIDE: 900; 150 INJECTION, SOLUTION INTRAVENOUS at 07:40

## 2018-03-12 RX ADMIN — DICLOFENAC SODIUM 2 G: 10 GEL TOPICAL at 13:35

## 2018-03-12 RX ADMIN — HYDROMORPHONE HYDROCHLORIDE 6 MG: 2 TABLET ORAL at 01:29

## 2018-03-12 RX ADMIN — SENNOSIDES AND DOCUSATE SODIUM 2 TABLET: 8.6; 5 TABLET ORAL at 12:39

## 2018-03-12 RX ADMIN — HYDROMORPHONE HYDROCHLORIDE 6 MG: 2 TABLET ORAL at 13:17

## 2018-03-12 ASSESSMENT — ACTIVITIES OF DAILY LIVING (ADL)
ADLS_ACUITY_SCORE: 11

## 2018-03-12 ASSESSMENT — PAIN DESCRIPTION - DESCRIPTORS
DESCRIPTORS: SHARP

## 2018-03-12 NOTE — PROGRESS NOTES
Pt is already scheduled for Follow up with the oncologist on Wednesday as scheduled Dr Healy.  CHAPO Very High.  Yaa RN CTS [id number]

## 2018-03-12 NOTE — PROGRESS NOTES
Pt to D/C to home.  Pt provided with d/c instructions, including new medications, when medications were last given, and when to take them again.  Pt also informed to f/u with  tomorrow.  Pt verbalized understanding of all d/c and f/u instructions.  All questions were answered at this time.  Copy of paperwork sent with pt.  Medications: 2 Fentanyl patches, 20 tabs dilaudid, miralax, carafate, volatren cream, and narcan sent with pt.  wife to provide transport.  All personal belongings sent with pt.

## 2018-03-12 NOTE — PLAN OF CARE
Problem: Patient Care Overview  Goal: Plan of Care/Patient Progress Review  Outcome: Improving  Patient was admitted for abdominal pain. VSS: /86, P 77, Temp 98.7, R- 16, and %. Last BM on Saturday, started on senna. Pain continues to be in abdominal pain: PO dilaudid x2. Port was hep locked and fluids were discontinued. Patient is tolerating regular diet. Up on his own. Consults: hem and pain. Plan to discharge today, waiting on discharge orders.

## 2018-03-12 NOTE — CONSULTS
Care Transition Initial Assessment -   Reason For Consult: financial concerns  Met with: Patient    Active Problems:    Gastric ulcer       DATA  Lives With: spouse  Living Arrangements: house  Description of Support System: Supportive, Involved  Who is your support system?: Wife     Identified issues/concerns regarding health management: Pt has endstage gastric cancer. He is receiving pal chemo at this time. He is not able to return to work full time. He is working here and there as he can but running out of his Disability through his employer             Quality Of Family Relationships: supportive       ASSESSMENT  Cognitive Status:  awake and alert  Concerns to be addressed: Looking for financial resources  .  Spoke with pt about DC Devices. At this time all household bills are up to day. Pt has been getting SD through work and his wife is working full time  Pt is in [place] through the [facility]. SWS encouraged him to see if he would qualify for food stamps once his SD through work is finished. Talked about options for support from [employer] and the disability linkage line    PLAN  Pt plans to dc to home today. Will placed resource info in DC instructions for pt to follow up after dc

## 2018-03-12 NOTE — CONSULTS
[facility]  Pain Service Consultation   Text Page    Date of Admission:  3/10/2018    Assessment & Plan   Dashawn Ordoñez is a 57 year old male who was admitted on 3/10/2018. I was asked to see the patient for pain management.    1) Acute on chronic pain  MSK and Visceral LUQ pain due to gastric cancer with ulceration    2) Chronic neuropathy in bilateral fingers. Pt attributes to degenerative changes in his neck.    2)  Patient with chronic progressive cancer pain, on chronic opioid therapy managed by [facility]  database review: Pt with progressive opioid over time since 11/2017. Most recent scripts MS ER 15 mg tabs 3/6 60 tabs, Lorazepam 0.5 mg tabs 3/3 #30 tabs, hydromorphone 2 gm tabs 2/26 #60, oxycodone 5 mg tabs 2/26 #30 tabs.      Pt reports to me that he was taking morphine sulfate 15 mg ER 2 tabs and 2-3 hydromorphone 2 mg tabs =  54 mg Daily Morphine Equivalent  Patient has a moderate opioid tolerance.     Patient's opioid use last 24 hours:   Fentanyl 12.5 mcg/hr patch  32 mg oral hydromorphone  0.3 mg IV hydromorphone   = 153 mg Daily Morphine Equivalent    3)  Opioid induced side-effects:  -Constipation - yes  -Nausea/Vomit - no  -Sedation - no    4) Other/Related:    -Disease of addiction - history of alcohol, through treatment twice per pt medical record, denies currently drinking   -Tobacco use  - Gastric Cancer, dx 10/17  - DM  - HTN  - Depression     PLAN:   1) Appreciate [facility] to follow up with pt regarding financial concerns, options for assistance as his disability benefit will soon be exhausted.  2) Appreciate Pharmacy [facility] to review pt's med rec from admission as there are medications that he is taking that are not listed per my review of [facility] , and missing in pt report.  2) Pt to follow up at [facility] Wednesday or Thursday of this week to reassess pain and further adjustments if needed, including increasing fentanyl patch. Pt is working on this  appointment today.  3)Multimodal Medication Therapy  Topical: Voltaren 1% gel to LUQ Abdomin 4 times per day. Send home with pt.  NSAIDS: None.  Muscle Relaxants: None  Adjuvants: Resume gabapentin 600 mg PO. Instructed pt to take at bedtime instead of midday. Melatonin 3 mg pO q HS for sleep. DC Unysom.  Antidepresants/anxiolytics: Pt does not want to start mirtazapine currently.    Lorazepam 0.5 mg PO q 8 hours prn nausea or anxiety, would use cautiously with increasing opioids.    Opioids:STOP PTA Morphine Sulfate 15 mg ER BID - has not taken since admission. Continue with fentanyl 12.5 mcg/hour 72 hours. Hydromorphone 4-6 mg PO q 4 hours prn moderate to severe pain.   4)Non-medication interventions  Heating pad prn - AVOID AREA OF FENTANYL PATCH  Ice prn.  Music  5)Constipation Prophylaxis  senekot s 1-2 tabs BID  miralax daily prn  Increase food and fluids  6) DC safety  -Opioid Safety information included in After Visit Summary (AVS)  - STOP and dispose of morphine sulfate 15 mg ER tabs, oxycodone tabs.  - FOLLOW UP with [facility] Wed or Thursday to assess pain regimen, and appropriateness to increase fentanyl patch, sleep.  - SMALL Scripts at d/c until pt can be seen at [facility].    - Pt cautioned not to drive or do other activities that require mental acuity with new doses of opioid and to use lorazepam cautiously with opioid.  - Pt will be sent home with nasal naloxone - script for dc written and instructions for use for pt in AVS for nursing review with pt and family.  - Reviewed strategies to track medications at home.    Time Spent on this Encounter   I spent  10:10am - 11:00 am  in assessment of the patient and discussion with the patient.  Another 45 minutes in review of chart, documentation and discussion with the health care team. Reviewed with Dr. Lizeth SNIDER, CNS  Pain Management and Palliative Care  [facility]  Pgr: [phone]      Reason for  "Consult   Reason for consult: I was asked by Dr. Buckner to evaluate this patient for pain management.    Primary Care Physician   Primary Care Physician:Cristian Healy  Pain Specialist: None    Chief Complaint   LUQ abdominal pain    History is obtained from the patient and electronic health record    History of Present Illness   Dashawn Ordoñze is a 57 year old male who presents with worsening LUQ pain. CT chest, abdomin, pelvis reveals new ulceration on pt's known gastric mass. Mass size or location appeared stable by report. Pt was given a GI cocktail in ED but symptoms worsened. Pt has been taking long acting morphine at home with hydromorphone \"2-3 per day\" at home for breakthrough pain. He tells me that the pain medications were not helping him at home.     CURRENT PAIN:  His pain is located in the LUQ abdomin and anteriolateral lower rib area.  It is described as Aching, Sharp and Stabbing   It has been getting worse over the last week or so.  He rates it as ranging between 6/10 and 10/10  The average is 6-7/10 on a scale of 0-10  Currently it is rated as 6/10  His goal is 5/10  It improves by heating pad, pain medications, listening to music, playing music, writing music.   It worsens by increased activity  He has been compliant with the recommendations while in the hospital.      PAIN HISTORY:  Pt reports chronic peripheral neuropathy in his bilateral fingers that he attributes to degenerative disc issues in his neck. He tells me it has been imaged in the last 2 months at [facility], but there are no cervical films currently available.  He feels his symptoms are controlled with daily gabapentin.  He notes lost of the muscle tone in the area of his interosseos muscle between L thumb and metacarple.    PAST PAIN TREATMENT:   Medications: MS Contin, hydrocodone, oxycodone, lorazepam, gabapentin.  Non-phamacologic modalities: distraction with music.  Previous interventions/surgeries: None.           " D.I.R.E Score: Patient Selection for Chronic Opioid Analgesia    For each factor, rate the patient's score from 1 - 3 based on the explanations on the right.       Diagnosis             2         1 = Benign chronic condition with minimal objective findings or no definite medical diagnosis.  Examples:  fibromyalgia, migraine, headaches, non-specific back pain.  2 = Slowly progressive condition concordant with moderate pain, or fixed condition with moderate objective findings.  Examples: failed back surgery syndrome, back pain with moderate degenerative changes, neuropathic pain.  3 = Advanced condition concordant with severe pain with objective findings.  Examples: severe ischemic vascular disease, advanced neuropathy, severe spinal stenosis.    Intractability             2         1 = Few therapies have been tried and the patient takes a passive role in his/her pain management process.   2 = Most costomary treatments have been tried but the patient is not fully engaged in the pain management process, or barriers prevent (insurance, transportation, medical illness)  3 = Patient fully engaged in a spectrum of appropriate treatments but with inadequate response.    Risk   (Risk = Total of P+C+R+S below)       Psychological             2         1 = Serious personality dysfunction or mental illness interfering with care.  Examples: personality disorder, severe affective disorder, significant personality issues.  2 = Personality or mental health interferes moderately.  Example: depression or anxiety disorder.  3 = Good communication with the clinic.  No significant personality dysfunction or mental illness.       Chemical      Health             2         1 = Active or very recent use of illicit drugs, excessive alcohol, or prescription drug abuse.  2 = Chemical coper (uses medications to cope with stress) or history of chemical dependency in remission.  3 = No CD history.  Not drug-focused or chemically reliant        Reliability             2         1 = History of numerous problems: medication misuse, missed appointments, rarely follows through.  2 = Occasional difficulties with compliance, but generally reliable.  3 = Highly reliable patient with medications, appointments and treatment.       Social      Support             2         1= Life in chaos.  Little family support and few close relationships.  Loss of most normal life roles.  2 = Reduction in some relationships and life roles.  3 = Supportive family/close relationships.  Involved in work or school and no social isolation.    Efficacy score             2         1 = Poor function or minimal pain relief despite moderate to high doses.  2 = Moderate benefit with function improved in a number of ways (or insufficient info - hasn't tried opioid yet or very low doses or too short a trial.  3 = Good improvement in pain and function and quality of life with stable doses over time.                                    14    Total score = D + I + R + E    Score 7-13: Not a suitable candidate for long-term opioid analgesia  Score 14-21: May be a good candidate for long-term opioid analgesia    Copyright 2013 Antonino Kruse MD, The DIRE Score: Predicting Outcomes of Opioid Prescribing for Chronic Pain. The Journal of Pain. 7(9) (September), 2006:671-681    Past Medical History   I have reviewed this patient's medical history and updated it with pertinent information if needed.   Past Medical History:   Diagnosis Date     Cancer (H)      Depressive disorder      Diabetes (H)      Hypertension      Stomach cancer (H)      Substance abuse        Past Surgical History   I have reviewed this patient's surgical history and updated it with pertinent information if needed.  Past Surgical History:   Procedure Laterality Date     APPENDECTOMY       ENT SURGERY       ESOPHAGOSCOPY, GASTROSCOPY, DUODENOSCOPY (EGD), COMBINED N/A 10/18/2017    Procedure: COMBINED ESOPHAGOSCOPY, GASTROSCOPY,  "DUODENOSCOPY (EGD), BIOPSY SINGLE OR MULTIPLE;  ESOPHAGOSCOPY, GASTROSCOPY, DUODENOSCOPY (EGD) with biopsies using cold forceps;  Surgeon: Filippo Mclean MD;  Location:  GI         Prior to Admission Medications   Prior to Admission Medications   Prescriptions Last Dose Informant Patient Reported? Taking?   HYDROmorphone (DILAUDID) 4 MG tablet 3/10/2018 at Unknown time  No Yes   Sig: Take 1 tablet (4 mg) by mouth every 3 hours as needed for moderate to severe pain   ferrous sulfate (IRON) 325 (65 FE) MG tablet 3/9/2018  No No   Sig: Take 1 tablet (325 mg) by mouth daily (with breakfast)   mirtazapine (REMERON) 7.5 MG TABS tablet Past Month at Unknown time Self No Yes   Sig: Take 1 tablet (7.5 mg) by mouth nightly as needed   omeprazole (PRILOSEC) 40 MG capsule 3/9/2018 at Unknown time Self No Yes   Sig: Take 1 capsule (40 mg) by mouth 2 times daily (before meals)      Facility-Administered Medications: None     Allergies   Allergies   Allergen Reactions     Blood Transfusion Related (Informational Only) Other (See Comments)     Patient has a history of a clinically significant antibody against RBC antigens.  A delay in compatible RBCs may occur.       Social History   I have reviewed this patient's social history and updated it with pertinent information if needed. Dashawn MANE Smita  reports that he has been smoking.  He has been smoking about 2.00 packs per day. He has never used smokeless tobacco. He reports that he drinks alcohol. He reports that he does not use illicit drugs.     Pt tells me today that he is sober. He once went through treatment for alcohol \"that was stupid and the result of a DWI\". \"I can't drink now, it is too strong to mix alcohol with my pain medications\".   Pt has wife and family that he describes as \"supportive'. 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 He " continues to collaborate with other musicians in the area on projects and fundraisers, including for cancer.    Family History   I have reviewed this patient's family history and updated it with pertinent information if needed.   No family history on file.  Family history of addiction - not assessed.    Review of Systems   CONSTITUTIONAL: NEGATIVE for fever, chills, POSITIVE for change in weight  ENT/MOUTH: NEGATIVE for ear, mouth and throat problems  RESP: NEGATIVE for significant cough or SOB. POSITIVE for tobacco use  CV: NEGATIVE for chest pain, palpitations or peripheral edema   Denies Bowel or bladder dysfunction    Physical Exam   Temp:  [96.2  F (35.7  C)-98.7  F (37.1  C)] 98.7  F (37.1  C)  Pulse:  [73-77] 73  Heart Rate:  [77-86] 77  Resp:  [16-17] 16  BP: (133-153)/(79-88) 133/86  SpO2:  [97 %-100 %] 100 %  0 lbs 0 oz  GEN:  Alert, oriented x 3, appears comfortable, NAD.  HEENT:  Normocephalic/atraumatic, no scleral icterus, no nasal discharge, mouth moist.  CV:  RRR, S1, S2; no murmurs or other irregularities noted.  +3 DP/PT pulses bilatererally; no edema BLE.  RESP:  Clear to auscultation bilaterally without rales/rhonchi/wheezing/retractions.  Symmetric chest rise on inhalation noted.  Normal respiratory effort.  ABD:  Rounded, soft, non-distended.  +BS. Constipated - last BM reported as Saturday.  EXT:  Edema & pulses as noted above.  CMS intact x 4.     M/S:   Tender to palpation LUQ/ anteriocostal region.    SKIN:  Dry to touch, flushed face, no exanthems noted in the visualized areas.    NEURO: Symmetric strength +5/5.  Bilateral peripheral neuropathy.  There is no area of allodynia or hyperesthesia.  PAIN BEHAVIOR: Cooperative, not detailed in pain medications or SUDS history  Psych:  Sl. accellerated affect.  Cooperative, conversant appropriately.       Data   Results for orders placed or performed during the hospital encounter of 03/10/18 (from the past 24 hour(s))   Comprehensive metabolic  panel   Result Value Ref Range    Sodium 138 133 - 144 mmol/L    Potassium 4.4 3.4 - 5.3 mmol/L    Chloride 104 94 - 109 mmol/L    Carbon Dioxide 29 20 - 32 mmol/L    Anion Gap 5 3 - 14 mmol/L    Glucose 141 (H) 70 - 99 mg/dL    Urea Nitrogen 10 7 - 30 mg/dL    Creatinine 0.68 0.66 - 1.25 mg/dL    GFR Estimate >90 >60 mL/min/1.7m2    GFR Estimate If Black >90 >60 mL/min/1.7m2    Calcium 8.1 (L) 8.5 - 10.1 mg/dL    Bilirubin Total 0.5 0.2 - 1.3 mg/dL    Albumin 2.9 (L) 3.4 - 5.0 g/dL    Protein Total 5.6 (L) 6.8 - 8.8 g/dL    Alkaline Phosphatase 113 40 - 150 U/L    ALT 17 0 - 70 U/L    AST 14 0 - 45 U/L   CBC with platelets differential   Result Value Ref Range    WBC 3.6 (L) 4.0 - 11.0 10e9/L    RBC Count 3.24 (L) 4.4 - 5.9 10e12/L    Hemoglobin 7.7 (L) 13.3 - 17.7 g/dL    Hematocrit 26.9 (L) 40.0 - 53.0 %    MCV 83 78 - 100 fl    MCH 23.8 (L) 26.5 - 33.0 pg    MCHC 28.6 (L) 31.5 - 36.5 g/dL    RDW 22.5 (H) 10.0 - 15.0 %    Platelet Count 107 (L) 150 - 450 10e9/L    Diff Method Automated Method     % Neutrophils 58.8 %    % Lymphocytes 28.6 %    % Monocytes 10.7 %    % Eosinophils 1.1 %    % Basophils 0.5 %    % Immature Granulocytes 0.3 %    Nucleated RBCs 0 0 /100    Absolute Neutrophil 2.1 1.6 - 8.3 10e9/L    Absolute Lymphocytes 1.0 0.8 - 5.3 10e9/L    Absolute Monocytes 0.4 0.0 - 1.3 10e9/L    Absolute Eosinophils 0.0 0.0 - 0.7 10e9/L    Absolute Basophils 0.0 0.0 - 0.2 10e9/L    Abs Immature Granulocytes 0.0 0 - 0.4 10e9/L    Absolute Nucleated RBC 0.0      Order    CT Chest/Abdomen/Pelvis w Contrast [[id number]] (Order [id number])         Exam Information      Exam Date Exam Time Accession # Performing Department Results      3/10/18 10:30 PM [id number] [facility]        [facility] Interactive Report and InfoRx      View the interactive report       PACS Images      Show images for CT Chest/Abdomen/Pelvis w Contrast       Study Result      Exam: CT CHEST/ABDOMEN/PELVIS W CONTRAST  3/10/2018 10:30 PM     History: History of gastric cancer, now with left upper quadrant and  back pain and positive d-dimer. Concern for pulmonary embolus.     Comparison: 1/11/2018     Technique: Volumetric acquisition with reconstruction in the axial,  coronal planes with contrast through the chest in the pulmonary  arterial angiographic phase and through the abdomen and pelvis in the  portal venous phase. Radiation dose for this scan was reduced using  automated exposure control, adjustment of the mA and/or kV according  to patient size, or iterative reconstruction technique.     Contrast: 80mL Isovue-370     Findings:   Chest: Contrast bolus timing is adequate for evaluation of the  pulmonary arteries. No pulmonary embolus is seen. No evidence of right  heart strain.     Mild apically predominant emphysema. 2 mm right upper lobe pulmonary  nodule again seen and unchanged. Lungs otherwise clear.     No pleural or pericardial effusion. Heart size normal. No thoracic  lymphadenopathy right internal jugular implanted central venous port  tip in the right atrium.     Abdomen: Gastric mass in the cardia, adjacent to the gastroesophageal  junction does not appear significantly changed. Adjacent prominent  lymph nodes are also unchanged. There does appear to be at least some  degree of ulceration within this mass (series 9 image 51 and series 8  image 20), this aspect appears slightly worsened since 1/11/2018. No  free air or abscess is seen at this time.     Large right hepatic dome hemangioma again seen and unchanged.  Additional smaller hypoechoic lesions again seen in the liver and  unchanged. No new liver lesions are seen. Spleen, adrenal glands,  kidneys, pancreas and gallbladder appear normal.     Colonic diverticulosis without signs of diverticulitis. No findings  concerning for bowel obstruction.     Pelvic structures appear normal. No free fluid.     Bones: No concerning lytic or sclerotic  lesions.         Impression:   1. Mass in the gastric cardia and adjacent to the gastroesophageal  junction is again seen. The overall size of the mass does not appear  significantly changed since 1/11/2018. However, there is an ulceration  extending anteriorly that appears new or at least worsened since that  time, and may account for the patient's symptoms. No free air or  abscess is seen.  2. No evidence of pulmonary embolus or other acute findings in the  chest.  3. Mild apically predominant emphysema.  4. Colonic diverticulosis without signs of diverticulitis.  5. Large right hepatic dome hemangioma and other smaller hepatic  hypodensities are again seen and unchanged.     ADRIANNE HILL MD     Order    XR Chest 2 Views [[id number]] (Order [id number])         Exam Information      Exam Date Exam Time Accession # Performing Department Results      3/10/18  9:50 PM [id number] [facility]        [facility] Interactive Report and InfoRx      View the interactive report       PACS Images      Show images for XR Chest 2 Views       Study Result      CHEST TWO VIEW   3/10/2018 9:50 PM      HISTORY: Chest pain.      COMPARISON: 12/20/2017     FINDINGS: Port-A-Cath with the tip in the SVC. Heart size normal.  Lungs clear. No interval change.         IMPRESSION: Nothing acute and no interval change.     PRIYA RUSH MD     Order    XR Lumbar Spine 2/3 Views [[id number]] (Order [id number])         Exam Information      Exam Date Exam Time Accession # Performing Department Results      1/31/18  5:22 PM [id number] [facility]        [facility] Interactive Report and InfoRx      View the interactive report       PACS Images      Show images for XR Lumbar Spine 2/3 Views       Study Result      LUMBAR SPINE TWO TO THREE VIEWS    1/31/2018 5:22 PM      HISTORY: Malignant neoplasm of stomach, unspecified location (H). Low  back pain.     COMPARISON: None. There is no acute fracture or  traumatic  malalignment. The disc spaces are preserved. There are endplate  osteophytes at all levels. Small metallic foreign body in the midline  abdomen. No focal bone lesions.          IMPRESSION: No acute abnormality.     TIN JOHNSTON MD

## 2018-03-12 NOTE — PLAN OF CARE
Problem: Pain, Acute (Adult)  Goal: Identify Related Risk Factors and Signs and Symptoms  Related risk factors and signs and symptoms are identified upon initiation of [employer] Clinical Practice Guideline (CPG).   Outcome: Improving  VSS on RA. Pain to LUQ max 7-8/10. PRN dilaudid PO provided with fent patch in place. Ambulating in room and halls ad yessi. Port infusing IVF at 50 ml/hr. Reg diet, tolerating with PO meds, denies n/v this shift; reports intermittent nausea, none overnight. Voiding without saving, no BM this shift. Alert and oriented, pleasant, able to make needs known. Continue to monitor.

## 2018-03-12 NOTE — CONSULTS
Hematology / Oncology Consultation      Dashawn Ordoñez MRN# [id number]   YOB: 1960 Age: 57 year old   Date of Admission: 3/10/2018     Reason for consult: Worsening epigastric pain in the setting of metastatic gastric cancer           Assessment and Plan:   #1 Metastatic gastric cancer, HER2 negative, MMR proficient, and PD-L1 negative  #2 Epigastric pain, likely related to primary tumor    It was my pleasure to see the patient in the hospital today.  Fortunately, with fentanyl patch and as needed Dilaudid for breakthrough pain, his pain is under better control.  It appears that Dashawn will be discharged from the hospital later today.  I reviewed with Dashawn the camacho images from the CT scan of the abdomen and pelvis.  The new ulceration of the gastric wall did not look very impressive, and could be due to treatment-induced tumor necrosis.  There was no evidence of disease progression.    PLAN:  - Agree with Fentanyl patch and Dilaudid PRN  - Recommend Carafate  - Will delay outpatient visit and chemotherapy to this Wednesday    Discussed with Dr. Stanley.    Cristian Healy M.D.  [facility]  [phone]             Chief Complaint:   Worsening epigastric pain in the setting of metastatic gastric cancer         History of Present Illness:    This is a very pleasant 57-year-old gentleman with metastatic HER-2 negative gastric cancer who has been treated with first-line chemotherapy in the form of FOLFOX in the outpatient setting.  He was admitted to the hospital on Saturday night for acutely worsening midepigastric pain, likely related to the primary tumor in the stomach, despite taking Dilaudid at home.  A CT scan of the abdomen and pelvis was performed to the ER, which demonstrated stable disease, with some ulceration anteriorly in the gastric wall.  He was started on a fentanyl patch, which was supplemented with Dilaudid as needed for breakthrough pain.  When I saw him in the hospital this afternoon,  his pain was much better controlled, and he will be discharged later today.              Past Medical History:     Past Medical History:   Diagnosis Date     Cancer (H)      Depressive disorder      Diabetes (H)      Hypertension      Stomach cancer (H)      Substance abuse              Past Surgical History:     Past Surgical History:   Procedure Laterality Date     APPENDECTOMY       ENT SURGERY       ESOPHAGOSCOPY, GASTROSCOPY, DUODENOSCOPY (EGD), COMBINED N/A 10/18/2017    Procedure: COMBINED ESOPHAGOSCOPY, GASTROSCOPY, DUODENOSCOPY (EGD), BIOPSY SINGLE OR MULTIPLE;  ESOPHAGOSCOPY, GASTROSCOPY, DUODENOSCOPY (EGD) with biopsies using cold forceps;  Surgeon: Filippo Mclean MD;  Location:  GI               Social History:     Social History   Substance Use Topics     Smoking status: Current Every Day Smoker     Packs/day: 2.00     Smokeless tobacco: Never Used     Alcohol use Yes      Comment: occasionally              Family History:   No family history on file.          Medications:     Prescriptions Prior to Admission   Medication Sig Dispense Refill Last Dose     LORAZEPAM PO Take 0.5 mg by mouth every 6 hours as needed for anxiety        GABAPENTIN PO Take 600 mg by mouth daily        Sildenafil Citrate (REVATIO PO) Take 20 mg by mouth daily as needed        mirtazapine (REMERON) 7.5 MG TABS tablet Take 1 tablet (7.5 mg) by mouth nightly as needed 30 tablet 0 Past Month at Unknown time     omeprazole (PRILOSEC) 40 MG capsule Take 1 capsule (40 mg) by mouth 2 times daily (before meals) 30 capsule 3 3/9/2018 at Unknown time     [DISCONTINUED] Morphine Sulfate (MS CONTIN PO) Take 15 mg by mouth every 12 hours as needed for moderate to severe pain        [DISCONTINUED] HYDROMORPHONE HCL PO Take 2 mg by mouth every 4 hours as needed for moderate to severe pain        ferrous sulfate (IRON) 325 (65 FE) MG tablet Take 1 tablet (325 mg) by mouth daily (with breakfast) 30 tablet 2 3/9/2018             Review of  Systems:   The 10 point Review of Systems is negative other than noted in the HPI            Physical Exam:   Vitals were reviewed  Temp: 98.7  F (37.1  C) Temp src: Oral BP: 133/86   Heart Rate: 77 Resp: 16 SpO2: 100 % O2 Device: None (Room air)    General: Awake, alert, and oriented.  No acute distress  Skin:  No bruising or skin rash noted.  Eyes:   Sclera anicteric.    Mouth:  Moist mucous membranes without ulceration  Abdomen:  Soft, nondistended.  Mid-epigastric area slightly tender to palpation  Extremities:  Well perfused, no edema.         Data:     Lab Results   Component Value Date    WBC 3.6 (L) 03/12/2018    HGB 7.7 (L) 03/12/2018    HCT 26.9 (L) 03/12/2018     (L) 03/12/2018     03/12/2018    POTASSIUM 4.4 03/12/2018    CHLORIDE 104 03/12/2018    CO2 29 03/12/2018    BUN 10 03/12/2018    CR 0.68 03/12/2018     (H) 03/12/2018    DD 1.0 (H) 03/10/2018    TROPONIN <0.04 12/05/2007    TROPI <0.015 03/10/2018    AST 14 03/12/2018    ALT 17 03/12/2018    ALKPHOS 113 03/12/2018    BILITOTAL 0.5 03/12/2018    INR 1.17 (H) 03/10/2018

## 2018-03-12 NOTE — DISCHARGE INSTRUCTIONS
Opioid Medication Information    You have been given a prescription for an opioid (narcotic) pain medicine and/or have received a pain medicine. These medicines can make you drowsy or impaired. You must not drive, operate dangerous equipment, or engage in any other dangerous activities while taking these medications. If you drive while taking these medications, you could be arrested for DUI, or driving under the influence. Do not drink any alcohol while you are taking these medications.   Opioid pain medications can cause addiction. If you have a history of chemical dependency of any type, you are at a higher risk of becoming addicted to pain medications.  Only take these prescribed medications to treat your pain when all other options have been tried. Take it for as short a time and as few doses as possible. Store your pain pills in a secure place, as they are frequently stolen and provide a dangerous opportunity for children or visitors in your house to start abusing these powerful medications. We will not replace any lost or stolen medicine.  As soon as your pain is better, you should seek out a drug take back program (see your local police department) to dispose of them.   Over-the-counter medications and prescription drugs can pollute castro and be harmful to humans, fish, and other wildlife when disposed of improperly -- do not flush medications down the toilet or place in the trash.  Properly disposing of medicines is important to prevent abuse or poisoning and protect the environment.     Prescription and over-the-counter medications are collected anonymously from residents for free at [facility] drop-off locations. Visit the [facility]'s Office Prescription Drug Drop-Off page for the list of drop-off sites and information about the program.       [place]  Police Department [phone] Mon-Fri  8am to 4:30pm     [place]  Police Department [phone] 24hrs a day    [place]  Police  Department [phone] Mon-Fri  8am to 6:00pm     [place]  Police Department [phone] Mon-Fri  8am to 4:30pm     [place]  Police Department [phone] 24hrs a day      [place]  Police Department [phone] Mon-Fri  8am to 4:30pm   Many prescription pain medications contain Tylenol  (acetaminophen), including Vicodin , Tylenol #3 , Norco , Lortab , and Percocet .  You should not take any extra pills of Tylenol  if you are using these prescription medications or you can get very sick.  Do not ever take more than 3000 mg of acetaminophen in any 24 hour period.  All opioids tend to cause constipation. Drink plenty of water and eat foods that have a lot of fiber, such as fruits, vegetables, prune juice, apple juice and high fiber cereal.  Take a laxative if you don t move your bowels at least every other day. Miralax , Milk of Magnesia, Colace , or Senna  can be used to keep you regular.  You will likely need to continue stool softeners and stimulants while taking opioids.   Naloxone Prescription  It has been determined you are at high risk of overdose of opioids as you are on multiple medications that can cause sedation at the same time as your opioid pain medications , you are a smoker, you are taking a high amount of opioids in 24 hour period, you have a history of substance abuse disorder, you have anxiety, depression or PTSD and you have lung disease.  You have been prescribed intranasal naloxone which is a medication that can temporarily reverse the effects of opioid pain medications if administered by a friend and family member while waiting for EMS to arrive.      The following video has been shown to you in the hospital, it recommend that you share it with your close family or friends in case you need assistance in an emergency.  http://prescribetoprevent.org/patient-education/videos/     You have been given the following information on Opioid overdose prevention and opioid  safety   http://store.[facility].gov/crowder/content/GYN43-8896/Toolkit_Patients.pdf     CM: Financial resources  1. Disability Linkage Line 1-275.805.8286  2. Social security Administration 1-587.860.1085  Www.[employer]bion[employer].gov  3.[employer] foundation   1-671.697.6529  Www.patientadvocate.org  4. American cancer Society 1-800.665.2540

## 2018-03-13 NOTE — PROGRESS NOTES
"Transition Communication Hand-off for Care Transitions to Next Level of Care Provider    Name: Dashawn Ordoñez  : 1960  MRN #: [id number]  Primary Care Provider: Cristian Hurt  Primary Care MD Name: Niraj  Primary Clinic: [facility] [address]  [facility] [address]  Primary Care Clinic Name: [facility]  Reason for Hospitalization:  Abdominal pain, epigastric [R10.13]  Malignant neoplasm of stomach, unspecified location (H) [C16.9]  Gastric ulcer without hemorrhage or perforation, unspecified chronicity [K25.9]  Admit Date/Time: 3/10/2018  8:12 PM  Discharge Date: 3/12/18  Payor Source: Payor: UCARE / Plan: UCARE [place] / Product Type: HMO /     Readmission Assessment Measure (CHAPO) Risk Score/category: VERY HIGH    Plan of Care Goals/Milestone Events:   Patient Concerns: financial concerns-SW saw and gave resources     Reason for Communication Hand-off Referral: Fragility    Discharge Plan:       Concern for non-adherence with plan of care:   NO  Discharge Needs Assessment:  Needs       Most Recent Value    Equipment Currently Used at Home none          Already enrolled in Tele-monitoring program and name of program:  na  Follow-up specialty is recommended: Yes    Follow-up plan:  No future appointments.    Any outstanding tests or procedures:             Follow-up and recommended labs and tests        Follow up with the oncologist on Wednesday as scheduled     We are also giving you a liquid medication called \"Carafate\", which will help with the ulcer pain                Key Recommendations:  Pt admitted with acute on chronic abdominal pain.  VERY HIGH CHAPO, oncology pt.  He has several new medications we started him on. F/u with Dr hurt which is scheduled.        Gracie Vazquez    AVS/Discharge Summary is the source of truth; this is a helpful guide for improved communication of patient story  "

## 2018-03-13 NOTE — DISCHARGE SUMMARY
Admit Date:     03/10/2018   Discharge Date:     2018      PRIMARY CARE PHYSICIAN:  Cristian Healy MD      DISCHARGE CONDITION:  Stable.      PHYSICAL EXAMINATION:     GENERAL:  He is awake and alert, comfortable appearing.    ABDOMEN:  Mild tenderness in the left upper quadrant. Overall benign exam.  No rebound or guarding.    LUNGS:  Clear to auscultation.      DISCHARGE DIAGNOSES:   1.  Acute on chronic abdominal pain due to malignancy.   2.  Known gastric cancer with ulceration.   3.  Tobacco abuse.   4.  Dehydration.   5.  Chronic pain. On narcotics at baseline.   6.  Pancytopenia. Suspect due to chemotherapy      CONSULTATIONS:     1.  Hematology/Oncology.   2.  Pain management.      IMAGIN.  Chest x-ray   2.  CT chest, abdomen and pelvis.      PENDING LAB TESTS:  None.      HISTORY OF PRESENT ILLNESS:  Please refer to the H and P for full details. The patient is a 57-year-old gentleman admitted to the hospital with concerns for increasing abdominal pain.      HOSPITAL COURSE:  The patient has a known history of gastric cancer for which he had been on chemotherapy.  He has been on oral pain medications at home; however, he had increasing pain that was uncontrolled with his home medication and came into the hospital.  He came to the ER and was initially under observation for pain control.  A CT scan of the chest, abdomen and pelvis was done which showed stable disease with some ulceration into the gastric wall.  I have discussed the case with Dr. Cristian Healy, [facility].  It is possible that the patient has some tumor necrosis due to the chemotherapy, which might be accounting for his increased pain.      The patient was treated with pain medications.  He was treated with a fentanyl patch and his MS Contin was stopped.  He was also given p.o. Dilaudid for breakthrough pain.  With these measures, his pain has much improved.      The patient was seen by the pain management services.  The patient is  doing much better at this time. His pain is adequately controlled and he is requesting discharge home today. On patient's  we have noticed that he has had different prescriptions for oxycodone, MS Contin and Dilaudid.  He mentions that he no longer has any Dilaudid at home and is advised to stop taking the MS Contin and the oxycodone and he was given resources by the pain team for proper disposition of those. I discussed with him that we he will use the fentanyl patch and the next change of it will be on Wednesday, March 14th and he will continue on Dilaudid 4 mg tablets p.r.n. for pain control.  Discussed precautions and warnings associated with narcotic and the concerns for overdose, respiratory depression, even death if not taken as appropriately in the presence of his family.  He does have a remote history of alcohol abuse; however, he states he is not actively drinking anymore. I also gave him Carafate for possible reflux     DISCHARGE DIET:  Regular diet      FOLLOWUP:  Has followup with the oncology clinic later this week for his next cycle of chemotherapy.      Total time spent in face-to-face contact with the patient and coordinating discharge more than 30 minutes, including discussion with the Pain and Palliative Care Services and also patient's oncologist.     Plan of care and discharge instructions were reviewed with the patient in great detail. All questions were answered. Patient is comfortable with the plan and agrees with it. All new medications including the side effects were reviewed with the patient.     # Discharge Pain Plan:   - During his hospitalization, Dashawn experienced pain due to gastric cancer.  The pain plan for discharge was discussed with Dashawn and his family and the plan was created in a collaborative fashion.    - Opioids prescribed on discharge: Fentanyl, Dilaudid  - Duration of opioids after discharge: Per CDC opioid prescribing guidelines, a 3 day prescription of opioids was  provided.  - Bowel regimen: miralax      Discharge Medication List as of 3/12/2018  4:46 PM      START taking these medications    Details   naloxone (NARCAN) 1 mg/mL for intranasal kit (2 syringes with 2 mucosal atomizer device) In opioid overdose put cone in nostril and push 1/2 of contents into each nostril.  Repeat every 3 min if no response until help arrives., Disp-1 kit, R-0, E-PrescribeFor intranasal administration: Dispense 2 naloxone injection 2 mg/2 mL syringes.  Incl ude 2 mucosal atomization devices (MAD-Nasal).      diclofenac (VOLTAREN) 1 % GEL topical gel Apply 2 g topically 4 times daily To upper quadrant of abdomin and ribs.Disp-1 Tube, [id number] send pt's tube on patient care unit home with him.      fentaNYL (DURAGESIC) 12 mcg/hr 72 hr patch Place 1 patch onto the skin every 72 hours remove old patch. Next change on 3/14 Wednesday at 11 am, Disp-2 patch, R-0, Local Print      sucralfate (CARAFATE) 1 GM/10ML suspension Take 10 mLs (1 g) by mouth 4 times daily, Disp-420 mL, R-0, E-Prescribe      polyethylene glycol (MIRALAX) powder Take 17 g (1 capful) by mouth daily as needed for constipation, Disp-225 g, R-0, E-Prescribe         CONTINUE these medications which have CHANGED    Details   HYDROmorphone (DILAUDID) 4 MG tablet Take 1 tablet (4 mg) by mouth every 4 hours as needed for moderate to severe pain, Disp-20 tablet, R-0, Local Print         CONTINUE these medications which have NOT CHANGED    Details   LORAZEPAM PO Take 0.5 mg by mouth every 6 hours as needed for anxiety, Historical      GABAPENTIN PO Take 600 mg by mouth daily, Historical      Sildenafil Citrate (REVATIO PO) Take 20 mg by mouth daily as needed, Historical      ferrous sulfate (IRON) 325 (65 FE) MG tablet Take 1 tablet (325 mg) by mouth daily (with breakfast), Disp-30 tablet, R-2, E-Prescribe      mirtazapine (REMERON) 7.5 MG TABS tablet Take 1 tablet (7.5 mg) by mouth nightly as needed, Disp-30 tablet, R-0,  E-Prescribe      omeprazole (PRILOSEC) 40 MG capsule Take 1 capsule (40 mg) by mouth 2 times daily (before meals), Disp-30 capsule, R-3, E-Prescribe         STOP taking these medications       Morphine Sulfate (MS CONTIN PO) Comments:   Reason for Stopping:               Allergies   Allergen Reactions     Blood Transfusion Related (Informational Only) Other (See Comments)     Patient has a history of a clinically significant antibody against RBC antigens.  A delay in compatible RBCs may occur.       Results for orders placed or performed during the hospital encounter of 03/10/18   XR Chest 2 Views    Narrative    CHEST TWO VIEW   3/10/2018 9:50 PM     HISTORY: Chest pain.     COMPARISON: 12/20/2017    FINDINGS: Port-A-Cath with the tip in the SVC. Heart size normal.  Lungs clear. No interval change.      Impression    IMPRESSION: Nothing acute and no interval change.    PRIYA RUSH MD   CT Chest/Abdomen/Pelvis w Contrast    Narrative    Exam: CT CHEST/ABDOMEN/PELVIS W CONTRAST 3/10/2018 10:30 PM    History: History of gastric cancer, now with left upper quadrant and  back pain and positive d-dimer. Concern for pulmonary embolus.    Comparison: 1/11/2018    Technique: Volumetric acquisition with reconstruction in the axial,  coronal planes with contrast through the chest in the pulmonary  arterial angiographic phase and through the abdomen and pelvis in the  portal venous phase. Radiation dose for this scan was reduced using  automated exposure control, adjustment of the mA and/or kV according  to patient size, or iterative reconstruction technique.    Contrast: 80mL [id number]-370    Findings:   Chest: Contrast bolus timing is adequate for evaluation of the  pulmonary arteries. No pulmonary embolus is seen. No evidence of right  heart strain.    Mild apically predominant emphysema. 2 mm right upper lobe pulmonary  nodule again seen and unchanged. Lungs otherwise clear.    No pleural or pericardial effusion. Heart  size normal. No thoracic  lymphadenopathy right internal jugular implanted central venous port  tip in the right atrium.    Abdomen: Gastric mass in the cardia, adjacent to the gastroesophageal  junction does not appear significantly changed. Adjacent prominent  lymph nodes are also unchanged. There does appear to be at least some  degree of ulceration within this mass (series 9 image 51 and series 8  image 20), this aspect appears slightly worsened since 2018. No  free air or abscess is seen at this time.    Large right hepatic dome hemangioma again seen and unchanged.  Additional smaller hypoechoic lesions again seen in the liver and  unchanged. No new liver lesions are seen. Spleen, adrenal glands,  kidneys, pancreas and gallbladder appear normal.    Colonic diverticulosis without signs of diverticulitis. No findings  concerning for bowel obstruction.    Pelvic structures appear normal. No free fluid.    Bones: No concerning lytic or sclerotic lesions.      Impression    Impression:   1. Mass in the gastric cardia and adjacent to the gastroesophageal  junction is again seen. The overall size of the mass does not appear  significantly changed since 2018. However, there is an ulceration  extending anteriorly that appears new or at least worsened since that  time, and may account for the patient's symptoms. No free air or  abscess is seen.  2. No evidence of pulmonary embolus or other acute findings in the  chest.  3. Mild apically predominant emphysema.  4. Colonic diverticulosis without signs of diverticulitis.  5. Large right hepatic dome hemangioma and other smaller hepatic  hypodensities are again seen and unchanged.    MD ALFONSO ROJO MD             D: 2018   T: 2018   MT: ANNY      Name:     MADI CARDOZO   MRN:      [id number]        Account:        [id number]   :      1960           Admit Date:     03/10/2018                                   Discharge Date: 03/12/2018      Document: [id number]

## 2018-03-20 ENCOUNTER — HOSPITAL ENCOUNTER (EMERGENCY)
Facility: CLINIC | Age: 58
Discharge: HOME | End: 2018-03-20
Attending: EMERGENCY MEDICINE | Admitting: EMERGENCY MEDICINE
Payer: COMMERCIAL

## 2018-03-20 VITALS
DIASTOLIC BLOOD PRESSURE: 85 MMHG | SYSTOLIC BLOOD PRESSURE: 146 MMHG | TEMPERATURE: 97.9 F | RESPIRATION RATE: 18 BRPM | OXYGEN SATURATION: 94 % | HEART RATE: 82 BPM

## 2018-03-20 DIAGNOSIS — C16.0 MALIGNANT NEOPLASM OF CARDIA OF STOMACH (H): ICD-10-CM

## 2018-03-20 DIAGNOSIS — G56.31 LESION OF RIGHT RADIAL NERVE: ICD-10-CM

## 2018-03-20 LAB
ANION GAP SERPL CALCULATED.3IONS-SCNC: 5 MMOL/L (ref 3–14)
BASOPHILS # BLD AUTO: 0 10E9/L (ref 0–0.2)
BASOPHILS NFR BLD AUTO: 0.3 %
BUN SERPL-MCNC: 14 MG/DL (ref 7–30)
CALCIUM SERPL-MCNC: 8.9 MG/DL (ref 8.5–10.1)
CHLORIDE SERPL-SCNC: 100 MMOL/L (ref 94–109)
CO2 SERPL-SCNC: 31 MMOL/L (ref 20–32)
CREAT SERPL-MCNC: 0.73 MG/DL (ref 0.66–1.25)
DIFFERENTIAL METHOD BLD: ABNORMAL
EOSINOPHIL # BLD AUTO: 0 10E9/L (ref 0–0.7)
EOSINOPHIL NFR BLD AUTO: 0.6 %
ERYTHROCYTE [DISTWIDTH] IN BLOOD BY AUTOMATED COUNT: 23.1 % (ref 10–15)
GFR SERPL CREATININE-BSD FRML MDRD: >90 ML/MIN/1.7M2
GLUCOSE SERPL-MCNC: 125 MG/DL (ref 70–99)
HCT VFR BLD AUTO: 31.6 % (ref 40–53)
HGB BLD-MCNC: 9.4 G/DL (ref 13.3–17.7)
IMM GRANULOCYTES # BLD: 0 10E9/L (ref 0–0.4)
IMM GRANULOCYTES NFR BLD: 0.6 %
LYMPHOCYTES # BLD AUTO: 1.7 10E9/L (ref 0.8–5.3)
LYMPHOCYTES NFR BLD AUTO: 53.6 %
MAGNESIUM SERPL-MCNC: 2.3 MG/DL (ref 1.6–2.3)
MCH RBC QN AUTO: 24.4 PG (ref 26.5–33)
MCHC RBC AUTO-ENTMCNC: 29.7 G/DL (ref 31.5–36.5)
MCV RBC AUTO: 82 FL (ref 78–100)
MONOCYTES # BLD AUTO: 0.3 10E9/L (ref 0–1.3)
MONOCYTES NFR BLD AUTO: 7.7 %
NEUTROPHILS # BLD AUTO: 1.2 10E9/L (ref 1.6–8.3)
NEUTROPHILS NFR BLD AUTO: 37.2 %
NRBC # BLD AUTO: 0 10*3/UL
NRBC BLD AUTO-RTO: 0 /100
PLATELET # BLD AUTO: 247 10E9/L (ref 150–450)
POTASSIUM SERPL-SCNC: 3.5 MMOL/L (ref 3.4–5.3)
RBC # BLD AUTO: 3.85 10E12/L (ref 4.4–5.9)
SODIUM SERPL-SCNC: 136 MMOL/L (ref 133–144)
WBC # BLD AUTO: 3.2 10E9/L (ref 4–11)

## 2018-03-20 PROCEDURE — 25000128 H RX IP 250 OP 636: Performed by: EMERGENCY MEDICINE

## 2018-03-20 PROCEDURE — 25000125 ZZHC RX 250: Performed by: EMERGENCY MEDICINE

## 2018-03-20 PROCEDURE — 85025 COMPLETE CBC W/AUTO DIFF WBC: CPT | Performed by: EMERGENCY MEDICINE

## 2018-03-20 PROCEDURE — 29125 APPL SHORT ARM SPLINT STATIC: CPT | Mod: RT

## 2018-03-20 PROCEDURE — 80048 BASIC METABOLIC PNL TOTAL CA: CPT | Performed by: EMERGENCY MEDICINE

## 2018-03-20 PROCEDURE — 96365 THER/PROPH/DIAG IV INF INIT: CPT

## 2018-03-20 PROCEDURE — 99284 EMERGENCY DEPT VISIT MOD MDM: CPT | Mod: 25

## 2018-03-20 PROCEDURE — 83735 ASSAY OF MAGNESIUM: CPT | Performed by: EMERGENCY MEDICINE

## 2018-03-20 RX ORDER — LIDOCAINE 40 MG/G
CREAM TOPICAL
Status: DISCONTINUED | OUTPATIENT
Start: 2018-03-20 | End: 2018-03-20 | Stop reason: HOSPADM

## 2018-03-20 RX ADMIN — FOLIC ACID: 5 INJECTION, SOLUTION INTRAMUSCULAR; INTRAVENOUS; SUBCUTANEOUS at 17:19

## 2018-03-20 ASSESSMENT — ENCOUNTER SYMPTOMS
FACIAL ASYMMETRY: 0
DIZZINESS: 1
WEAKNESS: 1
SPEECH DIFFICULTY: 0
NUMBNESS: 1

## 2018-03-20 NOTE — ED NOTES
"Pt reports right arm numbness and weakness since 8 am today. Pt states, \"my arm won't do what I want it to\". Pt also feeling off balance.   "

## 2018-03-20 NOTE — ED PROVIDER NOTES
"  History     Chief Complaint:  Numbness      HPI   Dashawn Ordoñez is a 57 year old male with a history of metastatic gastric cancer, HTN, substance abuse, depression, anxiety who presents to the emergency department with his wife for evaluation of right hand numbness. Of note, the patient has a history of gastric cancer (diagnosed in October 2017) that has metastasized to his liver and lymph nodes. He is currently undergoing chemotherapy and follows with [facility]. Since 0800 this morning, the patient reports right arm numbness and weakness. He states his right arm below the elbow \"won't do what I want it to\". He states he has full range of motion until the right wrist, which won't extend. He states he as a history of numbness in his bilateral arms and hands for past 2-3 years but he denies prior inability to extend his wrist, which he is unable to do today. He also reports feeling off balance on his feet and has slight dizziness. Patient also reports increased phlegm. Reports slurred speech earlier today that has since improved. He denies facial numbness. He reports a history of smoking a pack a day. He also reports he drinks boxed wine. Wife reports the  binge drinks. Yesterday, the patient states he had a glass of wine, took his general OTC sleeping pills, and went to bed feeling normally.  (Wife was visibly skeptical that he drank only a \"glass of wine\" at which point she states he binge drinks to the point of intoxication). Of note, wife reports the patient recently started a new medication to help with emesis and nausea due to chemo therapy.      Allergies:  Blood transfusion antibodies     Medications:    Narcan  Diclofenac  Lorazepam  Gabapentin  revatio  Fentanyl  Carafate  Ferrous sulfate  miralax  Remeron  Prilosec    Past Medical History:    Gastric cancer  Depression  diabetes mellitus  HTN  Substance abuse  Anxiety    Past Surgical History:    Appendectomy  ENT surgery  EGD    Family " History:    No past pertinent family history.    Social History:  Current smoker: 1 pack per day  Positive for alcohol use: Binge drinking  Patient presents with wife.  Marital Status:        Review of Systems   Neurological: Positive for dizziness, weakness and numbness. Negative for facial asymmetry and speech difficulty.   All other systems reviewed and are negative.    Physical Exam   First Vitals:  BP: 141/78  Pulse: 82  Temp: 97.9  F (36.6  C)  Resp: 18  SpO2: 99 %      Physical Exam  General: Sitting in bed  HEENT:   The scalp and head appear normal    The pupils are equal, round, and reactive to light    Extraocular muscles are intact.    The nose is normal.    The oropharynx is normal.      Uvula is in the midline.    Neck:  Normal range of motion.    Lungs:  Clear.      No rales, no wheezing.      There is no tachypnea.  Non-labored.  Cardiac: Regular rate.      Normal S1 and S2.      No pathological murmur/rub    Abdomen: Soft. No distension, no localized tenderness or rebound.  MS:  Normal tone. Normal movement of all extremities.   Neurologic:     Normal mentation.  No cranial nerve deficits. No focal motor or sensory changes with the exception of the right radial nerve palsy.       Speech normal. Symmetrical nasal labial folds   Psych:  Awake.     Alert.      Normal affect.      Appropriate interactions.  Skin:  No rash.      No lesions.        Emergency Department Course   Laboratory:  CBC: WBC: 3.2 (L), HGB: 9.4 (L), PLT: 247  BMP: Glucose 125 (H), o/w WNL (Creatinine: 0.73)  Magnesium: 2.3    Interventions:  1719 NS 1L IV    Procedures:    Narrative:      Splint was applied and after placement I checked and adjusted the fit to ensure proper positioning. Patient was more comfortable with splint in place. Sensation and circulation are intact after splint placement.      Emergency Department Course:  1646 Nursing notes and vitals reviewed.  I performed an exam of the patient as documented above.  "    IV inserted. Medicine administered as documented above. Blood drawn. This was sent to the lab for further testing, results above.  1743 I consulted with Dr. Saavedra, Oncology, regarding the patient's history and presentation here in the emergency department.    1833 I rechecked the patient and discussed the results of his workup thus far.     The patient's right wrist was placed in a splint, see procedure note above.    Findings and plan explained to the Patient and spouse. Patient discharged home with instructions regarding supportive care, medications, and reasons to return. The importance of close follow-up was reviewed.     I personally reviewed the laboratory results with the Patient and spouse and answered all related questions prior to discharge.   Impression & Plan    Medical Decision Making:  Dashawn Ordoñez is a 57 year old male with an isolated radial nerve palsy. There are no other neurological symptoms. He does admit to sleeping on a futon last night because it was more comfortable than his bed. He cannot remember what position he fell asleep in. He did admit to \"minimal alcohol intake\" however his wife during the conversation was shaking her head to all of his answers and then finally stated he binge drinks quite heavily.  I do believe that his slurred speech early this morning was due to alcohol intoxication and his isolated nerve palsy is due to falling asleep with compression of his radial nerve, likely from compression of the right axillae.    I did talk with Dr. Saavedra on call for his oncologist for his stage 4 gastric cancer about the possibility of one of his chemotherapy agents causing an isolated nerve palsy and it was felt to be very unlikely since it is so isolated. In any event, I have instructed the patient to wear the wrist splint over the next seven days and then follow up with PCP in 72 hours for reevaluation. I made him aware that he should avoid any kind of positions that " impinge upon the axial regions on that side or on the other side for that matter.     Critical Care time:  none    Diagnosis:    ICD-10-CM    1. Lesion of right radial nerve G56.31 CBC with platelets differential     Basic metabolic panel     Magnesium   2. 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 providers found based on my observations and the provider's statements to me.     Madhavi Ayon  3/20/2018   [facility] EMERGENCY DEPARTMENT       Favio Lyman MD  03/21/18 0660

## 2018-03-20 NOTE — ED AVS SNAPSHOT
[facility] Emergency Department    [address]    [facility] [address]    Phone:  [phone]    Fax:  [phone]                                       Dashawn Ordoñez   MRN: [id number]    Department:  [facility] Emergency Department   Date of Visit:  3/20/2018           Patient Information     Date Of Birth          1960        Your diagnoses for this visit were:     Lesion of right radial nerve     Malignant neoplasm of cardia of stomach (H)        You were seen by Favio Lyman MD.      Follow-up Information     Follow up with Cristian Healy MD In 3 days.    Specialty:  Hematology & Oncology    Contact information:    [facility]  [address]  [facility] [address]  [phone]          Discharge Instructions         Wrist Drop  The radial nerve is one of three nerves that carry signals from the brain down the arm to the hand. It controls movement of the triceps muscle at the back of the upper arm and backward bending of the wrist. It also helps in movement and feeling in the hand. Wrist drop is caused by compression of the radial nerve. Symptoms of wrist drop include:    Weakness of the wrist and fingers    Inability to straighten the wrist or fingers    Numbness or tingling of the hand  Compression of the radial nerve is often caused by:    Unrelieved pressure on the radial nerve (from things such as sleeping with the arm trapped under the body)    Use of crutches resulting in too much pressure on the nerve    Long-term constriction of the wrist (for example, from a tight watch or bracelet)  The problem will likely go away on its once pressure to the nerve is relieved. If the nerve has been damaged, symptoms may be longer term. In this case, splinting the wrist to limit movement may help with healing. Physical therapy may be prescribed. Corticosteroids injections into the area may reduce swelling and pressure on the nerve. Surgery to repair  the nerve may be needed for symptoms that do not respond to simpler treatments.  Home care    Rest the wrist until normal feeling and strength return.    If you were given a splint or sling, wear it as directed.    If medicines were prescribed for pain or nerve sensations, take them as directed.    Avoid positions that may stretch or put pressure on the underarm.     If repetitive motion is the cause, avoid the activity that caused the problem.  Follow-up care  Follow up with your healthcare provider or as advised by our staff.  When to seek medical advice  Call your healthcare provider right away if you have any of the following:    Increasing arm swelling or pain    Numbness or weakness of the arm    Symptoms spread to other parts of the body    Slurred speech, confusion    Trouble speaking, walking, or seeing     Date Last Reviewed: 9/25/2015 2000-2017 [employer]. [address] [place] [address]. All rights reserved. This information is not intended as a substitute for professional medical care. Always follow your healthcare professional's instructions.          24 Hour Appointment Hotline       To make an appointment at any [facility], call [phone] ([phone]). If you don't have a family doctor or clinic, we will help you find one. [place] clinics are conveniently located to serve the needs of you and your family.             Review of your medicines      Our records show that you are taking the medicines listed below. If these are incorrect, please call your family doctor or clinic.        Dose / Directions Last dose taken    diclofenac 1 % Gel topical gel   Commonly known as:  VOLTAREN   Dose:  2 g   Quantity:  1 Tube        Apply 2 g topically 4 times daily To upper quadrant of abdomin and ribs.   Refills:  0        DRONABINOL PO        Refills:  0        fentaNYL 12 mcg/hr 72 hr patch   Commonly known as:  DURAGESIC   Dose:  1 patch   Quantity:  2 patch         Place 1 patch onto the skin every 72 hours remove old patch. Next change on 3/14 Wednesday at 11 am   Refills:  0        ferrous sulfate 325 (65 FE) MG tablet   Commonly known as:  IRON   Dose:  325 mg   Quantity:  30 tablet        Take 1 tablet (325 mg) by mouth daily (with breakfast)   Refills:  2        GABAPENTIN PO   Dose:  600 mg        Take 600 mg by mouth daily   Refills:  0        HYDROmorphone 4 MG tablet   Commonly known as:  DILAUDID   Dose:  4 mg   Quantity:  20 tablet        Take 1 tablet (4 mg) by mouth every 4 hours as needed for moderate to severe pain   Refills:  0        LORAZEPAM PO   Dose:  0.5 mg        Take 0.5 mg by mouth every 6 hours as needed for anxiety   Refills:  0        mirtazapine 7.5 MG Tabs tablet   Commonly known as:  REMERON   Dose:  7.5 mg   Quantity:  30 tablet        Take 1 tablet (7.5 mg) by mouth nightly as needed   Refills:  0        naloxone 1 mg/mL for intranasal kit (2 syringes with 2 mucosal atomizer device)   Commonly known as:  NARCAN   Quantity:  1 kit        In opioid overdose put cone in nostril and push 1/2 of contents into each nostril.  Repeat every 3 min if no response until help arrives.   Refills:  0        omeprazole 40 MG capsule   Commonly known as:  priLOSEC   Dose:  40 mg   Quantity:  30 capsule        Take 1 capsule (40 mg) by mouth 2 times daily (before meals)   Refills:  3        polyethylene glycol powder   Commonly known as:  MIRALAX   Dose:  1 capful   Quantity:  225 g        Take 17 g (1 capful) by mouth daily as needed for constipation   Refills:  0        REVATIO PO   Dose:  20 mg        Take 20 mg by mouth daily as needed   Refills:  0        sucralfate 1 GM/10ML suspension   Commonly known as:  CARAFATE   Dose:  1 g   Quantity:  420 mL        Take 10 mLs (1 g) by mouth 4 times daily   Refills:  0                Procedures and tests performed during your visit     Basic metabolic panel    CBC with platelets differential    Magnesium     Peripheral IV catheter    Splint      Orders Needing Specimen Collection     None      Pending Results     No orders found from 3/18/2018 to 3/21/2018.            Pending Culture Results     No orders found from 3/18/2018 to 3/21/2018.            Pending Results Instructions     If you had any lab results that were not finalized at the time of your Discharge, you can call the ED Lab Result RN at [phone]. You will be contacted by this team for any positive Lab results or changes in treatment. The nurses are available 7 days a week from 10A to 6:30P.  You can leave a message 24 hours per day and they will return your call.        Test Results From Your Hospital Stay        3/20/2018  5:03 PM      Component Results     Component Value Ref Range & Units Status    WBC 3.2 (L) 4.0 - 11.0 10e9/L Final    RBC Count 3.85 (L) 4.4 - 5.9 10e12/L Final    Hemoglobin 9.4 (L) 13.3 - 17.7 g/dL Final    Hematocrit 31.6 (L) 40.0 - 53.0 % Final    MCV 82 78 - 100 fl Final    MCH 24.4 (L) 26.5 - 33.0 pg Final    MCHC 29.7 (L) 31.5 - 36.5 g/dL Final    RDW 23.1 (H) 10.0 - 15.0 % Final    Platelet Count 247 150 - 450 10e9/L Final    Diff Method Automated Method  Final    % Neutrophils 37.2 % Final    % Lymphocytes 53.6 % Final    % Monocytes 7.7 % Final    % Eosinophils 0.6 % Final    % Basophils 0.3 % Final    % Immature Granulocytes 0.6 % Final    Nucleated RBCs 0 0 /100 Final    Absolute Neutrophil 1.2 (L) 1.6 - 8.3 10e9/L Final    Absolute Lymphocytes 1.7 0.8 - 5.3 10e9/L Final    Absolute Monocytes 0.3 0.0 - 1.3 10e9/L Final    Absolute Eosinophils 0.0 0.0 - 0.7 10e9/L Final    Absolute Basophils 0.0 0.0 - 0.2 10e9/L Final    Abs Immature Granulocytes 0.0 0 - 0.4 10e9/L Final    Absolute Nucleated RBC 0.0  Final         3/20/2018  5:16 PM      Component Results     Component Value Ref Range & Units Status    Sodium 136 133 - 144 mmol/L Final    Potassium 3.5 3.4 - 5.3 mmol/L Final    Chloride 100 94 - 109 mmol/L Final    Carbon  Dioxide 31 20 - 32 mmol/L Final    Anion Gap 5 3 - 14 mmol/L Final    Glucose 125 (H) 70 - 99 mg/dL Final    Urea Nitrogen 14 7 - 30 mg/dL Final    Creatinine 0.73 0.66 - 1.25 mg/dL Final    GFR Estimate >90 >60 mL/min/1.7m2 Final    Non  GFR Calc    GFR Estimate If Black >90 >60 mL/min/1.7m2 Final    African American GFR Calc    Calcium 8.9 8.5 - 10.1 mg/dL Final         3/20/2018  5:16 PM      Component Results     Component Value Ref Range & Units Status    Magnesium 2.3 1.6 - 2.3 mg/dL Final                Clinical Quality Measure: Blood Pressure Screening     Your blood pressure was checked while you were in the emergency department today. The last reading we obtained was  BP: 134/83 . Please read the guidelines below about what these numbers mean and what you should do about them.  If your systolic blood pressure (the top number) is less than 120 and your diastolic blood pressure (the bottom number) is less than 80, then your blood pressure is normal. There is nothing more that you need to do about it.  If your systolic blood pressure (the top number) is 120-139 or your diastolic blood pressure (the bottom number) is 80-89, your blood pressure may be higher than it should be. You should have your blood pressure rechecked within a year by a primary care provider.  If your systolic blood pressure (the top number) is 140 or greater or your diastolic blood pressure (the bottom number) is 90 or greater, you may have high blood pressure. High blood pressure is treatable, but if left untreated over time it can put you at risk for heart attack, stroke, or kidney failure. You should have your blood pressure rechecked by a primary care provider within the next 4 weeks.  If your provider in the emergency department today gave you specific instructions to follow-up with your doctor or provider even sooner than that, you should follow that instruction and not wait for up to 4 weeks for your follow-up  "visit.        Thank you for choosing [facility]       Thank you for choosing [facility] for your care. Our goal is always to provide you with excellent care. Hearing back from our patients is one way we can continue to improve our services. Please take a few minutes to complete the written survey that you may receive in the mail after you visit with us. Thank you!        [employer] Information     [employer] lets you send messages to your doctor, view your test results, renew your prescriptions, schedule appointments and more. To sign up, go to [email or web address] . Click on \"Log in\" on the left side of the screen, which will take you to the Welcome page. Then click on \"Sign up Now\" on the right side of the page.     You will be asked to enter the access code listed below, as well as some personal information. Please follow the directions to create your username and password.     Your access code is: [email or web address]  Expires: 2018  2:08 PM     Your access code will  in 90 days. If you need help or a new code, please call your [facility] clinic or [phone].        Care EveryWhere ID     This is your Care EveryWhere ID. This could be used by other organizations to access your [facility] medical records  [id number]        Equal Access to Services     VANESSA JOSHI : Hadryan sharmao Sozanaali, waaxda luqadaha, qaybta kaalmada adeegyada, emma simon. So [facility] [phone].    ATENCIÓN: Si habla español, tiene a sanz disposición servicios gratuitos de asistencia lingüística. Llame al [phone].    We comply with applicable federal civil rights laws and [place] laws. We do not discriminate on the basis of race, color, national origin, age, disability, sex, sexual orientation, or gender identity.            After Visit Summary       This is your record. Keep this with you and show to your community pharmacist(s) and doctor(s) at your next visit.                  "

## 2018-03-20 NOTE — DISCHARGE INSTRUCTIONS
Wrist Drop  The radial nerve is one of three nerves that carry signals from the brain down the arm to the hand. It controls movement of the triceps muscle at the back of the upper arm and backward bending of the wrist. It also helps in movement and feeling in the hand. Wrist drop is caused by compression of the radial nerve. Symptoms of wrist drop include:    Weakness of the wrist and fingers    Inability to straighten the wrist or fingers    Numbness or tingling of the hand  Compression of the radial nerve is often caused by:    Unrelieved pressure on the radial nerve (from things such as sleeping with the arm trapped under the body)    Use of crutches resulting in too much pressure on the nerve    Long-term constriction of the wrist (for example, from a tight watch or bracelet)  The problem will likely go away on its once pressure to the nerve is relieved. If the nerve has been damaged, symptoms may be longer term. In this case, splinting the wrist to limit movement may help with healing. Physical therapy may be prescribed. Corticosteroids injections into the area may reduce swelling and pressure on the nerve. Surgery to repair the nerve may be needed for symptoms that do not respond to simpler treatments.  Home care    Rest the wrist until normal feeling and strength return.    If you were given a splint or sling, wear it as directed.    If medicines were prescribed for pain or nerve sensations, take them as directed.    Avoid positions that may stretch or put pressure on the underarm.     If repetitive motion is the cause, avoid the activity that caused the problem.  Follow-up care  Follow up with your healthcare provider or as advised by our staff.  When to seek medical advice  Call your healthcare provider right away if you have any of the following:    Increasing arm swelling or pain    Numbness or weakness of the arm    Symptoms spread to other parts of the body    Slurred speech, confusion    Trouble  speaking, walking, or seeing     Date Last Reviewed: 9/25/2015 2000-2017 [employer]. [address] [place] [address], [place] [address]. All rights reserved. This information is not intended as a substitute for professional medical care. Always follow your healthcare professional's instructions.

## 2018-03-20 NOTE — ED NOTES
Patient discharged to home. Patient received follow-up information with Oncology. Patient received discharge instructions and has no other questions at this time.

## 2018-03-20 NOTE — ED AVS SNAPSHOT
[facility] Emergency Department    [address]    [facility] [address]    Phone:  [phone]    Fax:  [phone]                                       Dashawn Ordoñez   MRN: [id number]    Department:  [facility] Emergency Department   Date of Visit:  3/20/2018           After Visit Summary Signature Page     I have received my discharge instructions, and my questions have been answered. I have discussed any challenges I see with this plan with the nurse or doctor.    ..........................................................................................................................................  Patient/Patient Representative Signature      ..........................................................................................................................................  Patient Representative Print Name and Relationship to Patient    ..................................................               ................................................  Date                                            Time    ..........................................................................................................................................  Reviewed by Signature/Title    ...................................................              ..............................................  Date                                                            Time

## 2018-03-30 ENCOUNTER — APPOINTMENT (OUTPATIENT)
Dept: CT IMAGING | Facility: CLINIC | Age: 58
End: 2018-03-30
Attending: EMERGENCY MEDICINE
Payer: COMMERCIAL

## 2018-03-30 ENCOUNTER — HOSPITAL ENCOUNTER (EMERGENCY)
Facility: CLINIC | Age: 58
Discharge: HOME | End: 2018-03-30
Attending: EMERGENCY MEDICINE | Admitting: EMERGENCY MEDICINE
Payer: COMMERCIAL

## 2018-03-30 VITALS
OXYGEN SATURATION: 100 % | HEART RATE: 62 BPM | TEMPERATURE: 98.4 F | WEIGHT: 150 LBS | DIASTOLIC BLOOD PRESSURE: 82 MMHG | SYSTOLIC BLOOD PRESSURE: 150 MMHG | RESPIRATION RATE: 16 BRPM | BODY MASS INDEX: 22.15 KG/M2

## 2018-03-30 DIAGNOSIS — R10.13 ABDOMINAL PAIN, EPIGASTRIC: ICD-10-CM

## 2018-03-30 DIAGNOSIS — S54.21XA INJURY OF RADIAL NERVE AT RIGHT FOREARM LEVEL, INITIAL ENCOUNTER: ICD-10-CM

## 2018-03-30 DIAGNOSIS — M54.50 ACUTE LOW BACK PAIN WITHOUT SCIATICA, UNSPECIFIED BACK PAIN LATERALITY: ICD-10-CM

## 2018-03-30 LAB
ALBUMIN SERPL-MCNC: 3.4 G/DL (ref 3.4–5)
ALP SERPL-CCNC: 103 U/L (ref 40–150)
ALT SERPL W P-5'-P-CCNC: 17 U/L (ref 0–70)
ANION GAP SERPL CALCULATED.3IONS-SCNC: 7 MMOL/L (ref 3–14)
AST SERPL W P-5'-P-CCNC: 18 U/L (ref 0–45)
BASOPHILS # BLD AUTO: 0 10E9/L (ref 0–0.2)
BASOPHILS NFR BLD AUTO: 0.8 %
BILIRUB SERPL-MCNC: 0.7 MG/DL (ref 0.2–1.3)
BUN SERPL-MCNC: 13 MG/DL (ref 7–30)
CALCIUM SERPL-MCNC: 8.9 MG/DL (ref 8.5–10.1)
CHLORIDE SERPL-SCNC: 107 MMOL/L (ref 94–109)
CO2 SERPL-SCNC: 28 MMOL/L (ref 20–32)
CREAT SERPL-MCNC: 0.66 MG/DL (ref 0.66–1.25)
DIFFERENTIAL METHOD BLD: ABNORMAL
EOSINOPHIL # BLD AUTO: 0 10E9/L (ref 0–0.7)
EOSINOPHIL NFR BLD AUTO: 0.6 %
ERYTHROCYTE [DISTWIDTH] IN BLOOD BY AUTOMATED COUNT: 23.6 % (ref 10–15)
GFR SERPL CREATININE-BSD FRML MDRD: >90 ML/MIN/1.7M2
GLUCOSE SERPL-MCNC: 154 MG/DL (ref 70–99)
HCT VFR BLD AUTO: 33.7 % (ref 40–53)
HGB BLD-MCNC: 9.8 G/DL (ref 13.3–17.7)
IMM GRANULOCYTES # BLD: 0 10E9/L (ref 0–0.4)
IMM GRANULOCYTES NFR BLD: 0.4 %
LIPASE SERPL-CCNC: 138 U/L (ref 73–393)
LYMPHOCYTES # BLD AUTO: 0.6 10E9/L (ref 0.8–5.3)
LYMPHOCYTES NFR BLD AUTO: 11.8 %
MCH RBC QN AUTO: 23.8 PG (ref 26.5–33)
MCHC RBC AUTO-ENTMCNC: 29.1 G/DL (ref 31.5–36.5)
MCV RBC AUTO: 82 FL (ref 78–100)
MONOCYTES # BLD AUTO: 0.7 10E9/L (ref 0–1.3)
MONOCYTES NFR BLD AUTO: 13.8 %
NEUTROPHILS # BLD AUTO: 3.6 10E9/L (ref 1.6–8.3)
NEUTROPHILS NFR BLD AUTO: 72.6 %
NRBC # BLD AUTO: 0 10*3/UL
NRBC BLD AUTO-RTO: 0 /100
PLATELET # BLD AUTO: 283 10E9/L (ref 150–450)
POTASSIUM SERPL-SCNC: 3.8 MMOL/L (ref 3.4–5.3)
PROT SERPL-MCNC: 6.7 G/DL (ref 6.8–8.8)
RBC # BLD AUTO: 4.12 10E12/L (ref 4.4–5.9)
SODIUM SERPL-SCNC: 142 MMOL/L (ref 133–144)
WBC # BLD AUTO: 4.9 10E9/L (ref 4–11)

## 2018-03-30 PROCEDURE — 85025 COMPLETE CBC W/AUTO DIFF WBC: CPT | Performed by: EMERGENCY MEDICINE

## 2018-03-30 PROCEDURE — 72131 CT LUMBAR SPINE W/O DYE: CPT

## 2018-03-30 PROCEDURE — 80053 COMPREHEN METABOLIC PANEL: CPT | Performed by: EMERGENCY MEDICINE

## 2018-03-30 PROCEDURE — 99285 EMERGENCY DEPT VISIT HI MDM: CPT | Mod: 25

## 2018-03-30 PROCEDURE — 83690 ASSAY OF LIPASE: CPT | Performed by: EMERGENCY MEDICINE

## 2018-03-30 PROCEDURE — 25000128 H RX IP 250 OP 636: Performed by: EMERGENCY MEDICINE

## 2018-03-30 PROCEDURE — 96376 TX/PRO/DX INJ SAME DRUG ADON: CPT

## 2018-03-30 PROCEDURE — 74177 CT ABD & PELVIS W/CONTRAST: CPT

## 2018-03-30 PROCEDURE — 96374 THER/PROPH/DIAG INJ IV PUSH: CPT | Mod: 59

## 2018-03-30 RX ORDER — IOPAMIDOL 755 MG/ML
500 INJECTION, SOLUTION INTRAVASCULAR ONCE
Status: COMPLETED | OUTPATIENT
Start: 2018-03-30 | End: 2018-03-30

## 2018-03-30 RX ORDER — IOPAMIDOL 755 MG/ML
500 INJECTION, SOLUTION INTRAVASCULAR ONCE
Status: DISCONTINUED | OUTPATIENT
Start: 2018-03-30 | End: 2018-03-30

## 2018-03-30 RX ORDER — HYDROMORPHONE HYDROCHLORIDE 1 MG/ML
0.5 INJECTION, SOLUTION INTRAMUSCULAR; INTRAVENOUS; SUBCUTANEOUS
Status: DISCONTINUED | OUTPATIENT
Start: 2018-03-30 | End: 2018-03-31 | Stop reason: HOSPADM

## 2018-03-30 RX ADMIN — SODIUM CHLORIDE 60 ML: 9 INJECTION, SOLUTION INTRAVENOUS at 20:28

## 2018-03-30 RX ADMIN — IOPAMIDOL 75 ML: 755 INJECTION, SOLUTION INTRAVENOUS at 20:27

## 2018-03-30 RX ADMIN — HYDROMORPHONE HYDROCHLORIDE 0.5 MG: 1 INJECTION, SOLUTION INTRAMUSCULAR; INTRAVENOUS; SUBCUTANEOUS at 19:45

## 2018-03-30 RX ADMIN — HYDROMORPHONE HYDROCHLORIDE 0.5 MG: 1 INJECTION, SOLUTION INTRAMUSCULAR; INTRAVENOUS; SUBCUTANEOUS at 21:23

## 2018-03-30 ASSESSMENT — ENCOUNTER SYMPTOMS
NECK PAIN: 0
ARTHRALGIAS: 1
WEAKNESS: 1
BACK PAIN: 1
ABDOMINAL PAIN: 1

## 2018-03-30 NOTE — ED AVS SNAPSHOT
[facility] Emergency Department    [address]    [facility] [address]    Phone:  [phone]    Fax:  [phone]                                       Dashawn Ordoñez   MRN: [id number]    Department:  [facility] Emergency Department   Date of Visit:  3/30/2018           After Visit Summary Signature Page     I have received my discharge instructions, and my questions have been answered. I have discussed any challenges I see with this plan with the nurse or doctor.    ..........................................................................................................................................  Patient/Patient Representative Signature      ..........................................................................................................................................  Patient Representative Print Name and Relationship to Patient    ..................................................               ................................................  Date                                            Time    ..........................................................................................................................................  Reviewed by Signature/Title    ...................................................              ..............................................  Date                                                            Time

## 2018-03-30 NOTE — ED NOTES
Pt c/o lower back pain that started last night.  No loss of bowel/bladder.  No injury.  C/o right arm pain as well.  Pt was seen in ER about 1.5 week ago for right arm pain.

## 2018-03-30 NOTE — ED AVS SNAPSHOT
[facility] Emergency Department    [address]    [facility] [address]    Phone:  [phone]    Fax:  [phone]                                       Dashawn Ordoñez   MRN: [id number]    Department:  [facility] Emergency Department   Date of Visit:  3/30/2018           Patient Information     Date Of Birth          1960        Your diagnoses for this visit were:     Injury of radial nerve at right forearm level, initial encounter     Acute low back pain without sciatica, unspecified back pain laterality     Abdominal pain, epigastric        You were seen by Kady Vila MD.      Follow-up Information     Follow up with Cristian Healy MD In 1 day.    Specialty:  Hematology & Oncology    Contact information:    [facility]  [address] [facility] [address]  [facility] [address]  [phone]          Follow up with [facility] Emergency Department.    Specialty:  EMERGENCY MEDICINE    Why:  If symptoms worsen    Contact information:    [address]  [facility] [address] [phone]        Discharge Instructions       Discharge Instructions  Back Pain  You were seen today for back pain. Back pain can have many causes, but most will get better without surgery or other specific treatment. Sometimes there is a herniated ( slipped ) disc. We do not usually do MRI scans to look for these right away, since most herniated discs will get better on their own with time.  Today, we did not find any evidence that your back pain was caused by a serious condition. However, sometimes symptoms develop over time and cannot be found during an emergency visit, so it is very important that you follow up with your primary provider.  Generally, every Emergency Department visit should have a follow-up clinic visit with either a primary or a specialty clinic/provider. Please follow-up as instructed by your emergency provider today.    Return to the Emergency  Department if:    You develop a fever with your back pain.     You have weakness or change in sensation in one or both legs.    You lose control of your bowels or bladder, or cannot empty your bladder (cannot pee).    Your pain gets much worse.     Follow-up with your provider:    Unless your pain has completely gone away, please make an appointment with your provider within one week. Most of the routine care for back pain is available in a clinic and not the Emergency Department. You may need further management of your back pain, such as more pain medication, imaging such as an X-ray or MRI, or physical therapy.    What can I do to help myself?    Remain Active -- People are often afraid that they will hurt their back further or delay recovery by remaining active, but this is one of the best things you can do for your back. In fact, staying in bed for a long time to rest is not recommended. Studies have shown that people with low back pain recover faster when they remain active. Movement helps to bring blood flow to the muscles and relieve muscle spasms as well as preventing loss of muscle strength.    Heat -- Using a heating pad can help with low back pain during the first few weeks. Do not sleep with a heating pad, as you can be burned.     Pain medications - You may take a pain medication such as Tylenol  (acetaminophen), Advil , Motrin  (ibuprofen) or Aleve  (naproxen).  If you were given a prescription for medicine here today, be sure to read all of the information (including the package insert) that comes with your prescription.  This will include important information about the medicine, its side effects, and any warnings that you need to know about.  The pharmacist who fills the prescription can provide more information and answer questions you may have about the medicine.  If you have questions or concerns that the pharmacist cannot address, please call or return to the Emergency Department.   Remember  that you can always come back to the Emergency Department if you are not able to see your regular provider in the amount of time listed above, if you get any new symptoms, or if there is anything that worries you.  Discharge Instructions  Abdominal Pain    Abdominal pain (belly pain) can be caused by many things. Your evaluation today does not show the exact cause for your pain. Your provider today has decided that it is unlikely your pain is due to a life threatening problem, or a problem requiring surgery or hospital admission. Sometimes those problems cannot be found right away, so it is very important that you follow up as directed.  Sometimes only the changes which occur over time allow the cause of your pain to be found.    Generally, every Emergency Department visit should have a follow-up clinic visit with either a primary or a specialty clinic/provider. Please follow-up as instructed by your emergency provider today. With abdominal pain, we often recommend very close follow-up, such as the following day.    ADULTS:  Return to the Emergency Department right away if:      You get an oral temperature above 102oF or as directed by your provider.    You have blood in your stools. This may be bright red or appear as black, tarry stools.      You keep vomiting (throwing up) or cannot drink liquids.    You see blood when you vomit.     You cannot have a bowel movement or you cannot pass gas.    Your stomach gets bloated or bigger.    Your skin or the whites of your eyes look yellow.    You faint.    You have bloody, frequent or painful urination (peeing).    You have new symptoms or anything that worries you.    CHILDREN:  Return to the Emergency Department right away if your child has any of the above-listed symptoms or the following:      Pushes your hand away or screams/cries when his/her belly is touched.    You notice your child is very fussy or weak.    Your child is very tired and is too tired to eat or  drink.    Your child is dehydrated.  Signs of dehydration can be:  o Significant change in the amount of wet diapers/urine.  o Your infant or child starts to have dry mouth and lips, or no saliva (spit) or tears.    PREGNANT WOMEN:  Return to the Emergency Department right away if you have any of the above-listed symptoms or the following:      You have bleeding, leaking fluid or passing tissue from the vagina.    You have worse pain or cramping, or pain in your shoulder or back.    You have vomiting that will not stop.    You have a temperature of 100oF or more.    Your baby is not moving as much as usual.    You faint.    You get a bad headache with or without eye problems and abdominal pain.    You have a seizure.    You have unusual discharge from your vagina and abdominal pain.    Abdominal pain is pretty common during pregnancy.  Your pain may or may not be related to your pregnancy. You should follow-up closely with your OB provider so they can evaluate you and your baby.  Until you follow-up with your regular provider, do the following:       Avoid sex and do not put anything in your vagina.    Drink clear fluids.    Only take medications approved by your provider.    MORE INFORMATION:    Appendicitis:  A possible cause of abdominal pain in any person who still has their appendix is acute appendicitis. Appendicitis is often hard to diagnose.  Testing does not always rule out early appendicitis or other causes of abdominal pain. Close follow-up with your provider and re-evaluations may be needed to figure out the reason for your abdominal pain.    Follow-up:  It is very important that you make an appointment with your clinic and go to the appointment.  If you do not follow-up with your primary provider, it may result in missing an important development which could result in permanent injury or disability and/or lasting pain.  If there is any problem keeping your appointment, call your provider or return to  "the Emergency Department.    Medications:  Take your medications as directed by your provider today.  Before using over-the-counter medications, ask your provider and make sure to take the medications as directed.  If you have any questions about medications, ask your provider.    Diet:  Resume your normal diet as much as possible, but do not eat fried, fatty or spicy foods while you have pain.  Do not drink alcohol or have caffeine.  Do not smoke tobacco.    Probiotics: If you have been given an antibiotic, you may want to also take a probiotic pill or eat yogurt with live cultures. Probiotics have \"good bacteria\" to help your intestines stay healthy. Studies have shown that probiotics help prevent diarrhea (loose stools) and other intestine problems (including C. diff infection) when you take antibiotics. You can buy these without a prescription in the pharmacy section of the store.     If you were given a prescription for medicine here today, be sure to read all of the information (including the package insert) that comes with your prescription.  This will include important information about the medicine, its side effects, and any warnings that you need to know about.  The pharmacist who fills the prescription can provide more information and answer questions you may have about the medicine.  If you have questions or concerns that the pharmacist cannot address, please call or return to the Emergency Department.       Remember that you can always come back to the Emergency Department if you are not able to see your regular provider in the amount of time listed above, if you get any new symptoms, or if there is anything that worries you.      24 Hour Appointment Hotline       To make an appointment at any [facility], call [phone] ([phone]). If you don't have a family doctor or clinic, we will help you find one. [facility] are conveniently located to serve the needs of you and your " family.             Review of your medicines      Our records show that you are taking the medicines listed below. If these are incorrect, please call your family doctor or clinic.        Dose / Directions Last dose taken    diclofenac 1 % Gel topical gel   Commonly known as:  VOLTAREN   Dose:  2 g   Quantity:  1 Tube        Apply 2 g topically 4 times daily To upper quadrant of abdomin and ribs.   Refills:  0        DRONABINOL PO        Refills:  0        fentaNYL 12 mcg/hr 72 hr patch   Commonly known as:  DURAGESIC   Dose:  1 patch   Quantity:  2 patch        Place 1 patch onto the skin every 72 hours remove old patch. Next change on 3/14 Wednesday at 11 am   Refills:  0        ferrous sulfate 325 (65 FE) MG tablet   Commonly known as:  IRON   Dose:  325 mg   Quantity:  30 tablet        Take 1 tablet (325 mg) by mouth daily (with breakfast)   Refills:  2        GABAPENTIN PO   Dose:  600 mg        Take 600 mg by mouth daily   Refills:  0        HYDROmorphone 4 MG tablet   Commonly known as:  DILAUDID   Dose:  4 mg   Quantity:  20 tablet        Take 1 tablet (4 mg) by mouth every 4 hours as needed for moderate to severe pain   Refills:  0        LORAZEPAM PO   Dose:  0.5 mg        Take 0.5 mg by mouth every 6 hours as needed for anxiety   Refills:  0        mirtazapine 7.5 MG Tabs tablet   Commonly known as:  REMERON   Dose:  7.5 mg   Quantity:  30 tablet        Take 1 tablet (7.5 mg) by mouth nightly as needed   Refills:  0        naloxone 1 mg/mL for intranasal kit (2 syringes with 2 mucosal atomizer device)   Commonly known as:  NARCAN   Quantity:  1 kit        In opioid overdose put cone in nostril and push 1/2 of contents into each nostril.  Repeat every 3 min if no response until help arrives.   Refills:  0        omeprazole 40 MG capsule   Commonly known as:  priLOSEC   Dose:  40 mg   Quantity:  30 capsule        Take 1 capsule (40 mg) by mouth 2 times daily (before meals)   Refills:  3        polyethylene  glycol powder   Commonly known as:  MIRALAX   Dose:  1 capful   Quantity:  225 g        Take 17 g (1 capful) by mouth daily as needed for constipation   Refills:  0        REVATIO PO   Dose:  20 mg        Take 20 mg by mouth daily as needed   Refills:  0        sucralfate 1 GM/10ML suspension   Commonly known as:  CARAFATE   Dose:  1 g   Quantity:  420 mL        Take 10 mLs (1 g) by mouth 4 times daily   Refills:  0                Procedures and tests performed during your visit     CBC with platelets differential    CT Abdomen Pelvis w Contrast    Comprehensive metabolic panel    Lipase    Lumbar spine CT w/o contrast      Orders Needing Specimen Collection     None      Pending Results     No orders found from 3/28/2018 to 3/31/2018.            Pending Culture Results     No orders found from 3/28/2018 to 3/31/2018.            Pending Results Instructions     If you had any lab results that were not finalized at the time of your Discharge, you can call the ED Lab Result RN at [phone]. You will be contacted by this team for any positive Lab results or changes in treatment. The nurses are available 7 days a week from 10A to 6:30P.  You can leave a message 24 hours per day and they will return your call.        Test Results From Your Hospital Stay        3/30/2018  7:57 PM      Component Results     Component Value Ref Range & Units Status    WBC 4.9 4.0 - 11.0 10e9/L Final    RBC Count 4.12 (L) 4.4 - 5.9 10e12/L Final    Hemoglobin 9.8 (L) 13.3 - 17.7 g/dL Final    Hematocrit 33.7 (L) 40.0 - 53.0 % Final    MCV 82 78 - 100 fl Final    MCH 23.8 (L) 26.5 - 33.0 pg Final    MCHC 29.1 (L) 31.5 - 36.5 g/dL Final    RDW 23.6 (H) 10.0 - 15.0 % Final    Platelet Count 283 150 - 450 10e9/L Final    Diff Method Automated Method  Final    % Neutrophils 72.6 % Final    % Lymphocytes 11.8 % Final    % Monocytes 13.8 % Final    % Eosinophils 0.6 % Final    % Basophils 0.8 % Final    % Immature Granulocytes 0.4 % Final     Nucleated RBCs 0 0 /100 Final    Absolute Neutrophil 3.6 1.6 - 8.3 10e9/L Final    Absolute Lymphocytes 0.6 (L) 0.8 - 5.3 10e9/L Final    Absolute Monocytes 0.7 0.0 - 1.3 10e9/L Final    Absolute Eosinophils 0.0 0.0 - 0.7 10e9/L Final    Absolute Basophils 0.0 0.0 - 0.2 10e9/L Final    Abs Immature Granulocytes 0.0 0 - 0.4 10e9/L Final    Absolute Nucleated RBC 0.0  Final         3/30/2018  9:27 PM      Narrative     CT LUMBAR SPINE WITHOUT CONTRAST  3/30/2018 8:53 PM     HISTORY: assess for lesion; history of gastric cancer with liver  metastasis.     TECHNIQUE: Axial images of the lumbar spine were obtained without  intravenous contrast. Multiplanar reformations were performed.   Radiation dose for this scan was reduced using automated exposure  control, adjustment of the mA and/or kV according to patient size, or  iterative reconstruction technique.    COMPARISON: None.    FINDINGS:  There is no fracture or subluxation. No lytic or  destructive bone lesions are seen.  The paraspinous soft tissues  appear normal.    T12-L1:  Normal disc, facet joints, spinal canal and neural foramina.     L1-L2:  Normal disc, facet joints, spinal canal and neural foramina.    L2-L3:  Mild annular disc bulge. Central canal and neural foramen are  patent.     L3-L4:  Loss of disc space height. Mild symmetric annular disc bulge.  Central canal is normal. Mild foraminal stenosis due to the bulging  disc.     L4-L5:  Mild diffuse annular disc bulge. Central canal normal.     L5-S1:  Mild annular bulge. Central canal and neural foramen are  patent.      Mild degenerative changes are seen in both sacroiliac joints.        Impression     IMPRESSION:    1. No bone metastasis are identified.  2. Mild multilevel degenerative changes including degenerative changes  in both sacroiliac joints.    DENNIS ISLAS MD         3/30/2018  8:12 PM      Component Results     Component Value Ref Range & Units Status    Sodium 142 133 - 144 mmol/L Final     Potassium 3.8 3.4 - 5.3 mmol/L Final    Chloride 107 94 - 109 mmol/L Final    Carbon Dioxide 28 20 - 32 mmol/L Final    Anion Gap 7 3 - 14 mmol/L Final    Glucose 154 (H) 70 - 99 mg/dL Final    Urea Nitrogen 13 7 - 30 mg/dL Final    Creatinine 0.66 0.66 - 1.25 mg/dL Final    GFR Estimate >90 >60 mL/min/1.7m2 Final    Non  GFR Calc    GFR Estimate If Black >90 >60 mL/min/1.7m2 Final    African American GFR Calc    Calcium 8.9 8.5 - 10.1 mg/dL Final    Bilirubin Total 0.7 0.2 - 1.3 mg/dL Final    Albumin 3.4 3.4 - 5.0 g/dL Final    Protein Total 6.7 (L) 6.8 - 8.8 g/dL Final    Alkaline Phosphatase 103 40 - 150 U/L Final    ALT 17 0 - 70 U/L Final    AST 18 0 - 45 U/L Final         3/30/2018  8:12 PM      Component Results     Component Value Ref Range & Units Status    Lipase 138 73 - 393 U/L Final         3/30/2018  9:36 PM      Narrative     CT ABDOMEN AND PELVIS WITH CONTRAST  3/30/2018 8:53 PM    HISTORY: Epigastric tenderness. Gastric cancer.    COMPARISON: A CT on 3/10/2018.    TECHNIQUE: Routine transverse CT imaging of the abdomen and pelvis was  performed following the uneventful administration of 75mL Isovue-370  intravenous contrast. Radiation dose for this scan was reduced using  automated exposure control, adjustment of the mA and/or kV according  to patient size, or iterative reconstruction technique.    FINDINGS: The visualized lung bases are clear. Again seen is an  approximately 7 cm hemangioma of the dome of the liver. Much smaller  low-density lesions elsewhere in the liver are also unchanged. The  spleen, pancreas, gallbladder, adrenal glands, kidneys, and bladder  remain normal. Again suspected are a few mildly enlarged lymph nodes  near the cardia of the stomach. No other enlarged lymph node or  abnormal mass is seen. No free fluid is seen. No free intraperitoneal  gas is identified. The previously seen mass within the gastric cardia  is not as well seen currently due to  suboptimal contrast opacification  of this area. It likely is unchanged. There are scattered diverticula  of the sigmoid colon with no evidence of diverticulitis. No other  gastrointestinal tract abnormality is seen. The appendix is not  identified. There is no additional evidence of appendicitis. There is  mild calcification in the vascular structures. There are degenerative  changes in the spine. No abdominal or pelvic wall pathology is  demonstrated.         Impression     IMPRESSION: I see no definite change since a CT on 3/10/2018. Again  seen is a mass within the gastric cardia with adjacent enlarged lymph  nodes. There is no evidence of bowel perforation.    FUAD REILLY MD                Clinical Quality Measure: Blood Pressure Screening     Your blood pressure was checked while you were in the emergency department today. The last reading we obtained was  BP: 150/82 . Please read the guidelines below about what these numbers mean and what you should do about them.  If your systolic blood pressure (the top number) is less than 120 and your diastolic blood pressure (the bottom number) is less than 80, then your blood pressure is normal. There is nothing more that you need to do about it.  If your systolic blood pressure (the top number) is 120-139 or your diastolic blood pressure (the bottom number) is 80-89, your blood pressure may be higher than it should be. You should have your blood pressure rechecked within a year by a primary care provider.  If your systolic blood pressure (the top number) is 140 or greater or your diastolic blood pressure (the bottom number) is 90 or greater, you may have high blood pressure. High blood pressure is treatable, but if left untreated over time it can put you at risk for heart attack, stroke, or kidney failure. You should have your blood pressure rechecked by a primary care provider within the next 4 weeks.  If your provider in the emergency department today gave  "you specific instructions to follow-up with your doctor or provider even sooner than that, you should follow that instruction and not wait for up to 4 weeks for your follow-up visit.        Thank you for choosing [facility]       Thank you for choosing [facility] for your care. Our goal is always to provide you with excellent care. Hearing back from our patients is one way we can continue to improve our services. Please take a few minutes to complete the written survey that you may receive in the mail after you visit with us. Thank you!        [employer] Information     [employer] lets you send messages to your doctor, view your test results, renew your prescriptions, schedule appointments and more. To sign up, go to [email or web address] . Click on \"Log in\" on the left side of the screen, which will take you to the Welcome page. Then click on \"Sign up Now\" on the right side of the page.     You will be asked to enter the access code listed below, as well as some personal information. Please follow the directions to create your username and password.     Your access code is: [email or web address]  Expires: 2018  2:08 PM     Your access code will  in 90 days. If you need help or a new code, please call your [facility] clinic or [phone].        Care EveryWhere ID     This is your Care EveryWhere ID. This could be used by other organizations to access your [facility] medical records  [id number]        Equal Access to Services     VANESSA JOSHI : Hadii nito Brewster, waaxda sabrina, qaybta kaalmada sally, emma grigsby . So [facility] [phone].    ATENCIÓN: Si habla español, tiene a sanz disposición servicios gratuitos de asistencia lingüística. Llame al [phone].    We comply with applicable federal civil rights laws and [place] laws. We do not discriminate on the basis of race, color, national origin, age, disability, sex, sexual orientation, or gender identity.            After " Visit Summary       This is your record. Keep this with you and show to your community pharmacist(s) and doctor(s) at your next visit.

## 2018-03-31 NOTE — DISCHARGE INSTRUCTIONS
Discharge Instructions  Back Pain  You were seen today for back pain. Back pain can have many causes, but most will get better without surgery or other specific treatment. Sometimes there is a herniated ( slipped ) disc. We do not usually do MRI scans to look for these right away, since most herniated discs will get better on their own with time.  Today, we did not find any evidence that your back pain was caused by a serious condition. However, sometimes symptoms develop over time and cannot be found during an emergency visit, so it is very important that you follow up with your primary provider.  Generally, every Emergency Department visit should have a follow-up clinic visit with either a primary or a specialty clinic/provider. Please follow-up as instructed by your emergency provider today.    Return to the Emergency Department if:    You develop a fever with your back pain.     You have weakness or change in sensation in one or both legs.    You lose control of your bowels or bladder, or cannot empty your bladder (cannot pee).    Your pain gets much worse.     Follow-up with your provider:    Unless your pain has completely gone away, please make an appointment with your provider within one week. Most of the routine care for back pain is available in a clinic and not the Emergency Department. You may need further management of your back pain, such as more pain medication, imaging such as an X-ray or MRI, or physical therapy.    What can I do to help myself?    Remain Active -- People are often afraid that they will hurt their back further or delay recovery by remaining active, but this is one of the best things you can do for your back. In fact, staying in bed for a long time to rest is not recommended. Studies have shown that people with low back pain recover faster when they remain active. Movement helps to bring blood flow to the muscles and relieve muscle spasms as well as preventing loss of muscle  strength.    Heat -- Using a heating pad can help with low back pain during the first few weeks. Do not sleep with a heating pad, as you can be burned.     Pain medications - You may take a pain medication such as Tylenol  (acetaminophen), Advil , Motrin  (ibuprofen) or Aleve  (naproxen).  If you were given a prescription for medicine here today, be sure to read all of the information (including the package insert) that comes with your prescription.  This will include important information about the medicine, its side effects, and any warnings that you need to know about.  The pharmacist who fills the prescription can provide more information and answer questions you may have about the medicine.  If you have questions or concerns that the pharmacist cannot address, please call or return to the Emergency Department.   Remember that you can always come back to the Emergency Department if you are not able to see your regular provider in the amount of time listed above, if you get any new symptoms, or if there is anything that worries you.  Discharge Instructions  Abdominal Pain    Abdominal pain (belly pain) can be caused by many things. Your evaluation today does not show the exact cause for your pain. Your provider today has decided that it is unlikely your pain is due to a life threatening problem, or a problem requiring surgery or hospital admission. Sometimes those problems cannot be found right away, so it is very important that you follow up as directed.  Sometimes only the changes which occur over time allow the cause of your pain to be found.    Generally, every Emergency Department visit should have a follow-up clinic visit with either a primary or a specialty clinic/provider. Please follow-up as instructed by your emergency provider today. With abdominal pain, we often recommend very close follow-up, such as the following day.    ADULTS:  Return to the Emergency Department right away if:      You get an oral  temperature above 102oF or as directed by your provider.    You have blood in your stools. This may be bright red or appear as black, tarry stools.      You keep vomiting (throwing up) or cannot drink liquids.    You see blood when you vomit.     You cannot have a bowel movement or you cannot pass gas.    Your stomach gets bloated or bigger.    Your skin or the whites of your eyes look yellow.    You faint.    You have bloody, frequent or painful urination (peeing).    You have new symptoms or anything that worries you.    CHILDREN:  Return to the Emergency Department right away if your child has any of the above-listed symptoms or the following:      Pushes your hand away or screams/cries when his/her belly is touched.    You notice your child is very fussy or weak.    Your child is very tired and is too tired to eat or drink.    Your child is dehydrated.  Signs of dehydration can be:  o Significant change in the amount of wet diapers/urine.  o Your infant or child starts to have dry mouth and lips, or no saliva (spit) or tears.    PREGNANT WOMEN:  Return to the Emergency Department right away if you have any of the above-listed symptoms or the following:      You have bleeding, leaking fluid or passing tissue from the vagina.    You have worse pain or cramping, or pain in your shoulder or back.    You have vomiting that will not stop.    You have a temperature of 100oF or more.    Your baby is not moving as much as usual.    You faint.    You get a bad headache with or without eye problems and abdominal pain.    You have a seizure.    You have unusual discharge from your vagina and abdominal pain.    Abdominal pain is pretty common during pregnancy.  Your pain may or may not be related to your pregnancy. You should follow-up closely with your OB provider so they can evaluate you and your baby.  Until you follow-up with your regular provider, do the following:       Avoid sex and do not put anything in your  "vagina.    Drink clear fluids.    Only take medications approved by your provider.    MORE INFORMATION:    Appendicitis:  A possible cause of abdominal pain in any person who still has their appendix is acute appendicitis. Appendicitis is often hard to diagnose.  Testing does not always rule out early appendicitis or other causes of abdominal pain. Close follow-up with your provider and re-evaluations may be needed to figure out the reason for your abdominal pain.    Follow-up:  It is very important that you make an appointment with your clinic and go to the appointment.  If you do not follow-up with your primary provider, it may result in missing an important development which could result in permanent injury or disability and/or lasting pain.  If there is any problem keeping your appointment, call your provider or return to the Emergency Department.    Medications:  Take your medications as directed by your provider today.  Before using over-the-counter medications, ask your provider and make sure to take the medications as directed.  If you have any questions about medications, ask your provider.    Diet:  Resume your normal diet as much as possible, but do not eat fried, fatty or spicy foods while you have pain.  Do not drink alcohol or have caffeine.  Do not smoke tobacco.    Probiotics: If you have been given an antibiotic, you may want to also take a probiotic pill or eat yogurt with live cultures. Probiotics have \"good bacteria\" to help your intestines stay healthy. Studies have shown that probiotics help prevent diarrhea (loose stools) and other intestine problems (including C. diff infection) when you take antibiotics. You can buy these without a prescription in the pharmacy section of the store.     If you were given a prescription for medicine here today, be sure to read all of the information (including the package insert) that comes with your prescription.  This will include important information about " the medicine, its side effects, and any warnings that you need to know about.  The pharmacist who fills the prescription can provide more information and answer questions you may have about the medicine.  If you have questions or concerns that the pharmacist cannot address, please call or return to the Emergency Department.       Remember that you can always come back to the Emergency Department if you are not able to see your regular provider in the amount of time listed above, if you get any new symptoms, or if there is anything that worries you.

## 2018-03-31 NOTE — ED PROVIDER NOTES
History     Chief Complaint:  Back Pain and Arm Pain    HPI   Dashawn Ordoñez is a 57 year old male who presents to the emergency department today with back pain and arm pain. The patient was seen last week for right arm pain. He reports that his arm is numb and painful, he cannot  things, has limited hand mobility. He cannot lift his wrist. He can lift his elbow, but reports some weakness there too. He woke up with his hand like that, and it has been acting this way since. 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 The pain is mostly in the wrist; it is constant, nagging, and aching. He denies injury. When he went to the doctor he just suggested that he slept on it wrong, but the pain has not improved.     Since yesterday he has also had lower back pain and abdominal pain. The pain does not radiate down his legs. He rates the pain as 9/10 overall. He denies neck pain. The patient also had an injection in the upper back about 2 weeks ago for two degenerating disks in his upper back. This pain is different in location and severity. The patient also has gastric cancer that has moved to his liver. His last chemo treatment was 2 weeks ago. He has had the cancer since October. He also has a history of ulcers.     Allergies:  Blood Transfusion Related     Medications:    Dronabinol  Narcan  Voltaren  Lorazepam  Gabapentin  Revatio  Duragesic  Dilaudid  Carafate  Miralax  Iron  Remeron  Prilosec    Past Medical History:    Cancer  GIB  Fever   Type II diabetes  GIB  GI bleed  Hypertension   Anxiety  Tobacco use disorder  Alcohol abuse  Depression  Diabetes  Hypertension  Stomach cancer  Substance abuse    Past Surgical History:    Appendectomy  ENT surgery  EGD    Family History:    History reviewed. No pertinent family history.     Social History:  The patient was accompanied to the ED by wife.  Smoking Status: Current every day smoker  Smokeless Tobacco: Never  Alcohol Use: Yes    Marital Status:      Review of Systems   Gastrointestinal: Positive for abdominal pain.   Musculoskeletal: Positive for arthralgias (right arm and wrist pain) and back pain. Negative for neck pain.   Neurological: Positive for weakness (right hand).   All other systems reviewed and are negative.    Physical Exam     Patient Vitals for the past 24 hrs:   BP Temp Temp src Pulse Resp SpO2 Weight   03/30/18 2125 - - - - - 100 % -   03/30/18 2123 - - - 62 - 100 % -   03/30/18 2122 - - - - - 100 % -   03/30/18 2121 - - - - - 100 % -   03/30/18 2120 150/82 - - - - - -   03/30/18 2015 - - - - - 98 % -   03/30/18 2000 - - - - - 97 % -   03/30/18 1954 - - - - - 100 % -   03/30/18 1945 - - - - - 98 % -   03/30/18 1915 - - - - - 98 % -   03/30/18 1900 144/89 - - - - 100 % -   03/30/18 1855 147/87 98.4  F (36.9  C) Oral 95 16 100 % -   03/30/18 1853 - - - - - - 68 kg (150 lb)      Physical Exam  General: Patient is alert and interactive when I enter the room  Head:  The scalp, face, and head appear normal  Eyes:  Conjunctivae are normal  ENT:    The nose is normal    Pinnae are normal    External acoustic canals are normal  Neck:  Trachea midline  CV:  Pulses are normal, RRR  Resp:  No respiratory distress, CTAB  Abdomen:      Soft, mild epigastric tenderness, non-distended  Musc:  Normal muscular tone    No major joint effusions    2+  DP pulses  Skin:  No rash or lesions noted  Neuro: Speech is normal and fluent. Face is symmetric.     Moving all extremities well. 5/5 strength of lower extremities, gait intact, sensation intact    Right upper extremity: wrist drop and finger extension 0/5, elbow extension/flexion and shoulder adduction intact, 2+ radial pulse   Psych:  Awake. Alert.  Normal affect.  Appropriate interactions.    Emergency Department Course   Imaging:  Radiology findings were communicated with the patient and family who voiced understanding of the findings.  CT Abdomen Pelvis w Contrast  IMPRESSION: I  see no definite change since a CT on 3/10/2018. Again seen is a mass within the gastric cardia with adjacent enlarged lymph nodes. There is no evidence of bowel perforation.  Report per radiology      Lumbar Spine CT w/o contrast  IMPRESSION:    1. No bone metastasis are identified.  2. Mild multilevel degenerative changes including degenerative changes in both sacroiliac joints.  Report per radiology      Laboratory:  Laboratory findings were communicated with the patient and family who voiced understanding of the findings.  CBC: WNL (WBC 4.9, HGB 9.8(L), )  CMP: 154(H) Glucose, 6.7(L) Protein total o/w WNL (Creatinine 0.66)   Lipase: 138     Interventions:  1945: Dilaudid 0.5mg IV    2028: 0.9% NaCl 60mL IV Bolus   2123: Dilaudid 0.5mg IV      Emergency Department Course:  Nursing notes and vitals reviewed.  1909: I performed an exam of the patient as documented above.   IV was inserted and blood was drawn for laboratory testing, results above.  The patient was sent for a Abdomen Pelvis CT and Lumbar spine CT while in the emergency department, results above.   2139: Findings and plan explained to the Patient. Patient discharged home with instructions regarding supportive care, medications, and reasons to return. The importance of close follow-up was reviewed.   I personally reviewed the laboratory and imaging results with the Patient and answered all related questions prior to discharge.      Impression & Plan    Medical Decision Making:  Dashawn Ordoñez is a 57 year old male with a history of gastric cancer who presents with multiple complaints. It seems to be that his main concerns are right wrist pain, with associated wrist drop, back pain, and epigastric pain. He does have a history of back pain, but this is on a different location and worsened severity. He is otherwise neurologically intact except for his obvious wrist drop. His weakness seems to be localized from his mid forearm down. He has actually  intact elbow extension and flexion as well as shoulder adduction. He an injection a month ago, so it is possible that he had an injury from that injection. They did his injection on the radial side so it is definitely possible. I doubt a radial nerve palsy on the axilla as he has no weakness of elbow extension, which points to a more distal injury or compression. He has no neck pain to suggest radiculopathy and no real radicular-type symptoms. So I think this is something peripheral that needs an EMG and further workup with orthopedics and neurology. I do not think a further workup for this is warranted at this time. He has no other symptoms of stroke and given how peripheral this is, I doubt a CVA as the cause of his symptoms. In terms of his back pain, he is certainly at risk with his history of cancer to have a mets so a CT lumbar was done, this was normal. He has no other red flags to suggest a cauda equina. He has no weakness on exam to warrant an MRI and he definitely has no radiculopathy. In terms of his epigastric pain, I did repeat a CT to make sure there was no change. This is likely chronic abdominal pain. Patient at this point, after getting dilaudid for pain control, was ok with going home. He will contact his doctor and follow up with specialists as we stated. Careful return precautions were discussed. Patient discharged.     Diagnosis:    ICD-10-CM    1. Injury of radial nerve at right forearm level, initial encounter S54.21XA    2. Acute low back pain without sciatica, unspecified back pain laterality M54.5    3. Abdominal pain, epigastric R10.13        Disposition:  discharged to home    Scribe Disclosure:  I, Kristina Ramey, am serving as a scribe at 7:09 PM on 3/30/2018 to document services personally performed by Kady Vila MD based on my observations and the provider's statements to me.    3/30/2018   [facility] EMERGENCY DEPARTMENT       Kady Vila MD  03/31/18  2048

## 2018-04-21 ENCOUNTER — APPOINTMENT (OUTPATIENT)
Dept: CT IMAGING | Facility: CLINIC | Age: 58
End: 2018-04-21
Attending: EMERGENCY MEDICINE
Payer: COMMERCIAL

## 2018-04-21 ENCOUNTER — APPOINTMENT (OUTPATIENT)
Dept: GENERAL RADIOLOGY | Facility: CLINIC | Age: 58
End: 2018-04-21
Attending: EMERGENCY MEDICINE
Payer: COMMERCIAL

## 2018-04-21 ENCOUNTER — HOSPITAL ENCOUNTER (EMERGENCY)
Facility: CLINIC | Age: 58
Discharge: HOME | End: 2018-04-21
Attending: EMERGENCY MEDICINE | Admitting: EMERGENCY MEDICINE
Payer: COMMERCIAL

## 2018-04-21 ENCOUNTER — NURSE TRIAGE (OUTPATIENT)
Dept: NURSING | Facility: CLINIC | Age: 58
End: 2018-04-21

## 2018-04-21 VITALS
OXYGEN SATURATION: 94 % | RESPIRATION RATE: 18 BRPM | DIASTOLIC BLOOD PRESSURE: 86 MMHG | SYSTOLIC BLOOD PRESSURE: 153 MMHG | HEART RATE: 96 BPM | WEIGHT: 135 LBS | BODY MASS INDEX: 19.94 KG/M2 | TEMPERATURE: 98.9 F

## 2018-04-21 DIAGNOSIS — K59.00 CONSTIPATION, UNSPECIFIED CONSTIPATION TYPE: ICD-10-CM

## 2018-04-21 LAB
ALBUMIN SERPL-MCNC: 3.8 G/DL (ref 3.4–5)
ALBUMIN UR-MCNC: 30 MG/DL
ALP SERPL-CCNC: 91 U/L (ref 40–150)
ALT SERPL W P-5'-P-CCNC: 19 U/L (ref 0–70)
ANION GAP SERPL CALCULATED.3IONS-SCNC: 6 MMOL/L (ref 3–14)
APPEARANCE UR: CLEAR
AST SERPL W P-5'-P-CCNC: 17 U/L (ref 0–45)
BASOPHILS # BLD AUTO: 0.1 10E9/L (ref 0–0.2)
BASOPHILS NFR BLD AUTO: 0.6 %
BILIRUB SERPL-MCNC: 0.4 MG/DL (ref 0.2–1.3)
BILIRUB UR QL STRIP: NEGATIVE
BUN SERPL-MCNC: 16 MG/DL (ref 7–30)
CALCIUM SERPL-MCNC: 8.9 MG/DL (ref 8.5–10.1)
CHLORIDE SERPL-SCNC: 105 MMOL/L (ref 94–109)
CO2 SERPL-SCNC: 28 MMOL/L (ref 20–32)
COLOR UR AUTO: YELLOW
CREAT BLD-MCNC: 0.6 MG/DL (ref 0.66–1.25)
CREAT SERPL-MCNC: 0.71 MG/DL (ref 0.66–1.25)
DIFFERENTIAL METHOD BLD: ABNORMAL
EOSINOPHIL # BLD AUTO: 0 10E9/L (ref 0–0.7)
EOSINOPHIL NFR BLD AUTO: 0.1 %
ERYTHROCYTE [DISTWIDTH] IN BLOOD BY AUTOMATED COUNT: 20.4 % (ref 10–15)
GFR SERPL CREATININE-BSD FRML MDRD: >90 ML/MIN/1.7M2
GFR SERPL CREATININE-BSD FRML MDRD: >90 ML/MIN/1.7M2
GLUCOSE SERPL-MCNC: 114 MG/DL (ref 70–99)
GLUCOSE UR STRIP-MCNC: 150 MG/DL
HCT VFR BLD AUTO: 30 % (ref 40–53)
HGB BLD-MCNC: 8.8 G/DL (ref 13.3–17.7)
HGB UR QL STRIP: NEGATIVE
IMM GRANULOCYTES # BLD: 0 10E9/L (ref 0–0.4)
IMM GRANULOCYTES NFR BLD: 0.2 %
KETONES UR STRIP-MCNC: NEGATIVE MG/DL
LACTATE BLD-SCNC: 0.9 MMOL/L (ref 0.7–2)
LEUKOCYTE ESTERASE UR QL STRIP: NEGATIVE
LIPASE SERPL-CCNC: 153 U/L (ref 73–393)
LYMPHOCYTES # BLD AUTO: 1 10E9/L (ref 0.8–5.3)
LYMPHOCYTES NFR BLD AUTO: 11.8 %
MCH RBC QN AUTO: 23.3 PG (ref 26.5–33)
MCHC RBC AUTO-ENTMCNC: 29.3 G/DL (ref 31.5–36.5)
MCV RBC AUTO: 80 FL (ref 78–100)
MONOCYTES # BLD AUTO: 0.7 10E9/L (ref 0–1.3)
MONOCYTES NFR BLD AUTO: 9 %
MUCOUS THREADS #/AREA URNS LPF: PRESENT /LPF
NEUTROPHILS # BLD AUTO: 6.3 10E9/L (ref 1.6–8.3)
NEUTROPHILS NFR BLD AUTO: 78.3 %
NITRATE UR QL: NEGATIVE
NRBC # BLD AUTO: 0 10*3/UL
NRBC BLD AUTO-RTO: 0 /100
PH UR STRIP: 7 PH (ref 5–7)
PLATELET # BLD AUTO: 170 10E9/L (ref 150–450)
POTASSIUM SERPL-SCNC: 4 MMOL/L (ref 3.4–5.3)
PROT SERPL-MCNC: 6.9 G/DL (ref 6.8–8.8)
RBC # BLD AUTO: 3.77 10E12/L (ref 4.4–5.9)
RBC #/AREA URNS AUTO: 1 /HPF (ref 0–2)
SODIUM SERPL-SCNC: 139 MMOL/L (ref 133–144)
SOURCE: ABNORMAL
SP GR UR STRIP: 1 (ref 1–1.03)
UROBILINOGEN UR STRIP-MCNC: 0 MG/DL (ref 0–2)
WBC # BLD AUTO: 8.1 10E9/L (ref 4–11)
WBC #/AREA URNS AUTO: 1 /HPF (ref 0–5)

## 2018-04-21 PROCEDURE — 74177 CT ABD & PELVIS W/CONTRAST: CPT

## 2018-04-21 PROCEDURE — 25000128 H RX IP 250 OP 636: Performed by: EMERGENCY MEDICINE

## 2018-04-21 PROCEDURE — 83690 ASSAY OF LIPASE: CPT | Performed by: EMERGENCY MEDICINE

## 2018-04-21 PROCEDURE — 74019 RADEX ABDOMEN 2 VIEWS: CPT

## 2018-04-21 PROCEDURE — 96374 THER/PROPH/DIAG INJ IV PUSH: CPT

## 2018-04-21 PROCEDURE — 99285 EMERGENCY DEPT VISIT HI MDM: CPT | Mod: 25

## 2018-04-21 PROCEDURE — 85025 COMPLETE CBC W/AUTO DIFF WBC: CPT | Performed by: EMERGENCY MEDICINE

## 2018-04-21 PROCEDURE — 25000132 ZZH RX MED GY IP 250 OP 250 PS 637: Performed by: EMERGENCY MEDICINE

## 2018-04-21 PROCEDURE — 81001 URINALYSIS AUTO W/SCOPE: CPT | Performed by: EMERGENCY MEDICINE

## 2018-04-21 PROCEDURE — 82565 ASSAY OF CREATININE: CPT

## 2018-04-21 PROCEDURE — 96375 TX/PRO/DX INJ NEW DRUG ADDON: CPT

## 2018-04-21 PROCEDURE — 80053 COMPREHEN METABOLIC PANEL: CPT | Performed by: EMERGENCY MEDICINE

## 2018-04-21 PROCEDURE — 83605 ASSAY OF LACTIC ACID: CPT | Performed by: EMERGENCY MEDICINE

## 2018-04-21 RX ORDER — IOPAMIDOL 755 MG/ML
500 INJECTION, SOLUTION INTRAVASCULAR ONCE
Status: COMPLETED | OUTPATIENT
Start: 2018-04-21 | End: 2018-04-21

## 2018-04-21 RX ORDER — HYDROMORPHONE HYDROCHLORIDE 1 MG/ML
0.5 INJECTION, SOLUTION INTRAMUSCULAR; INTRAVENOUS; SUBCUTANEOUS ONCE
Status: COMPLETED | OUTPATIENT
Start: 2018-04-21 | End: 2018-04-21

## 2018-04-21 RX ORDER — POLYETHYLENE GLYCOL 3350 17 G/17G
1 POWDER, FOR SOLUTION ORAL DAILY
Qty: 68 G | Refills: 0 | Status: SHIPPED | OUTPATIENT
Start: 2018-04-21 | End: 2018-04-25

## 2018-04-21 RX ORDER — ONDANSETRON 2 MG/ML
4 INJECTION INTRAMUSCULAR; INTRAVENOUS ONCE
Status: COMPLETED | OUTPATIENT
Start: 2018-04-21 | End: 2018-04-21

## 2018-04-21 RX ORDER — SENNOSIDES A AND B 8.6 MG/1
1 TABLET, FILM COATED ORAL DAILY
Qty: 30 TABLET | Refills: 0 | Status: SHIPPED | OUTPATIENT
Start: 2018-04-21

## 2018-04-21 RX ADMIN — IOPAMIDOL 68 ML: 755 INJECTION, SOLUTION INTRAVENOUS at 16:48

## 2018-04-21 RX ADMIN — HYDROMORPHONE HYDROCHLORIDE 0.5 MG: 1 INJECTION, SOLUTION INTRAMUSCULAR; INTRAVENOUS; SUBCUTANEOUS at 17:16

## 2018-04-21 RX ADMIN — DOCUSATE SODIUM 286 ML: 50 LIQUID ORAL at 18:20

## 2018-04-21 RX ADMIN — ONDANSETRON 4 MG: 2 INJECTION INTRAMUSCULAR; INTRAVENOUS at 19:22

## 2018-04-21 RX ADMIN — SODIUM CHLORIDE 57 ML: 9 INJECTION, SOLUTION INTRAVENOUS at 16:48

## 2018-04-21 ASSESSMENT — ENCOUNTER SYMPTOMS
ABDOMINAL PAIN: 1
DIFFICULTY URINATING: 0
CONSTIPATION: 1
VOMITING: 0
FREQUENCY: 0
FEVER: 0
DYSURIA: 0

## 2018-04-21 NOTE — TELEPHONE ENCOUNTER
Patient reports that he is being treated for cancer. He is on strong pain medications. He recently ran out of his stool softener. Reports severe constipation. His last bowel movement was 4 days ago. He has the urge to have a bm, but has severe pain when trying to pass the stool. Said he sees stars due to the severe pain. Feels nauseated. Did not sleep well last night. Reports abdominal pain along with the constipation.    Protocol and care advice reviewed.   Caller states understanding of the recommended disposition.  Advised to call back if further questions or concerns.     Elizabet Victor RN/FNA    Reason for Disposition    [1] Constant abdominal pain AND [2] present > 2 hours    Additional Information    Negative: [1] Abdominal pain is main symptom AND [2] adult male    Negative: [1] Abdominal pain is main symptom AND [2] adult female    Negative: Rectal bleeding or blood in stool is main symptom    Negative: Rectal pain or itching is main symptom    Negative: Patient sounds very sick or weak to the triager    Negative: [1] Vomiting AND [2] abdomen looks much more swollen than usual    Negative: [1] Vomiting AND [2] contains bile (green color)    Protocols used: CONSTIPATION-ADULT-

## 2018-04-21 NOTE — ED PROVIDER NOTES
History     Chief Complaint:  Constipation      HPI   Dashawn Ordoñez is a 57 year old male who presents to the emergency department today for evaluation of constipation. The patient reports constipation for the past 4 days with constant abdominal pain since last night and dry heaves. He states that he has had a recent history of constipation as he is on Dilaudid and fentanyl patches for pain control of terminal stomach and liver cancer. He reports that he did take a stool softener three days ago but has been afraid to strain due to the pain. He reports that it hurts his rectum and abdomen to strain and that he saw stars when doing this, so he has not tried any enemas. He feels that he could push it out but that it would be too painful for him. The patient denies any vomiting, fevers or urinary symptoms.    Allergies:  Blood Transfusion Related (Informational Only)     Medications:    diclofenac (VOLTAREN) 1 % GEL topical gel  DRONABINOL PO  fentaNYL (DURAGESIC) 12 mcg/hr 72 hr patch  ferrous sulfate (IRON) 325 (65 FE) MG tablet  GABAPENTIN PO  HYDROmorphone (DILAUDID) 4 MG tablet  LORAZEPAM PO  mirtazapine (REMERON) 7.5 MG TABS tablet  naloxone (NARCAN) 1 mg/mL for intranasal kit (2 syringes with 2 mucosal atomizer device)  omeprazole (PRILOSEC) 40 MG capsule  polyethylene glycol (MIRALAX) powder  Sildenafil Citrate (REVATIO PO)  sucralfate (CARAFATE) 1 GM/10ML suspension  Past Medical History:    Cancer (H)   Depressive disorder   Diabetes (H)   Hypertension   Stomach cancer (H)   Substance abuse   Past Surgical History:    Appendectomy  ENT surgery  EGD    Family History:    History reviewed. No pertinent family history.     Social History:  The patient was accompanied to the ED by his wife.  Smoking Status: Current Every Day Smoker  Smokeless Tobacco: Never Used  Alcohol Use: Yes   Marital Status:        Review of Systems   Constitutional: Negative for fever.   Gastrointestinal: Positive for abdominal  pain and constipation. Negative for vomiting.   Genitourinary: Negative for difficulty urinating, dysuria and frequency.   All other systems reviewed and are negative.    Physical Exam   First Vitals:  BP: (!) 153/96  Pulse: 96  Heart Rate: 96  Temp: 98.9  F (37.2  C)  Resp: 18  Weight: 61.2 kg (135 lb)  SpO2: 100 %      Physical Exam  Constitutional: The patient is oriented to person, place, and time. Alert and cooperative.  HENT:   Right Ear: External ear normal.   Left Ear: External ear normal.   Nose: Nose normal.   Mouth/Throat: Moist mucous membranes.   Eyes: Conjunctivae, EOM and lids are normal.   Neck: Trachea normal. Normal range of motion. Neck supple.   Cardiovascular: Normal rate, regular rhythm, normal heart sounds, and intact distal pulses.    Pulmonary/Chest: Effort normal and breath sounds equal bilaterally. No crackles or wheezing.   Abdominal: Soft. Mild diffuse tenderness to palpation. No rebound and no guarding.   Musculoskeletal: Normal range of motion.  No extremity tenderness or edema.  Neurological: Alert and Oriented. Strength 5/5 in upper and lower extremities bilaterally. Sensation intact to light touch throughout.  Skin: Skin is dry. No rash noted.    Rectal: Stool present in the rectal vault. No gross blood.     Emergency Department Course     Imaging:  Radiology findings were communicated with the patient who voiced understanding of the findings.    Abdomen XR, 2 vw, flat and upright  IMPRESSION: Mildly dilated loops of small bowel are seen in the  midabdomen with some air-fluid levels. Stool and gas are seen within  nondilated colon and rectum. Findings could be due to an ileus or  partial obstruction.  Report per radiology      CT Abdomen Pelvis w Contrast  IMPRESSION:  1. No obstruction demonstrated. No perforation demonstrated.  2. Stable mass in the gastric cardia and adjacent adenopathy.  Report per radiology      Laboratory:  Laboratory findings were communicated with the  patient who voiced understanding of the findings.    Creatinine POCT: Creatinine 0.6 (L), GFR >90  CBC: HGB 8.8 (L) o/w WNL. (WBC 8.1, )   Lipase: 153   CMP: Glucose 114 (H) o/w WNL (Creatinine 0.71)   Lactic Acid: 0.9     UA with Microscopic: clear yellow urine, urine glucose 150, protein albumin 30, mucous present o/w WNL     Interventions:  1716: Dilaudid 0.5mg IV  1820: Pink lady enema 286mL Rectal Given   1922: Zofran 4mg IV     Emergency Department Course:  Nursing notes and vitals reviewed.  The patient was sent for an Abdomen XR, 2 vw, flat and upright and CT Abdomen Pelvis w Contrast while in the emergency department, results above.    1415: I performed an exam of the patient as documented above.   1554: Patient rechecked and updated.    1802: I attempted to disimpact the patient's stool without success. We will pursue a pink lady enema.  1926; Patient rechecked and updated.    Findings and plan explained to the patient. Patient discharged home with instructions regarding supportive care, medications, and reasons to return. The importance of close follow-up was reviewed. The patient was prescribed Miralax and Senokot.   I personally reviewed the imaging results with the Patient and answered all related questions prior to discharge.     Impression & Plan      Medical Decision Making:  Dashawn Ordoñez is a 57 year old male with a history of gastric cancer, hypertension and diabetes who presents to the emergency department for evaluation of abdominal pain and constipation. Upon presentation in the ED, the patient is non toxic appearing. He is hypertensive otherwise vitals are within normal limits and stable. On exam, he is well appearing. He is alert, oriented and neurologic exam is non focal. Cardiopulmonary exam is unremarkable. On abdominal examination, he does endorse mild diffuse tenderness to palpation. There is no rebound or guarding. The rest of the exam is as mentioned above. Abdominal x-ray was  obtained initially and does demonstrate findings concerning for ileus versus partial obstruction. Labs were then obtained. Notably, the patient does not have a leukocytosis. CT of the abdomen was obtained and demonstrates no evidence of obstruction or other acute abnormality. Given these findings, I am most suspicious for constipation. I did attempt fecal disimpaction, however, the stool is not low enough in the rectal vault for me to achieve this appropriately. The patient was then given a pink lady enema with significant results. The patient noted significant improvement in symptoms after the enema. Given that he is well-appearing and symptoms have improved, I do feel that he is safe for discharge home. I did discuss with the patient that I recommend close follow up with his primary care physician and he was in agreement. He was provided a prescription for miralax and stool softeners. Return instructions were provided. He was stable/improved at the time of discharge.    Diagnosis:    ICD-10-CM    1. Constipation, unspecified constipation type K59.00      Disposition:  discharged to home with below prescription  Discharge Medications:  New Prescriptions    POLYETHYLENE GLYCOL (MIRALAX) POWDER    Take 17 g (1 capful) by mouth daily for 4 days    SENNA (SENOKOT) 8.6 MG TABLET    Take 1 tablet by mouth daily     Scribe Disclosure:  I, Marisabel Carroll, am serving as a scribe at 2:15 PM on 4/21/2018 to document services personally performed by Shaunna Trevino MD based on my observations and the provider's statements to me.    4/21/2018   [facility] EMERGENCY DEPARTMENT       Shaunna Trevino MD  04/21/18 [id number]

## 2018-04-21 NOTE — ED AVS SNAPSHOT
[facility] Emergency Department    [address]    [facility] [address]    Phone:  [phone]    Fax:  [phone]                                       Dashawn Ordoñez   MRN: [id number]    Department:  [facility] Emergency Department   Date of Visit:  4/21/2018           After Visit Summary Signature Page     I have received my discharge instructions, and my questions have been answered. I have discussed any challenges I see with this plan with the nurse or doctor.    ..........................................................................................................................................  Patient/Patient Representative Signature      ..........................................................................................................................................  Patient Representative Print Name and Relationship to Patient    ..................................................               ................................................  Date                                            Time    ..........................................................................................................................................  Reviewed by Signature/Title    ...................................................              ..............................................  Date                                                            Time

## 2018-04-21 NOTE — ED NOTES
Pt in with constipation, ran out of stool softener at home and on oral dilaudid, no BM for past 3 days. Denies n/v. ABC's intact, alert and oriented X3.

## 2018-04-21 NOTE — ED AVS SNAPSHOT
[facility] Emergency Department    [address]    [facility] [address]    Phone:  [phone]    Fax:  [phone]                                       Dashawn Ordoñez   MRN: [id number]    Department:  [facility] Emergency Department   Date of Visit:  4/21/2018           Patient Information     Date Of Birth          1960        Your diagnoses for this visit were:     Constipation, unspecified constipation type        You were seen by Favio Lyman MD and Shaunna Trevino MD.      Follow-up Information     Follow up with Cristian Healy MD In 3 days.    Specialty:  Hematology & Oncology    Contact information:    [facility]  [address]  [facility] [address] [phone]          Discharge Instructions       Please follow up closely with your regular physician. Please return to the ED if your symptoms worsen or if you develop new or concerning symptoms.     Discharge Instructions  Constipation  Your doctor has diagnosed you with constipation. Constipation can cause severe cramping pain and your physician thinks this is the cause of your abdominal pain today.  People usually recognize that they are constipated because they have difficulty having bowel movements, are not having bowel movements frequently enough, or are not having large enough bowel movements. Sometimes, especially in children or older people, you do not recognize that you are constipated until it becomes severe. The most common cause of constipation is a combination of lack of exercise and not eating enough fruits, vegetables and whole grains. Constipation can also be a side effect of medications, such as narcotics, or may be caused by a disease of the digestive system.    Return to the Emergency Department if:    Your abdominal pain worsens or does not improve after a bowel movement.    You become very weak.    You get an oral temperature above 102oF or as directed by your  doctor.    You have blood in your stools (bright red or black, tarry stools).    You keep throwing up or can t drink liquids.    Your see blood when you throw up.    Your stomach gets bloated or bigger.    You have new symptoms or anything that worries you.    What can I do to help myself?    If your doctor gave you a cathartic medication, like magnesium citrate or GoLytely  (polyethylene glycol), you can expect to have cramps and gas pains after taking it. You can expect to have a number of bowel movements and even diarrhea in the course of clearing your bowels.  You will know your bowels have been cleaned out after you pass clear liquid. The cramps and gas should let up after you have emptied your bowels. You may want to wait until morning to take this type of medication so you aren t up in the night.     Sometimes instead of cathartics, we recommend laxatives like milk of magnesia to move your bowels more slowly, or an enema to help the bowels to move. Read and follow the package directions, or follow your physician s instructions.    Once you have become very constipated, it takes time for your bowels to return to normal and you need to be very careful to prevent becoming constipated again. Take a laxative if you don t move your bowels at least every two days.       Eat foods that have a lot of fiber. Good choices are fruits, vegetables, prune juice, apple juice and high fiber cereal. Limit dairy products such as milk and cheese, since these can make constipation worse.     Drink plenty of water and other fluids.     When you feel the need to go to the bathroom, go to the bathroom. Don t hold it.    Miralax , Metamucil , Colace , Senna or fiber supplements can be used daily.  Miralax  daily is often the best choice for children.    FOLLOW UP WITH YOUR REGULAR DOCTOR IF YOUR CONSTIPATION IS NOT IMPROVING.  Sometimes, chronic constipation requires further testing to determine the cause. If you are over 50 years  old, you may need a colonoscopy if you have not had one before.   If you were given a prescription for medicine here today, be sure to read all of the information (including the package insert) that comes with your prescription.  This will include important information about the medicine, its side effects, and any warnings that you need to know about.  The pharmacist who fills the prescription can provide more information and answer questions you may have about the medicine.  If you have questions or concerns that the pharmacist cannot address, please call or return to the Emergency Department.   Opioid Medication Information    Pain medications are among the most commonly prescribed medicines, so we are including this information for all our patients. If you did not receive pain medication or get a prescription for pain medicine, you can ignore it.     You may have been given a prescription for an opioid (narcotic) pain medicine and/or have received a pain medicine while here in the Emergency Department. These medicines can make you drowsy or impaired. You must not drive, operate dangerous equipment, or engage in any other dangerous activities while taking these medications. If you drive while taking these medications, you could be arrested for DUI, or driving under the influence. Do not drink any alcohol while you are taking these medications.     Opioid pain medications can cause addiction. If you have a history of chemical dependency of any type, you are at a higher risk of becoming addicted to pain medications.  Only take these prescribed medications to treat your pain when all other options have been tried. Take it for as short a time and as few doses as possible. Store your pain pills in a secure place, as they are frequently stolen and provide a dangerous opportunity for children or visitors in your house to start abusing these powerful medications. We will not replace any lost or stolen medicine.  As soon  as your pain is better, you should flush all your remaining medication.     Many prescription pain medications contain Tylenol  (acetaminophen), including Vicodin , Tylenol #3 , Norco , Lortab , and Percocet .  You should not take any extra pills of Tylenol  if you are using these prescription medications or you can get very sick.  Do not ever take more than 3000 mg of acetaminophen in any 24 hour period.    All opioids tend to cause constipation. Drink plenty of water and eat foods that have a lot of fiber, such as fruits, vegetables, prune juice, apple juice and high fiber cereal.  Take a laxative if you don t move your bowels at least every other day. Miralax , Milk of Magnesia, Colace , or Senna  can be used to keep you regular.      Remember that you can always come back to the Emergency Department if you are not able to see your regular doctor in the amount of time listed above, if you get any new symptoms, or if there is anything that worries you.        24 Hour Appointment Hotline       To make an appointment at any [facility], call [phone] ([phone]). If you don't have a family doctor or clinic, we will help you find one. [place] [facility] are conveniently located to serve the needs of you and your family.             Review of your medicines      START taking        Dose / Directions Last dose taken    senna 8.6 MG tablet   Commonly known as:  SENOKOT   Dose:  1 tablet   Quantity:  30 tablet        Take 1 tablet by mouth daily   Refills:  0          CONTINUE these medicines which may have CHANGED, or have new prescriptions. If we are uncertain of the size of tablets/capsules you have at home, strength may be listed as something that might have changed.        Dose / Directions Last dose taken    * polyethylene glycol powder   Commonly known as:  MIRALAX   Dose:  1 capful   What changed:  Another medication with the same name was added. Make sure you understand how and when to take each.    Quantity:  225 g        Take 17 g (1 capful) by mouth daily as needed for constipation   Refills:  0        * polyethylene glycol powder   Commonly known as:  MIRALAX   Dose:  1 capful   What changed:  You were already taking a medication with the same name, and this prescription was added. Make sure you understand how and when to take each.   Quantity:  68 g        Take 17 g (1 capful) by mouth daily for 4 days   Refills:  0        * Notice:  This list has 2 medication(s) that are the same as other medications prescribed for you. Read the directions carefully, and ask your doctor or other care provider to review them with you.      Our records show that you are taking the medicines listed below. If these are incorrect, please call your family doctor or clinic.        Dose / Directions Last dose taken    diclofenac 1 % Gel topical gel   Commonly known as:  VOLTAREN   Dose:  2 g   Quantity:  1 Tube        Apply 2 g topically 4 times daily To upper quadrant of abdomin and ribs.   Refills:  0        DRONABINOL PO        Refills:  0        fentaNYL 12 mcg/hr 72 hr patch   Commonly known as:  DURAGESIC   Dose:  1 patch   Quantity:  2 patch        Place 1 patch onto the skin every 72 hours remove old patch. Next change on 3/14 Wednesday at 11 am   Refills:  0        ferrous sulfate 325 (65 Fe) MG tablet   Commonly known as:  IRON   Dose:  325 mg   Quantity:  30 tablet        Take 1 tablet (325 mg) by mouth daily (with breakfast)   Refills:  2        GABAPENTIN PO   Dose:  600 mg        Take 600 mg by mouth daily   Refills:  0        HYDROmorphone 4 MG tablet   Commonly known as:  DILAUDID   Dose:  4 mg   Quantity:  20 tablet        Take 1 tablet (4 mg) by mouth every 4 hours as needed for moderate to severe pain   Refills:  0        LORAZEPAM PO   Dose:  0.5 mg        Take 0.5 mg by mouth every 6 hours as needed for anxiety   Refills:  0        mirtazapine 7.5 MG Tabs tablet   Commonly known as:  REMERON   Dose:  7.5 mg    Quantity:  30 tablet        Take 1 tablet (7.5 mg) by mouth nightly as needed   Refills:  0        naloxone 1 mg/mL for intranasal kit (2 syringes with 2 mucosal atomizer device)   Commonly known as:  NARCAN   Quantity:  1 kit        In opioid overdose put cone in nostril and push 1/2 of contents into each nostril.  Repeat every 3 min if no response until help arrives.   Refills:  0        omeprazole 40 MG capsule   Commonly known as:  priLOSEC   Dose:  40 mg   Quantity:  30 capsule        Take 1 capsule (40 mg) by mouth 2 times daily (before meals)   Refills:  3        REVATIO PO   Dose:  20 mg        Take 20 mg by mouth daily as needed   Refills:  0        sucralfate 1 GM/10ML suspension   Commonly known as:  CARAFATE   Dose:  1 g   Quantity:  420 mL        Take 10 mLs (1 g) by mouth 4 times daily   Refills:  0                Prescriptions were sent or printed at these locations (2 Prescriptions)                   Other Prescriptions                Printed at Department/Unit printer (2 of 2)         polyethylene glycol (MIRALAX) powder               senna (SENOKOT) 8.6 MG tablet                Procedures and tests performed during your visit     Abdomen XR, 2 vw, flat and upright    CBC + differential    CT Abdomen Pelvis w Contrast    Comprehensive metabolic panel    Creatinine POCT    Lactic acid whole blood    Lipase    UA with Microscopic      Orders Needing Specimen Collection     None      Pending Results     Date and Time Order Name Status Description    4/21/2018 1551 CT Abdomen Pelvis w Contrast Preliminary             Pending Culture Results     No orders found from 4/19/2018 to 4/22/2018.            Pending Results Instructions     If you had any lab results that were not finalized at the time of your Discharge, you can call the ED Lab Result RN at [phone]. You will be contacted by this team for any positive Lab results or changes in treatment. The nurses are available 7 days a week from 10A to  6:30P.  You can leave a message 24 hours per day and they will return your call.        Test Results From Your Hospital Stay        4/21/2018  3:49 PM      Narrative     ABDOMEN TWO VIEWS 4/21/2018 3:42 PM     HISTORY: Constipation.     COMPARISON: None.        Impression     IMPRESSION: Mildly dilated loops of small bowel are seen in the  midabdomen with some air-fluid levels. Stool and gas are seen within  nondilated colon and rectum. Findings could be due to an ileus or  partial obstruction.    GODWIN RIZO MD         4/21/2018  4:22 PM      Component Results     Component Value Ref Range & Units Status    WBC 8.1 4.0 - 11.0 10e9/L Final    RBC Count 3.77 (L) 4.4 - 5.9 10e12/L Final    Hemoglobin 8.8 (L) 13.3 - 17.7 g/dL Final    Hematocrit 30.0 (L) 40.0 - 53.0 % Final    MCV 80 78 - 100 fl Final    MCH 23.3 (L) 26.5 - 33.0 pg Final    MCHC 29.3 (L) 31.5 - 36.5 g/dL Final    RDW 20.4 (H) 10.0 - 15.0 % Final    Platelet Count 170 150 - 450 10e9/L Final    Diff Method Automated Method  Final    % Neutrophils 78.3 % Final    % Lymphocytes 11.8 % Final    % Monocytes 9.0 % Final    % Eosinophils 0.1 % Final    % Basophils 0.6 % Final    % Immature Granulocytes 0.2 % Final    Nucleated RBCs 0 0 /100 Final    Absolute Neutrophil 6.3 1.6 - 8.3 10e9/L Final    Absolute Lymphocytes 1.0 0.8 - 5.3 10e9/L Final    Absolute Monocytes 0.7 0.0 - 1.3 10e9/L Final    Absolute Eosinophils 0.0 0.0 - 0.7 10e9/L Final    Absolute Basophils 0.1 0.0 - 0.2 10e9/L Final    Abs Immature Granulocytes 0.0 0 - 0.4 10e9/L Final    Absolute Nucleated RBC 0.0  Final         4/21/2018  4:12 PM      Component Results     Component Value Ref Range & Units Status    Lactic Acid 0.9 0.7 - 2.0 mmol/L Final         4/21/2018  5:07 PM      Narrative     CT ABDOMEN AND PELVIS WITHOUT CONTRAST 4/21/2018 5:01 PM     HISTORY: Concern for obstruction on x-ray, abdominal pain,  constipation, nausea.     COMPARISON: March 30, 2018    TECHNIQUE: Volumetric  helical acquisition of CT images from the lung  bases through the symphysis pubis without intravenous contrast.  Radiation dose for this scan was reduced using automated exposure  control, adjustment of the mA and/or kV according to patient size, or  iterative reconstruction technique.    FINDINGS: Stable mass in the region of the gastric cardia and adjacent  adenopathy with a few calcifications. The liver, bilateral kidneys and  adrenal glands, pancreas, and spleen demonstrate no worrisome focal  lesion in these unenhanced images. Prominent hemangioma in the right  lobe of liver near the dome and additional low dense lesions are  stable in the liver. There is moderate diverticulosis without evidence  for diverticulitis. No hydronephrosis. Unremarkable gallbladder.  Appendix not confidently identified. No free fluid in the abdomen or  pelvis. No free air in the abdomen. There are no obvious abdominal or  pelvic lymph nodes that are abnormal by size criteria on unenhanced  images. There are no dilated or thickened loops of small intestine or  large bowel. No infiltrates or effusions at the lung bases.        Impression     IMPRESSION:  1. No obstruction demonstrated. No perforation demonstrated.  2. Stable mass in the gastric cardia and adjacent adenopathy.         4/21/2018  4:32 PM      Component Results     Component Value Ref Range & Units Status    Sodium 139 133 - 144 mmol/L Final    Potassium 4.0 3.4 - 5.3 mmol/L Final    Chloride 105 94 - 109 mmol/L Final    Carbon Dioxide 28 20 - 32 mmol/L Final    Anion Gap 6 3 - 14 mmol/L Final    Glucose 114 (H) 70 - 99 mg/dL Final    Urea Nitrogen 16 7 - 30 mg/dL Final    Creatinine 0.71 0.66 - 1.25 mg/dL Final    GFR Estimate >90 >60 mL/min/1.7m2 Final    Non  GFR Calc    GFR Estimate If Black >90 >60 mL/min/1.7m2 Final    African American GFR Calc    Calcium 8.9 8.5 - 10.1 mg/dL Final    Bilirubin Total 0.4 0.2 - 1.3 mg/dL Final    Albumin 3.8 3.4 - 5.0  g/dL Final    Protein Total 6.9 6.8 - 8.8 g/dL Final    Alkaline Phosphatase 91 40 - 150 U/L Final    ALT 19 0 - 70 U/L Final    AST 17 0 - 45 U/L Final         4/21/2018  4:32 PM      Component Results     Component Value Ref Range & Units Status    Lipase 153 73 - 393 U/L Final         4/21/2018  5:58 PM      Component Results     Component Value Ref Range & Units Status    Color Urine Yellow  Final    Appearance Urine Clear  Final    Glucose Urine 150 (A) NEG^Negative mg/dL Final    Bilirubin Urine Negative NEG^Negative Final    Ketones Urine Negative NEG^Negative mg/dL Final    Specific Gravity Urine 1.005 1.003 - 1.035 Final    Blood Urine Negative NEG^Negative Final    pH Urine 7.0 5.0 - 7.0 pH Final    Protein Albumin Urine 30 (A) NEG^Negative mg/dL Final    Urobilinogen mg/dL 0.0 0.0 - 2.0 mg/dL Final    Nitrite Urine Negative NEG^Negative Final    Leukocyte Esterase Urine Negative NEG^Negative Final    Source Midstream Urine  Final    WBC Urine 1 0 - 5 /HPF Final    RBC Urine 1 0 - 2 /HPF Final    Mucous Urine Present (A) NEG^Negative /LPF Final         4/21/2018  4:07 PM      Component Results     Component Value Ref Range & Units Status    Creatinine 0.6 (L) 0.66 - 1.25 mg/dL Final    GFR Estimate >90 >60 mL/min/1.7m2 Final    GFR Estimate If Black >90 >60 mL/min/1.7m2 Final                Clinical Quality Measure: Blood Pressure Screening     Your blood pressure was checked while you were in the emergency department today. The last reading we obtained was  BP: 143/87 . Please read the guidelines below about what these numbers mean and what you should do about them.  If your systolic blood pressure (the top number) is less than 120 and your diastolic blood pressure (the bottom number) is less than 80, then your blood pressure is normal. There is nothing more that you need to do about it.  If your systolic blood pressure (the top number) is 120-139 or your diastolic blood pressure (the bottom number) is  80-89, your blood pressure may be higher than it should be. You should have your blood pressure rechecked within a year by a primary care provider.  If your systolic blood pressure (the top number) is 140 or greater or your diastolic blood pressure (the bottom number) is 90 or greater, you may have high blood pressure. High blood pressure is treatable, but if left untreated over time it can put you at risk for heart attack, stroke, or kidney failure. You should have your blood pressure rechecked by a primary care provider within the next 4 weeks.  If your provider in the emergency department today gave you specific instructions to follow-up with your doctor or provider even sooner than that, you should follow that instruction and not wait for up to 4 weeks for your follow-up visit.        Thank you for choosing [facility]       Thank you for choosing [facility] for your care. Our goal is always to provide you with excellent care. Hearing back from our patients is one way we can continue to improve our services. Please take a few minutes to complete the written survey that you may receive in the mail after you visit with us. Thank you!        [employer] Information     [employer] gives you secure access to your electronic health record. If you see a primary care provider, you can also send messages to your care team and make appointments. If you have questions, please call your primary care clinic.  If you do not have a primary care provider, please call [phone] and they will assist you.        Care EveryWhere ID     This is your Care EveryWhere ID. This could be used by other organizations to access your [facility] medical records  [id number]        Equal Access to Services     VANESSA JOSHI : Charan Brewster, watuan bennett, qaybrenee kaalemma vazquez. So [facility] [phone].    ATENCIÓN: Si habla español, tiene a sanz disposición servicios gratuitos de asistencia  nazia Ledezmachloe al [phone].    We comply with applicable federal civil rights laws and [place] laws. We do not discriminate on the basis of race, color, national origin, age, disability, sex, sexual orientation, or gender identity.            After Visit Summary       This is your record. Keep this with you and show to your community pharmacist(s) and doctor(s) at your next visit.

## 2018-04-22 NOTE — DISCHARGE INSTRUCTIONS
Please follow up closely with your regular physician. Please return to the ED if your symptoms worsen or if you develop new or concerning symptoms.     Discharge Instructions  Constipation  Your doctor has diagnosed you with constipation. Constipation can cause severe cramping pain and your physician thinks this is the cause of your abdominal pain today.  People usually recognize that they are constipated because they have difficulty having bowel movements, are not having bowel movements frequently enough, or are not having large enough bowel movements. Sometimes, especially in children or older people, you do not recognize that you are constipated until it becomes severe. The most common cause of constipation is a combination of lack of exercise and not eating enough fruits, vegetables and whole grains. Constipation can also be a side effect of medications, such as narcotics, or may be caused by a disease of the digestive system.    Return to the Emergency Department if:    Your abdominal pain worsens or does not improve after a bowel movement.    You become very weak.    You get an oral temperature above 102oF or as directed by your doctor.    You have blood in your stools (bright red or black, tarry stools).    You keep throwing up or can t drink liquids.    Your see blood when you throw up.    Your stomach gets bloated or bigger.    You have new symptoms or anything that worries you.    What can I do to help myself?    If your doctor gave you a cathartic medication, like magnesium citrate or GoLytely  (polyethylene glycol), you can expect to have cramps and gas pains after taking it. You can expect to have a number of bowel movements and even diarrhea in the course of clearing your bowels.  You will know your bowels have been cleaned out after you pass clear liquid. The cramps and gas should let up after you have emptied your bowels. You may want to wait until morning to take this type of medication so you  aren t up in the night.     Sometimes instead of cathartics, we recommend laxatives like milk of magnesia to move your bowels more slowly, or an enema to help the bowels to move. Read and follow the package directions, or follow your physician s instructions.    Once you have become very constipated, it takes time for your bowels to return to normal and you need to be very careful to prevent becoming constipated again. Take a laxative if you don t move your bowels at least every two days.       Eat foods that have a lot of fiber. Good choices are fruits, vegetables, prune juice, apple juice and high fiber cereal. Limit dairy products such as milk and cheese, since these can make constipation worse.     Drink plenty of water and other fluids.     When you feel the need to go to the bathroom, go to the bathroom. Don t hold it.    Miralax , Metamucil , Colace , Senna or fiber supplements can be used daily.  Miralax  daily is often the best choice for children.    FOLLOW UP WITH YOUR REGULAR DOCTOR IF YOUR CONSTIPATION IS NOT IMPROVING.  Sometimes, chronic constipation requires further testing to determine the cause. If you are over 50 years old, you may need a colonoscopy if you have not had one before.   If you were given a prescription for medicine here today, be sure to read all of the information (including the package insert) that comes with your prescription.  This will include important information about the medicine, its side effects, and any warnings that you need to know about.  The pharmacist who fills the prescription can provide more information and answer questions you may have about the medicine.  If you have questions or concerns that the pharmacist cannot address, please call or return to the Emergency Department.   Opioid Medication Information    Pain medications are among the most commonly prescribed medicines, so we are including this information for all our patients. If you did not receive pain  medication or get a prescription for pain medicine, you can ignore it.     You may have been given a prescription for an opioid (narcotic) pain medicine and/or have received a pain medicine while here in the Emergency Department. These medicines can make you drowsy or impaired. You must not drive, operate dangerous equipment, or engage in any other dangerous activities while taking these medications. If you drive while taking these medications, you could be arrested for DUI, or driving under the influence. Do not drink any alcohol while you are taking these medications.     Opioid pain medications can cause addiction. If you have a history of chemical dependency of any type, you are at a higher risk of becoming addicted to pain medications.  Only take these prescribed medications to treat your pain when all other options have been tried. Take it for as short a time and as few doses as possible. Store your pain pills in a secure place, as they are frequently stolen and provide a dangerous opportunity for children or visitors in your house to start abusing these powerful medications. We will not replace any lost or stolen medicine.  As soon as your pain is better, you should flush all your remaining medication.     Many prescription pain medications contain Tylenol  (acetaminophen), including Vicodin , Tylenol #3 , Norco , Lortab , and Percocet .  You should not take any extra pills of Tylenol  if you are using these prescription medications or you can get very sick.  Do not ever take more than 3000 mg of acetaminophen in any 24 hour period.    All opioids tend to cause constipation. Drink plenty of water and eat foods that have a lot of fiber, such as fruits, vegetables, prune juice, apple juice and high fiber cereal.  Take a laxative if you don t move your bowels at least every other day. Miralax , Milk of Magnesia, Colace , or Senna  can be used to keep you regular.      Remember that you can always come back to  the Emergency Department if you are not able to see your regular doctor in the amount of time listed above, if you get any new symptoms, or if there is anything that worries you.

## 2018-05-01 ENCOUNTER — HOSPITAL ENCOUNTER (EMERGENCY)
Facility: CLINIC | Age: 58
Discharge: HOME | End: 2018-05-01
Attending: EMERGENCY MEDICINE | Admitting: EMERGENCY MEDICINE
Payer: COMMERCIAL

## 2018-05-01 VITALS
TEMPERATURE: 98.3 F | SYSTOLIC BLOOD PRESSURE: 116 MMHG | RESPIRATION RATE: 36 BRPM | OXYGEN SATURATION: 99 % | WEIGHT: 140 LBS | HEART RATE: 86 BPM | BODY MASS INDEX: 20.67 KG/M2 | DIASTOLIC BLOOD PRESSURE: 68 MMHG

## 2018-05-01 DIAGNOSIS — K59.03 DRUG-INDUCED CONSTIPATION: ICD-10-CM

## 2018-05-01 LAB
ALBUMIN SERPL-MCNC: 3.5 G/DL (ref 3.4–5)
ALP SERPL-CCNC: 79 U/L (ref 40–150)
ALT SERPL W P-5'-P-CCNC: 12 U/L (ref 0–70)
ANION GAP SERPL CALCULATED.3IONS-SCNC: 14 MMOL/L (ref 3–14)
AST SERPL W P-5'-P-CCNC: 14 U/L (ref 0–45)
BASOPHILS # BLD AUTO: 0.1 10E9/L (ref 0–0.2)
BASOPHILS NFR BLD AUTO: 0.7 %
BILIRUB SERPL-MCNC: 0.5 MG/DL (ref 0.2–1.3)
BUN SERPL-MCNC: 9 MG/DL (ref 7–30)
CALCIUM SERPL-MCNC: 8.4 MG/DL (ref 8.5–10.1)
CHLORIDE SERPL-SCNC: 106 MMOL/L (ref 94–109)
CO2 SERPL-SCNC: 22 MMOL/L (ref 20–32)
CREAT SERPL-MCNC: 0.61 MG/DL (ref 0.66–1.25)
DIFFERENTIAL METHOD BLD: ABNORMAL
EOSINOPHIL # BLD AUTO: 0 10E9/L (ref 0–0.7)
EOSINOPHIL NFR BLD AUTO: 0.3 %
ERYTHROCYTE [DISTWIDTH] IN BLOOD BY AUTOMATED COUNT: 19.7 % (ref 10–15)
GFR SERPL CREATININE-BSD FRML MDRD: >90 ML/MIN/1.7M2
GLUCOSE SERPL-MCNC: 119 MG/DL (ref 70–99)
HCT VFR BLD AUTO: 29.2 % (ref 40–53)
HGB BLD-MCNC: 8.6 G/DL (ref 13.3–17.7)
IMM GRANULOCYTES # BLD: 0 10E9/L (ref 0–0.4)
IMM GRANULOCYTES NFR BLD: 0.3 %
LYMPHOCYTES # BLD AUTO: 0.8 10E9/L (ref 0.8–5.3)
LYMPHOCYTES NFR BLD AUTO: 11.2 %
MCH RBC QN AUTO: 22.9 PG (ref 26.5–33)
MCHC RBC AUTO-ENTMCNC: 29.5 G/DL (ref 31.5–36.5)
MCV RBC AUTO: 78 FL (ref 78–100)
MONOCYTES # BLD AUTO: 0.7 10E9/L (ref 0–1.3)
MONOCYTES NFR BLD AUTO: 8.9 %
NEUTROPHILS # BLD AUTO: 5.9 10E9/L (ref 1.6–8.3)
NEUTROPHILS NFR BLD AUTO: 78.6 %
NRBC # BLD AUTO: 0 10*3/UL
NRBC BLD AUTO-RTO: 0 /100
PLATELET # BLD AUTO: 289 10E9/L (ref 150–450)
POTASSIUM SERPL-SCNC: 3.6 MMOL/L (ref 3.4–5.3)
PROT SERPL-MCNC: 6.6 G/DL (ref 6.8–8.8)
RBC # BLD AUTO: 3.76 10E12/L (ref 4.4–5.9)
SODIUM SERPL-SCNC: 142 MMOL/L (ref 133–144)
WBC # BLD AUTO: 7.5 10E9/L (ref 4–11)

## 2018-05-01 PROCEDURE — 96375 TX/PRO/DX INJ NEW DRUG ADDON: CPT

## 2018-05-01 PROCEDURE — 99285 EMERGENCY DEPT VISIT HI MDM: CPT | Mod: 25

## 2018-05-01 PROCEDURE — 96374 THER/PROPH/DIAG INJ IV PUSH: CPT

## 2018-05-01 PROCEDURE — 96361 HYDRATE IV INFUSION ADD-ON: CPT

## 2018-05-01 PROCEDURE — 25000132 ZZH RX MED GY IP 250 OP 250 PS 637: Performed by: EMERGENCY MEDICINE

## 2018-05-01 PROCEDURE — 80053 COMPREHEN METABOLIC PANEL: CPT | Performed by: EMERGENCY MEDICINE

## 2018-05-01 PROCEDURE — 25000128 H RX IP 250 OP 636: Performed by: EMERGENCY MEDICINE

## 2018-05-01 PROCEDURE — 25000125 ZZHC RX 250: Performed by: EMERGENCY MEDICINE

## 2018-05-01 PROCEDURE — 96376 TX/PRO/DX INJ SAME DRUG ADON: CPT

## 2018-05-01 PROCEDURE — 85025 COMPLETE CBC W/AUTO DIFF WBC: CPT | Performed by: EMERGENCY MEDICINE

## 2018-05-01 PROCEDURE — 93005 ELECTROCARDIOGRAM TRACING: CPT

## 2018-05-01 RX ORDER — HYDROMORPHONE HYDROCHLORIDE 2 MG/1
2 TABLET ORAL EVERY 6 HOURS PRN
Qty: 12 TABLET | Refills: 0 | Status: SHIPPED | OUTPATIENT
Start: 2018-05-01

## 2018-05-01 RX ORDER — LORAZEPAM 2 MG/ML
1 INJECTION INTRAMUSCULAR ONCE
Status: COMPLETED | OUTPATIENT
Start: 2018-05-01 | End: 2018-05-01

## 2018-05-01 RX ORDER — ONDANSETRON 4 MG/1
4 TABLET, ORALLY DISINTEGRATING ORAL ONCE
Status: COMPLETED | OUTPATIENT
Start: 2018-05-01 | End: 2018-05-01

## 2018-05-01 RX ORDER — HYDROMORPHONE HYDROCHLORIDE 2 MG/1
2 TABLET ORAL ONCE
Status: COMPLETED | OUTPATIENT
Start: 2018-05-01 | End: 2018-05-01

## 2018-05-01 RX ORDER — MAGNESIUM CARB/ALUMINUM HYDROX 105-160MG
30 TABLET,CHEWABLE ORAL ONCE
Status: DISCONTINUED | OUTPATIENT
Start: 2018-05-01 | End: 2018-05-01

## 2018-05-01 RX ORDER — MAGNESIUM CARB/ALUMINUM HYDROX 105-160MG
296 TABLET,CHEWABLE ORAL ONCE
Status: COMPLETED | OUTPATIENT
Start: 2018-05-01 | End: 2018-05-01

## 2018-05-01 RX ORDER — HEPARIN SODIUM (PORCINE) LOCK FLUSH IV SOLN 100 UNIT/ML 100 UNIT/ML
500 SOLUTION INTRAVENOUS ONCE
Status: COMPLETED | OUTPATIENT
Start: 2018-05-01 | End: 2018-05-01

## 2018-05-01 RX ORDER — ASPIRIN 81 MG
100 TABLET, DELAYED RELEASE (ENTERIC COATED) ORAL DAILY
Qty: 60 TABLET | Refills: 1 | Status: SHIPPED | OUTPATIENT
Start: 2018-05-01

## 2018-05-01 RX ADMIN — LORAZEPAM 1 MG: 2 INJECTION INTRAMUSCULAR; INTRAVENOUS at 17:27

## 2018-05-01 RX ADMIN — HYDROMORPHONE HYDROCHLORIDE 1 MG: 1 INJECTION, SOLUTION INTRAMUSCULAR; INTRAVENOUS; SUBCUTANEOUS at 20:10

## 2018-05-01 RX ADMIN — DOCUSATE SODIUM 286 ML: 50 LIQUID ORAL at 19:33

## 2018-05-01 RX ADMIN — ONDANSETRON 4 MG: 4 TABLET, ORALLY DISINTEGRATING ORAL at 17:23

## 2018-05-01 RX ADMIN — HEPARIN 500 UNITS: 100 SYRINGE at 21:49

## 2018-05-01 RX ADMIN — HYDROMORPHONE HYDROCHLORIDE 1 MG: 1 INJECTION, SOLUTION INTRAMUSCULAR; INTRAVENOUS; SUBCUTANEOUS at 18:38

## 2018-05-01 RX ADMIN — MAGNESIUM CITRATE 296 ML: 1.75 LIQUID ORAL at 19:31

## 2018-05-01 RX ADMIN — SODIUM CHLORIDE 1000 ML: 9 INJECTION, SOLUTION INTRAVENOUS at 17:29

## 2018-05-01 RX ADMIN — HYDROMORPHONE HYDROCHLORIDE 2 MG: 2 TABLET ORAL at 17:29

## 2018-05-01 ASSESSMENT — ENCOUNTER SYMPTOMS
SHORTNESS OF BREATH: 1
ABDOMINAL PAIN: 1
NAUSEA: 1
NERVOUS/ANXIOUS: 1
FEVER: 0
DIARRHEA: 0
DIAPHORESIS: 1
CONSTIPATION: 1
VOMITING: 0

## 2018-05-01 NOTE — ED AVS SNAPSHOT
[facility] Emergency Department    [address]    [facility] [address]    Phone:  [phone]    Fax:  [phone]                                       Dashawn Ordoñez   MRN: [id number]    Department:  [facility] Emergency Department   Date of Visit:  5/1/2018           After Visit Summary Signature Page     I have received my discharge instructions, and my questions have been answered. I have discussed any challenges I see with this plan with the nurse or doctor.    ..........................................................................................................................................  Patient/Patient Representative Signature      ..........................................................................................................................................  Patient Representative Print Name and Relationship to Patient    ..................................................               ................................................  Date                                            Time    ..........................................................................................................................................  Reviewed by Signature/Title    ...................................................              ..............................................  Date                                                            Time

## 2018-05-01 NOTE — ED PROVIDER NOTES
History     Chief Complaint:  Shortness of breath and constipation    HPI   Dashawn Ordoñez is a 57 year old male with a history of stage 4 lung cancer, stomach cancer, anxiety, and panic attacks who presents with shortness of breath and constipation. The patient reports that he was at home today when he began feeling short of breath and called 911. Here in the emergency department the patient has multiple complaints including nausea, abdominal pain, constipation, and shortness of breath. He has felt diaphoretic but denies recording any fevers. He has not vomited.The patient has finished his last course of chemotherapy approximately one month ago and is not currently undergoing any radiation therapy. He states that he has recently ruin out of his medications, specifically his Dilaudid, and that he has not taken these in two days. The patient does mention that he would like to further information about hospice care and possibly pursuing this.     Allergies:  Blood transfusion related    Medications:    Voltaren  Fentanyl  Dronabinol  Gabapentin  Lorazepam  Remeron  Narcan  Omeprazole  Senokot  Sildenafil  Carafate     Past Medical History:    Cancer  Depressive disorder  Diabetes  Hypertension  Stomach cancer  Substance abuse  Anxiety  Panic attack  GI bleed    Past Surgical History:    Appendectomy  ENT surgery  EGD    Family History:    The patient denies any relevant family medical history.     Social History:  Smoking Status: Yes. 2.00 PPD  Smokeless Tobacco: No  Alcohol Use: Yes   Marital Status:   [2]    Review of Systems   Constitutional: Positive for diaphoresis. Negative for fever.   Respiratory: Positive for shortness of breath.    Gastrointestinal: Positive for abdominal pain, constipation and nausea. Negative for diarrhea and vomiting.   Psychiatric/Behavioral: The patient is nervous/anxious.    All other systems reviewed and are negative.    Physical Exam   Vitals:  Patient Vitals for the past 24  hrs:   BP Temp Temp src Pulse Resp SpO2 Weight   05/01/18 1915 - - - - - 99 % -   05/01/18 1900 116/68 - - - - 97 % -   05/01/18 1845 - - - - - 99 % -   05/01/18 1843 - - - - - 98 % -   05/01/18 1842 126/81 - - - - - -   05/01/18 1800 (!) 147/100 - - - - - -   05/01/18 1730 - - - - - 99 % -   05/01/18 1723 (!) 150/98 - - - - - -   05/01/18 1715 - - - - - 99 % -   05/01/18 1700 - - - - - 99 % -   05/01/18 1628 - - - - - 96 % -   05/01/18 1627 115/74 98.3  F (36.8  C) Oral 86 (!) 36 - 63.5 kg (140 lb)        Physical Exam  Vitals: reviewed by me  General: Pt seen on [facility], cooperative, and alert to conversation.  Cachectic appearing, but up and about the room, appears mildly anxious   Eyes: Tracking well, clear conjunctiva BL  ENT: MMM, midline trachea.   Lungs:   No tachypnea, no accessory muscle use. No respiratory distress.   CV: Rate as above, regular rhythm.    Abd: Soft, non tender, no guarding, no rebound. Non distended  MSK: no peripheral edema or joint effusion.  No evidence of trauma  Skin: No rash, normal turgor and temperature  Neuro: Clear speech and no facial droop.  Psych: Not RIS, no e/o AH/VH, does appear mildly anxious, but is redirectable      Emergency Department Course     ECG:  ECG taken at 1700, ECG read at 1707  Normal sinus rhythm. Right bundle branch block. Abnormal ECG.  Rate 75 bpm. RI interval 152. QRS duration 134. QT/QTc 440/491. P-R-T axes 4, 47, 56.     Laboratory:  Laboratory findings were communicated with the patient who voiced understanding of the findings.  CBC: RBC: 3.76(L), HGB: 8.6(L), HCT: 29.2(L), MCH: 22.9(L), MCHC: 29,5(L), RDW: 19.7(H), o/w WNL (WBC 7.5, )   CMP: Glucose: 119(H), Creatinine: 0.61(L), Calcium: 8.4(L), Protein total: 6.6(L), o/w WNL      Interventions:  1723 Zofran 4 mg oral  1727 Ativan 1 mg oral  1729 Normal Saline 1000 mL IV   1729 Dilaudid 2 mg oral  1838 Dilaudid 1 mg Iv  1931 Magnesium citrate 296 mL oral  1933 Pink lady  enema 286 mL rectal  2010 Dilaudid 1 mg IV    Emergency Department Course:  Nursing notes and vitals reviewed.  I performed an exam of the patient as documented above.   IV was inserted and blood was drawn for laboratory testing, results above.     2129 I rechecked with the patient    Findings and plan explained to the Patient. Patient discharged home with instructions regarding supportive care, medications, and reasons to return. The importance of close follow-up was reviewed.    I personally reviewed the laboratory results with the Patient and answered all related questions prior to discharge.    Impression & Plan      Medical Decision Making:  Dashawn Ordoñez is a 57 year old male who presents to the emergency department today with what appears to be constipation and mild anxiety. Patient has stage 4 stomach cancer and was on a significant amount of Dilaudid at home. He states that he ran out of his Dilaudid 2 days ago, he may have been taking more of his medication than prescribed as he does state that he attempted to call his Oncologist because he ran out before his next Oncology visit. Patient here has a very soft abdomen, and I do believe his main issue is constipation. No evidence of white count, fever, or focal tenderness to evidence a peritoneal or surgical abdomen, and I do not feel that advanced imaging is indicated in this otherwise comfortable patient. Patient has had several bowel movements after treatment for constipation and does feel better overall. We will discharge home with increased bowel regiment, increased pain medication to get him through the next 48 hours until he can see his Oncologist, and patient states that he is confident he can see his Oncologist either Wednesday or Thursday of this week.     Diagnosis:    ICD-10-CM    1. Drug-induced constipation K59.03       Disposition:   Discharged    CMS Diagnoses: None     Discharge Medications:  New Prescriptions    DOCUSATE SODIUM (COLACE) 100  MG TABLET    Take 100 mg by mouth daily    HYDROMORPHONE (DILAUDID) 2 MG TABLET    Take 1 tablet (2 mg) by mouth every 6 hours as needed for severe pain maximum 6 tablet(s) per day    MAGNESIUM HYDROXIDE (MILK OF MAGNESIA) 400 MG/5ML SUSPENSION    Take 30 mLs by mouth daily as needed for constipation or heartburn       Scribe Disclosure:  I, Lucio Flores, am serving as a scribe at 5:06 PM on 5/1/2018 to document services personally performed by Boubacar Corrigan MD, based on my observations and the provider's statements to me.   [facility] EMERGENCY DEPARTMENT       Boubacar Corrigan MD  05/01/18 1862

## 2018-05-01 NOTE — ED AVS SNAPSHOT
[facility] Emergency Department    [address]    [facility] [address]    Phone:  [phone]    Fax:  [phone]                                       Dashawn Ordoñez   MRN: [id number]    Department:  [facility] Emergency Department   Date of Visit:  5/1/2018           Patient Information     Date Of Birth          1960        Your diagnoses for this visit were:     Drug-induced constipation        You were seen by Cory Macedo DO and Boubacar Corrigan MD.      Follow-up Information     Follow up with Your oncologist . Call in 1 day.    Why:  For repeat evaluation and symptom check as well as additional pain medications         Follow up with [facility] Emergency Department.    Specialty:  EMERGENCY MEDICINE    Why:  If symptoms worsen    Contact information:    [address]  [facility] [address]  [phone]        Discharge Instructions         Constipation (Adult)  Constipation means that you have bowel movements that are less frequent than usual. Stools often become very hard and difficult to pass.  Constipation is very common. At some point in life it affects almost everyone. Since everyone's bowel habits are different, what is constipation to one person may not be to another. Your healthcare provider may do tests to diagnose constipation. It depends on what he or she finds when evaluating you.    Symptoms of constipation include:    Abdominal pain    Bloating    Vomiting    Painful bowel movements    Itching, swelling, bleeding, or pain around the anus  Causes  Constipation can have many causes. These include:    Diet low in fiber    Too much dairy    Not drinking enough liquids    Lack of exercise or physical activity. This is especially true for older adults.    Changes in lifestyle or daily routine, including pregnancy, aging, work, and travel    Frequent use or misuse of laxatives    Ignoring the  urge to have a bowel movement or delaying it until later    Medicines, such as certain prescription pain medicines, iron supplements, antacids, certain antidepressants, and calcium supplements    Diseases like irritable bowel syndrome, bowel obstructions, stroke, diabetes, thyroid disease, Parkinson disease, hemorrhoids, and colon cancer  Complications  Potential complications of constipation can include:    Hemorrhoids    Rectal bleeding from hemorrhoids or anal fissures (skin tears)    Hernias    Dependency on laxatives    Chronic constipation    Fecal impaction    Bowel obstruction or perforation  Home care  All treatment should be done after talking with your healthcare provider. This is especially true if you have another medical problems, are taking prescription medicines, or are an older adult. Treatment most often involves lifestyle changes. You may also need medicines. Your healthcare provider will tell you which will work best for you. Follow the advice below to help avoid this problem in the future.  Lifestyle changes  These lifestyle changes can help prevent constipation:    Diet. Eat a high-fiber diet, with fresh fruit and vegetables, and reduce dairy intake, meats, and processed foods    Fluids. It's important to get enough fluids each day. Drink plenty of water when you eat more fiber. If you are on diet that limits the amount of fluid you can have, talk about this with your healthcare provider.    Regular exercise. Check with your healthcare provider first.  Medicines  Take any medicines as directed. Some laxatives are safe to use only every now and then. Others can be taken on a regular basis. Talk with your doctor or pharmacist if you have questions.  Prescription pain medicines can cause constipation. If you are taking this kind of medicine, ask your healthcare provider if you should also take a stool softener.  Medicines you may take to treat constipation include:    Fiber supplements    Stool  softeners    Laxatives    Enemas    Rectal suppositories  Follow-up care  Follow up with your healthcare provider if symptoms don't get better in the next few days. You may need to have more tests or see a specialist.  Call 911  Call 911 if any of these occur:    Trouble breathing    Stiff, rigid abdomen that is severely painful to touch    Confusion    Fainting or loss of consciousness    Rapid heart rate    Chest pain  When to seek medical advice  Call your healthcare provider right away if any of these occur:    Fever of 100.4 F (38 C) or higher, or as directed by your healthcare provider    Failure to resume normal bowel movements    Pain in your abdomen or back gets worse    Nausea or vomiting    Swelling in your abdomen    Blood in the stool    Black, tarry stool    Involuntary weight loss    Weakness  Date Last Reviewed: 12/30/2015 2000-2017 [employer]. [address] [place] [address]. All rights reserved. This information is not intended as a substitute for professional medical care. Always follow your healthcare professional's instructions.          Eating a High-Fiber Diet  Fiber is what gives strength and structure to plants. Most grains, beans, vegetables, and fruits contain fiber. Foods rich in fiber are often low in calories and fat, and they fill you up more. They may also reduce your risks for certain health problems. To find out the amount of fiber in canned, packaged, or frozen foods, read the Nutrition Facts label. It tells you how much fiber is in one serving.    Types of fiber and their benefits  There are two types of fiber: insoluble and soluble. They both aid digestion and help you maintain a healthy weight.    Insoluble fiber. This is found in whole grains, cereals, certain fruits and vegetables such as apple skin, corn, and carrots. Insoluble fiber may prevent constipation and reduce the risk for certain types of cancer. It is called insoluble because it does not  dissolve in water.    Soluble fiber. This type of fiber is in oats, beans, and certain fruits and vegetables such as strawberries and peas. Soluble fiber can reduce cholesterol, which may help lower the risk for heart disease. It also helps control blood sugar levels.  Look for high-fiber foods  Try these foods to add fiber to your diet:    Whole-grain breads and cereals. Try to eat 6 to 8 ounces a day. Include wheat and oat bran cereals, whole-wheat muffins or toast, and corn tortillas in your meals.    Fruits. Try to eat 2 cups a day. Apples, oranges, strawberries, pears, and bananas are good sources. (Note: Fruit juice is low in fiber.)    Vegetables. Try to eat at least 2.5 cups a day. Add asparagus, carrots, broccoli, peas, and corn to your meals.    Beans. One cup of cooked lentils gives you over 15 grams of fiber. Try navy beans, lentils, and chickpeas.    Seeds. A small handful of seeds gives you about 3 grams of fiber. Try sunflower or delvis seeds.  Keep track of your fiber  Keep track of how much fiber you eat. Start by reading food labels. Then eat a variety of foods high in fiber. As you start to eat more fiber, ask your healthcare provider how much water you should be drinking to keep your digestive system working smoothly.  Aim for a certain amount of fiber in your diet each day. If you are a woman, that amount is between 25 and 28 grams per day. Men should aim for 30 to 33 grams per day. After age 50, your daily fiber needs drop to 22 grams for women and 28 grams for men.  Before you reach for the fiber supplements, think about this. Fiber is found naturally in healthy whole foods. It gives you that feeling of fullness after you eat. Taking fiber supplements or eating fiber-enriched foods will not give you this full feeling.  Your fiber intake is a good measure for the quality of your overall diet. If you are missing out on your daily amount of fiber, you may be lacking other important nutrients as  well.  Date Last Reviewed: 6/1/2017 2000-2017 [employer] [address] [place] [address], [place] [address]. All rights reserved. This information is not intended as a substitute for professional medical care. Always follow your healthcare professional's instructions.          Treating Constipation    Constipation is a common and often uncomfortable problem. Constipation means you have bowel movements fewer than 3 times per week, or strain to pass hard, dry stool. It can last a short time. Or it can be a problem that never seems to go away. The good news is that it can often be treated and controlled.  Eat more fiber  One of the best ways to help treat constipation is to increase your fiber intake. You can do this either through diet or by using fiber supplements. Fiber (in whole grains, fruits, and vegetables) adds bulk and absorbs water to soften the stool. This helps the stool pass through the colon more easily. When you increase your fiber intake, do it slowly to avoid side effects such as bloating. Also increase the amount of water that you drink. Eating more of the following foods can add fiber to your diet.    High-fiber cereals    Whole grains, bran, and brown rice    Vegetables such as carrots, broccoli, and greens    Fresh fruits (especially apples, pears, and dried fruits like raisins and apricots)    Nuts and legumes (especially beans such as lentils, kidney beans, and lima beans)  Get physically active  Exercise helps improve the working of your colon which helps ease constipation. Try to get some physical activity every day. If you haven t been active for a while, talk to your healthcare provider before starting again.  Laxatives  Your healthcare provider may suggest an over-the-counter product to help ease your constipation. He or she may suggest the use of bulk-forming agents or laxatives. The use of laxatives, if used as directed, is common and safe. Follow directions carefully when using  them. See your healthcare provider for new-onset constipation, or long-term constipation, to rule out other causes such as medicines or thyroid disease.  Date Last Reviewed: 7/1/2016 2000-2017 [employer]. [address] [place] [address]. All rights reserved. This information is not intended as a substitute for professional medical care. Always follow your healthcare professional's instructions.          How the Colon Works  The colon (large intestine) is a muscular tube that forms the last part of the digestive tract. It absorbs water and stores food waste. The colon is about 4 to 6 feet long. The rectum is the last 6 inches of the colon.    How food moves through the colon  Semiliquid food waste from the small intestine enters the colon at the beginning of the colon (cecum). As this waste, called stool, travels through the colon, it loses water and solidifies. Strong muscles keep the stool moving through the colon. The stool is moved toward the last section of the colon (sigmoid colon). From there, it passes into the rectum, where it is stored until it leaves the body through the anus during a bowel movement.    Date Last Reviewed: 8/1/2016 2000-2017 [employer]. [address] [place] [address]. All rights reserved. This information is not intended as a substitute for professional medical care. Always follow your healthcare professional's instructions.          24 Hour Appointment Hotline       To make an appointment at any [facility], call [phone] ([phone]). If you don't have a family doctor or clinic, we will help you find one. [facility] are conveniently located to serve the needs of you and your family.             Review of your medicines      START taking        Dose / Directions Last dose taken    docusate sodium 100 MG tablet   Commonly known as:  COLACE   Dose:  100 mg   Quantity:  60 tablet        Take 100 mg by mouth daily    Refills:  1        magnesium hydroxide 400 MG/5ML suspension   Commonly known as:  MILK OF MAGNESIA   Dose:  30 mL   Quantity:  360 mL        Take 30 mLs by mouth daily as needed for constipation or heartburn   Refills:  1          CONTINUE these medicines which may have CHANGED, or have new prescriptions. If we are uncertain of the size of tablets/capsules you have at home, strength may be listed as something that might have changed.        Dose / Directions Last dose taken    HYDROmorphone 2 MG tablet   Commonly known as:  DILAUDID   Dose:  2 mg   What changed:    - medication strength  - how much to take  - when to take this  - reasons to take this  - additional instructions   Quantity:  12 tablet        Take 1 tablet (2 mg) by mouth every 6 hours as needed for severe pain maximum 6 tablet(s) per day   Refills:  0          Our records show that you are taking the medicines listed below. If these are incorrect, please call your family doctor or clinic.        Dose / Directions Last dose taken    diclofenac 1 % Gel topical gel   Commonly known as:  VOLTAREN   Dose:  2 g   Quantity:  1 Tube        Apply 2 g topically 4 times daily To upper quadrant of abdomin and ribs.   Refills:  0        DRONABINOL PO        Refills:  0        fentaNYL 12 mcg/hr 72 hr patch   Commonly known as:  DURAGESIC   Dose:  1 patch   Quantity:  2 patch        Place 1 patch onto the skin every 72 hours remove old patch. Next change on 3/14 Wednesday at 11 am   Refills:  0        ferrous sulfate 325 (65 Fe) MG tablet   Commonly known as:  IRON   Dose:  325 mg   Quantity:  30 tablet        Take 1 tablet (325 mg) by mouth daily (with breakfast)   Refills:  2        GABAPENTIN PO   Dose:  600 mg        Take 600 mg by mouth daily   Refills:  0        LORAZEPAM PO   Dose:  0.5 mg        Take 0.5 mg by mouth every 6 hours as needed for anxiety   Refills:  0        mirtazapine 7.5 MG Tabs tablet   Commonly known as:  REMERON   Dose:  7.5 mg    Quantity:  30 tablet        Take 1 tablet (7.5 mg) by mouth nightly as needed   Refills:  0        naloxone 1 mg/mL for intranasal kit (2 syringes with 2 mucosal atomizer device)   Commonly known as:  NARCAN   Quantity:  1 kit        In opioid overdose put cone in nostril and push 1/2 of contents into each nostril.  Repeat every 3 min if no response until help arrives.   Refills:  0        omeprazole 40 MG capsule   Commonly known as:  priLOSEC   Dose:  40 mg   Quantity:  30 capsule        Take 1 capsule (40 mg) by mouth 2 times daily (before meals)   Refills:  3        polyethylene glycol powder   Commonly known as:  MIRALAX   Dose:  1 capful   Quantity:  225 g        Take 17 g (1 capful) by mouth daily as needed for constipation   Refills:  0        REVATIO PO   Dose:  20 mg        Take 20 mg by mouth daily as needed   Refills:  0        senna 8.6 MG tablet   Commonly known as:  SENOKOT   Dose:  1 tablet   Quantity:  30 tablet        Take 1 tablet by mouth daily   Refills:  0        sucralfate 1 GM/10ML suspension   Commonly known as:  CARAFATE   Dose:  1 g   Quantity:  420 mL        Take 10 mLs (1 g) by mouth 4 times daily   Refills:  0                Information about OPIOIDS     PRESCRIPTION OPIOIDS: WHAT YOU NEED TO KNOW   You have a prescription for an opioid (narcotic) pain medicine. Opioids can cause addiction. If you have a history of chemical dependency of any type, you are at a higher risk of becoming addicted to opioids. Only take this medicine after all other options have been tried. Take it for as short a time and as few doses as possible.     Do not:    Drive. If you drive while taking these medicines, you could be arrested for driving under the influence (DUI).    Operate heavy machinery    Do any other dangerous activities while taking these medicines.     Drink any alcohol while taking these medicines.      Take with any other medicines that contain acetaminophen. Read all labels carefully. Look  for the word  acetaminophen  or  Tylenol.  Ask your pharmacist if you have questions or are unsure.    Store your pills in a secure place, locked if possible. We will not replace any lost or stolen medicine. If you don t finish your medicine, please throw away (dispose) as directed by your pharmacist. The [place] Pollution [employer] Agency has more information about safe disposal: https://www.pca.[facility].[place].us/living-green/managing-unwanted-medications    All opioids tend to cause constipation. Drink plenty of water and eat foods that have a lot of fiber, such as fruits, vegetables, prune juice, apple juice and high-fiber cereal. Take a laxative (Miralax, milk of magnesia, Colace, Senna) if you don t move your bowels at least every other day.         Prescriptions were sent or printed at these locations (3 Prescriptions)                   Other Prescriptions                Printed at Department/Unit printer (3 of 3)         docusate sodium (COLACE) 100 MG tablet               HYDROmorphone (DILAUDID) 2 MG tablet               magnesium hydroxide (MILK OF MAGNESIA) 400 MG/5ML suspension                Procedures and tests performed during your visit     CBC with platelets differential    Comprehensive metabolic panel    EKG 12 lead      Orders Needing Specimen Collection     None      Pending Results     Date and Time Order Name Status Description    5/1/2018 1629 EKG 12 lead Preliminary             Pending Culture Results     No orders found from 4/29/2018 to 5/2/2018.            Pending Results Instructions     If you had any lab results that were not finalized at the time of your Discharge, you can call the ED Lab Result RN at [phone]. You will be contacted by this team for any positive Lab results or changes in treatment. The nurses are available 7 days a week from 10A to 6:30P.  You can leave a message 24 hours per day and they will return your call.        Test Results From Your Hospital Stay        5/1/2018   5:42 PM      Component Results     Component Value Ref Range & Units Status    WBC 7.5 4.0 - 11.0 10e9/L Final    RBC Count 3.76 (L) 4.4 - 5.9 10e12/L Final    Hemoglobin 8.6 (L) 13.3 - 17.7 g/dL Final    Hematocrit 29.2 (L) 40.0 - 53.0 % Final    MCV 78 78 - 100 fl Final    MCH 22.9 (L) 26.5 - 33.0 pg Final    MCHC 29.5 (L) 31.5 - 36.5 g/dL Final    RDW 19.7 (H) 10.0 - 15.0 % Final    Platelet Count 289 150 - 450 10e9/L Final    Diff Method Automated Method  Final    % Neutrophils 78.6 % Final    % Lymphocytes 11.2 % Final    % Monocytes 8.9 % Final    % Eosinophils 0.3 % Final    % Basophils 0.7 % Final    % Immature Granulocytes 0.3 % Final    Nucleated RBCs 0 0 /100 Final    Absolute Neutrophil 5.9 1.6 - 8.3 10e9/L Final    Absolute Lymphocytes 0.8 0.8 - 5.3 10e9/L Final    Absolute Monocytes 0.7 0.0 - 1.3 10e9/L Final    Absolute Eosinophils 0.0 0.0 - 0.7 10e9/L Final    Absolute Basophils 0.1 0.0 - 0.2 10e9/L Final    Abs Immature Granulocytes 0.0 0 - 0.4 10e9/L Final    Absolute Nucleated RBC 0.0  Final         5/1/2018  5:59 PM      Component Results     Component Value Ref Range & Units Status    Sodium 142 133 - 144 mmol/L Final    Potassium 3.6 3.4 - 5.3 mmol/L Final    Chloride 106 94 - 109 mmol/L Final    Carbon Dioxide 22 20 - 32 mmol/L Final    Anion Gap 14 3 - 14 mmol/L Final    Glucose 119 (H) 70 - 99 mg/dL Final    Urea Nitrogen 9 7 - 30 mg/dL Final    Creatinine 0.61 (L) 0.66 - 1.25 mg/dL Final    GFR Estimate >90 >60 mL/min/1.7m2 Final    Non  GFR Calc    GFR Estimate If Black >90 >60 mL/min/1.7m2 Final    African American GFR Calc    Calcium 8.4 (L) 8.5 - 10.1 mg/dL Final    Bilirubin Total 0.5 0.2 - 1.3 mg/dL Final    Albumin 3.5 3.4 - 5.0 g/dL Final    Protein Total 6.6 (L) 6.8 - 8.8 g/dL Final    Alkaline Phosphatase 79 40 - 150 U/L Final    ALT 12 0 - 70 U/L Final    AST 14 0 - 45 U/L Final                Clinical Quality Measure: Blood Pressure Screening     Your blood  pressure was checked while you were in the emergency department today. The last reading we obtained was  BP: 116/68 . Please read the guidelines below about what these numbers mean and what you should do about them.  If your systolic blood pressure (the top number) is less than 120 and your diastolic blood pressure (the bottom number) is less than 80, then your blood pressure is normal. There is nothing more that you need to do about it.  If your systolic blood pressure (the top number) is 120-139 or your diastolic blood pressure (the bottom number) is 80-89, your blood pressure may be higher than it should be. You should have your blood pressure rechecked within a year by a primary care provider.  If your systolic blood pressure (the top number) is 140 or greater or your diastolic blood pressure (the bottom number) is 90 or greater, you may have high blood pressure. High blood pressure is treatable, but if left untreated over time it can put you at risk for heart attack, stroke, or kidney failure. You should have your blood pressure rechecked by a primary care provider within the next 4 weeks.  If your provider in the emergency department today gave you specific instructions to follow-up with your doctor or provider even sooner than that, you should follow that instruction and not wait for up to 4 weeks for your follow-up visit.        Thank you for choosing [facility]       Thank you for choosing [facility] for your care. Our goal is always to provide you with excellent care. Hearing back from our patients is one way we can continue to improve our services. Please take a few minutes to complete the written survey that you may receive in the mail after you visit with us. Thank you!        [employer] Information     [employer] gives you secure access to your electronic health record. If you see a primary care provider, you can also send messages to your care team and make appointments. If you have questions, please call your  primary care clinic.  If you do not have a primary care provider, please call [phone] and they will assist you.        Care EveryWhere ID     This is your Care EveryWhere ID. This could be used by other organizations to access your [facility] medical records  [id number]        Equal Access to Services     VANESSA JOSHI : Charan Brewster, thomas bennett, carlo zavala, emma simon. So [facility] [phone].    ATENCIÓN: Si habla español, tiene a sanz disposición servicios gratuitos de asistencia lingüística. Llame al [phone].    We comply with applicable federal civil rights laws and [place] laws. We do not discriminate on the basis of race, color, national origin, age, disability, sex, sexual orientation, or gender identity.            After Visit Summary       This is your record. Keep this with you and show to your community pharmacist(s) and doctor(s) at your next visit.

## 2018-05-01 NOTE — ED TRIAGE NOTES
EMS reports pt is stage 4 lung and stomach cancer. Pt frequently has panic attacks and calls 911 per EMS. Today pt reports he feels like he can't breath for the last few hours. Pt also complaining of constipation, unknown last bowel movement. Pt also nauseated. Pt states he hasn't taken any of his meds in several days, did not try any of his home nausea meds, has not been remembering to take meds and has no one to help him.

## 2018-05-01 NOTE — ED NOTES
Bed: ED33  Expected date: 5/1/18  Expected time: 4:07 PM  Means of arrival: Ambulance  Comments:  Mann Ledesma

## 2018-05-02 LAB — INTERPRETATION ECG - MUSE: NORMAL

## 2018-05-02 NOTE — DISCHARGE INSTRUCTIONS
Constipation (Adult)  Constipation means that you have bowel movements that are less frequent than usual. Stools often become very hard and difficult to pass.  Constipation is very common. At some point in life it affects almost everyone. Since everyone's bowel habits are different, what is constipation to one person may not be to another. Your healthcare provider may do tests to diagnose constipation. It depends on what he or she finds when evaluating you.    Symptoms of constipation include:    Abdominal pain    Bloating    Vomiting    Painful bowel movements    Itching, swelling, bleeding, or pain around the anus  Causes  Constipation can have many causes. These include:    Diet low in fiber    Too much dairy    Not drinking enough liquids    Lack of exercise or physical activity. This is especially true for older adults.    Changes in lifestyle or daily routine, including pregnancy, aging, work, and travel    Frequent use or misuse of laxatives    Ignoring the urge to have a bowel movement or delaying it until later    Medicines, such as certain prescription pain medicines, iron supplements, antacids, certain antidepressants, and calcium supplements    Diseases like irritable bowel syndrome, bowel obstructions, stroke, diabetes, thyroid disease, Parkinson disease, hemorrhoids, and colon cancer  Complications  Potential complications of constipation can include:    Hemorrhoids    Rectal bleeding from hemorrhoids or anal fissures (skin tears)    Hernias    Dependency on laxatives    Chronic constipation    Fecal impaction    Bowel obstruction or perforation  Home care  All treatment should be done after talking with your healthcare provider. This is especially true if you have another medical problems, are taking prescription medicines, or are an older adult. Treatment most often involves lifestyle changes. You may also need medicines. Your healthcare provider will tell you which will work best for you. Follow  the advice below to help avoid this problem in the future.  Lifestyle changes  These lifestyle changes can help prevent constipation:    Diet. Eat a high-fiber diet, with fresh fruit and vegetables, and reduce dairy intake, meats, and processed foods    Fluids. It's important to get enough fluids each day. Drink plenty of water when you eat more fiber. If you are on diet that limits the amount of fluid you can have, talk about this with your healthcare provider.    Regular exercise. Check with your healthcare provider first.  Medicines  Take any medicines as directed. Some laxatives are safe to use only every now and then. Others can be taken on a regular basis. Talk with your doctor or pharmacist if you have questions.  Prescription pain medicines can cause constipation. If you are taking this kind of medicine, ask your healthcare provider if you should also take a stool softener.  Medicines you may take to treat constipation include:    Fiber supplements    Stool softeners    Laxatives    Enemas    Rectal suppositories  Follow-up care  Follow up with your healthcare provider if symptoms don't get better in the next few days. You may need to have more tests or see a specialist.  Call 911  Call 911 if any of these occur:    Trouble breathing    Stiff, rigid abdomen that is severely painful to touch    Confusion    Fainting or loss of consciousness    Rapid heart rate    Chest pain  When to seek medical advice  Call your healthcare provider right away if any of these occur:    Fever of 100.4 F (38 C) or higher, or as directed by your healthcare provider    Failure to resume normal bowel movements    Pain in your abdomen or back gets worse    Nausea or vomiting    Swelling in your abdomen    Blood in the stool    Black, tarry stool    Involuntary weight loss    Weakness  Date Last Reviewed: 12/30/2015 2000-2017 [employer]. [address] [place] [address], [place] [address]. All rights reserved. This  information is not intended as a substitute for professional medical care. Always follow your healthcare professional's instructions.          Eating a High-Fiber Diet  Fiber is what gives strength and structure to plants. Most grains, beans, vegetables, and fruits contain fiber. Foods rich in fiber are often low in calories and fat, and they fill you up more. They may also reduce your risks for certain health problems. To find out the amount of fiber in canned, packaged, or frozen foods, read the Nutrition Facts label. It tells you how much fiber is in one serving.    Types of fiber and their benefits  There are two types of fiber: insoluble and soluble. They both aid digestion and help you maintain a healthy weight.    Insoluble fiber. This is found in whole grains, cereals, certain fruits and vegetables such as apple skin, corn, and carrots. Insoluble fiber may prevent constipation and reduce the risk for certain types of cancer. It is called insoluble because it does not dissolve in water.    Soluble fiber. This type of fiber is in oats, beans, and certain fruits and vegetables such as strawberries and peas. Soluble fiber can reduce cholesterol, which may help lower the risk for heart disease. It also helps control blood sugar levels.  Look for high-fiber foods  Try these foods to add fiber to your diet:    Whole-grain breads and cereals. Try to eat 6 to 8 ounces a day. Include wheat and oat bran cereals, whole-wheat muffins or toast, and corn tortillas in your meals.    Fruits. Try to eat 2 cups a day. Apples, oranges, strawberries, pears, and bananas are good sources. (Note: Fruit juice is low in fiber.)    Vegetables. Try to eat at least 2.5 cups a day. Add asparagus, carrots, broccoli, peas, and corn to your meals.    Beans. One cup of cooked lentils gives you over 15 grams of fiber. Try navy beans, lentils, and chickpeas.    Seeds. A small handful of seeds gives you about 3 grams of fiber. Try sunflower or  delvis seeds.  Keep track of your fiber  Keep track of how much fiber you eat. Start by reading food labels. Then eat a variety of foods high in fiber. As you start to eat more fiber, ask your healthcare provider how much water you should be drinking to keep your digestive system working smoothly.  Aim for a certain amount of fiber in your diet each day. If you are a woman, that amount is between 25 and 28 grams per day. Men should aim for 30 to 33 grams per day. After age 50, your daily fiber needs drop to 22 grams for women and 28 grams for men.  Before you reach for the fiber supplements, think about this. Fiber is found naturally in healthy whole foods. It gives you that feeling of fullness after you eat. Taking fiber supplements or eating fiber-enriched foods will not give you this full feeling.  Your fiber intake is a good measure for the quality of your overall diet. If you are missing out on your daily amount of fiber, you may be lacking other important nutrients as well.  Date Last Reviewed: 6/1/2017 2000-2017 [employer]. [address] [place] [address]. All rights reserved. This information is not intended as a substitute for professional medical care. Always follow your healthcare professional's instructions.          Treating Constipation    Constipation is a common and often uncomfortable problem. Constipation means you have bowel movements fewer than 3 times per week, or strain to pass hard, dry stool. It can last a short time. Or it can be a problem that never seems to go away. The good news is that it can often be treated and controlled.  Eat more fiber  One of the best ways to help treat constipation is to increase your fiber intake. You can do this either through diet or by using fiber supplements. Fiber (in whole grains, fruits, and vegetables) adds bulk and absorbs water to soften the stool. This helps the stool pass through the colon more easily. When you increase your  fiber intake, do it slowly to avoid side effects such as bloating. Also increase the amount of water that you drink. Eating more of the following foods can add fiber to your diet.    High-fiber cereals    Whole grains, bran, and brown rice    Vegetables such as carrots, broccoli, and greens    Fresh fruits (especially apples, pears, and dried fruits like raisins and apricots)    Nuts and legumes (especially beans such as lentils, kidney beans, and lima beans)  Get physically active  Exercise helps improve the working of your colon which helps ease constipation. Try to get some physical activity every day. If you haven t been active for a while, talk to your healthcare provider before starting again.  Laxatives  Your healthcare provider may suggest an over-the-counter product to help ease your constipation. He or she may suggest the use of bulk-forming agents or laxatives. The use of laxatives, if used as directed, is common and safe. Follow directions carefully when using them. See your healthcare provider for new-onset constipation, or long-term constipation, to rule out other causes such as medicines or thyroid disease.  Date Last Reviewed: 7/1/2016 2000-2017 [employer]. [address] [place] [address]. All rights reserved. This information is not intended as a substitute for professional medical care. Always follow your healthcare professional's instructions.          How the Colon Works  The colon (large intestine) is a muscular tube that forms the last part of the digestive tract. It absorbs water and stores food waste. The colon is about 4 to 6 feet long. The rectum is the last 6 inches of the colon.    How food moves through the colon  Semiliquid food waste from the small intestine enters the colon at the beginning of the colon (cecum). As this waste, called stool, travels through the colon, it loses water and solidifies. Strong muscles keep the stool moving through the colon. The  stool is moved toward the last section of the colon (sigmoid colon). From there, it passes into the rectum, where it is stored until it leaves the body through the anus during a bowel movement.    Date Last Reviewed: 8/1/2016 2000-2017 [employer]. [address] [place] [address]. All rights reserved. This information is not intended as a substitute for professional medical care. Always follow your healthcare professional's instructions.

## 2018-05-02 NOTE — ED NOTES
Pt rolling around on bed in pain, unable to lie on his back for port access or EKG for more than a minute.

## 2018-05-02 NOTE — ED NOTES
Pt reports no change in pain. Pt resting on cot, able to lie on his back now, talking with visitors.

## 2018-05-08 ENCOUNTER — TRANSFERRED RECORDS (OUTPATIENT)
Dept: PHYSICAL THERAPY | Facility: CLINIC | Age: 58
End: 2018-05-08

## 2018-05-10 ENCOUNTER — THERAPY VISIT (OUTPATIENT)
Dept: OCCUPATIONAL THERAPY | Facility: CLINIC | Age: 58
End: 2018-05-10
Payer: COMMERCIAL

## 2018-05-10 DIAGNOSIS — M79.641 PAIN OF RIGHT HAND: Primary | ICD-10-CM

## 2018-05-10 DIAGNOSIS — R29.898 RIGHT HAND WEAKNESS: ICD-10-CM

## 2018-05-10 PROCEDURE — 97112 NEUROMUSCULAR REEDUCATION: CPT | Mod: GO | Performed by: OCCUPATIONAL THERAPIST

## 2018-05-10 PROCEDURE — 97166 OT EVAL MOD COMPLEX 45 MIN: CPT | Mod: GO | Performed by: OCCUPATIONAL THERAPIST

## 2018-05-10 PROCEDURE — 97110 THERAPEUTIC EXERCISES: CPT | Mod: GO | Performed by: OCCUPATIONAL THERAPIST

## 2018-05-10 NOTE — LETTER
[facility]  [address]  [address]  [facility] [address]  [phone]    May 11, 2018    Re: Dashawn Ordoñez   :   1960  MRN:  [id number]   REFERRING PHYSICIAN:   Clementina ROMERO  [facility]    Date of Initial Evaluation:  5/10/2018  Visits:  Rxs Used: 1  Reason for Referral:     Pain of right hand  Right hand weakness    Hand Therapy Initial Evaluation  Current Date:  5/10/2018    Subjective:  Dashawn Ordoñez is a 57 year old right hand dominant male.    Diagnosis: Right and left hand weakness  DOI:  18    Patient reports symptoms of pain, stiffness/loss of motion, weakness/loss of strength, numbness and tingling  of the right hand which occurred due to unknown. Since onset symptoms are Gradually getting better. Special tests:  None EMG scheduled for 18.  Previous treatment: Pre-sarah splint. General health as reported by patient is fair.  Pertinent medical history includes: cancer, implanted device, smoking, numbness/tingling, pain at rest/night.  Medical allergies: none.  Surgical history: orthopedic: Bilateral CTS releases.  Medication history: pain.    Occupational Profile Information:  Current occupation is Sales//Retired  Currently not working due to present treatment problem  Job Tasks: driving  Prior functional level:  no limitations  Barriers include:none  Mobility: No difficulty  Transportation: drives  Leisure activities/hobbies: Play [employer]    Upper Extremity Functional Index Score:  SCORE:   Column Totals: /80: 21   (A lower score indicates greater disability.)  Objective:  Pain Level Report  VAS(0-10) 5/10/2018   At Rest: 6   With Use: 9   Re: Dashawn Ordoñez   :   1960    Report of Pain:  Location:  Right elbow, wrist and hand  Pain Quality:  Sharp, Shooting, Stabbing and Throbbing  Frequency: constant    Pain is worst:  nighttime  Exacerbated by:  Movement  Relieved by:  Pain medication  Progression:  Gradually getting better    Sensation: Decreased Radial Nerve distribution. Noted wrist drop and inability to actively extend wrist and fingers. Light touch intact with gross testing.   ROM:  No active wrist extension or finger extension. Able to actively make a composite fist. Unable to oppose thumb.   Range of Motion Wrist AROM (PROM):  Date 5/10/2018 5/10/2018   Side R L   Ext (50) 55   Flex 70 55   UD 35 32   RD 5 20   Sup 85 80   Pro 75 65     Palpation Radial Nerve Path: Pain level report + or -  Date 5/10/18    Side R    Supraspinatus -    Triangular Interval -    Spiral Groove +    Radial Head +    PIN +    DRSN -        Strength:  Deferred due to radial nerve palsy    Assessment:  Patient presents with symptoms consistent with diagnosis of right and left hand weakness,  with conservative intervention.     Patient's limitations or Problem List includes:  Pain, Decreased ROM/motion and Weakness of the right elbow, wrist and hand which interferes with the patient's ability to perform Self Care Tasks (dressing, eating, bathing, hygiene/toileting), Work Tasks, Sleep Patterns, Recreational Activities, Household Chores and Driving  as compared to previous level of function.  Rehab Potential:  Good - Return to full activity, some limitations  Patient will benefit from skilled Occupational Therapy to increase ROM, motion, overall strength, coordination and dexterity and decrease pain to return to previous activity level and resume normal daily tasks and to reach their rehab potential.  Barriers to Learning:  No barrier  Communication Issues:  Patient appears to be able to clearly communicate and understand verbal and written communication and follow directions correctly.  Chart Review: Chart Review and Brief history including review of medical and/or therapy records relating to the presenting problem  Re: Dashawn Ordoñez   :   1960    Identified Performance Deficits: bathing/showering, toileting, dressing, feeding, driving and  community mobility, health management and maintenance, home establishment and management, meal preparation and cleanup, shopping, sleep, work, leisure activities and social participation    Assessment of Occupational Performance:  5 or more Performance Deficits    Clinical Decision Making (Complexity): Moderate complexity    Treatment Explanation:  The following has been discussed with the patient:  RX ordered/plan of care  Anticipated outcomes  Possible risks and side effects    Plan:  Frequency:  1 X week, once daily  Duration:  for 5 weeks    Treatment Plan:   Modalities:  E-Stim  Therapeutic Exercise:  AROM, AAROM, PROM, Tendon Gliding, Blocking, Reverse Blocking, Place and Hold, Contract Relax, Extensor Tracking, Isotonics, Isometrics and Stabilization  Neuromuscular re-education:  Nerve Gliding, Kinesiotaping, Isometrics and Stabilization  Manual Techniques:  Joint mobilization and Myofascial release  Orthotic Fabrication:  Static orthosis, Dynamic orthosis and Forearm based orthosis  Discharge Plan:  Achieve all LTG.  Independent in home treatment program.  Reach maximal therapeutic benefit.    Home Exercise Program:  PROM wrist and finger extension  Finger abd and add  UD/RD    Next Visit:  Radial nerve glide  MFR  Progress as tolerated  Possible resting splint fabrication    Thank you for your referral.    INQUIRIES  Therapist: SHARRI Corbett/L  [facility]  [address]  [address]  [facility] [address]  Phone: [phone]  Fax: [phone]

## 2018-05-10 NOTE — MR AVS SNAPSHOT
After Visit Summary   5/10/2018    Dashawn Ordoñez    MRN: [id number]           Patient Information     Date Of Birth          1960        Visit Information        Provider Department      5/10/2018 11:00 AM Bonnie Marie, HAMZAH [facility]ERO [facility]        Today's Diagnoses     Pain of right hand    -  1    Right hand weakness           Follow-ups after your visit        Your next 10 appointments already scheduled     May 15, 2018  3:00 PM CDT   [facility] with Bonnie Marie, OT   [facility] ([facility] [place]  )    [address] [address] [address]   [phone]            May 24, 2018  2:00 PM CDT   [facility] with Bonnie Marie, OT   [facility] ([facility] [place]  )    [address] [address] [address]   [phone]            May 31, 2018  1:30 PM CDT   [facility] with Bonnie Marie, OT   [facility] ([facility] [place]  )    [address] [address] [address]   [phone]            Jun 07, 2018  2:00 PM CDT   [facility] with Bonnie Marie, OT   [facility] ([facility] [place]  )    [address] [address] [address]   [phone]              Who to contact     If you have questions or need follow up information about today's clinic visit or your schedule please contact [facility] directly at [phone].  Normal or non-critical lab and imaging results will be communicated to you by MyChart, letter or phone within 4 business days after the clinic has received the results. If you do not hear from us within 7 days, please contact the clinic through exozethart or phone. If you have a critical or abnormal lab result, we will notify you by phone as soon as possible.  Submit refill requests through [employer] or call your pharmacy and they will forward the refill request to us. Please allow 3 business days for your refill to be completed.          Additional Information About Your  Visit        MyChart Information     [employer] gives you secure access to your electronic health record. If you see a primary care provider, you can also send messages to your care team and make appointments. If you have questions, please call your primary care clinic.  If you do not have a primary care provider, please call [phone] and they will assist you.        Care EveryWhere ID     This is your Care EveryWhere ID. This could be used by other organizations to access your [place] medical records  [id number]         Blood Pressure from Last 3 Encounters:   05/01/18 116/68   04/21/18 153/86   03/30/18 150/82    Weight from Last 3 Encounters:   05/01/18 63.5 kg (140 lb)   04/21/18 61.2 kg (135 lb)   03/30/18 68 kg (150 lb)              We Performed the Following     HC OT EVAL, MODERATE COMPLEXITY     NEUROMUSCULAR RE-EDUCATION     THERAPEUTIC EXERCISES        Primary Care Provider Office Phone # Fax #    Cristian Shakeel Healy [phone]       [place] [facility] [address] [address]        Equal Access to Services     [facility]: Hadii aad ku hadasho Soomaali, waaxda luqadaha, qaybta kaalmada adeegyada, waxay zoraida grigsby . So [facility] [phone].    ATENCIÓN: Si habla español, tiene a sanz disposición servicios gratSocialF5os de asistencia lingüística. [facility] [phone].    We comply with applicable federal civil rights laws and [place] laws. We do not discriminate on the basis of race, color, national origin, age, disability, sex, sexual orientation, or gender identity.            Thank you!     Thank you for choosing [facility]  for your care. Our goal is always to provide you with excellent care. Hearing back from our patients is one way we can continue to improve our services. Please take a few minutes to complete the written survey that you may receive in the mail after your visit with us. Thank you!             Your Updated Medication  List - Protect others around you: Learn how to safely use, store and throw away your medicines at www.disposemymeds.org.          This list is accurate as of 5/10/18 12:47 PM.  Always use your most recent med list.                   Brand Name Dispense Instructions for use Diagnosis    diclofenac 1 % Gel topical gel    VOLTAREN    1 Tube    Apply 2 g topically 4 times daily To upper quadrant of abdomin and ribs.    Abdominal pain, left upper quadrant       docusate sodium 100 MG tablet    COLACE    60 tablet    Take 100 mg by mouth daily        DRONABINOL PO           fentaNYL 12 mcg/hr 72 hr patch    DURAGESIC    2 patch    Place 1 patch onto the skin every 72 hours remove old patch. Next change on 3/14 Wednesday at 11 am    Malignant neoplasm of stomach, unspecified location (H), Abdominal pain, left upper quadrant       ferrous sulfate 325 (65 Fe) MG tablet    IRON    30 tablet    Take 1 tablet (325 mg) by mouth daily (with breakfast)    Gastrointestinal hemorrhage associated with gastric ulcer       GABAPENTIN PO      Take 600 mg by mouth daily        HYDROmorphone 2 MG tablet    DILAUDID    12 tablet    Take 1 tablet (2 mg) by mouth every 6 hours as needed for severe pain maximum 6 tablet(s) per day        LORAZEPAM PO      Take 0.5 mg by mouth every 6 hours as needed for anxiety        magnesium hydroxide 400 MG/5ML suspension    MILK OF MAGNESIA    360 mL    Take 30 mLs by mouth daily as needed for constipation or heartburn        mirtazapine 7.5 MG Tabs tablet    REMERON    30 tablet    Take 1 tablet (7.5 mg) by mouth nightly as needed    Sleep disorder       naloxone 1 mg/mL for intranasal kit (2 syringes with 2 mucosal atomizer device)    NARCAN    1 kit    In opioid overdose put cone in nostril and push 1/2 of contents into each nostril.  Repeat every 3 min if no response until help arrives.    Malignant neoplasm of stomach, unspecified location (H)       omeprazole 40 MG capsule    priLOSEC    30 capsule     Take 1 capsule (40 mg) by mouth 2 times daily (before meals)    Acute upper gastrointestinal bleeding       polyethylene glycol powder    MIRALAX    225 g    Take 17 g (1 capful) by mouth daily as needed for constipation    Abdominal pain, left upper quadrant       REVATIO PO      Take 20 mg by mouth daily as needed        senna 8.6 MG tablet    SENOKOT    30 tablet    Take 1 tablet by mouth daily        sucralfate 1 GM/10ML suspension    CARAFATE    420 mL    Take 10 mLs (1 g) by mouth 4 times daily    Malignant neoplasm of stomach, unspecified location (H), Abdominal pain, left upper quadrant

## 2018-05-10 NOTE — PROGRESS NOTES
Hand Therapy Initial Evaluation  Current Date:  5/10/2018    Subjective:  Dashawn Ordoñez is a 57 year old right hand dominant male.    Diagnosis: Right and left hand weakness  DOI:  5/8/18      Patient reports symptoms of pain, stiffness/loss of motion, weakness/loss of strength, numbness and tingling  of the right hand which occurred due to unknown. Since onset symptoms are Gradually getting better. Special tests:  None EMG scheduled for 8/16/18.  Previous treatment: Pre-sarah splint. General health as reported by patient is fair.  Pertinent medical history includes: cancer, implanted device, smoking, numbness/tingling, pain at rest/night.  Medical allergies: none.  Surgical history: orthopedic: Bilateral CTS releases.  Medication history: pain.    Occupational Profile Information:  Current occupation is Sales//Retired  Currently not working due to present treatment problem  Job Tasks: driving  Prior functional level:  no limitations  Barriers include:none  Mobility: No difficulty  Transportation: drives  Leisure activities/hobbies: Play [employer]    Upper Extremity Functional Index Score:  SCORE:   Column Totals: /80: 21   (A lower score indicates greater disability.)      Objective:  Pain Level Report  VAS(0-10) 5/10/2018   At Rest: 6   With Use: 9     Report of Pain:  Location:  Right elbow, wrist and hand  Pain Quality:  Sharp, Shooting, Stabbing and Throbbing  Frequency: constant    Pain is worst:  nighttime  Exacerbated by:  Movement  Relieved by:  Pain medication  Progression:  Gradually getting better     Sensation: Decreased Radial Nerve distribution. Noted wrist drop and inability to actively extend wrist and fingers. Light touch intact with gross testing.   ROM:  No active wrist extension or finger extension. Able to actively make a composite fist. Unable to oppose thumb.     Range of Motion Wrist AROM (PROM):  Date 5/10/2018 5/10/2018   Side R L   Ext (50) 55   Flex 70 55   UD 35 32   RD 5 20   Sup 85  80   Pro 75 65     Palpation Radial Nerve Path: Pain level report + or -  Date 5/10/18    Side R    Supraspinatus -    Triangular Interval -    Spiral Groove +    Radial Head +    PIN +    DRSN -          Strength:  Deferred due to radial nerve palsy    Assessment:  Patient presents with symptoms consistent with diagnosis of right and left hand weakness,  with conservative intervention.     Patient's limitations or Problem List includes:  Pain, Decreased ROM/motion and Weakness of the right elbow, wrist and hand which interferes with the patient's ability to perform Self Care Tasks (dressing, eating, bathing, hygiene/toileting), Work Tasks, Sleep Patterns, Recreational Activities, Household Chores and Driving  as compared to previous level of function.    Rehab Potential:  Good - Return to full activity, some limitations    Patient will benefit from skilled Occupational Therapy to increase ROM, motion, overall strength, coordination and dexterity and decrease pain to return to previous activity level and resume normal daily tasks and to reach their rehab potential.    Barriers to Learning:  No barrier    Communication Issues:  Patient appears to be able to clearly communicate and understand verbal and written communication and follow directions correctly.    Chart Review: Chart Review and Brief history including review of medical and/or therapy records relating to the presenting problem    Identified Performance Deficits: bathing/showering, toileting, dressing, feeding, driving and community mobility, health management and maintenance, home establishment and management, meal preparation and cleanup, shopping, sleep, work, leisure activities and social participation    Assessment of Occupational Performance:  5 or more Performance Deficits    Clinical Decision Making (Complexity): Moderate complexity    Treatment Explanation:  The following has been discussed with the patient:  RX ordered/plan of care  Anticipated  outcomes  Possible risks and side effects    Plan:  Frequency:  1 X week, once daily  Duration:  for 5 weeks    Treatment Plan:   Modalities:  E-Stim  Therapeutic Exercise:  AROM, AAROM, PROM, Tendon Gliding, Blocking, Reverse Blocking, Place and Hold, Contract Relax, Extensor Tracking, Isotonics, Isometrics and Stabilization  Neuromuscular re-education:  Nerve Gliding, Kinesiotaping, Isometrics and Stabilization  Manual Techniques:  Joint mobilization and Myofascial release  Orthotic Fabrication:  Static orthosis, Dynamic orthosis and Forearm based orthosis  Discharge Plan:  Achieve all LTG.  Independent in home treatment program.  Reach maximal therapeutic benefit.    Home Exercise Program:  PROM wrist and finger extension  Finger abd and add  UD/RD    Next Visit:  Radial nerve glide  MFR  Progress as tolerated  Possible resting splint fabrication

## 2018-05-22 ENCOUNTER — THERAPY VISIT (OUTPATIENT)
Dept: OCCUPATIONAL THERAPY | Facility: CLINIC | Age: 58
End: 2018-05-22
Payer: COMMERCIAL

## 2018-05-22 DIAGNOSIS — M79.641 PAIN OF RIGHT HAND: ICD-10-CM

## 2018-05-22 DIAGNOSIS — R29.898 RIGHT HAND WEAKNESS: ICD-10-CM

## 2018-05-22 PROCEDURE — 97760 ORTHOTIC MGMT&TRAING 1ST ENC: CPT | Mod: GO | Performed by: OCCUPATIONAL THERAPIST

## 2018-05-24 ENCOUNTER — THERAPY VISIT (OUTPATIENT)
Dept: OCCUPATIONAL THERAPY | Facility: CLINIC | Age: 58
End: 2018-05-24
Payer: COMMERCIAL

## 2018-05-24 DIAGNOSIS — R29.898 RIGHT HAND WEAKNESS: ICD-10-CM

## 2018-05-24 DIAGNOSIS — M79.641 PAIN OF RIGHT HAND: ICD-10-CM

## 2018-05-24 PROCEDURE — 97760 ORTHOTIC MGMT&TRAING 1ST ENC: CPT | Mod: GO | Performed by: OCCUPATIONAL THERAPIST

## 2018-05-24 NOTE — MR AVS SNAPSHOT
After Visit Summary   5/24/2018    Dashawn Ordoñez    MRN: [id number]           Patient Information     Date Of Birth          1960        Visit Information        Provider Department      5/24/2018 2:00 PM Heimkes, Bonnie, OT [facility]        Today's Diagnoses     Pain of right hand        Right hand weakness           Follow-ups after your visit        Your next 10 appointments already scheduled     May 31, 2018  1:30 PM CDT   [facility] with Bonnie Marie OT   [facility] ([facility]  )    [address]  [address]  [facility] [address]   [phone]            Jun 07, 2018  2:00 PM CDT   [facility] Bonnie [facility] OT   [facility] ([facility]  )    [address] [address] [address]   [phone]              Who to contact     If you have questions or need follow up information about today's clinic visit or your schedule please contact [facility] directly at [phone].  Normal or non-critical lab and imaging results will be communicated to you by Lookerhart, letter or phone within 4 business days after the clinic has received the results. If you do not hear from us within 7 days, please contact the clinic through [employer] or phone. If you have a critical or abnormal lab result, we will notify you by phone as soon as possible.  Submit refill requests through [employer] or call your pharmacy and they will forward the refill request to us. Please allow 3 business days for your refill to be completed.          Additional Information About Your Visit        Look[employer] Information     [employer] gives you secure access to your electronic health record. If you see a primary care provider, you can also send messages to your care team and make appointments. If you have questions, please call your primary care clinic.  If you do not have a primary care provider, please call [phone] and they will assist you.        Care  EveryWhere ID     This is your Care EveryWhere ID. This could be used by other organizations to access your [place] medical records  [id number]         Blood Pressure from Last 3 Encounters:   05/01/18 116/68   04/21/18 153/86   03/30/18 150/82    Weight from Last 3 Encounters:   05/01/18 63.5 kg (140 lb)   04/21/18 61.2 kg (135 lb)   03/30/18 68 kg (150 lb)              We Performed the Following     ORTHOTIC MGMT AND TRAINING, EACH 15 MIN        Primary Care Provider Office Phone # Fax #    Cristian Shakeel Healy [phone]       [facility] [address] [address]        Equal Access to Services     MARKUS JOSHI : Hadii aad ku hadasho Soomaali, waaxda luqadaha, qaybta kaalmada adeegyada, waxay idiin hayleeann grigsby . So [facility] [phone].    ATENCIÓN: Si habla español, tiene a sanz disposición servicios gratuitos de asistencia lingüística. [facility] [phone].    We comply with applicable federal civil rights laws and [place] laws. We do not discriminate on the basis of race, color, national origin, age, disability, sex, sexual orientation, or gender identity.            Thank you!     Thank you for choosing [facility]  for your care. Our goal is always to provide you with excellent care. Hearing back from our patients is one way we can continue to improve our services. Please take a few minutes to complete the written survey that you may receive in the mail after your visit with us. Thank you!             Your Updated Medication List - Protect others around you: Learn how to safely use, store and throw away your medicines at www.disposemymeds.org.          This list is accurate as of 5/24/18  2:39 PM.  Always use your most recent med list.                   Brand Name Dispense Instructions for use Diagnosis    diclofenac 1 % Gel topical gel    VOLTAREN    1 Tube    Apply 2 g topically 4 times daily To upper quadrant of abdomin and ribs.     Abdominal pain, left upper quadrant       docusate sodium 100 MG tablet    COLACE    60 tablet    Take 100 mg by mouth daily        DRONABINOL PO           fentaNYL 12 mcg/hr 72 hr patch    DURAGESIC    2 patch    Place 1 patch onto the skin every 72 hours remove old patch. Next change on 3/14 Wednesday at 11 am    Malignant neoplasm of stomach, unspecified location (H), Abdominal pain, left upper quadrant       ferrous sulfate 325 (65 Fe) MG tablet    IRON    30 tablet    Take 1 tablet (325 mg) by mouth daily (with breakfast)    Gastrointestinal hemorrhage associated with gastric ulcer       GABAPENTIN PO      Take 600 mg by mouth daily        HYDROmorphone 2 MG tablet    DILAUDID    12 tablet    Take 1 tablet (2 mg) by mouth every 6 hours as needed for severe pain maximum 6 tablet(s) per day        LORAZEPAM PO      Take 0.5 mg by mouth every 6 hours as needed for anxiety        magnesium hydroxide 400 MG/5ML suspension    MILK OF MAGNESIA    360 mL    Take 30 mLs by mouth daily as needed for constipation or heartburn        mirtazapine 7.5 MG Tabs tablet    REMERON    30 tablet    Take 1 tablet (7.5 mg) by mouth nightly as needed    Sleep disorder       naloxone 1 mg/mL for intranasal kit (2 syringes with 2 mucosal atomizer device)    NARCAN    1 kit    In opioid overdose put cone in nostril and push 1/2 of contents into each nostril.  Repeat every 3 min if no response until help arrives.    Malignant neoplasm of stomach, unspecified location (H)       omeprazole 40 MG capsule    priLOSEC    30 capsule    Take 1 capsule (40 mg) by mouth 2 times daily (before meals)    Acute upper gastrointestinal bleeding       polyethylene glycol powder    MIRALAX    225 g    Take 17 g (1 capful) by mouth daily as needed for constipation    Abdominal pain, left upper quadrant       REVATIO PO      Take 20 mg by mouth daily as needed        senna 8.6 MG tablet    SENOKOT    30 tablet    Take 1 tablet by mouth daily         sucralfate 1 GM/10ML suspension    CARAFATE    420 mL    Take 10 mLs (1 g) by mouth 4 times daily    Malignant neoplasm of stomach, unspecified location (H), Abdominal pain, left upper quadrant

## 2018-06-21 PROBLEM — M79.641 PAIN OF RIGHT HAND: Status: RESOLVED | Noted: 2018-05-10 | Resolved: 2018-06-21

## 2018-06-21 PROBLEM — R29.898 RIGHT HAND WEAKNESS: Status: RESOLVED | Noted: 2018-05-10 | Resolved: 2018-06-21

## 2018-06-21 NOTE — PROGRESS NOTES
Pt has not returned since 5/24/18..  Assume all goals are met to pt satisfaction.  D/C [facility]

## 2018-06-28 NOTE — PLAN OF CARE
Problem: Patient Care Overview  Goal: Plan of Care/Patient Progress Review  Outcome: No Change  Patient readmitted to  this evening. VSS. Had stool and + flatus today. Denies blood c stool. Denies pain/nausea. MIV @ 50 ml/hr via PICC. Ambulating independently. Goes outside to smoke.  Observation goals PRIOR TO DISCHARGE     Comments: -diagnostic tests and consults completed and resulted   -vital signs normal or at patient baseline. Goal met  -tolerating oral intake to maintain hydration . Tolerating Clear liquids  -adequate pain control on oral analgesics : Goal met. Denies pain  -returns to baseline functional status Goal met. VSS  -safe disposition plan has been identified . Goal Not met.               101.2

## 2018-07-22 ENCOUNTER — HOSPITAL ENCOUNTER (EMERGENCY)
Facility: CLINIC | Age: 58
Discharge: HOME | End: 2018-07-23
Attending: EMERGENCY MEDICINE | Admitting: EMERGENCY MEDICINE
Payer: COMMERCIAL

## 2018-07-22 ENCOUNTER — APPOINTMENT (OUTPATIENT)
Dept: CT IMAGING | Facility: CLINIC | Age: 58
End: 2018-07-22
Attending: EMERGENCY MEDICINE
Payer: COMMERCIAL

## 2018-07-22 DIAGNOSIS — C16.9 MALIGNANT NEOPLASM OF STOMACH, UNSPECIFIED LOCATION (H): ICD-10-CM

## 2018-07-22 DIAGNOSIS — G89.29 CHRONIC ABDOMINAL PAIN: ICD-10-CM

## 2018-07-22 DIAGNOSIS — F10.220 ALCOHOL DEPENDENCE WITH UNCOMPLICATED INTOXICATION (H): ICD-10-CM

## 2018-07-22 DIAGNOSIS — R10.9 CHRONIC ABDOMINAL PAIN: ICD-10-CM

## 2018-07-22 LAB
ALBUMIN SERPL-MCNC: 3.6 G/DL (ref 3.4–5)
ALP SERPL-CCNC: 78 U/L (ref 40–150)
ALT SERPL W P-5'-P-CCNC: 12 U/L (ref 0–70)
ANION GAP SERPL CALCULATED.3IONS-SCNC: 11 MMOL/L (ref 3–14)
AST SERPL W P-5'-P-CCNC: 14 U/L (ref 0–45)
BASOPHILS # BLD AUTO: 0.1 10E9/L (ref 0–0.2)
BASOPHILS NFR BLD AUTO: 1.1 %
BILIRUB SERPL-MCNC: 0.4 MG/DL (ref 0.2–1.3)
BUN SERPL-MCNC: 9 MG/DL (ref 7–30)
CALCIUM SERPL-MCNC: 8.9 MG/DL (ref 8.5–10.1)
CHLORIDE SERPL-SCNC: 103 MMOL/L (ref 94–109)
CO2 SERPL-SCNC: 26 MMOL/L (ref 20–32)
CREAT SERPL-MCNC: 0.86 MG/DL (ref 0.66–1.25)
DIFFERENTIAL METHOD BLD: ABNORMAL
EOSINOPHIL # BLD AUTO: 0 10E9/L (ref 0–0.7)
EOSINOPHIL NFR BLD AUTO: 0.6 %
ERYTHROCYTE [DISTWIDTH] IN BLOOD BY AUTOMATED COUNT: 20.8 % (ref 10–15)
ETHANOL SERPL-MCNC: 0.22 G/DL
GFR SERPL CREATININE-BSD FRML MDRD: >90 ML/MIN/1.7M2
GLUCOSE SERPL-MCNC: 92 MG/DL (ref 70–99)
HCT VFR BLD AUTO: 35.6 % (ref 40–53)
HGB BLD-MCNC: 11.1 G/DL (ref 13.3–17.7)
IMM GRANULOCYTES # BLD: 0 10E9/L (ref 0–0.4)
IMM GRANULOCYTES NFR BLD: 0.2 %
LIPASE SERPL-CCNC: 85 U/L (ref 73–393)
LYMPHOCYTES # BLD AUTO: 1.5 10E9/L (ref 0.8–5.3)
LYMPHOCYTES NFR BLD AUTO: 24.2 %
MCH RBC QN AUTO: 25.3 PG (ref 26.5–33)
MCHC RBC AUTO-ENTMCNC: 31.2 G/DL (ref 31.5–36.5)
MCV RBC AUTO: 81 FL (ref 78–100)
MONOCYTES # BLD AUTO: 0.5 10E9/L (ref 0–1.3)
MONOCYTES NFR BLD AUTO: 8.4 %
NEUTROPHILS # BLD AUTO: 4.1 10E9/L (ref 1.6–8.3)
NEUTROPHILS NFR BLD AUTO: 65.5 %
NRBC # BLD AUTO: 0 10*3/UL
NRBC BLD AUTO-RTO: 0 /100
PLATELET # BLD AUTO: 225 10E9/L (ref 150–450)
POTASSIUM SERPL-SCNC: 3.9 MMOL/L (ref 3.4–5.3)
PROT SERPL-MCNC: 6.5 G/DL (ref 6.8–8.8)
RBC # BLD AUTO: 4.38 10E12/L (ref 4.4–5.9)
SODIUM SERPL-SCNC: 140 MMOL/L (ref 133–144)
WBC # BLD AUTO: 6.2 10E9/L (ref 4–11)

## 2018-07-22 PROCEDURE — 96374 THER/PROPH/DIAG INJ IV PUSH: CPT | Mod: 59

## 2018-07-22 PROCEDURE — 80053 COMPREHEN METABOLIC PANEL: CPT | Performed by: EMERGENCY MEDICINE

## 2018-07-22 PROCEDURE — 99285 EMERGENCY DEPT VISIT HI MDM: CPT | Mod: 25

## 2018-07-22 PROCEDURE — 85025 COMPLETE CBC W/AUTO DIFF WBC: CPT | Performed by: EMERGENCY MEDICINE

## 2018-07-22 PROCEDURE — 83690 ASSAY OF LIPASE: CPT | Performed by: EMERGENCY MEDICINE

## 2018-07-22 PROCEDURE — 74177 CT ABD & PELVIS W/CONTRAST: CPT

## 2018-07-22 PROCEDURE — 80320 DRUG SCREEN QUANTALCOHOLS: CPT | Performed by: EMERGENCY MEDICINE

## 2018-07-22 PROCEDURE — 25000128 H RX IP 250 OP 636: Performed by: EMERGENCY MEDICINE

## 2018-07-22 PROCEDURE — 25000132 ZZH RX MED GY IP 250 OP 250 PS 637: Performed by: EMERGENCY MEDICINE

## 2018-07-22 PROCEDURE — 25000125 ZZHC RX 250: Performed by: EMERGENCY MEDICINE

## 2018-07-22 PROCEDURE — 96361 HYDRATE IV INFUSION ADD-ON: CPT

## 2018-07-22 RX ORDER — HYDROMORPHONE HYDROCHLORIDE 1 MG/ML
0.5 INJECTION, SOLUTION INTRAMUSCULAR; INTRAVENOUS; SUBCUTANEOUS ONCE
Status: COMPLETED | OUTPATIENT
Start: 2018-07-22 | End: 2018-07-22

## 2018-07-22 RX ORDER — IOPAMIDOL 755 MG/ML
500 INJECTION, SOLUTION INTRAVASCULAR ONCE
Status: COMPLETED | OUTPATIENT
Start: 2018-07-22 | End: 2018-07-22

## 2018-07-22 RX ORDER — SODIUM CHLORIDE 9 MG/ML
1000 INJECTION, SOLUTION INTRAVENOUS CONTINUOUS
Status: DISCONTINUED | OUTPATIENT
Start: 2018-07-22 | End: 2018-07-22

## 2018-07-22 RX ADMIN — Medication 0.5 MG: at 20:16

## 2018-07-22 RX ADMIN — IOPAMIDOL 65 ML: 755 INJECTION, SOLUTION INTRAVENOUS at 21:44

## 2018-07-22 RX ADMIN — SODIUM CHLORIDE 56 ML: 900 INJECTION, SOLUTION INTRAVENOUS at 21:44

## 2018-07-22 RX ADMIN — SODIUM CHLORIDE 1000 ML: 9 INJECTION, SOLUTION INTRAVENOUS at 20:16

## 2018-07-22 RX ADMIN — LIDOCAINE HYDROCHLORIDE 30 ML: 20 SOLUTION ORAL; TOPICAL at 20:17

## 2018-07-22 ASSESSMENT — ENCOUNTER SYMPTOMS
VOMITING: 0
ABDOMINAL PAIN: 1
NAUSEA: 0
CONSTIPATION: 1
WEAKNESS: 0

## 2018-07-22 NOTE — ED AVS SNAPSHOT
[facility] Emergency Department    [address]    [facility] [address]    Phone:  [phone]    Fax:  [phone]                                       Dashawn Ordoñez   MRN: [id number]    Department:  [facility] Emergency Department   Date of Visit:  7/22/2018           Patient Information     Date Of Birth          1960        Your diagnoses for this visit were:     Alcohol dependence with uncomplicated intoxication (H)     Chronic abdominal pain     Malignant neoplasm of stomach, unspecified location (H)        You were seen by Ankush Jessica MD and Bon Bruner MD.      Follow-up Information     Follow up with Cristian Healy MD.    Specialty:  Hematology & Oncology    Why:  As needed    Contact information:    [facility]  [address]  [facility] [address]  [phone]        Discharge References/Attachments     ALCOHOL INTOXICATION (ENGLISH)    ALCOHOLISM: RESOURCES FOR FAMILY AND FRIENDS (ENGLISH)      24 Hour Appointment Hotline       To make an appointment at any [facility] clinic, call [phone] ([phone]). If you don't have a family doctor or clinic, we will help you find one. [facility] clinics are conveniently located to serve the needs of you and your family.             Review of your medicines      Our records show that you are taking the medicines listed below. If these are incorrect, please call your family doctor or clinic.        Dose / Directions Last dose taken    diclofenac 1 % Gel topical gel   Commonly known as:  VOLTAREN   Dose:  2 g   Quantity:  1 Tube        Apply 2 g topically 4 times daily To upper quadrant of abdomin and ribs.   Refills:  0        docusate sodium 100 MG tablet   Commonly known as:  COLACE   Dose:  100 mg   Quantity:  60 tablet        Take 100 mg by mouth daily   Refills:  1        DRONABINOL PO        Refills:  0        fentaNYL 12 mcg/hr 72 hr patch   Commonly known as:  DURAGESIC   Dose:  1  patch   Quantity:  2 patch        Place 1 patch onto the skin every 72 hours remove old patch. Next change on 3/14 Wednesday at 11 am   Refills:  0        ferrous sulfate 325 (65 Fe) MG tablet   Commonly known as:  IRON   Dose:  325 mg   Quantity:  30 tablet        Take 1 tablet (325 mg) by mouth daily (with breakfast)   Refills:  2        GABAPENTIN PO   Dose:  600 mg        Take 600 mg by mouth daily   Refills:  0        HYDROmorphone 2 MG tablet   Commonly known as:  DILAUDID   Dose:  2 mg   Quantity:  12 tablet        Take 1 tablet (2 mg) by mouth every 6 hours as needed for severe pain maximum 6 tablet(s) per day   Refills:  0        LORAZEPAM PO   Dose:  0.5 mg        Take 0.5 mg by mouth every 6 hours as needed for anxiety   Refills:  0        magnesium hydroxide 400 MG/5ML suspension   Commonly known as:  MILK OF MAGNESIA   Dose:  30 mL   Quantity:  360 mL        Take 30 mLs by mouth daily as needed for constipation or heartburn   Refills:  1        mirtazapine 7.5 MG Tabs tablet   Commonly known as:  REMERON   Dose:  7.5 mg   Quantity:  30 tablet        Take 1 tablet (7.5 mg) by mouth nightly as needed   Refills:  0        naloxone 1 mg/mL for intranasal kit (2 syringes with 2 mucosal atomizer device)   Commonly known as:  NARCAN   Quantity:  1 kit        In opioid overdose put cone in nostril and push 1/2 of contents into each nostril.  Repeat every 3 min if no response until help arrives.   Refills:  0        omeprazole 40 MG capsule   Commonly known as:  priLOSEC   Dose:  40 mg   Quantity:  30 capsule        Take 1 capsule (40 mg) by mouth 2 times daily (before meals)   Refills:  3        polyethylene glycol powder   Commonly known as:  MIRALAX   Dose:  1 capful   Quantity:  225 g        Take 17 g (1 capful) by mouth daily as needed for constipation   Refills:  0        REVATIO PO   Dose:  20 mg        Take 20 mg by mouth daily as needed   Refills:  0        senna 8.6 MG tablet   Commonly known as:   SENOKOT   Dose:  1 tablet   Quantity:  30 tablet        Take 1 tablet by mouth daily   Refills:  0        sucralfate 1 GM/10ML suspension   Commonly known as:  CARAFATE   Dose:  1 g   Quantity:  420 mL        Take 10 mLs (1 g) by mouth 4 times daily   Refills:  0                Procedures and tests performed during your visit     Alcohol ethyl    CBC with platelets differential    CT Abdomen Pelvis w Contrast    Comprehensive metabolic panel    Lipase      Orders Needing Specimen Collection     None      Pending Results     No orders found for last 3 day(s).            Pending Culture Results     No orders found for last 3 day(s).            Pending Results Instructions     If you had any lab results that were not finalized at the time of your Discharge, you can call the ED Lab Result RN at [phone]. You will be contacted by this team for any positive Lab results or changes in treatment. The nurses are available 7 days a week from 10A to 6:30P.  You can leave a message 24 hours per day and they will return your call.        Test Results From Your Hospital Stay        7/22/2018  8:22 PM      Component Results     Component Value Ref Range & Units Status    WBC 6.2 4.0 - 11.0 10e9/L Final    RBC Count 4.38 (L) 4.4 - 5.9 10e12/L Final    Hemoglobin 11.1 (L) 13.3 - 17.7 g/dL Final    Hematocrit 35.6 (L) 40.0 - 53.0 % Final    MCV 81 78 - 100 fl Final    MCH 25.3 (L) 26.5 - 33.0 pg Final    MCHC 31.2 (L) 31.5 - 36.5 g/dL Final    RDW 20.8 (H) 10.0 - 15.0 % Final    Platelet Count 225 150 - 450 10e9/L Final    Diff Method Automated Method  Final    % Neutrophils 65.5 % Final    % Lymphocytes 24.2 % Final    % Monocytes 8.4 % Final    % Eosinophils 0.6 % Final    % Basophils 1.1 % Final    % Immature Granulocytes 0.2 % Final    Nucleated RBCs 0 0 /100 Final    Absolute Neutrophil 4.1 1.6 - 8.3 10e9/L Final    Absolute Lymphocytes 1.5 0.8 - 5.3 10e9/L Final    Absolute Monocytes 0.5 0.0 - 1.3 10e9/L Final    Absolute  Eosinophils 0.0 0.0 - 0.7 10e9/L Final    Absolute Basophils 0.1 0.0 - 0.2 10e9/L Final    Abs Immature Granulocytes 0.0 0 - 0.4 10e9/L Final    Absolute Nucleated RBC 0.0  Final         7/22/2018  8:41 PM      Component Results     Component Value Ref Range & Units Status    Sodium 140 133 - 144 mmol/L Final    Potassium 3.9 3.4 - 5.3 mmol/L Final    Chloride 103 94 - 109 mmol/L Final    Carbon Dioxide 26 20 - 32 mmol/L Final    Anion Gap 11 3 - 14 mmol/L Final    Glucose 92 70 - 99 mg/dL Final    Urea Nitrogen 9 7 - 30 mg/dL Final    Creatinine 0.86 0.66 - 1.25 mg/dL Final    GFR Estimate >90 >60 mL/min/1.7m2 Final    Non  GFR Calc    GFR Estimate If Black >90 >60 mL/min/1.7m2 Final    African American GFR Calc    Calcium 8.9 8.5 - 10.1 mg/dL Final    Bilirubin Total 0.4 0.2 - 1.3 mg/dL Final    Albumin 3.6 3.4 - 5.0 g/dL Final    Protein Total 6.5 (L) 6.8 - 8.8 g/dL Final    Alkaline Phosphatase 78 40 - 150 U/L Final    ALT 12 0 - 70 U/L Final    AST 14 0 - 45 U/L Final         7/22/2018  8:41 PM      Component Results     Component Value Ref Range & Units Status    Lipase 85 73 - 393 U/L Final         7/22/2018  8:41 PM      Component Results     Component Value Ref Range & Units Status    Ethanol g/dL 0.22 (H) <0.01 g/dL Final         7/22/2018 10:12 PM      Narrative     CT ABDOMEN AND PELVIS WITH CONTRAST  7/22/2018 9:47 PM    HISTORY: Gastric cancer, abdominal pain.      COMPARISON: A CT on 4/21/2018.    TECHNIQUE: Routine transverse CT imaging of the abdomen and pelvis was  performed following the uneventful administration of 65mL Isovue-370  intravenous contrast. Radiation dose for this scan was reduced using  automated exposure control, adjustment of the mA and/or kV according  to patient size, or iterative reconstruction technique.    FINDINGS: The visualized lung bases are clear. Again seen is a  prominent hemangioma in the posterior superior aspect of the dome of  the liver. This  measures just under 8 cm in greatest dimension and is  unchanged. Again seen are a few small cysts elsewhere in the liver. No  other hepatic abnormality is seen. The spleen, pancreas, gallbladder,  adrenal glands, kidneys, and bladder remain normal. Again seen are a  few mildly enlarged lymph nodes adjacent to the cardia of the stomach.  No free fluid is seen. No free intraperitoneal gas is identified.  Thickening of the wall of the gastric cardia is again seen consistent  with the patient's history of gastric cancer. This does not appear  significantly changed. Again seen are numerous diverticula of the  sigmoid colon with no evidence of diverticulitis. No other  gastrointestinal tract abnormality is seen. The appendix is not  identified. There is no additional evidence of appendicitis. There is  mild calcification in the vascular structures. There are degenerative  changes of the spine. No other osseous abnormality is demonstrated. No  abdominal or pelvic wall pathology is demonstrated.         Impression     IMPRESSION: Unchanged examination with no acute abnormality seen.  Again seen is a mass within the gastric cardia with adjacent  lymphadenopathy. There is no evidence of gastrointestinal tract  obstruction.     FUAD REILLY MD                Clinical Quality Measure: Blood Pressure Screening     Your blood pressure was checked while you were in the emergency department today. The last reading we obtained was  BP: (!) 151/95 . Please read the guidelines below about what these numbers mean and what you should do about them.  If your systolic blood pressure (the top number) is less than 120 and your diastolic blood pressure (the bottom number) is less than 80, then your blood pressure is normal. There is nothing more that you need to do about it.  If your systolic blood pressure (the top number) is 120-139 or your diastolic blood pressure (the bottom number) is 80-89, your blood pressure may be higher  than it should be. You should have your blood pressure rechecked within a year by a primary care provider.  If your systolic blood pressure (the top number) is 140 or greater or your diastolic blood pressure (the bottom number) is 90 or greater, you may have high blood pressure. High blood pressure is treatable, but if left untreated over time it can put you at risk for heart attack, stroke, or kidney failure. You should have your blood pressure rechecked by a primary care provider within the next 4 weeks.  If your provider in the emergency department today gave you specific instructions to follow-up with your doctor or provider even sooner than that, you should follow that instruction and not wait for up to 4 weeks for your follow-up visit.        Thank you for choosing [facility]       Thank you for choosing [facility] for your care. Our goal is always to provide you with excellent care. Hearing back from our patients is one way we can continue to improve our services. Please take a few minutes to complete the written survey that you may receive in the mail after you visit with us. Thank you!        [employer] Information     S[employer]ee[employer]ee gives you secure access to your electronic health record. If you see a primary care provider, you can also send messages to your care team and make appointments. If you have questions, please call your primary care clinic.  If you do not have a primary care provider, please call [phone] and they will assist you.        Care EveryWhere ID     This is your Care EveryWhere ID. This could be used by other organizations to access your [facility] medical records  [id number]        Equal Access to Services     VANESSA JOSHI : Hadii nito Brewster, waaxda luqadaha, qaybta kaalmada emma zavala. So [facility] [phone].    ATENCIÓN: Si habla español, tiene a sanz disposición servicios gratuitos de asistencia lingüística. Llame al [phone].    We  comply with applicable federal civil rights laws and [place] laws. We do not discriminate on the basis of race, color, national origin, age, disability, sex, sexual orientation, or gender identity.            After Visit Summary       This is your record. Keep this with you and show to your community pharmacist(s) and doctor(s) at your next visit.

## 2018-07-23 VITALS
RESPIRATION RATE: 18 BRPM | WEIGHT: 130 LBS | HEART RATE: 70 BPM | BODY MASS INDEX: 19.2 KG/M2 | SYSTOLIC BLOOD PRESSURE: 151 MMHG | DIASTOLIC BLOOD PRESSURE: 95 MMHG | OXYGEN SATURATION: 96 % | TEMPERATURE: 98.7 F

## 2018-07-23 LAB — ALCOHOL BREATH TEST: 0.05 (ref 0–0.01)

## 2018-07-23 PROCEDURE — 25000132 ZZH RX MED GY IP 250 OP 250 PS 637: Performed by: EMERGENCY MEDICINE

## 2018-07-23 RX ORDER — ACETAMINOPHEN 500 MG
1000 TABLET ORAL EVERY 4 HOURS PRN
Status: DISCONTINUED | OUTPATIENT
Start: 2018-07-23 | End: 2018-07-23 | Stop reason: HOSPADM

## 2018-07-23 RX ADMIN — ACETAMINOPHEN 1000 MG: 500 TABLET, FILM COATED ORAL at 03:34

## 2018-07-23 NOTE — ED NOTES
RN walked into pt's room to place pt back on the monitor.  Pt was not in room.  RN walked outside to look for pt.  Pt was outside talking to people and smoking a cigarette.  RN told pt that he was not allowed to leave and needed to stay in the room. Pt apologized and verbalized understanding. Pt ambulated back to room with steady gait and walked independently.  Dr dailey.

## 2018-07-23 NOTE — ED NOTES
Patient signed out to me at shift change.  Here on an [facility] hold for alcohol intoxication.  Wife is unwilling to come get him tonight.  Attempted dispo to Detox, but no beds available.  No SI.  Evaluated by DEC.  Remainder for lab work up and vitals reassuring.  Patient asking for anxiety and pain medication, will decline at this time.  Plan to keep him safe and comfortable.  His wife did bring in three days of meds if he is amenable to detox in the morning if a bed is available.  Once he has metabolized his current intoxication, he would obviously be free to go home to if that is what he chooses.  No psychosis symptoms.             Bon Bruner MD  07/22/18 3029

## 2018-07-23 NOTE — ED NOTES
Bed: ED06  Expected date: 7/22/18  Expected time: 7:48 PM  Means of arrival: Ambulance  Comments:  Mann Teague

## 2018-07-23 NOTE — ED NOTES
Pt spoke with MD about plan to go home. Pt doesn't not want to go tot detox, discharged to ed lobby.

## 2018-07-23 NOTE — ED PROVIDER NOTES
History     Chief Complaint:  Abdominal Pain    HPI   Dashawn Ordoñez is a 58 year old male, with a history of gastric cancer, diabetes, HTN, and alcohol abuse, who presents  to the emergency department for evaluation of upper central abdominal pain. The patient reports severe abdominal pain starting today. He reports his last bowel movement was Thursday. He denies any vomiting, nausea, chest pain or weakness. He reports drinking alcohol for the pain.    Allergies:  No known drug allergies     Medications:    Colace  Dronabinol  Fentanyl  Iron  Gabapentin  Hydromorphone  Lorazepam  Magnesium Hydroxide  Remeron  Prilosec  Miralax  Senna  Revatio  Carafate    Past Medical History:    Gastric ulcer  GIB  Type 2 diabetes  Gastric cancer  HTN  Alcohol abuse  Tobacco abuse  Anxiety  Chronic pain syndrome  Depression  Opioid dependence    Past Surgical History:    Appendectomy  ENT surgery    Family History:    History reviewed. No pertinent family history.     Social History:  Smoking status: Current 2.0 ppd  Alcohol use: Yes  The patient presents to the emergency department by himself.  Marital Status:   [2]     Review of Systems   Cardiovascular: Negative for chest pain.   Gastrointestinal: Positive for abdominal pain and constipation. Negative for nausea and vomiting.   Neurological: Negative for weakness.   All other systems reviewed and are negative.    Physical Exam   Patient Vitals for the past 24 hrs:   BP Temp Temp src Pulse Resp SpO2 Weight   07/22/18 2037 (!) 158/98 - - - - 98 % -   07/22/18 2017 (!) 138/96 - - - - 99 % -   07/22/18 1956 (!) 132/98 98.7  F (37.1  C) Oral 92 18 99 % 59 kg (130 lb)     Physical Exam  Constitutional: Emaciated. Intoxicated.  Head: Atraumatic.  Mouth/Throat: Oropharynx is clear and moist. No oropharyngeal exudate.  Eyes: Conjunctivae and EOM are normal. No scleral icterus.  Neck: Normal range of motion. Neck supple.   Cardiovascular: Normal rate, regular rhythm, normal heart  "sounds and intact distal pulses.   Pulmonary/Chest: Breath sounds normal. No respiratory distress.  Abdominal: Soft. Bowel sounds are normal. No distension. No tenderness. No rebound or guarding. Localizes as midline generalized pain without tenderness.  Musculoskeletal: Normal range of motion. No edema or tenderness.   Neurological: Alert and orientated to person, place, and time. No observable focal neuro deficit  Skin: Warm and dry. No rash noted. Not diaphoretic.     Emergency Department Course   Imaging:  Radiographic findings were communicated with the patient who voiced understanding of the findings.    CT Abdomen Pelvis w Contrast  IMPRESSION: Unchanged examination with no acute abnormality seen.  Again seen is a mass within the gastric cardia with adjacent  lymphadenopathy. There is no evidence of gastrointestinal tract  obstruction.   As read by Radiology.    Laboratory:  CBC: HGB 11.1 (L) o/w WNL (WBC 6.2, )  CMP: Protein total 6.5 (L) o/w WNL (Creatinine 0.86)  Lipase: 85    Alcohol ethyl: 0.22 (H)    Interventions:  2016 NS 1L IV Bolus  2016 Dilaudid 0.5 mg IV  2017 GI cocktail 30 mL PO    Emergency Department Course:  Patient arrived via EMS.    Past medical records, nursing notes, and vitals reviewed.  2000: I performed an exam of the patient and obtained history, as documented above.     IV inserted and blood drawn.    The patient was sent for an abdominal CT while in the emergency department, findings above.     2148: I rechecked the patient. Explained findings to the patient.    I discussed the patient with his wife.    I signed out the patient to my partner Dr. Bruner.  Impression & Plan    Medical Decision Making:  Pt present intoxicated brought in by police and EMS.  Was out in public intoxicated looking for more EtOH.  Has gastric cancer likely end stage.  States drinking for pain control.  I don't believe this  after talking with wife that family \"needs a night off\" since his drinking " seems to be out of control.  He denies SI HI AH VH.   DEC involved please see their note.  He has no new medical or surgical emergency to account for any new pain and I believe all his sx are chronic.  He is medically cleared.  The first peel of his mental health onion that needs to be addressed is detox.  Wife agrees.  She will bring three days worth of meds and pt put on central intake list.  Pt care transition pending final dispo.       Diagnosis:    ICD-10-CM   1. Alcohol dependence with uncomplicated intoxication (H) F10.220   2. Chronic abdominal pain R10.9    G89.29   3. Malignant neoplasm of stomach, unspecified location (H) C16.9     Disposition:  Patient signed out to Dr. Bruner.  Hien Wheeler  7/22/2018   [facility] EMERGENCY DEPARTMENT  Scribe Disclosure:  I, Hien Wheeler, am serving as a scribe at 8:00 PM on 7/22/2018 to document services personally performed by Ankush Jessica MD based on my observations and the provider's statements to me.      Ankush Jessica MD  07/23/18 5636

## 2018-07-23 NOTE — ED NOTES
Recheck on patient.  Breath EtOH now 0.05.  Patient is not interested in going to detox.  He is requesting to go home.  No medical indication for admission.  No signs of EtOH withdrawal.  [facility] hold taken off.  Patient knows the emergency department is always open and we are happy to see him with future concerns.       Bon Bruner MD  07/23/18 7680

## 2018-07-23 NOTE — ED TRIAGE NOTES
"Pt arrives via EMS.  Pt c/o upper abd pain that started today. Pt has stomach cancer stage 4.  Last bowel movement was on Thursday, no blood stool. No vomiting.  Pt admits to alcohol use today. States he had a \"few beers\".  Pt passing gas. No chest pain.   "

## 2019-12-15 ENCOUNTER — HEALTH MAINTENANCE LETTER (OUTPATIENT)
Age: 59
End: 2019-12-15

## 2020-08-20 NOTE — ED AVS SNAPSHOT
[facility] Emergency Department    [address]    [facility] [address]    Phone:  [phone]    Fax:  [phone]                                       Dashawn Ordoñez   MRN: [id number]    Department:  [facility] Emergency Department   Date of Visit:  7/22/2018           After Visit Summary Signature Page     I have received my discharge instructions, and my questions have been answered. I have discussed any challenges I see with this plan with the nurse or doctor.    ..........................................................................................................................................  Patient/Patient Representative Signature      ..........................................................................................................................................  Patient Representative Print Name and Relationship to Patient    ..................................................               ................................................  Date                                            Time    ..........................................................................................................................................  Reviewed by Signature/Title    ...................................................              ..............................................  Date                                                            Time           FEVER

## 2021-01-15 ENCOUNTER — HEALTH MAINTENANCE LETTER (OUTPATIENT)
Age: 61
End: 2021-01-15

## 2021-09-04 ENCOUNTER — HEALTH MAINTENANCE LETTER (OUTPATIENT)
Age: 61
End: 2021-09-04

## 2022-02-13 ENCOUNTER — HEALTH MAINTENANCE LETTER (OUTPATIENT)
Age: 62
End: 2022-02-13

## 2022-04-10 ENCOUNTER — HEALTH MAINTENANCE LETTER (OUTPATIENT)
Age: 62
End: 2022-04-10

## 2022-10-16 ENCOUNTER — HEALTH MAINTENANCE LETTER (OUTPATIENT)
Age: 62
End: 2022-10-16

## 2023-03-26 ENCOUNTER — HEALTH MAINTENANCE LETTER (OUTPATIENT)
Age: 63
End: 2023-03-26

## 2023-12-17 NOTE — PROVIDER NOTIFICATION
Pt complaining of restlessness. IV dilaudid given for pain at 0153. Pt requesting medication for the restlessness. MD paged.  
Admission
